# Patient Record
Sex: MALE | Race: WHITE | Employment: OTHER | URBAN - METROPOLITAN AREA
[De-identification: names, ages, dates, MRNs, and addresses within clinical notes are randomized per-mention and may not be internally consistent; named-entity substitution may affect disease eponyms.]

---

## 2017-12-30 PROBLEM — I21.4 NSTEMI (NON-ST ELEVATED MYOCARDIAL INFARCTION) (HCC): Status: ACTIVE | Noted: 2017-12-30

## 2018-01-02 ENCOUNTER — TELEPHONE (OUTPATIENT)
Dept: CARDIOLOGY | Age: 53
End: 2018-01-02

## 2018-01-02 ENCOUNTER — TELEPHONE (OUTPATIENT)
Dept: CARDIOLOGY CLINIC | Age: 53
End: 2018-01-02

## 2018-01-02 RX ORDER — CLOPIDOGREL BISULFATE 75 MG/1
75 TABLET ORAL DAILY
Qty: 30 TABLET | Refills: 11 | Status: SHIPPED | OUTPATIENT
Start: 2018-01-02 | End: 2018-11-23 | Stop reason: SDUPTHER

## 2018-01-03 NOTE — TELEPHONE ENCOUNTER
I called and spoke with patient's wife and she states that Martir Garza picked up his Plavix yesterday and starting taking it right away. I discussed that he should not miss any doses of the medication.

## 2018-01-11 ENCOUNTER — TELEPHONE (OUTPATIENT)
Dept: CARDIOLOGY CLINIC | Age: 53
End: 2018-01-11

## 2018-01-11 NOTE — TELEPHONE ENCOUNTER
Pt states he had a heart attack on 12/30/17 and he is wondering if he is ok to have sex. Please call pt to advise.

## 2018-01-16 ENCOUNTER — OFFICE VISIT (OUTPATIENT)
Dept: CARDIOLOGY CLINIC | Age: 53
End: 2018-01-16

## 2018-01-16 VITALS
HEART RATE: 79 BPM | DIASTOLIC BLOOD PRESSURE: 58 MMHG | OXYGEN SATURATION: 92 % | WEIGHT: 138 LBS | BODY MASS INDEX: 25.4 KG/M2 | HEIGHT: 62 IN | SYSTOLIC BLOOD PRESSURE: 86 MMHG

## 2018-01-16 DIAGNOSIS — I22.2 SUBSEQUENT NON-ST ELEVATION (NSTEMI) MYOCARDIAL INFARCTION WITHIN 4 WEEKS OF INITIAL INFARCTION (HCC): ICD-10-CM

## 2018-01-16 DIAGNOSIS — I25.10 CORONARY ARTERY DISEASE INVOLVING NATIVE CORONARY ARTERY OF NATIVE HEART WITHOUT ANGINA PECTORIS: Primary | ICD-10-CM

## 2018-01-16 DIAGNOSIS — E78.00 PURE HYPERCHOLESTEROLEMIA: ICD-10-CM

## 2018-01-16 PROCEDURE — G8427 DOCREV CUR MEDS BY ELIG CLIN: HCPCS | Performed by: NURSE PRACTITIONER

## 2018-01-16 PROCEDURE — G8419 CALC BMI OUT NRM PARAM NOF/U: HCPCS | Performed by: NURSE PRACTITIONER

## 2018-01-16 PROCEDURE — 99214 OFFICE O/P EST MOD 30 MIN: CPT | Performed by: NURSE PRACTITIONER

## 2018-01-16 PROCEDURE — 4004F PT TOBACCO SCREEN RCVD TLK: CPT | Performed by: NURSE PRACTITIONER

## 2018-01-16 PROCEDURE — 1111F DSCHRG MED/CURRENT MED MERGE: CPT | Performed by: NURSE PRACTITIONER

## 2018-01-16 PROCEDURE — 3017F COLORECTAL CA SCREEN DOC REV: CPT | Performed by: NURSE PRACTITIONER

## 2018-01-16 PROCEDURE — G8484 FLU IMMUNIZE NO ADMIN: HCPCS | Performed by: NURSE PRACTITIONER

## 2018-01-16 PROCEDURE — G8598 ASA/ANTIPLAT THER USED: HCPCS | Performed by: NURSE PRACTITIONER

## 2018-01-16 RX ORDER — NICOTINE 21 MG/24HR
1 PATCH, TRANSDERMAL 24 HOURS TRANSDERMAL EVERY 24 HOURS
Qty: 30 PATCH | Refills: 1 | Status: SHIPPED | OUTPATIENT
Start: 2018-01-16 | End: 2018-03-05 | Stop reason: SDUPTHER

## 2018-01-16 RX ORDER — METOPROLOL SUCCINATE 25 MG/1
12.5 TABLET, EXTENDED RELEASE ORAL DAILY
Qty: 30 TABLET | Refills: 3 | Status: SHIPPED | OUTPATIENT
Start: 2018-01-16 | End: 2018-08-28 | Stop reason: SDUPTHER

## 2018-01-16 NOTE — COMMUNICATION BODY
Assessment:    1. Coronary artery disease involving native coronary artery of native heart without angina pectoris    2. Subsequent non-ST elevation (NSTEMI) myocardial infarction within 4 weeks of initial infarction ([facility] [address]    3. Pure hypercholesterolemia             Plan:   1. Stop the metoprolol (Lopressor) 25 mg twice daily  2. Start instead metoprolol er (Toprol XL) 25 mg, half tablet once daily (hypotension)  3. Avoid ibuprofen, motrin, advil (NSAIDS) since on the \"blood thinners\"  4. Schedule a stress test to look at the other blockage  5. Follow a low saturated fat diet (handout given)  6. Referral to cardiac rehab phase II  7.  Follow up with me in 4-6 weeks, 3 months with Dr. Tyrell Lebron

## 2018-01-16 NOTE — PROGRESS NOTES
cyanosis of the extremities.   · No edema  · Pedal Pulses: 2+ and equal     Abdomen:  · No masses or tenderness  · Liver/Spleen: No Abnormalities Noted  Neurological/Psychiatric:  · Alert and oriented in all spheres  · Moves all extremities well  · Exhibits normal gait balance and coordination  · No abnormalities of mood, affect, memory, mentation, or behavior are noted    Lab Data:  CBC:   Lab Results   Component Value Date    WBC 14.3 12/31/2017    WBC 14.5 12/30/2017    WBC 14.8 08/16/2016    RBC 5.29 12/31/2017    RBC 5.70 12/30/2017    RBC 5.33 08/16/2016    HGB 14.8 12/31/2017    HGB 16.2 12/30/2017    HGB 14.9 08/16/2016    HCT 44.6 12/31/2017    HCT 47.2 12/30/2017    HCT 44.8 08/16/2016    MCV 84.3 12/31/2017    MCV 82.8 12/30/2017    MCV 84.0 08/16/2016    RDW 13.6 12/31/2017    RDW 13.4 12/30/2017    RDW 13.4 08/16/2016     12/31/2017     12/30/2017     08/16/2016     BMP:  Lab Results   Component Value Date     12/31/2017     12/30/2017     08/16/2016    K 3.7 12/31/2017    K 3.3 12/30/2017    K 3.6 08/16/2016     12/31/2017    CL 95 12/30/2017    CL 98 08/16/2016    CO2 22 12/31/2017    CO2 27 12/30/2017    CO2 23 08/16/2016    PHOS 3.8 08/15/2011    BUN 6 12/31/2017    BUN 7 12/30/2017    BUN 7 08/16/2016    CREATININE 0.6 12/31/2017    CREATININE 0.7 12/30/2017    CREATININE 0.6 08/16/2016     BNP:   Lab Results   Component Value Date    PROBNP 1,226 12/30/2017     Troponin: No components found for: TROPONIN  Lipids:   Lab Results   Component Value Date    TRIG 208 12/31/2017    TRIG 446 08/15/2011    HDL 22 12/31/2017    HDL 30 08/15/2011    LDLCALC 80 12/31/2017    LDLDIRECT 99 08/15/2011     Cardiac Imaging:  LEFT HEART CATH  LM: short  LAD: long section of disease in mid LAD from septal to diag 2,  mid LAD plaque rupture with thrombus and 80% stenosis (ADAN-2 flow)  Ramus: vs high OM1, proximal 15%  LCX: small vessel, luminals  RCA: dominant, moderate diffuse diseae in mid with sting of perals configuration, distal RCA of 70%, ostial PLV 90%(short focal, seen in BETH)     LVEDP: 22  LVEF: 30-35% with moderate anterior, apex, and inferior apical hypokinesis     PCI of mid LAD  STENT: Xience 3.0 x 38mm post dilated using 3x15mm NC trek with pressures of 16-22atm, sluggish flow afterwards requiring NTG and nitroprusside    Assessment  1. Successful PCI to mid LAD for active plaque rupture. 80%-->0% using drug stent  2. Integrillin for 12 hrs  3. ASA 81mg poqday for life  4. Ticagrelor 90mg po BID for 1 yr w/o interruption  5. Staged ischemic eval of distal RCA./PLV  6. BB, statin, echo, cardiac rehab       Assessment:    1. Coronary artery disease involving native coronary artery of native heart without angina pectoris    2. Subsequent non-ST elevation (NSTEMI) myocardial infarction within 4 weeks of initial infarction ([facility] [address]    3. Pure hypercholesterolemia          Plan:   1. Stop the metoprolol (Lopressor) 25 mg twice daily  2. Start instead metoprolol er (Toprol XL) 25 mg, half tablet once daily (hypotension)  3. Avoid ibuprofen, motrin, advil (NSAIDS) since on the \"blood thinners\"  4. Schedule a stress test to look at the other blockage  5. Follow a low saturated fat diet (handout given)  6. Referral to cardiac rehab phase II  7. Follow up with me in 4-6 weeks, 3 months with Dr. Nigel Barker      I appreciate the opportunity of cooperating in the care of this individual.    Jeanne Rosenbaum CNP, 1/16/2018, 12:36 PM    QUALITY MEASURES  1. Tobacco Cessation Counseling: Yes  2. Retake of BP if >140/90:   NA  3. Documentation to PCP/referring for new patient:  Sent to PCP at close of office visit  4. CAD patient on anti-platelet: Yes  5. CAD patient on STATIN therapy:  Yes  6.  Patient with CHF and aFib on anticoagulation:  NA

## 2018-01-16 NOTE — PATIENT INSTRUCTIONS
1. Stop the metoprolol (Lopressor) 25 mg twice daily  2. Start instead metoprolol er (Toprol XL) 25 mg, half tablet once daily  3. Avoid ibuprofen, motrin, advil (NSAIDS) since on the \"blood thinners\"  4. Schedule a stress test to look at the other blockage  5. Follow a low saturated fat diet  6. Referral to cardiac rehab phase II  7. Follow up with me in 4-6 weeks, 3 months with Dr. Erich Tracey       Ref.  Range 12/31/2017 04:13   Cholesterol, Total Latest Ref Range: 0 - 199 mg/dL 144   HDL Cholesterol Latest Ref Range: 40 - 60 mg/dL 22 (L)   LDL Calculated Latest Ref Range: <70 mg/dL 80   Triglycerides Latest Ref Range: 0 - 150 mg/dL 208 (H)

## 2018-02-01 ENCOUNTER — HOSPITAL ENCOUNTER (OUTPATIENT)
Dept: NON INVASIVE DIAGNOSTICS | Age: 53
Discharge: HOME | End: 2018-02-01
Attending: NURSE PRACTITIONER | Admitting: NURSE PRACTITIONER

## 2018-02-01 VITALS
DIASTOLIC BLOOD PRESSURE: 70 MMHG | HEART RATE: 77 BPM | WEIGHT: 140 LBS | SYSTOLIC BLOOD PRESSURE: 110 MMHG | TEMPERATURE: 97.5 F | RESPIRATION RATE: 15 BRPM | BODY MASS INDEX: 25.61 KG/M2

## 2018-02-01 DIAGNOSIS — I25.10 ATHEROSCLEROTIC HEART DISEASE OF NATIVE CORONARY ARTERY WITHOUT ANGINA PECTORIS: ICD-10-CM

## 2018-02-01 LAB
LV EF: 65 %
LVEF MODALITY: NORMAL

## 2018-02-01 ASSESSMENT — PAIN - FUNCTIONAL ASSESSMENT: PAIN_FUNCTIONAL_ASSESSMENT: 0-10

## 2018-02-02 ENCOUNTER — TELEPHONE (OUTPATIENT)
Dept: CARDIOLOGY CLINIC | Age: 53
End: 2018-02-02

## 2018-02-02 NOTE — TELEPHONE ENCOUNTER
Spoke with Asiya Buchanan, relayed message per [employer] regarding stress test results.  Pt verbalized understanding and will see [employer] at appt on 2/21

## 2018-02-02 NOTE — TELEPHONE ENCOUNTER
----- Message from Nicanor Alvarez NP sent at 2/2/2018  8:01 AM EST -----  No evidence of ischemia in the territory to the RCA. Heart function is good. No new recommendations. Keep upcoming appointments.    Thanks, [employer]

## 2018-02-05 ENCOUNTER — PATIENT MESSAGE (OUTPATIENT)
Dept: CARDIOLOGY CLINIC | Age: 53
End: 2018-02-05

## 2018-02-06 NOTE — TELEPHONE ENCOUNTER
From: Rachel Cisneros  To: Thu Alvarenga NP  Sent: 2/5/2018 11:47 PM EST  Subject: Test Results Question    will I require to have surgery on my heart ?

## 2018-02-19 PROBLEM — I20.0 UNSTABLE ANGINA (HCC): Status: ACTIVE | Noted: 2018-02-19

## 2018-03-05 RX ORDER — NICOTINE 14MG/24HR
1 PATCH, TRANSDERMAL 24 HOURS TRANSDERMAL EVERY 24 HOURS
Qty: 28 PATCH | Refills: 0 | Status: SHIPPED | OUTPATIENT
Start: 2018-03-05 | End: 2018-03-29 | Stop reason: SDUPTHER

## 2018-03-14 ENCOUNTER — OFFICE VISIT (OUTPATIENT)
Dept: CARDIOLOGY CLINIC | Age: 53
End: 2018-03-14

## 2018-03-14 VITALS
OXYGEN SATURATION: 93 % | SYSTOLIC BLOOD PRESSURE: 102 MMHG | BODY MASS INDEX: 25.95 KG/M2 | HEIGHT: 62 IN | HEART RATE: 78 BPM | WEIGHT: 141 LBS | DIASTOLIC BLOOD PRESSURE: 64 MMHG

## 2018-03-14 DIAGNOSIS — I25.5 ISCHEMIC CARDIOMYOPATHY: ICD-10-CM

## 2018-03-14 DIAGNOSIS — E78.00 PURE HYPERCHOLESTEROLEMIA: ICD-10-CM

## 2018-03-14 DIAGNOSIS — I10 ESSENTIAL HYPERTENSION: ICD-10-CM

## 2018-03-14 DIAGNOSIS — I25.10 CORONARY ARTERY DISEASE INVOLVING NATIVE CORONARY ARTERY OF NATIVE HEART WITHOUT ANGINA PECTORIS: Primary | ICD-10-CM

## 2018-03-14 PROCEDURE — G8598 ASA/ANTIPLAT THER USED: HCPCS | Performed by: NURSE PRACTITIONER

## 2018-03-14 PROCEDURE — 1036F TOBACCO NON-USER: CPT | Performed by: NURSE PRACTITIONER

## 2018-03-14 PROCEDURE — G8484 FLU IMMUNIZE NO ADMIN: HCPCS | Performed by: NURSE PRACTITIONER

## 2018-03-14 PROCEDURE — 1111F DSCHRG MED/CURRENT MED MERGE: CPT | Performed by: NURSE PRACTITIONER

## 2018-03-14 PROCEDURE — 99213 OFFICE O/P EST LOW 20 MIN: CPT | Performed by: NURSE PRACTITIONER

## 2018-03-14 PROCEDURE — 3017F COLORECTAL CA SCREEN DOC REV: CPT | Performed by: NURSE PRACTITIONER

## 2018-03-14 PROCEDURE — G8419 CALC BMI OUT NRM PARAM NOF/U: HCPCS | Performed by: NURSE PRACTITIONER

## 2018-03-14 PROCEDURE — G8427 DOCREV CUR MEDS BY ELIG CLIN: HCPCS | Performed by: NURSE PRACTITIONER

## 2018-03-14 NOTE — PROGRESS NOTES
[address]   Cardiac Evaluation    Primary Care Doctor:  Samm Coley CNP    Chief Complaint   Patient presents with    Follow Up After Procedure     s/p cath by jjp 02/20/2018    Coronary Artery Disease    Results     cxr 02/19/2018    Discuss Labs     cmp 02/20/2018, lip 12/31/2017    Other     groin site healed    Shortness of Breath     always    Fatigue     always tired        History of Present Illness:   I had the pleasure of seeing Merlene Tompkins in follow up for CAD, PCI to LAD, ICM, HTN and HLD. At last visit we changed metoprolol to Toprol XL 12.5 mg once daily and recommended avoidance of [place], baking soda and NSAIDS. A nuclear stress test was negative for ischemia. He was then hospitalized 2/19/18 to 2/21/18 for lightheadedness, diaphoresis and chest pain. A repeat LHC showed patent stent in LAD and 40-50% obstruction in the RCA, no intervention warranted. He was started Imdur which is causing him a headache. No further chest pain, lightheadedness or diaphoresis. He reports some dizziness with standing too long (doing dishes). He was treated for bronchitis with course of antibiotics, now resolved. However, he continues to have dyspnea on exertion with just walking to the bathroom, ongoing since had MI/ PCI. He remains a non-smoker since 12/31/17, no longer using the patches. Merlene Tompkins describes symptoms including dyspnea, fatigue but denies chest pain, palpitations, orthopnea, PND, early saiety, edema, syncope. NYHA:   III  ACC/ AHA Stage:    C    Past Medical History:   has a past medical history of Family history of early CAD; GERD (gastroesophageal reflux disease); High cholesterol; MI (myocardial infarction); and Smoker. Surgical History:   has a past surgical history that includes Cholecystectomy and Coronary angioplasty with stent (12/30/2017). Social History:   reports that he has quit smoking.  His smoking use included

## 2018-03-30 RX ORDER — NICOTINE 14MG/24HR
1 PATCH, TRANSDERMAL 24 HOURS TRANSDERMAL EVERY 24 HOURS
Qty: 28 PATCH | Refills: 0 | Status: SHIPPED | OUTPATIENT
Start: 2018-03-30 | End: 2018-06-20 | Stop reason: ALTCHOICE

## 2018-04-16 RX ORDER — LORATADINE 10 MG
TABLET ORAL
Qty: 30 TABLET | Refills: 3 | Status: SHIPPED | OUTPATIENT
Start: 2018-04-16 | End: 2018-08-01 | Stop reason: SDUPTHER

## 2018-04-16 RX ORDER — ATORVASTATIN CALCIUM 40 MG/1
40 TABLET, FILM COATED ORAL NIGHTLY
Qty: 30 TABLET | Refills: 3 | Status: SHIPPED | OUTPATIENT
Start: 2018-04-16 | End: 2018-08-01 | Stop reason: SDUPTHER

## 2018-04-16 RX ORDER — NITROGLYCERIN 0.4 MG/1
TABLET SUBLINGUAL
Qty: 25 TABLET | Refills: 3 | Status: SHIPPED | OUTPATIENT
Start: 2018-04-16 | End: 2018-08-01 | Stop reason: SDUPTHER

## 2018-04-23 RX ORDER — NICOTINE 14MG/24HR
1 PATCH, TRANSDERMAL 24 HOURS TRANSDERMAL EVERY 24 HOURS
Qty: 28 PATCH | Refills: 0 | OUTPATIENT
Start: 2018-04-23

## 2018-05-08 RX ORDER — NICOTINE 14MG/24HR
1 PATCH, TRANSDERMAL 24 HOURS TRANSDERMAL EVERY 24 HOURS
Qty: 28 PATCH | Refills: 1 | OUTPATIENT
Start: 2018-05-08

## 2018-05-21 ENCOUNTER — PATIENT MESSAGE (OUTPATIENT)
Dept: CARDIOLOGY CLINIC | Age: 53
End: 2018-05-21

## 2018-05-23 ENCOUNTER — PATIENT MESSAGE (OUTPATIENT)
Dept: CARDIOLOGY CLINIC | Age: 53
End: 2018-05-23

## 2018-06-20 ENCOUNTER — OFFICE VISIT (OUTPATIENT)
Dept: CARDIOLOGY CLINIC | Age: 53
End: 2018-06-20

## 2018-06-20 VITALS
OXYGEN SATURATION: 95 % | SYSTOLIC BLOOD PRESSURE: 108 MMHG | HEART RATE: 86 BPM | HEIGHT: 62 IN | WEIGHT: 147 LBS | BODY MASS INDEX: 27.05 KG/M2 | DIASTOLIC BLOOD PRESSURE: 62 MMHG

## 2018-06-20 DIAGNOSIS — I25.5 ISCHEMIC CARDIOMYOPATHY: ICD-10-CM

## 2018-06-20 DIAGNOSIS — I25.10 CORONARY ARTERY DISEASE INVOLVING NATIVE CORONARY ARTERY OF NATIVE HEART WITHOUT ANGINA PECTORIS: Primary | ICD-10-CM

## 2018-06-20 DIAGNOSIS — I10 ESSENTIAL HYPERTENSION: ICD-10-CM

## 2018-06-20 DIAGNOSIS — E78.00 PURE HYPERCHOLESTEROLEMIA: ICD-10-CM

## 2018-06-20 PROCEDURE — G8419 CALC BMI OUT NRM PARAM NOF/U: HCPCS | Performed by: NURSE PRACTITIONER

## 2018-06-20 PROCEDURE — 1036F TOBACCO NON-USER: CPT | Performed by: NURSE PRACTITIONER

## 2018-06-20 PROCEDURE — 3017F COLORECTAL CA SCREEN DOC REV: CPT | Performed by: NURSE PRACTITIONER

## 2018-06-20 PROCEDURE — G8598 ASA/ANTIPLAT THER USED: HCPCS | Performed by: NURSE PRACTITIONER

## 2018-06-20 PROCEDURE — G8427 DOCREV CUR MEDS BY ELIG CLIN: HCPCS | Performed by: NURSE PRACTITIONER

## 2018-06-20 PROCEDURE — 99213 OFFICE O/P EST LOW 20 MIN: CPT | Performed by: NURSE PRACTITIONER

## 2018-06-20 RX ORDER — ISOSORBIDE MONONITRATE 30 MG/1
30 TABLET, EXTENDED RELEASE ORAL DAILY
Qty: 30 TABLET | Refills: 5 | Status: SHIPPED | OUTPATIENT
Start: 2018-06-20 | End: 2018-09-19 | Stop reason: ALTCHOICE

## 2018-06-20 RX ORDER — FOLIC ACID/MULTIVIT,IRON,MINER 0.4MG-18MG
1 TABLET ORAL 2 TIMES DAILY
Qty: 60 CAPSULE | Refills: 5 | Status: SHIPPED | OUTPATIENT
Start: 2018-06-20 | End: 2018-12-03 | Stop reason: SDUPTHER

## 2018-07-16 ENCOUNTER — APPOINTMENT (OUTPATIENT)
Dept: CT IMAGING | Age: 53
DRG: 347 | End: 2018-07-16
Payer: MEDICAID

## 2018-07-16 ENCOUNTER — HOSPITAL ENCOUNTER (INPATIENT)
Age: 53
LOS: 1 days | Discharge: HOME HEALTH | DRG: 347 | End: 2018-07-17
Attending: EMERGENCY MEDICINE | Admitting: INTERNAL MEDICINE
Payer: MEDICAID

## 2018-07-16 ENCOUNTER — APPOINTMENT (OUTPATIENT)
Dept: GENERAL RADIOLOGY | Age: 53
DRG: 347 | End: 2018-07-16
Payer: MEDICAID

## 2018-07-16 DIAGNOSIS — K86.9 PANCREATIC LESION: ICD-10-CM

## 2018-07-16 DIAGNOSIS — S22.070A CLOSED WEDGE COMPRESSION FRACTURE OF TENTH THORACIC VERTEBRA, INITIAL ENCOUNTER: ICD-10-CM

## 2018-07-16 DIAGNOSIS — I95.9 HYPOTENSION, UNSPECIFIED HYPOTENSION TYPE: Primary | ICD-10-CM

## 2018-07-16 DIAGNOSIS — R09.02 HYPOXIA: ICD-10-CM

## 2018-07-16 PROBLEM — I20.0 UNSTABLE ANGINA (HCC): Status: RESOLVED | Noted: 2018-02-19 | Resolved: 2018-07-16

## 2018-07-16 PROBLEM — I21.4 NSTEMI (NON-ST ELEVATED MYOCARDIAL INFARCTION) (HCC): Status: RESOLVED | Noted: 2017-12-30 | Resolved: 2018-07-16

## 2018-07-16 LAB
A/G RATIO: 1.3 (ref 1.1–2.2)
ALBUMIN SERPL-MCNC: 3.9 G/DL (ref 3.4–5)
ALP BLD-CCNC: 118 U/L (ref 40–129)
ALT SERPL-CCNC: 28 U/L (ref 10–40)
ANION GAP SERPL CALCULATED.3IONS-SCNC: 8 MMOL/L (ref 3–16)
AST SERPL-CCNC: 25 U/L (ref 15–37)
BACTERIA: ABNORMAL /HPF
BASOPHILS ABSOLUTE: 0.1 K/UL (ref 0–0.2)
BASOPHILS RELATIVE PERCENT: 0.9 %
BILIRUB SERPL-MCNC: 0.6 MG/DL (ref 0–1)
BILIRUBIN URINE: NEGATIVE
BLOOD, URINE: NEGATIVE
BUN BLDV-MCNC: 9 MG/DL (ref 7–20)
CALCIUM SERPL-MCNC: 9.1 MG/DL (ref 8.3–10.6)
CASTS 2: ABNORMAL /LPF
CASTS: ABNORMAL /LPF
CHLORIDE BLD-SCNC: 104 MMOL/L (ref 99–110)
CLARITY: CLEAR
CO2: 25 MMOL/L (ref 21–32)
COLOR: YELLOW
CREAT SERPL-MCNC: 0.8 MG/DL (ref 0.9–1.3)
EOSINOPHILS ABSOLUTE: 0.1 K/UL (ref 0–0.6)
EOSINOPHILS RELATIVE PERCENT: 0.5 %
GFR AFRICAN AMERICAN: >60
GFR NON-AFRICAN AMERICAN: >60
GLOBULIN: 3.1 G/DL
GLUCOSE BLD-MCNC: 142 MG/DL (ref 70–99)
GLUCOSE URINE: NEGATIVE MG/DL
HCT VFR BLD CALC: 41.4 % (ref 40.5–52.5)
HEMOGLOBIN: 13.9 G/DL (ref 13.5–17.5)
KETONES, URINE: NEGATIVE MG/DL
LACTIC ACID: 1 MMOL/L (ref 0.4–2)
LEUKOCYTE ESTERASE, URINE: NEGATIVE
LYMPHOCYTES ABSOLUTE: 1.7 K/UL (ref 1–5.1)
LYMPHOCYTES RELATIVE PERCENT: 13.2 %
MCH RBC QN AUTO: 27.9 PG (ref 26–34)
MCHC RBC AUTO-ENTMCNC: 33.5 G/DL (ref 31–36)
MCV RBC AUTO: 83.4 FL (ref 80–100)
MICROSCOPIC EXAMINATION: ABNORMAL
MONOCYTES ABSOLUTE: 0.6 K/UL (ref 0–1.3)
MONOCYTES RELATIVE PERCENT: 4.8 %
MUCUS: ABNORMAL /LPF
NEUTROPHILS ABSOLUTE: 10.1 K/UL (ref 1.7–7.7)
NEUTROPHILS RELATIVE PERCENT: 80.6 %
NITRITE, URINE: NEGATIVE
PDW BLD-RTO: 13.4 % (ref 12.4–15.4)
PH UA: 6
PLATELET # BLD: 247 K/UL (ref 135–450)
PMV BLD AUTO: 8.4 FL (ref 5–10.5)
POTASSIUM SERPL-SCNC: 4.1 MMOL/L (ref 3.5–5.1)
PROTEIN UA: NEGATIVE MG/DL
RBC # BLD: 4.97 M/UL (ref 4.2–5.9)
RBC UA: ABNORMAL /HPF (ref 0–2)
SODIUM BLD-SCNC: 137 MMOL/L (ref 136–145)
SPECIFIC GRAVITY UA: 1.02
TOTAL PROTEIN: 7 G/DL (ref 6.4–8.2)
TROPONIN: <0.01 NG/ML
TROPONIN: <0.01 NG/ML
UROBILINOGEN, URINE: 0.2 E.U./DL
WBC # BLD: 12.5 K/UL (ref 4–11)
WBC UA: ABNORMAL /HPF (ref 0–5)

## 2018-07-16 PROCEDURE — 84484 ASSAY OF TROPONIN QUANT: CPT

## 2018-07-16 PROCEDURE — 71275 CT ANGIOGRAPHY CHEST: CPT

## 2018-07-16 PROCEDURE — 2700000000 HC OXYGEN THERAPY PER DAY

## 2018-07-16 PROCEDURE — 6360000004 HC RX CONTRAST MEDICATION: Performed by: PHYSICIAN ASSISTANT

## 2018-07-16 PROCEDURE — 93005 ELECTROCARDIOGRAM TRACING: CPT | Performed by: PHYSICIAN ASSISTANT

## 2018-07-16 PROCEDURE — 6360000002 HC RX W HCPCS: Performed by: INTERNAL MEDICINE

## 2018-07-16 PROCEDURE — 99285 EMERGENCY DEPT VISIT HI MDM: CPT

## 2018-07-16 PROCEDURE — 94761 N-INVAS EAR/PLS OXIMETRY MLT: CPT

## 2018-07-16 PROCEDURE — 2580000003 HC RX 258: Performed by: INTERNAL MEDICINE

## 2018-07-16 PROCEDURE — 6370000000 HC RX 637 (ALT 250 FOR IP): Performed by: INTERNAL MEDICINE

## 2018-07-16 PROCEDURE — 6370000000 HC RX 637 (ALT 250 FOR IP): Performed by: PHYSICIAN ASSISTANT

## 2018-07-16 PROCEDURE — 85025 COMPLETE CBC W/AUTO DIFF WBC: CPT

## 2018-07-16 PROCEDURE — 81001 URINALYSIS AUTO W/SCOPE: CPT

## 2018-07-16 PROCEDURE — 83605 ASSAY OF LACTIC ACID: CPT

## 2018-07-16 PROCEDURE — 36415 COLL VENOUS BLD VENIPUNCTURE: CPT

## 2018-07-16 PROCEDURE — 1200000000 HC SEMI PRIVATE

## 2018-07-16 PROCEDURE — 93010 ELECTROCARDIOGRAM REPORT: CPT | Performed by: INTERNAL MEDICINE

## 2018-07-16 PROCEDURE — 96374 THER/PROPH/DIAG INJ IV PUSH: CPT

## 2018-07-16 PROCEDURE — 72100 X-RAY EXAM L-S SPINE 2/3 VWS: CPT

## 2018-07-16 PROCEDURE — 80053 COMPREHEN METABOLIC PANEL: CPT

## 2018-07-16 PROCEDURE — 74174 CTA ABD&PLVS W/CONTRAST: CPT

## 2018-07-16 PROCEDURE — 96361 HYDRATE IV INFUSION ADD-ON: CPT

## 2018-07-16 PROCEDURE — 2580000003 HC RX 258: Performed by: PHYSICIAN ASSISTANT

## 2018-07-16 RX ORDER — CYCLOBENZAPRINE HCL 10 MG
10 TABLET ORAL ONCE
Status: COMPLETED | OUTPATIENT
Start: 2018-07-16 | End: 2018-07-16

## 2018-07-16 RX ORDER — SODIUM CHLORIDE 0.9 % (FLUSH) 0.9 %
10 SYRINGE (ML) INJECTION PRN
Status: DISCONTINUED | OUTPATIENT
Start: 2018-07-16 | End: 2018-07-17 | Stop reason: HOSPADM

## 2018-07-16 RX ORDER — 0.9 % SODIUM CHLORIDE 0.9 %
1000 INTRAVENOUS SOLUTION INTRAVENOUS ONCE
Status: COMPLETED | OUTPATIENT
Start: 2018-07-16 | End: 2018-07-17

## 2018-07-16 RX ORDER — OXYCODONE HYDROCHLORIDE AND ACETAMINOPHEN 5; 325 MG/1; MG/1
1 TABLET ORAL EVERY 6 HOURS PRN
Status: DISCONTINUED | OUTPATIENT
Start: 2018-07-16 | End: 2018-07-17 | Stop reason: HOSPADM

## 2018-07-16 RX ORDER — SODIUM CHLORIDE 0.9 % (FLUSH) 0.9 %
10 SYRINGE (ML) INJECTION EVERY 12 HOURS SCHEDULED
Status: DISCONTINUED | OUTPATIENT
Start: 2018-07-16 | End: 2018-07-17 | Stop reason: HOSPADM

## 2018-07-16 RX ORDER — CLOPIDOGREL BISULFATE 75 MG/1
75 TABLET ORAL DAILY
Status: DISCONTINUED | OUTPATIENT
Start: 2018-07-17 | End: 2018-07-17 | Stop reason: HOSPADM

## 2018-07-16 RX ORDER — ACETAMINOPHEN 325 MG/1
650 TABLET ORAL EVERY 4 HOURS PRN
Status: DISCONTINUED | OUTPATIENT
Start: 2018-07-16 | End: 2018-07-17 | Stop reason: HOSPADM

## 2018-07-16 RX ORDER — 0.9 % SODIUM CHLORIDE 0.9 %
1000 INTRAVENOUS SOLUTION INTRAVENOUS ONCE
Status: COMPLETED | OUTPATIENT
Start: 2018-07-16 | End: 2018-07-16

## 2018-07-16 RX ORDER — ONDANSETRON 2 MG/ML
4 INJECTION INTRAMUSCULAR; INTRAVENOUS EVERY 6 HOURS PRN
Status: DISCONTINUED | OUTPATIENT
Start: 2018-07-16 | End: 2018-07-17 | Stop reason: HOSPADM

## 2018-07-16 RX ORDER — LIDOCAINE 50 MG/G
1 PATCH TOPICAL ONCE
Status: COMPLETED | OUTPATIENT
Start: 2018-07-16 | End: 2018-07-16

## 2018-07-16 RX ORDER — FOLIC ACID/MULTIVIT,IRON,MINER 0.4MG-18MG
1 TABLET ORAL 2 TIMES DAILY
Status: DISCONTINUED | OUTPATIENT
Start: 2018-07-16 | End: 2018-07-16 | Stop reason: RX

## 2018-07-16 RX ORDER — ATORVASTATIN CALCIUM 40 MG/1
40 TABLET, FILM COATED ORAL NIGHTLY
Status: DISCONTINUED | OUTPATIENT
Start: 2018-07-16 | End: 2018-07-17 | Stop reason: HOSPADM

## 2018-07-16 RX ORDER — OXYCODONE AND ACETAMINOPHEN 10; 325 MG/1; MG/1
1 TABLET ORAL ONCE
Status: COMPLETED | OUTPATIENT
Start: 2018-07-16 | End: 2018-07-16

## 2018-07-16 RX ORDER — ASPIRIN 81 MG/1
81 TABLET, CHEWABLE ORAL DAILY
Status: DISCONTINUED | OUTPATIENT
Start: 2018-07-17 | End: 2018-07-17 | Stop reason: HOSPADM

## 2018-07-16 RX ORDER — OXYCODONE HYDROCHLORIDE AND ACETAMINOPHEN 5; 325 MG/1; MG/1
2 TABLET ORAL EVERY 6 HOURS PRN
Status: DISCONTINUED | OUTPATIENT
Start: 2018-07-16 | End: 2018-07-17 | Stop reason: HOSPADM

## 2018-07-16 RX ORDER — SODIUM CHLORIDE 9 MG/ML
INJECTION, SOLUTION INTRAVENOUS CONTINUOUS
Status: DISCONTINUED | OUTPATIENT
Start: 2018-07-16 | End: 2018-07-17 | Stop reason: HOSPADM

## 2018-07-16 RX ADMIN — CYCLOBENZAPRINE HYDROCHLORIDE 10 MG: 10 TABLET, FILM COATED ORAL at 11:07

## 2018-07-16 RX ADMIN — IOPAMIDOL 100 ML: 755 INJECTION, SOLUTION INTRAVENOUS at 16:29

## 2018-07-16 RX ADMIN — Medication 10 ML: at 22:02

## 2018-07-16 RX ADMIN — OXYCODONE HYDROCHLORIDE AND ACETAMINOPHEN 2 TABLET: 5; 325 TABLET ORAL at 23:37

## 2018-07-16 RX ADMIN — ENOXAPARIN SODIUM 40 MG: 40 INJECTION SUBCUTANEOUS at 21:59

## 2018-07-16 RX ADMIN — SODIUM CHLORIDE: 9 INJECTION, SOLUTION INTRAVENOUS at 21:59

## 2018-07-16 RX ADMIN — SODIUM CHLORIDE 1000 ML: 9 INJECTION, SOLUTION INTRAVENOUS at 12:39

## 2018-07-16 RX ADMIN — ATORVASTATIN CALCIUM 40 MG: 40 TABLET, FILM COATED ORAL at 21:59

## 2018-07-16 RX ADMIN — SODIUM CHLORIDE 1000 ML: 9 INJECTION, SOLUTION INTRAVENOUS at 14:36

## 2018-07-16 RX ADMIN — OXYCODONE HYDROCHLORIDE AND ACETAMINOPHEN 1 TABLET: 10; 325 TABLET ORAL at 11:07

## 2018-07-16 ASSESSMENT — PAIN DESCRIPTION - DESCRIPTORS
DESCRIPTORS: ACHING
DESCRIPTORS: ACHING

## 2018-07-16 ASSESSMENT — PAIN DESCRIPTION - FREQUENCY: FREQUENCY: INTERMITTENT

## 2018-07-16 ASSESSMENT — PAIN SCALES - GENERAL
PAINLEVEL_OUTOF10: 5
PAINLEVEL_OUTOF10: 8
PAINLEVEL_OUTOF10: 5
PAINLEVEL_OUTOF10: 10
PAINLEVEL_OUTOF10: 10

## 2018-07-16 ASSESSMENT — PAIN DESCRIPTION - PAIN TYPE
TYPE: ACUTE PAIN
TYPE: ACUTE PAIN

## 2018-07-16 ASSESSMENT — PAIN DESCRIPTION - LOCATION
LOCATION: BACK
LOCATION: BACK

## 2018-07-16 ASSESSMENT — PAIN DESCRIPTION - ONSET: ONSET: GRADUAL

## 2018-07-16 NOTE — ED NOTES
[id number] EDITH Garcia called for [facility] Hospitalist.      Dylan Liu  07/16/18 1828    NARINDER Hastings NP called back denied transfer    1845 - perfect served Dr. Candace Hawkins. Dylan Liu  07/16/18 Erlinda Cisneros - Dr. Candace Hawkins called back.       Dylan Liu  07/16/18 [id number]

## 2018-07-16 NOTE — ED PROVIDER NOTES
I independently performed a history and physical on [place] [address]. All diagnostic, treatment, and disposition decisions were made by myself in conjunction with the mid-level provider. Briefly the patient's history and exam was the following: For further details of [place] [address] emergency department encounter, please see KADE Zamorano's documentation. HISTORY:  Patient presents to ED with complaints of low back pain that began when he ws lifting a westfall pole ornament out of the ground and felt a pop in his back. Pain is in low back but does not radiate to legs. . No numbness or weakness. No urinary incontinence or retention. Denies abdominal pain. No fever. No vomiting or diarrhea. Hurts more to move. No chest pain or sob. EXAM:  Patient in no distress  Heart RRR, no murmurs. Lungs clear. No rales. No wheezing  Abdomen nontender. No guarding. No flank tenderness  Back with tenderness to low thoracic and lumbar spine. Strength 5/5 in legs bilaterally. Sensation intact. Patellar DTRs 2 plus    ED Triage Vitals [07/16/18 1041]   Enc Vitals Group      BP 98/66      Pulse 74      Resp 16      Temp 98 °F (36.7 °C)      Temp src       SpO2 93 %      Weight 146 lb (66.2 kg)      Height 5' 3\" (1.6 m)      Head Circumference       Peak Flow       Pain Score       Pain Loc       Pain Edu? Excl. in [address]? MEDICAL DECISION MAKING:    EKG as interpreted by myself:  normal sinus rhythm with a rate of 61  Axis is   Normal  QTc is  normal  Intervals and Durations are unremarkable. No specific ST-T wave changes appreciated. Septa Q waves  No evidence of acute ischemia. Compared to prior EKG dated 6/22/18, no significant change    CTA ABDOMEN PELVIS W CONTRAST   Final Result   VASCULAR      1. No evidence of an acute aortic syndrome. 2. Negative for acute pulmonary embolism.    3. 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 Comparison imaging would be helpful if available. Otherwise   consideration could be given to MRCP versus follow-up imaging in 3-6 months. 4. Colonic diverticulosis. XR LUMBAR SPINE (2-3 VIEWS)   Final Result   No acute fracture or subluxation. No significant degenerative changes. Xr Chest Standard (2 Vw)    Result Date: 6/23/2018  EXAMINATION: TWO VIEWS OF THE CHEST 6/23/2018 12:16 am COMPARISON: 02/19/2018 HISTORY: ORDERING PHYSICIAN PROVIDED HISTORY: cp TECHNOLOGIST PROVIDED HISTORY: Technologist Provided Reason for Exam: chest pain; SOB; nausea Acuity: Acute Type of Encounter: Initial Relevant Medical/Surgical History: personal hx of smoker x 45 year 1 1/2 PPD-quit 12/2017; MI; PTCA w/ stent placement FINDINGS: The lungs are clear. Evidence of healed granulomatous disease is again seen. There is no pleural effusion. The cardiomediastinal silhouette is normal. There is no pneumothorax. No acute disease. Xr Lumbar Spine (2-3 Views)    Result Date: 7/16/2018  EXAMINATION: 3 XRAY VIEWS OF THE LUMBAR SPINE 7/16/2018 11:01 am COMPARISON: Lumbar spine radiographs 09/16/2010 HISTORY: ORDERING SYSTEM PROVIDED HISTORY: back pain after trying to pull up a shepherds hook TECHNOLOGIST PROVIDED HISTORY: Reason for exam:->back pain after trying to pull up a shepherds hook Ordering Physician Provided Reason for Exam: back pain after heavy lifting this am Acuity: Acute Type of Exam: Initial FINDINGS: Bones: Five non-rib-bearing lumbar vertebral bodies are present. Vertebral body heights and alignment are maintained. Intervertebral disc heights are maintained. Intact sacroiliac joints and included sacrum. Degenerative changes: No significant degenerative changes. Soft Tissues: Aortoiliac atherosclerotic calcifications. Right upper quadrant cholecystectomy clips. No acute fracture or subluxation. No significant degenerative changes.      Cta Chest W Contrast    Result Date: 7/16/2018  EXAMINATION: CTA OF THE CHEST; CTA OF THE ABDOMEN AND PELVIS WITH CONTRAST 7/16/2018 4:32 pm TECHNIQUE: CTA of the chest was performed after the administration of intravenous contrast.  Multiplanar reformatted images are provided for review. MIP images are provided for review. Dose modulation, iterative reconstruction, and/or weight based adjustment of the mA/kV was utilized to reduce the radiation dose to as low as reasonably achievable.; CTA of the abdomen and pelvis was performed with the administration of intravenous contrast. Multiplanar reformatted images are provided for review. MIP images are provided for review. Dose modulation, iterative reconstruction, and/or weight based adjustment of the mA/kV was utilized to reduce the radiation dose to as low as reasonably achievable. COMPARISON: Chest radiographs 06/23/2018, 02/19/2018 HISTORY: ORDERING SYSTEM PROVIDED HISTORY: low back pain, hypotension TECHNOLOGIST PROVIDED HISTORY: Ordering Physician Provided Reason for Exam: WAS PULLING A SHEPHARDS HOOK IN THE GROUND AND FELT A POP IN HIS BACK Acuity: Acute Type of Exam: Initial FINDINGS: VASCULAR: CHEST: Aorta: Normal caliber thoracic aorta without evidence of aneurysm, dissection, rupture or penetrating atherosclerotic ulcer. Limited evaluation for intramural hematoma on postcontrast imaging. Moderate mixed calcified plaque throughout the thoracic aorta with involvement of the proximal arch vessels. Pulmonary Arteries: Normal caliber main pulmonary artery. No filling defect within the pulmonary arterial tree to the subsegmental level. ABDOMEN: Aorta: Normal caliber abdominal aorta without evidence of aneurysm, dissection, rupture or penetrating atherosclerotic ulcer. Mild atherosclerotic calcifications. Visceral Branch Vessels:  All of the visceral branch vessels opacify with contrast, including the celiac, superior and inferior mesenteric, and bilateral renal arteries as well as an accessory left renal artery. PELVIS: Aortoiliac: Chronic appearing occlusion of the left common iliac artery with reconstitution of the internal and external iliac arteries. Mild to moderate mixed atherosclerotic plaque along the bilateral external iliac and common femoral arteries. The aortoiliac and iliofemoral systems opacify with contrast. No evidence of dissection, aneurysm, or occlusion. NONVASCULAR: CHEST Heart and Pericardium: Normal heart size. Prior LAD stent seen. Mixed calcified plaque along the proximal LAD. Mediastinum: No pneumomediastinum, hematoma, fluid or gas collection. Mild mediastinal and hilar lymphadenopathy. For example 10 mm short axis right tracheobronchial and right hilar lymph nodes. Normal esophagus. Chest Wall: No enlarged lymph nodes. Normal included thyroid. Lungs, Airways and Pleura: No pneumothorax, pleural effusion or focal airspace consolidation. Moderate to advanced centrilobular and paraseptal emphysema with destructive and/or bullous deformity at the right lung apex. Scattered punctate calcified granulomas. Diffuse bronchial wall thickening. Clear central airways. Dependent atelectasis. ABDOMEN Liver: Low-attenuation liver parenchyma compatible with underlying steatosis. No focal lesion appreciated on this single-phase exam. Gallbladder: Surgically absent. Biliary: No intra or extrahepatic bile duct dilatation. Pancreas: 23 x 16 x 18 mm low-attenuation focus along the superior aspect of the pancreatic neck. Pancreas is otherwise unremarkable no bile duct dilatation. Spleen: Normal. Adrenals: Normal. Kidneys: Kidneys are symmetric in size, contour and enhancement. No hydronephrosis or nephrolithiasis. GI Tract: No apparent wall thickening or obstruction. Colonic diverticulosis. Normal appendix. Peritoneum/Retroperitoneum: No enlarged lymph nodes, free air or free fluid.  PELVIS Genitourinary: Probable dependent excreted contrast in the urinary bladder which is otherwise thin-walled and normal-appearing. Normal reproductive organs. Other: No free fluid. No enlarged lymph nodes. MUSCULOSKELETAL Bones and Soft Tissues: Subtle T10 superior endplate deformity with 20-30% vertebral body height loss and no significant retropulsion. No significant canal or foraminal stenosis. Probable Schmorl's node at L4. No other acute osseous or soft tissue abnormality identified. No paraspinal hematoma. VASCULAR 1. No evidence of an acute aortic syndrome. 2. Negative for acute pulmonary embolism. 3. 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 MUSCULOSKELETAL 1. Acute T10 superior endplate deformity with 20-30% vertebral body height loss. No retropulsion, canal or foraminal stenosis. CHEST 1. Moderate to advanced emphysema with smoking-related airway inflammation. No consolidative airspace disease. 2. Partially imaged mixed calcified plaque along the proximal LAD, incompletely characterized on this exam.  Prior LAD territory coronary stenting. 3. Mild mediastinal and right hilar lymphadenopathy, presumably reactive. ABDOMEN/PELVIS 1. No acute abdominopelvic findings. 2. Hepatic steatosis. 3. Indeterminate low-attenuation focus near the pancreatic neck, possibly a pancreatic lesion (such as IPMN) versus necrotic lymph node versus other etiology. Comparison imaging would be helpful if available. Otherwise consideration could be given to MRCP versus follow-up imaging in 3-6 months. 4. Colonic diverticulosis. Cta Abdomen Pelvis W Contrast    Result Date: 7/16/2018  EXAMINATION: CTA OF THE CHEST; CTA OF THE ABDOMEN AND PELVIS WITH CONTRAST 7/16/2018 4:32 pm TECHNIQUE: CTA of the chest was performed after the administration of intravenous contrast.  Multiplanar reformatted images are provided for review. MIP images are provided for review.  Dose modulation, iterative reconstruction, and/or weight based adjustment of the mA/kV was utilized to identified. No paraspinal hematoma. VASCULAR 1. No evidence of an acute aortic syndrome. 2. Negative for acute pulmonary embolism. 3. 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 MUSCULOSKELETAL 1. Acute T10 superior endplate deformity with 20-30% vertebral body height loss. No retropulsion, canal or foraminal stenosis. CHEST 1. Moderate to advanced emphysema with smoking-related airway inflammation. No consolidative airspace disease. 2. Partially imaged mixed calcified plaque along the proximal LAD, incompletely characterized on this exam.  Prior LAD territory coronary stenting. 3. Mild mediastinal and right hilar lymphadenopathy, presumably reactive. ABDOMEN/PELVIS 1. No acute abdominopelvic findings. 2. Hepatic steatosis. 3. Indeterminate low-attenuation focus near the pancreatic neck, possibly a pancreatic lesion (such as IPMN) versus necrotic lymph node versus other etiology. Comparison imaging would be helpful if available. Otherwise consideration could be given to MRCP versus follow-up imaging in 3-6 months. 4. Colonic diverticulosis.        Results for orders placed or performed during the hospital encounter of 07/16/18   CBC Auto Differential   Result Value Ref Range    WBC 12.5 (H) 4.0 - 11.0 K/uL    RBC 4.97 4.20 - 5.90 M/uL    Hemoglobin 13.9 13.5 - 17.5 g/dL    Hematocrit 41.4 40.5 - 52.5 %    MCV 83.4 80.0 - 100.0 fL    MCH 27.9 26.0 - 34.0 pg    MCHC 33.5 31.0 - 36.0 g/dL    RDW 13.4 12.4 - 15.4 %    Platelets 076 964 - 853 K/uL    MPV 8.4 5.0 - 10.5 fL    Neutrophils % 80.6 %    Lymphocytes % 13.2 %    Monocytes % 4.8 %    Eosinophils % 0.5 %    Basophils % 0.9 %    Neutrophils # 10.1 (H) 1.7 - 7.7 K/uL    Lymphocytes # 1.7 1.0 - 5.1 K/uL    Monocytes # 0.6 0.0 - 1.3 K/uL    Eosinophils # 0.1 0.0 - 0.6 K/uL    Basophils # 0.1 0.0 - 0.2 K/uL   Comprehensive Metabolic Panel   Result Value Ref Range    Sodium 137 136 - 145 mmol/L Potassium 4.1 3.5 - 5.1 mmol/L    Chloride 104 99 - 110 mmol/L    CO2 25 21 - 32 mmol/L    Anion Gap 8 3 - 16    Glucose 142 (H) 70 - 99 mg/dL    BUN 9 7 - 20 mg/dL    CREATININE 0.8 (L) 0.9 - 1.3 mg/dL    GFR Non-African American >60 >60    GFR African American >60 >60    Calcium 9.1 8.3 - 10.6 mg/dL    Total Protein 7.0 6.4 - 8.2 g/dL    Alb 3.9 3.4 - 5.0 g/dL    Albumin/Globulin Ratio 1.3 1.1 - 2.2    Total Bilirubin 0.6 0.0 - 1.0 mg/dL    Alkaline Phosphatase 118 40 - 129 U/L    ALT 28 10 - 40 U/L    AST 25 15 - 37 U/L    Globulin 3.1 g/dL   Urinalysis with Microscopic   Result Value Ref Range    Color, UA Yellow Straw/Yellow    Clarity, UA Clear Clear    Glucose, Ur Negative Negative mg/dL    Bilirubin Urine Negative Negative    Ketones, Urine Negative Negative mg/dL    Specific Gravity, UA 1.025 1.005 - 1.030    Blood, Urine Negative Negative    pH, UA 6.0 5.0 - 8.0    Protein, UA Negative Negative mg/dL    Urobilinogen, Urine 0.2 <2.0 E.U./dL    Nitrite, Urine Negative Negative    Leukocyte Esterase, Urine Negative Negative    Microscopic Examination Not Indicated     Casts 1-3 Hyaline (A) /LPF    CASTS 2 0-1 Coarse Gran (A) /LPF    Mucus, UA 3+ (A) /LPF    WBC, UA 0-2 0 - 5 /HPF    RBC, UA 0-2 0 - 2 /HPF    Bacteria, UA 1+ (A) /HPF   Lactic Acid, Plasma   Result Value Ref Range    Lactic Acid 1.0 0.4 - 2.0 mmol/L   Troponin   Result Value Ref Range    Troponin <0.01 <0.01 ng/mL   Troponin   Result Value Ref Range    Troponin <0.01 <0.01 ng/mL   EKG 12 Lead   Result Value Ref Range    Ventricular Rate 61 BPM    Atrial Rate 61 BPM    P-R Interval 138 ms    QRS Duration 76 ms    Q-T Interval 398 ms    QTc Calculation (Bazett) 400 ms    P Axis 48 degrees    R Axis 38 degrees    T Axis 59 degrees    Diagnosis       Sinus rhythm with marked sinus arrhythmiaSeptal infarct , age undeterminedAbnormal ECGNo previous ECGs availableConfirmed by Migel Linder MD, JOHANA ([id number]) on 7/16/2018 1:01:39 PM     Medications   lidocaine

## 2018-07-16 NOTE — ED NOTES
Dr. Michael Puente at bedside updating patient about results and further disposition. Will continue to monitor.       Dagmar Mata RN  07/16/18 2874

## 2018-07-16 NOTE — ED PROVIDER NOTES
well-nourished. HENT:   Head: Normocephalic and atraumatic. Right Ear: External ear normal.   Left Ear: External ear normal.   Nose: Nose normal.   Eyes: Conjunctivae are normal. Right eye exhibits no discharge. Left eye exhibits no discharge. No scleral icterus. Neck: Normal range of motion. Neck supple. Cardiovascular: Normal rate, regular rhythm, normal heart sounds and intact distal pulses. Exam reveals no gallop and no friction rub. No murmur heard. Pulmonary/Chest: Effort normal and breath sounds normal. No respiratory distress. He has no wheezes. He has no rales. Musculoskeletal:        Lumbar back: He exhibits decreased range of motion, tenderness (Bilateral paravertebral muscles), pain and spasm. He exhibits no bony tenderness, no swelling, no edema, no deformity and no laceration. Neurological: He is alert and oriented to person, place, and time. No sensory deficit. Reflex Scores:       Patellar reflexes are 2+ on the right side and 2+ on the left side. Achilles reflexes are 2+ on the right side and 2+ on the left side. Lower extremity strength is 5 out of 5 but is symmetric. Skin: Skin is warm and dry. No rash noted. He is not diaphoretic. No pallor. Psychiatric: He has a normal mood and affect. His behavior is normal. Thought content normal.   Nursing note and vitals reviewed.       DIAGNOSTIC RESULTS   LABS:    Labs Reviewed   CBC WITH AUTO DIFFERENTIAL - Abnormal; Notable for the following:        Result Value    WBC 12.5 (*)     Neutrophils # 10.1 (*)     All other components within normal limits    Narrative:     Performed at:  [facility] [address],  [place], [facility]   Phone [phone]   COMPREHENSIVE METABOLIC PANEL - Abnormal; Notable for the following:     Glucose 142 (*)     CREATININE 0.8 (*)     All other components within normal limits    Narrative:     Performed at:  [facility]  [address],  [place], [facility]   Phone [phone]   URINALYSIS WITH MICROSCOPIC - Abnormal; Notable for the following:     Casts 1-3 Hyaline (*)     CASTS 2 0-1 Coarse Gran (*)     Mucus, UA 3+ (*)     Bacteria, UA 1+ (*)     All other components within normal limits    Narrative:     Performed at:  [facility] [address],  [place], [facility]   Phone [phone]   LACTIC ACID, PLASMA    Narrative:     Performed at:  [facility] [address],  [place], [facility]   Phone [phone]   TROPONIN    Narrative:     Performed at:  [facility] [address],  [place], [facility]   Phone [phone]       All other labs were within normal range or not returned as of this dictation. EKG: All EKG's are interpreted by the Emergency Department Physician who either signs or Co-signs this chart in the absence of a cardiologist.  Please see their note for interpretation of EKG. RADIOLOGY:   Non-plain film images such as CT, Ultrasound and MRI are read by the radiologist. Plain radiographic images are visualized and preliminarily interpreted by the  ED Provider with the below findings:        Interpretation per the Radiologist below, if available at the time of this note:    CTA ABDOMEN PELVIS W CONTRAST   Final Result   VASCULAR      1. No evidence of an acute aortic syndrome. 2. Negative for acute pulmonary embolism. 3. 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 MUSCULOSKELETAL      1. Acute T10 superior endplate deformity with 20-30% vertebral body height   loss. No retropulsion, canal or foraminal stenosis. CHEST      1. Moderate to advanced emphysema with smoking-related airway inflammation. No consolidative airspace disease.    2. Partially imaged mixed calcified plaque along the proximal LAD,   incompletely characterized on this exam.  Prior LAD territory coronary   stenting. 3. Mild mediastinal and right hilar lymphadenopathy, presumably reactive. ABDOMEN/PELVIS      1. No acute abdominopelvic findings. 2. Hepatic steatosis. 3. Indeterminate low-attenuation focus near the pancreatic neck, possibly a   pancreatic lesion (such as IPMN) versus necrotic lymph node versus other   etiology. Comparison imaging would be helpful if available. Otherwise   consideration could be given to MRCP versus follow-up imaging in 3-6 months. 4. Colonic diverticulosis. CTA CHEST W CONTRAST   Final Result   VASCULAR      1. No evidence of an acute aortic syndrome. 2. Negative for acute pulmonary embolism. 3. 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 MUSCULOSKELETAL      1. Acute T10 superior endplate deformity with 20-30% vertebral body height   loss. No retropulsion, canal or foraminal stenosis. CHEST      1. Moderate to advanced emphysema with smoking-related airway inflammation. No consolidative airspace disease. 2. Partially imaged mixed calcified plaque along the proximal LAD,   incompletely characterized on this exam.  Prior LAD territory coronary   stenting. 3. Mild mediastinal and right hilar lymphadenopathy, presumably reactive. ABDOMEN/PELVIS      1. No acute abdominopelvic findings. 2. Hepatic steatosis. 3. Indeterminate low-attenuation focus near the pancreatic neck, possibly a   pancreatic lesion (such as IPMN) versus necrotic lymph node versus other   etiology. Comparison imaging would be helpful if available. Otherwise   consideration could be given to MRCP versus follow-up imaging in 3-6 months. 4. Colonic diverticulosis. XR LUMBAR SPINE (2-3 VIEWS)   Final Result   No acute fracture or subluxation. No significant degenerative changes. No results found.       PROCEDURES   Unless otherwise noted

## 2018-07-16 NOTE — ED NOTES
Pt given water per request at this time, okayed by Jyotsna, [id number].       Wesley Dickens RN  07/16/18 1953

## 2018-07-16 NOTE — ED NOTES
This RN explained to pt about MD Ash wanting an IV on pt. Pt not understand the need for IV.  Dr. Brennan King notified and will speak with pt      Salomón Morris RN  07/16/18 [id number]

## 2018-07-16 NOTE — ED NOTES
Pt O2 dropped to 84% on room air while sleeping. Place pt on 3L of oxygen nasal cannula. Blood pressure still in 80's. Sim BRAUN notified.       Presley Aguilar RN  07/16/18 0622

## 2018-07-17 VITALS
OXYGEN SATURATION: 93 % | SYSTOLIC BLOOD PRESSURE: 129 MMHG | HEART RATE: 70 BPM | WEIGHT: 147.3 LBS | TEMPERATURE: 97.4 F | HEIGHT: 63 IN | BODY MASS INDEX: 26.1 KG/M2 | DIASTOLIC BLOOD PRESSURE: 73 MMHG | RESPIRATION RATE: 18 BRPM

## 2018-07-17 LAB
ANION GAP SERPL CALCULATED.3IONS-SCNC: 8 MMOL/L (ref 3–16)
BASOPHILS ABSOLUTE: 0.1 K/UL (ref 0–0.2)
BASOPHILS RELATIVE PERCENT: 0.9 %
BUN BLDV-MCNC: 10 MG/DL (ref 7–20)
CALCIUM SERPL-MCNC: 8.9 MG/DL (ref 8.3–10.6)
CHLORIDE BLD-SCNC: 110 MMOL/L (ref 99–110)
CO2: 24 MMOL/L (ref 21–32)
CREAT SERPL-MCNC: 0.7 MG/DL (ref 0.9–1.3)
EOSINOPHILS ABSOLUTE: 0.2 K/UL (ref 0–0.6)
EOSINOPHILS RELATIVE PERCENT: 1.9 %
GFR AFRICAN AMERICAN: >60
GFR NON-AFRICAN AMERICAN: >60
GLUCOSE BLD-MCNC: 117 MG/DL (ref 70–99)
HCT VFR BLD CALC: 38.8 % (ref 40.5–52.5)
HEMOGLOBIN: 13.1 G/DL (ref 13.5–17.5)
LYMPHOCYTES ABSOLUTE: 2 K/UL (ref 1–5.1)
LYMPHOCYTES RELATIVE PERCENT: 21.8 %
MCH RBC QN AUTO: 28.1 PG (ref 26–34)
MCHC RBC AUTO-ENTMCNC: 33.9 G/DL (ref 31–36)
MCV RBC AUTO: 82.9 FL (ref 80–100)
MONOCYTES ABSOLUTE: 0.9 K/UL (ref 0–1.3)
MONOCYTES RELATIVE PERCENT: 9.7 %
NEUTROPHILS ABSOLUTE: 6.2 K/UL (ref 1.7–7.7)
NEUTROPHILS RELATIVE PERCENT: 65.7 %
PDW BLD-RTO: 13.6 % (ref 12.4–15.4)
PLATELET # BLD: 202 K/UL (ref 135–450)
PMV BLD AUTO: 8.5 FL (ref 5–10.5)
POTASSIUM REFLEX MAGNESIUM: 3.7 MMOL/L (ref 3.5–5.1)
RBC # BLD: 4.68 M/UL (ref 4.2–5.9)
SODIUM BLD-SCNC: 142 MMOL/L (ref 136–145)
TROPONIN: <0.01 NG/ML
WBC # BLD: 9.4 K/UL (ref 4–11)

## 2018-07-17 PROCEDURE — 6370000000 HC RX 637 (ALT 250 FOR IP): Performed by: INTERNAL MEDICINE

## 2018-07-17 PROCEDURE — 2580000003 HC RX 258: Performed by: INTERNAL MEDICINE

## 2018-07-17 PROCEDURE — 36415 COLL VENOUS BLD VENIPUNCTURE: CPT

## 2018-07-17 PROCEDURE — 84484 ASSAY OF TROPONIN QUANT: CPT

## 2018-07-17 PROCEDURE — 85025 COMPLETE CBC W/AUTO DIFF WBC: CPT

## 2018-07-17 PROCEDURE — 80048 BASIC METABOLIC PNL TOTAL CA: CPT

## 2018-07-17 PROCEDURE — 99220 PR INITIAL OBSERVATION CARE/DAY 70 MINUTES: CPT | Performed by: INTERNAL MEDICINE

## 2018-07-17 RX ADMIN — OXYCODONE HYDROCHLORIDE AND ACETAMINOPHEN 1 TABLET: 5; 325 TABLET ORAL at 02:20

## 2018-07-17 RX ADMIN — ASPIRIN 81 MG 81 MG: 81 TABLET ORAL at 08:08

## 2018-07-17 RX ADMIN — OXYCODONE HYDROCHLORIDE AND ACETAMINOPHEN 1 TABLET: 5; 325 TABLET ORAL at 06:03

## 2018-07-17 RX ADMIN — SODIUM CHLORIDE: 9 INJECTION, SOLUTION INTRAVENOUS at 06:03

## 2018-07-17 RX ADMIN — Medication 10 ML: at 08:08

## 2018-07-17 RX ADMIN — CLOPIDOGREL BISULFATE 75 MG: 75 TABLET ORAL at 08:08

## 2018-07-17 ASSESSMENT — PAIN SCALES - GENERAL
PAINLEVEL_OUTOF10: 3
PAINLEVEL_OUTOF10: 6
PAINLEVEL_OUTOF10: 10

## 2018-07-17 NOTE — ED PROVIDER NOTES
I called the patient in his room this morning. I wanted to see how he was doing. He states that he is in a little bit of pain, but doing better. He states that his blood pressure came back up. I did just want to follow-up and make sure he was aware of his findings on the CT scans. He stated that he was aware of all of the findings and stated he was to follow-up with his family doctor especially about the incidental finding of the pancreas.        Rosemary Cruz PA-C  07/17/18 [id number]

## 2018-07-17 NOTE — H&P
Combined History and Physical and d/c summary          HISTORY OF PRESENT ILLNESS: 39-year-old male with a history of coronary disease status post stents presented to the emergency room with back pain. He was trying to pull something from the ground when he noticed a popping sound in his back and was having back pain which prompted him to come to the emergency room. On evaluation emergency room he was hypotensive. Did not have a fever or elevated white count. CT of the abdomen and chest showed an acute T10 vertebral fracture. Patient has No Known Allergies. Past Medical History:   Diagnosis Date    Family history of early CAD     GERD (gastroesophageal reflux disease)     High cholesterol 12/2017    MI (myocardial infarction) 12/30/2017    ST Elevation MI    NSTEMI (non-ST elevated myocardial infarction) [facility] [address] 12/30/2017    Smoker 12/2017    ST elevation myocardial infarction involving left anterior descending (LAD) coronary artery ([facility])        Past Surgical History:   Procedure Laterality Date    CHOLECYSTECTOMY      CORONARY ANGIOPLASTY WITH STENT PLACEMENT  12/30/2017    BASILIA- 3.0x 38- mLAD       Scheduled Meds:   clopidogrel  75 mg Oral Daily    atorvastatin  40 mg Oral Nightly    aspirin  81 mg Oral Daily    sodium chloride flush  10 mL Intravenous 2 times per day    enoxaparin  40 mg Subcutaneous Nightly       Continuous Infusions:   sodium chloride Stopped (07/17/18 0816)       PRN Meds:  sodium chloride flush, magnesium hydroxide, ondansetron, oxyCODONE-acetaminophen **OR** oxyCODONE-acetaminophen, acetaminophen       reports that he has quit smoking. His smoking use included Cigarettes. He smoked 1.00 pack per day.  He has never used smokeless tobacco.    Family History   Problem Relation Age of Onset    Heart Attack Mother        Social History     Social History    Marital status:      Spouse name: N/A    Number of children: N/A    Years of education: N/A     Social IV fluids and his blood pressures are normal.    3.  Coronary artery disease status post stents. Stable. EKG is unremarkable. Troponins are negative. 4.  Low attenuation focus near the pancreatic neck on CT. Discussed with patient and wife. He will need an outpatient MRCP versus follow-up CT in 3-6 months. DC home.         Chayo Clayton 7/17/2018 10:23 AM

## 2018-07-18 LAB
EKG ATRIAL RATE: 61 BPM
EKG DIAGNOSIS: NORMAL
EKG P AXIS: 48 DEGREES
EKG P-R INTERVAL: 138 MS
EKG Q-T INTERVAL: 398 MS
EKG QRS DURATION: 76 MS
EKG QTC CALCULATION (BAZETT): 400 MS
EKG R AXIS: 38 DEGREES
EKG T AXIS: 59 DEGREES
EKG VENTRICULAR RATE: 61 BPM

## 2018-08-01 RX ORDER — ATORVASTATIN CALCIUM 40 MG/1
40 TABLET, FILM COATED ORAL NIGHTLY
Qty: 90 TABLET | Refills: 3 | Status: SHIPPED | OUTPATIENT
Start: 2018-08-01 | End: 2019-07-10 | Stop reason: SDUPTHER

## 2018-08-01 RX ORDER — LORATADINE 10 MG
TABLET ORAL
Qty: 90 TABLET | Refills: 3 | Status: SHIPPED | OUTPATIENT
Start: 2018-08-01 | End: 2019-07-10 | Stop reason: SDUPTHER

## 2018-08-02 RX ORDER — NITROGLYCERIN 0.4 MG/1
TABLET SUBLINGUAL
Qty: 25 TABLET | Refills: 3 | Status: SHIPPED | OUTPATIENT
Start: 2018-08-02 | End: 2019-01-20 | Stop reason: SDUPTHER

## 2018-08-23 ENCOUNTER — HOSPITAL ENCOUNTER (OUTPATIENT)
Age: 53
Setting detail: OBSERVATION
Discharge: HOME | End: 2018-08-24
Attending: EMERGENCY MEDICINE | Admitting: INTERNAL MEDICINE
Payer: MEDICAID

## 2018-08-23 ENCOUNTER — APPOINTMENT (OUTPATIENT)
Dept: GENERAL RADIOLOGY | Age: 53
End: 2018-08-23
Payer: MEDICAID

## 2018-08-23 DIAGNOSIS — R07.9 CHEST PAIN, UNSPECIFIED TYPE: Primary | ICD-10-CM

## 2018-08-23 DIAGNOSIS — I25.119 CORONARY ARTERY DISEASE INVOLVING NATIVE CORONARY ARTERY OF NATIVE HEART WITH ANGINA PECTORIS (HCC): ICD-10-CM

## 2018-08-23 LAB
A/G RATIO: 1.5 (ref 1.1–2.2)
ALBUMIN SERPL-MCNC: 4.1 G/DL (ref 3.4–5)
ALP BLD-CCNC: 112 U/L (ref 40–129)
ALT SERPL-CCNC: 32 U/L (ref 10–40)
ANION GAP SERPL CALCULATED.3IONS-SCNC: 12 MMOL/L (ref 3–16)
AST SERPL-CCNC: 24 U/L (ref 15–37)
BASOPHILS ABSOLUTE: 0.1 K/UL (ref 0–0.2)
BASOPHILS RELATIVE PERCENT: 1 %
BILIRUB SERPL-MCNC: 0.5 MG/DL (ref 0–1)
BUN BLDV-MCNC: 10 MG/DL (ref 7–20)
CALCIUM SERPL-MCNC: 9.1 MG/DL (ref 8.3–10.6)
CHLORIDE BLD-SCNC: 104 MMOL/L (ref 99–110)
CO2: 23 MMOL/L (ref 21–32)
CREAT SERPL-MCNC: 0.7 MG/DL (ref 0.9–1.3)
EOSINOPHILS ABSOLUTE: 0.1 K/UL (ref 0–0.6)
EOSINOPHILS RELATIVE PERCENT: 1.1 %
GFR AFRICAN AMERICAN: >60
GFR NON-AFRICAN AMERICAN: >60
GLOBULIN: 2.7 G/DL
GLUCOSE BLD-MCNC: 109 MG/DL (ref 70–99)
HCT VFR BLD CALC: 40.4 % (ref 40.5–52.5)
HEMOGLOBIN: 14 G/DL (ref 13.5–17.5)
LYMPHOCYTES ABSOLUTE: 1.8 K/UL (ref 1–5.1)
LYMPHOCYTES RELATIVE PERCENT: 18.1 %
MCH RBC QN AUTO: 28.2 PG (ref 26–34)
MCHC RBC AUTO-ENTMCNC: 34.7 G/DL (ref 31–36)
MCV RBC AUTO: 81.3 FL (ref 80–100)
MONOCYTES ABSOLUTE: 0.8 K/UL (ref 0–1.3)
MONOCYTES RELATIVE PERCENT: 7.4 %
NEUTROPHILS ABSOLUTE: 7.4 K/UL (ref 1.7–7.7)
NEUTROPHILS RELATIVE PERCENT: 72.4 %
PDW BLD-RTO: 13.9 % (ref 12.4–15.4)
PLATELET # BLD: 285 K/UL (ref 135–450)
PMV BLD AUTO: 8.1 FL (ref 5–10.5)
POTASSIUM SERPL-SCNC: 3.9 MMOL/L (ref 3.5–5.1)
RBC # BLD: 4.97 M/UL (ref 4.2–5.9)
SODIUM BLD-SCNC: 139 MMOL/L (ref 136–145)
TOTAL PROTEIN: 6.8 G/DL (ref 6.4–8.2)
TROPONIN: <0.01 NG/ML
TROPONIN: <0.01 NG/ML
WBC # BLD: 10.2 K/UL (ref 4–11)

## 2018-08-23 PROCEDURE — 6370000000 HC RX 637 (ALT 250 FOR IP): Performed by: INTERNAL MEDICINE

## 2018-08-23 PROCEDURE — 85025 COMPLETE CBC W/AUTO DIFF WBC: CPT

## 2018-08-23 PROCEDURE — 71046 X-RAY EXAM CHEST 2 VIEWS: CPT

## 2018-08-23 PROCEDURE — 36415 COLL VENOUS BLD VENIPUNCTURE: CPT

## 2018-08-23 PROCEDURE — 99285 EMERGENCY DEPT VISIT HI MDM: CPT

## 2018-08-23 PROCEDURE — 2580000003 HC RX 258: Performed by: INTERNAL MEDICINE

## 2018-08-23 PROCEDURE — 80053 COMPREHEN METABOLIC PANEL: CPT

## 2018-08-23 PROCEDURE — G0378 HOSPITAL OBSERVATION PER HR: HCPCS

## 2018-08-23 PROCEDURE — 84484 ASSAY OF TROPONIN QUANT: CPT

## 2018-08-23 PROCEDURE — 6360000002 HC RX W HCPCS: Performed by: INTERNAL MEDICINE

## 2018-08-23 PROCEDURE — 93010 ELECTROCARDIOGRAM REPORT: CPT | Performed by: INTERNAL MEDICINE

## 2018-08-23 PROCEDURE — 93005 ELECTROCARDIOGRAM TRACING: CPT | Performed by: EMERGENCY MEDICINE

## 2018-08-23 RX ORDER — ACETAMINOPHEN 325 MG/1
650 TABLET ORAL EVERY 4 HOURS PRN
Status: DISCONTINUED | OUTPATIENT
Start: 2018-08-23 | End: 2018-08-24 | Stop reason: HOSPADM

## 2018-08-23 RX ORDER — ONDANSETRON 2 MG/ML
4 INJECTION INTRAMUSCULAR; INTRAVENOUS EVERY 6 HOURS PRN
Status: DISCONTINUED | OUTPATIENT
Start: 2018-08-23 | End: 2018-08-24 | Stop reason: HOSPADM

## 2018-08-23 RX ORDER — LOPERAMIDE HYDROCHLORIDE 2 MG/1
2 CAPSULE ORAL DAILY PRN
Status: ON HOLD | COMMUNITY
End: 2019-12-31 | Stop reason: HOSPADM

## 2018-08-23 RX ORDER — ISOSORBIDE MONONITRATE 30 MG/1
30 TABLET, EXTENDED RELEASE ORAL DAILY
Status: DISCONTINUED | OUTPATIENT
Start: 2018-08-23 | End: 2018-08-24 | Stop reason: HOSPADM

## 2018-08-23 RX ORDER — NITROGLYCERIN 0.4 MG/1
0.4 TABLET SUBLINGUAL EVERY 5 MIN PRN
Status: DISCONTINUED | OUTPATIENT
Start: 2018-08-23 | End: 2018-08-24 | Stop reason: HOSPADM

## 2018-08-23 RX ORDER — ATORVASTATIN CALCIUM 40 MG/1
40 TABLET, FILM COATED ORAL NIGHTLY
Status: DISCONTINUED | OUTPATIENT
Start: 2018-08-23 | End: 2018-08-24 | Stop reason: HOSPADM

## 2018-08-23 RX ORDER — METOPROLOL SUCCINATE 25 MG/1
12.5 TABLET, EXTENDED RELEASE ORAL DAILY
Status: DISCONTINUED | OUTPATIENT
Start: 2018-08-23 | End: 2018-08-24 | Stop reason: HOSPADM

## 2018-08-23 RX ORDER — CLOPIDOGREL BISULFATE 75 MG/1
75 TABLET ORAL DAILY
Status: DISCONTINUED | OUTPATIENT
Start: 2018-08-23 | End: 2018-08-24 | Stop reason: HOSPADM

## 2018-08-23 RX ORDER — ASPIRIN 81 MG/1
81 TABLET, CHEWABLE ORAL DAILY
Status: DISCONTINUED | OUTPATIENT
Start: 2018-08-24 | End: 2018-08-24 | Stop reason: HOSPADM

## 2018-08-23 RX ORDER — SODIUM CHLORIDE 0.9 % (FLUSH) 0.9 %
10 SYRINGE (ML) INJECTION PRN
Status: DISCONTINUED | OUTPATIENT
Start: 2018-08-23 | End: 2018-08-24 | Stop reason: HOSPADM

## 2018-08-23 RX ORDER — SODIUM CHLORIDE 0.9 % (FLUSH) 0.9 %
10 SYRINGE (ML) INJECTION EVERY 12 HOURS SCHEDULED
Status: DISCONTINUED | OUTPATIENT
Start: 2018-08-23 | End: 2018-08-24 | Stop reason: HOSPADM

## 2018-08-23 RX ORDER — FAMOTIDINE 20 MG/1
20 TABLET, FILM COATED ORAL NIGHTLY
Status: DISCONTINUED | OUTPATIENT
Start: 2018-08-23 | End: 2018-08-24 | Stop reason: HOSPADM

## 2018-08-23 RX ORDER — OMEGA-3-ACID ETHYL ESTERS 1 G/1
1 CAPSULE, LIQUID FILLED ORAL 2 TIMES DAILY
Status: DISCONTINUED | OUTPATIENT
Start: 2018-08-23 | End: 2018-08-24 | Stop reason: HOSPADM

## 2018-08-23 RX ADMIN — Medication 10 ML: at 20:53

## 2018-08-23 RX ADMIN — ATORVASTATIN CALCIUM 40 MG: 40 TABLET, FILM COATED ORAL at 20:52

## 2018-08-23 RX ADMIN — OMEGA-3-ACID ETHYL ESTERS 1 CAPSULE: 1 CAPSULE, LIQUID FILLED ORAL at 20:53

## 2018-08-23 ASSESSMENT — HEART SCORE: ECG: 0

## 2018-08-23 NOTE — ED PROVIDER NOTES
Emergency Physician Note    Chief Complaint  Chest Pain (chest pain started around 1430 says he was talking to a nurse on the phone when the pain started )       History of Present Illness  Yohannes Boyd is a 46 y.o. male who presents to the ED for chest pain. Patient states that he was having a difficult conversation with his son when he started having chest pain. The pain is midsternal, pressure-like, radiates to his left arm. It also has a stabbing pain in the left side of his chest wall. He states the pain resolved nitro. He has noticed for the past 2 days that he'll get the sharpness and the tightness in his chest whenever he has a stressful conversation. On December 30 patient had one stent placement is currently on Plavix. He was chest pain-free when he arrived in the ER but while he was telling me about the issues with his son he started having chest pain again. Denies fever, chills, malaise,  shortness of breath, cough, abdominal pain, nausea, vomiting, diarrhea, headache, sore throat, dysuria, back pain, rash. No palliative/provocative factors. Positives and pertinent negatives as per HPI. All other of the 10 systems were reviewed and are negative. I have reviewed the following from the nursing documentation:      Prior to Admission medications    Medication Sig Start Date End Date Taking? Authorizing Provider   RaNITidine HCl (ZANTAC 75 PO) Take by mouth   Yes Historical Provider, MD   loperamide (IMODIUM) 2 MG capsule Take 2 mg by mouth daily as needed for Diarrhea   Yes Historical Provider, MD   nitroGLYCERIN (NITROSTAT) 0.4 MG SL tablet DISSOLVE 1 TABLET UNDER TONGUE AT ONSET OF CHEST PAIN. IF NO RELIEF AFTER 1 DOSE CALL 911.  (MAX 3) 8/2/18  Yes GISELA Mcgill CNP   [facility] ASPIRIN ADULT LOW DOSE 81 MG chewable tablet TAKE 1 TABLET BY MOUTH EVERY DAY 8/1/18  Yes GISELA Mcgill CNP   atorvastatin (LIPITOR) 40 MG tablet TAKE 1 TABLET BY MOUTH NIGHTLY 8/1/18 Final Result   No acute abnormality detected.               Chest X-Ray    Interpreted by: Emergency Department Physician    View: PA/Lateral chest xray    Findings: Normal heart size, Normal lungs, Normal mediastinum    LABS  Results for orders placed or performed during the hospital encounter of 08/23/18   CBC auto differential   Result Value Ref Range    WBC 10.2 4.0 - 11.0 K/uL    RBC 4.97 4.20 - 5.90 M/uL    Hemoglobin 14.0 13.5 - 17.5 g/dL    Hematocrit 40.4 (L) 40.5 - 52.5 %    MCV 81.3 80.0 - 100.0 fL    MCH 28.2 26.0 - 34.0 pg    MCHC 34.7 31.0 - 36.0 g/dL    RDW 13.9 12.4 - 15.4 %    Platelets 882 414 - 273 K/uL    MPV 8.1 5.0 - 10.5 fL    Neutrophils % 72.4 %    Lymphocytes % 18.1 %    Monocytes % 7.4 %    Eosinophils % 1.1 %    Basophils % 1.0 %    Neutrophils # 7.4 1.7 - 7.7 K/uL    Lymphocytes # 1.8 1.0 - 5.1 K/uL    Monocytes # 0.8 0.0 - 1.3 K/uL    Eosinophils # 0.1 0.0 - 0.6 K/uL    Basophils # 0.1 0.0 - 0.2 K/uL   Comprehensive metabolic panel   Result Value Ref Range    Sodium 139 136 - 145 mmol/L    Potassium 3.9 3.5 - 5.1 mmol/L    Chloride 104 99 - 110 mmol/L    CO2 23 21 - 32 mmol/L    Anion Gap 12 3 - 16    Glucose 109 (H) 70 - 99 mg/dL    BUN 10 7 - 20 mg/dL    CREATININE 0.7 (L) 0.9 - 1.3 mg/dL    GFR Non-African American >60 >60    GFR African American >60 >60    Calcium 9.1 8.3 - 10.6 mg/dL    Total Protein 6.8 6.4 - 8.2 g/dL    Alb 4.1 3.4 - 5.0 g/dL    Albumin/Globulin Ratio 1.5 1.1 - 2.2    Total Bilirubin 0.5 0.0 - 1.0 mg/dL    Alkaline Phosphatase 112 40 - 129 U/L    ALT 32 10 - 40 U/L    AST 24 15 - 37 U/L    Globulin 2.7 g/dL   Troponin   Result Value Ref Range    Troponin <0.01 <0.01 ng/mL   EKG 12 Lead   Result Value Ref Range    Ventricular Rate 62 BPM    Atrial Rate 62 BPM    P-R Interval 140 ms    QRS Duration 82 ms    Q-T Interval 398 ms    QTc Calculation (Bazett) 403 ms    P Axis 40 degrees    R Axis 0 degrees    T Axis 25 degrees    Diagnosis       Normal sinus rhythm with sinus arrhythmiaNonspecific ST abnormalityWhen compared with ECG of 16-JUL-2018 12:00,No significant change was foundConfirmed by LENORA Ferrari MD ([id number]) on 8/23/2018 5:44:01 PM       PROCEDURES      MEDICAL DECISION MAKING    Review of records:  LEFT HEART CATH (12/30/18)  LM: short  LAD: long section of disease in mid LAD from septal to diag 2,  mid LAD plaque rupture with thrombus and 80% stenosis (ADAN-2 flow)  Ramus: vs high OM1, proximal 15%  LCX: small vessel, luminals  RCA: dominant, moderate diffuse diseae in mid with sting of perals configuration, distal RCA of 70%, ostial PLV 90%(short focal, seen in BETH)     LVEDP: 22  LVEF: 30-35% with moderate anterior, apex, and inferior apical hypokinesis     PCI of mid LAD  STENT: Xience 3.0 x 38mm post dilated using 3x15mm NC trek with pressures of 16-22atm, sluggish flow afterwards requiring NTG and nitroprusside     I discussed this patient with Dr. Shay Hartman, cardiology who recommended admission.     Results for orders placed or performed during the hospital encounter of 08/23/18   CBC auto differential   Result Value Ref Range    WBC 10.2 4.0 - 11.0 K/uL    RBC 4.97 4.20 - 5.90 M/uL    Hemoglobin 14.0 13.5 - 17.5 g/dL    Hematocrit 40.4 (L) 40.5 - 52.5 %    MCV 81.3 80.0 - 100.0 fL    MCH 28.2 26.0 - 34.0 pg    MCHC 34.7 31.0 - 36.0 g/dL    RDW 13.9 12.4 - 15.4 %    Platelets 818 308 - 569 K/uL    MPV 8.1 5.0 - 10.5 fL    Neutrophils % 72.4 %    Lymphocytes % 18.1 %    Monocytes % 7.4 %    Eosinophils % 1.1 %    Basophils % 1.0 %    Neutrophils # 7.4 1.7 - 7.7 K/uL    Lymphocytes # 1.8 1.0 - 5.1 K/uL    Monocytes # 0.8 0.0 - 1.3 K/uL    Eosinophils # 0.1 0.0 - 0.6 K/uL    Basophils # 0.1 0.0 - 0.2 K/uL   Comprehensive metabolic panel   Result Value Ref Range    Sodium 139 136 - 145 mmol/L    Potassium 3.9 3.5 - 5.1 mmol/L    Chloride 104 99 - 110 mmol/L    CO2 23 21 - 32 mmol/L    Anion Gap 12 3 - 16    Glucose 109 (H) 70 - 99 mg/dL    BUN 10 7 - 20 mg/dL CREATININE 0.7 (L) 0.9 - 1.3 mg/dL    GFR Non-African American >60 >60    GFR African American >60 >60    Calcium 9.1 8.3 - 10.6 mg/dL    Total Protein 6.8 6.4 - 8.2 g/dL    Alb 4.1 3.4 - 5.0 g/dL    Albumin/Globulin Ratio 1.5 1.1 - 2.2    Total Bilirubin 0.5 0.0 - 1.0 mg/dL    Alkaline Phosphatase 112 40 - 129 U/L    ALT 32 10 - 40 U/L    AST 24 15 - 37 U/L    Globulin 2.7 g/dL   Troponin   Result Value Ref Range    Troponin <0.01 <0.01 ng/mL   Lipid panel   Result Value Ref Range    Cholesterol, Total 116 0 - 199 mg/dL    Triglycerides 415 (H) 0 - 150 mg/dL    HDL 20 (L) 40 - 60 mg/dL    LDL Calculated see below <100 mg/dL    VLDL Cholesterol Calculated see below Not Established mg/dL   Troponin   Result Value Ref Range    Troponin <0.01 <0.01 ng/mL   Troponin   Result Value Ref Range    Troponin <0.01 <0.01 ng/mL   LDL Cholesterol, Direct   Result Value Ref Range    LDL Direct 50 <100 mg/dL   EKG 12 Lead   Result Value Ref Range    Ventricular Rate 62 BPM    Atrial Rate 62 BPM    P-R Interval 140 ms    QRS Duration 82 ms    Q-T Interval 398 ms    QTc Calculation (Bazett) 403 ms    P Axis 40 degrees    R Axis 0 degrees    T Axis 25 degrees    Diagnosis       Normal sinus rhythm with sinus arrhythmiaNonspecific ST abnormalityWhen compared with ECG of 16-JUL-2018 12:00,No significant change was foundConfirmed by LENORA Marte MD ([id number]) on 8/23/2018 5:44:01 PM       I spoke with Dr. Levi Kim. We thoroughly discussed the history, physical exam, laboratory and imaging studies, as well as, emergency department course. Based upon that discussion, we've decided to admit Katheryn Verdugo for further observation and evaluation of Moise Cohen's chest pain.   As I have deemed necessary from their history, physical, and studies, I have considered and evaluated Katheryn Kartik for the following diagnoses:  ACUTE CORONARY SYNDROME, PERICARDIAL TAMPONADE, PNEUMOTHORAX, PULMONARY EMBOLISM, and THORACIC

## 2018-08-23 NOTE — LETTER
[facility] [address] [place] [id number]  Phone: [phone]             August 24, 2018    Patient: Carson Rivero   YOB: 1965   Date of Visit: 8/23/2018       To Whom It May Concern:    Carson Rivero was seen and treated in our facility  beginning 8/23/2018 until 08/24/2018. His wife Alberto Mcgee was present both days.     Sincerely,       Mendel Jarred, RN         Signature:__________________________________

## 2018-08-23 NOTE — LETTER
[address] [address]  Phone: [phone]             August 24, 2018    Patient: Yajaira Garza   YOB: 1965   Date of Visit: 8/23/2018       To Whom It May Concern:    Yajaira Garza was seen and treated in our facility  beginning 8/23/2018 until 8/24/2018. His son, Yajaira Garza, was present with him yesterday, 8/23/2018.  Sincerely,       Margot Baltazar RN         Signature:__________________________________

## 2018-08-23 NOTE — H&P
Hospital Medicine History & Physical      PCP: GISELA Reyes CNP    Date of Admission: 8/23/2018    Date of Service: Pt seen/examined on 8/23/2018 and Admitted to observation with expected LOS less than two midnights due to medical therapy. Chief Complaint:  Chest pain    History Of Present Illness:      46 y.o. male, with PMH of CAD, HTN, HLD and GERD, who [facility]. History obtained from the patient and review of EMR. The patient stated today around 230 pm he was on the phone with a nurse to schedule an appointment when he endorsed 7/10 left sided chest pain. He stated the chest pain radiated down his left arm and was associated with sweating and shortness of breath. The patient stated he took 1 SL nitro and within 5 minutes his chest pain subsided. He stated he had an MI in December, 2017 with stent placement by Dr. Jurgen Nevarez and his symptoms today were very similar to those at that time. He stated he was afraid his was having another \"heart attack\" so he picked his wife up from work and went to the ED for further evaluation. The patient stated he has been under a lot of stress recently d/t some problems he is having with his middle son and his sons khadijah, so he is not sure if today's episode was related to that. The patient denied any other associated symptoms as well as any aggravating and/or alleviating factors. At the time of this assessment, the patient was resting comfortably in bed. He currently denies any chest pain, back pain, abdominal pain, shortness of breath, numbness, tingling, N/V/C/D, fever and/or chills.      Past Medical History:          Diagnosis Date    Family history of early CAD     GERD (gastroesophageal reflux disease)     High cholesterol 12/2017    MI (myocardial infarction) ([facility] [address] 12/30/2017    ST Elevation MI    NSTEMI (non-ST elevated myocardial infarction) ([facility] [address] 12/30/2017    Smoker 12/2017    ST elevation myocardial CXR: I have reviewed the CXR with the following interpretation: No acute cardiopulmonary findings  EKG:  I have reviewed the EKG with the following interpretation: Normal sinus rhythm with sinus arrhythmia, rate 62. Nonspecific ST abnormality. When compared with ECG of 16-JUL-2018 12:00, No significant change was found. Confirmed by Harish Silveira MD, [address] [place] [address] ([id number]) on 8/23/2018 5:44:01 PM     XR CHEST STANDARD (2 VW)   Final Result   No acute abnormality detected. ASSESSMENT:    Active Hospital Problems    Diagnosis Date Noted    Coronary artery disease [I25.10]      Priority: High    Chest pain [R07.9] 08/23/2018     PLAN:    Chest pain in setting of known CAD, s/p MI with BASILIA stent placement to [facility] 12/2017  -atypical  -serial troponin - initial negative  -EKG w/o ischemic changes  -FLP in am  -cardiology consulted - appreciate recommendations in advance  -iv pain medication  -prn nitro  -tele monitoring  -continue imdur  -continue plavix and asa    Essential HTN, controlled  -continue metoprolol    HLD, stable  -continue atorvastatin    GERD, stable  -continue zantac    DVT Prophylaxis: Lovenox  Diet: DIET CARDIAC; Diet NPO, After Midnight  Code Status: Full Code    PT/OT Eval Status: No indication for need at this time. Dispo - days pending clinical improvement and cardiology [address], APRN - CNP    Thank you Ladariusp GISELA Crawford CNP for the opportunity to be involved in this patient's care.  If you have any questions or concerns please feel free to contact me at [phone].  ---------------------------------Anticipated Dr. Robby frank [facility]--------------------------------------------

## 2018-08-24 ENCOUNTER — TELEPHONE (OUTPATIENT)
Dept: CARDIOLOGY CLINIC | Age: 53
End: 2018-08-24

## 2018-08-24 VITALS
HEIGHT: 63 IN | SYSTOLIC BLOOD PRESSURE: 116 MMHG | TEMPERATURE: 97.5 F | HEART RATE: 63 BPM | OXYGEN SATURATION: 94 % | RESPIRATION RATE: 16 BRPM | WEIGHT: 145.06 LBS | BODY MASS INDEX: 25.7 KG/M2 | DIASTOLIC BLOOD PRESSURE: 71 MMHG

## 2018-08-24 LAB
CHOLESTEROL, TOTAL: 116 MG/DL (ref 0–199)
HDLC SERPL-MCNC: 20 MG/DL (ref 40–60)
LDL CHOLESTEROL CALCULATED: ABNORMAL MG/DL
LDL CHOLESTEROL DIRECT: 50 MG/DL
LV EF: 55 %
LVEF MODALITY: NORMAL
TRIGL SERPL-MCNC: 415 MG/DL (ref 0–150)
TROPONIN: <0.01 NG/ML
VLDLC SERPL CALC-MCNC: ABNORMAL MG/DL

## 2018-08-24 PROCEDURE — 93306 TTE W/DOPPLER COMPLETE: CPT

## 2018-08-24 PROCEDURE — 83721 ASSAY OF BLOOD LIPOPROTEIN: CPT

## 2018-08-24 PROCEDURE — 80061 LIPID PANEL: CPT

## 2018-08-24 PROCEDURE — 36415 COLL VENOUS BLD VENIPUNCTURE: CPT

## 2018-08-24 PROCEDURE — 2580000003 HC RX 258: Performed by: INTERNAL MEDICINE

## 2018-08-24 PROCEDURE — G0378 HOSPITAL OBSERVATION PER HR: HCPCS

## 2018-08-24 PROCEDURE — 99214 OFFICE O/P EST MOD 30 MIN: CPT | Performed by: INTERNAL MEDICINE

## 2018-08-24 PROCEDURE — 6370000000 HC RX 637 (ALT 250 FOR IP): Performed by: INTERNAL MEDICINE

## 2018-08-24 PROCEDURE — 84484 ASSAY OF TROPONIN QUANT: CPT

## 2018-08-24 RX ADMIN — ASPIRIN 81 MG 81 MG: 81 TABLET ORAL at 10:40

## 2018-08-24 RX ADMIN — OMEGA-3-ACID ETHYL ESTERS 1 CAPSULE: 1 CAPSULE, LIQUID FILLED ORAL at 10:37

## 2018-08-24 RX ADMIN — Medication 10 ML: at 10:39

## 2018-08-24 RX ADMIN — ISOSORBIDE MONONITRATE 30 MG: 30 TABLET, FILM COATED, EXTENDED RELEASE ORAL at 10:38

## 2018-08-24 RX ADMIN — CLOPIDOGREL BISULFATE 75 MG: 75 TABLET, FILM COATED ORAL at 10:35

## 2018-08-24 NOTE — CONSULTS
[facility]  [phone]      Attending Physician: Amairani Leung MD  Reason for Consultation/Chief Complaint: CP    Subjective   History of Present Illness:  Rosys Tate is a 46 y.o. patient who presented to the hospital with complaints of CP. Started 23-pm while stressing over a message that was sent to his wife. [place] like his MI, took a NTG and got rid of it and got scared. Past Medical History:   has a past medical history of Family history of early CAD; GERD (gastroesophageal reflux disease); High cholesterol; MI (myocardial infarction) [facility]); NSTEMI (non-ST elevated [facility] infarction) [facility] Smoker; and ST elevation myocardial infarction involving left anterior descending (LAD) coronary artery ([facility] Surgical History:   has a past surgical history that includes Cholecystectomy and Coronary angioplasty with stent (12/30/2017). Social History:   reports that he has quit smoking. His smoking use included Cigarettes. He smoked 1.00 pack per day. He has never used smokeless tobacco. He reports that he does not drink alcohol or use drugs. Family History:  family history includes Heart Attack in his mother. Home Medications:  Were reviewed and are listed in nursing record and/or below  Prior to Admission medications    Medication Sig Start Date End Date Taking? Authorizing Provider   RaNITidine HCl (ZANTAC 75 PO) Take by mouth   Yes Historical Provider, MD   loperamide (IMODIUM) 2 MG capsule Take 2 mg by mouth daily as needed for Diarrhea   Yes Historical Provider, MD   nitroGLYCERIN (NITROSTAT) 0.4 MG SL tablet DISSOLVE 1 TABLET UNDER TONGUE AT ONSET OF CHEST PAIN. IF NO RELIEF AFTER 1 DOSE CALL 911.  (MAX 3) 8/2/18  Yes GISELA Ann CNP   CVS ASPIRIN ADULT LOW DOSE 81 MG chewable tablet TAKE 1 TABLET BY MOUTH EVERY DAY 8/1/18  Yes GISELA Ann CNP   atorvastatin (LIPITOR) 40 MG tablet TAKE 1 TABLET BY MOUTH NIGHTLY 8/1/18 Yes Ashley MaryamrodolfoGISELA - CNP   isosorbide mononitrate (IMDUR) 30 MG extended release tablet Take 1 tablet by mouth daily 6/20/18  Yes Ashleyn Favre, APRN - CNP   Omega-3 Fatty Acids (OMEGA-3 FISH OIL) 1200 MG CAPS Take 1 capsule by mouth 2 times daily 6/20/18  Yes Ashleyn Favre, APRN - CNP   metoprolol succinate (TOPROL XL) 25 MG extended release tablet Take 0.5 tablets by mouth daily 1/16/18  Yes Ashleyn Favre, APRN - CNP   clopidogrel (PLAVIX) 75 MG tablet Take 1 tablet by mouth daily 1/2/18  Yes Hanh Calderon MD        CURRENT Medications:    atorvastatin (LIPITOR) tablet 40 mg Nightly   clopidogrel (PLAVIX) tablet 75 mg Daily   isosorbide mononitrate (IMDUR) extended release tablet 30 mg Daily   metoprolol succinate (TOPROL XL) extended release tablet 12.5 mg Daily   omega-3 acid ethyl esters (LOVAZA) capsule 1 capsule BID   famotidine (PEPCID) tablet 20 mg Nightly   sodium chloride flush 0.9 % injection 10 mL 2 times per day   sodium chloride flush 0.9 % injection 10 mL PRN   acetaminophen (TYLENOL) tablet 650 mg Q4H PRN   magnesium hydroxide (MILK OF MAGNESIA) 400 MG/5ML suspension 30 mL Daily PRN   ondansetron (ZOFRAN) injection 4 mg Q6H PRN   nitroGLYCERIN (NITROSTAT) SL tablet 0.4 mg Q5 Min PRN   aspirin chewable tablet 81 mg Daily   enoxaparin (LOVENOX) injection 40 mg Daily       Allergies:  Patient has no known allergies. Review of Systems:   A 14 point review of symptoms completed. Pertinent positives identified in the HPI, all other review of symptoms negative as below.       Objective   PHYSICAL EXAM:    Vitals:    08/24/18 0502   BP: 94/67   Pulse: 80   Resp: 14   Temp: 97.5 °F (36.4 °C)   SpO2: 94%    Weight: 145 lb 1 oz (65.8 kg)         General Appearance:  Alert, cooperative, no distress, appears stated age   Head:  Normocephalic, without obvious abnormality, atraumatic   Eyes:  PERRL, conjunctiva/corneas clear   Nose: Nares normal, no drainage or sinus tenderness   Throat: Lips, mucosa, and tongue normal   Neck: Supple, symmetrical, trachea midline, no adenopathy, thyroid: not enlarged, symmetric, no tenderness/mass/nodules, no carotid bruit or JVD   Lungs:   Clear to auscultation bilaterally, respirations unlabored   Chest Wall:  No deformity or tenderness   Heart:  Regular rate and rhythm, S1, S2 normal, no murmur, rub or gallop   Abdomen:   Soft, non-tender, bowel sounds active all four quadrants,  no masses, no organomegaly   Extremities: Extremities normal, atraumatic, no cyanosis or edema   Pulses: 2+ and symmetric   Skin: Skin color, texture, turgor normal, no rashes or lesions   Pysch: Normal mood and affect   Neurologic: Normal gross motor and sensory exam.         Labs   CBC: Lab Results   Component Value Date    WBC 10.2 2018    RBC 4.97 2018    HGB 14.0 2018    HCT 40.4 2018    MCV 81.3 2018    RDW 13.9 2018     2018     CMP:  Lab Results   Component Value Date     2018    K 3.9 2018    K 3.7 2018     2018    CO2 23 2018    BUN 10 2018    CREATININE 0.7 2018    GFRAA >60 2018    GFRAA >60 08/15/2011    AGRATIO 1.5 2018    LABGLOM >60 2018    GLUCOSE 109 2018    PROT 6.8 2018    PROT 7.4 08/15/2011    CALCIUM 9.1 2018    BILITOT 0.5 2018    ALKPHOS 112 2018    AST 24 2018    ALT 32 2018     PT/INR:  No results found for: PTINR  HgBA1c:  Lab Results   Component Value Date    LABA1C 6.5 (H) 08/15/2011     Lab Results   Component Value Date    TROPONINI <0.01 2018         Cardiac Data     Last EK2018 NSR with no ischemia      Cath: 2018 LEFT HEART CATH  LM: luminals  LAD: patent mid LAD stent  LCX: no angiographic CAD              OM1- high takeoff, luminals  RCA: dominant, luminals, mid with moderate eccentric \"chunk-like\" disease there is a 40-50% stenosis involving bifurcation with

## 2018-08-24 NOTE — PROGRESS NOTES
Per cardiology, patient okay to discharge. He wants him to have his ECHO done here before he leaves, but he does not need to wait for results.  Sent to Dr. Cuba Aleman

## 2018-08-24 NOTE — PROGRESS NOTES
Call placed to cardiology to verify that patient was on list for consults. Was told that patient was on list and that Dr. Chris Gutierrez would see patient later on today.

## 2018-08-24 NOTE — PLAN OF CARE
Problem: Pain:  Intervention: Promote participation in pain management plan  PT verbalized understanding of pain rating scale. PT continues to deny CP, pt/spouse verbalized understanding to alert RN should CP return.

## 2018-08-26 LAB
EKG ATRIAL RATE: 62 BPM
EKG DIAGNOSIS: NORMAL
EKG P AXIS: 40 DEGREES
EKG P-R INTERVAL: 140 MS
EKG Q-T INTERVAL: 398 MS
EKG QRS DURATION: 82 MS
EKG QTC CALCULATION (BAZETT): 403 MS
EKG R AXIS: 0 DEGREES
EKG T AXIS: 25 DEGREES
EKG VENTRICULAR RATE: 62 BPM

## 2018-08-26 NOTE — DISCHARGE SUMMARY
chest pain. He stated the chest pain radiated down his left arm and was associated with sweating and shortness of breath. The patient stated he took 1 SL nitro and within 5 minutes his chest pain subsided. He stated he had an MI in December, 2017 with stent placement by Dr. Oli Osborn and his symptoms today were very similar to those at that time. He stated he was afraid his was having another \"heart attack\" so he picked his wife up from work and went to the ED for further evaluation. The patient stated he has been under a lot of stress recently d/t some problems he is having with his middle son and his sons khadijah, so he is not sure if today's episode was related to that. The patient denied any other associated symptoms as well as any aggravating and/or alleviating factors. At the time of this assessment, the patient was resting comfortably in bed. He currently denies any chest pain, back pain, abdominal pain, shortness of breath, numbness, tingling, N/V/C/D, fever and/or chills. Chest pain in setting of known CAD, s/p MI with BASILIA stent placement to [facility] 12/2017  -atypical  -serial troponin - initial negative  -EKG w/o ischemic changes  -FLP in am  -cardiology consulted; cleared for discharge   -continue plavix and asa     Essential HTN, controlled  -continue metoprolol     HLD, stable  -continue atorvastatin     GERD, stable  -continue zantac          Consults: cardiology    Imaging Studies:  Xr Chest Standard (2 Vw)    Result Date: 8/23/2018  EXAMINATION: TWO VIEWS OF THE CHEST 8/23/2018 2:02 pm COMPARISON: 06/23/2018 HISTORY: ORDERING SYSTEM PROVIDED HISTORY: chest pain TECHNOLOGIST PROVIDED HISTORY: Reason for exam:->chest pain Ordering Physician Provided Reason for Exam: chest pain started around 1430 says he was talking to a nurse on the phone when the pain started FINDINGS: No acute focal infiltrate is identified. Hyperlucency within the upper lungs is found, suggesting obstructive lung disease.   No

## 2018-08-28 RX ORDER — METOPROLOL SUCCINATE 25 MG/1
12.5 TABLET, EXTENDED RELEASE ORAL DAILY
Qty: 90 TABLET | Refills: 3 | Status: SHIPPED | OUTPATIENT
Start: 2018-08-28 | End: 2019-09-01 | Stop reason: SDUPTHER

## 2018-09-19 ENCOUNTER — OFFICE VISIT (OUTPATIENT)
Dept: CARDIOLOGY CLINIC | Age: 53
End: 2018-09-19

## 2018-09-19 VITALS
HEART RATE: 62 BPM | SYSTOLIC BLOOD PRESSURE: 78 MMHG | DIASTOLIC BLOOD PRESSURE: 50 MMHG | OXYGEN SATURATION: 96 % | HEIGHT: 63 IN | WEIGHT: 147 LBS | BODY MASS INDEX: 26.05 KG/M2

## 2018-09-19 DIAGNOSIS — I25.10 CORONARY ARTERY DISEASE INVOLVING NATIVE CORONARY ARTERY OF NATIVE HEART WITHOUT ANGINA PECTORIS: Primary | ICD-10-CM

## 2018-09-19 DIAGNOSIS — I95.2 HYPOTENSION DUE TO DRUGS: ICD-10-CM

## 2018-09-19 DIAGNOSIS — Z72.0 TOBACCO ABUSE: ICD-10-CM

## 2018-09-19 DIAGNOSIS — I73.9 PAD (PERIPHERAL ARTERY DISEASE) (HCC): ICD-10-CM

## 2018-09-19 DIAGNOSIS — I25.5 ISCHEMIC CARDIOMYOPATHY: ICD-10-CM

## 2018-09-19 PROCEDURE — 99214 OFFICE O/P EST MOD 30 MIN: CPT | Performed by: INTERNAL MEDICINE

## 2018-09-19 NOTE — PROGRESS NOTES
[address]   Cardiovascular Evaluation    PATIENT: Russ Pelaez  DATE: 2018  MRN: [id number]  CSN: [id number]  : 1965      Primary Care Doctor: GISELA Alejandro CNP  Reason for evaluation:   6 Month Follow-Up (no cardiac complaints) and Coronary Artery Disease      Subjective:   History of present illness on initial date of evaluation:   Russ Pelaez is a 46 y.o. patient who presents for hospital follow up. He reports he is feeling good. He denies CP, SOB, palpitations, dizziness or syncope. He states he quit smoking back in February after his stent. He denies any bleeding or blood in his stools. He states he changed his diet and is keeping active. Patient Active Problem List   Diagnosis    Coronary artery disease    Tobacco abuse    Hypotension    Fract[facility] of vertebra due to osteoporosis ([facility]    Chest pain    Ischemic cardiomyopathy    [facility]         Past Medical History:   has a past medical history of Family history of early CAD; GERD (gastroesophageal reflux disease); High cholesterol; MI (myocardial infarction) [facility]); NSTEMI (non-ST elevated myocardial infarction) ([facility] Smoker; and ST elevation myocardial infarction involving left anterior descending (LAD) coronary artery ([facility] Surgical History:   has a past surgical history that includes Cholecystectomy and Coronary angioplasty with stent (2017). Social History:   reports that he has quit smoking. His smoking use included Cigarettes. He smoked 1.00 pack per day. He has never used smokeless tobacco. He reports that he does not drink alcohol or use drugs. Family History:  No evidence for sudden cardiac death or premature CAD    Home Medications:  Reviewed and are listed in nursing record.  and/or listed below  Current Outpatient Prescriptions   Medication Sig Dispense Refill    metoprolol succinate (TOPROL XL) 25 MG extended release texture, turgor normal, no rashes or lesions   Pysch: Normal mood and affect   Neurologic: Normal gross motor and sensory exam.         LABS   CBC:      Lab Results   Component Value Date    WBC 10.2 2018    RBC 4.97 2018    HGB 14.0 2018    HCT 40.4 2018    MCV 81.3 2018    RDW 13.9 2018     2018     CMP:  Lab Results   Component Value Date     2018    K 3.9 2018    K 3.7 2018     2018    CO2 23 2018    BUN 10 2018    CREATININE 0.7 2018    GFRAA >60 2018    GFRAA >60 08/15/2011    AGRATIO 1.5 2018    LABGLOM >60 2018    GLUCOSE 109 2018    PROT 6.8 2018    PROT 7.4 08/15/2011    CALCIUM 9.1 2018    BILITOT 0.5 2018    ALKPHOS 112 2018    AST 24 2018    ALT 32 2018     PT/INR:   No results found for: PTINR  Liver:  No components found for: CHLPL  Lab Results   Component Value Date    ALT 32 2018    AST 24 2018    ALKPHOS 112 2018    BILITOT 0.5 2018     Lab Results   Component Value Date    LABA1C 6.5 (H) 08/15/2011     Lipids:         Lab Results   Component Value Date    TRIG 415 (H) 2018    TRIG 208 (H) 2017    TRIG 446 (H) 08/15/2011            Lab Results   Component Value Date    HDL 20 (L) 2018    HDL 22 (L) 2017    HDL 30 (L) 08/15/2011            Lab Results   Component Value Date    LDLCALC see below 2018    [address] 2017            Lab Results   Component Value Date    LABVLDL see below 2018    LABVLDL 42 2017         CARDIAC DATA   EKG: Last EK2018 NSR with no ischemia    ECHO/MUGA: 18   Left ventricular systolic function is normal with ejection fraction   estimated at 55%. No regional wall motion abnormalities. Normal left ventricular diastolic filling pressure. Mild tricuspid regurgitation.    Systolic pulmonary artery pressure (SPAP) is normal estimated at 30 mmHg   (Right atrial pressure of 8 mmHg). STRESS TEST: 18 (lexiscan)  Summary  Small sized, mild intensity anterior and anteroseptal completely reversible  defect consistent with ischemia in the territory of the mid and distal LAD. Hyperdynamic LV systolic function with VF>88% with uniform wall motion. Intermediate risk abnormal study. Resting ECG  Normal sinus rhythm; diffuse T wave inversion  precordial leads and ST depression with T inversion inferior leads c/w possible  ischemia    CARDIAC CATH: 18  Cath: 2018 LEFT HEART CATH  LM: luminals  LAD: patent mid LAD stent  LCX: no angiographic CAD              OM1- high takeoff, luminals  RCA: dominant, luminals, mid with moderate eccentric \"chunk-like\" disease there is a 40-50% stenosis involving bifurcation with PLD. -DA     LVEDP: 20  LVEF: 50% with mild anterior hypokinesis    VASCULAR/OTHER IMAGIN18  VASCULAR       1. No evidence of an acute aortic syndrome. 2. Negative for acute pulmonary embolism. 3. 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 MUSCULOSKELETAL         Assessment and Plan   Luan Light is a 46 y.o. male who presents today for the following problems:      1. Chest pain: resolved, feels great  2. CAD:              - 2017 PCI to mid LAD using drug stent         3. Ischemic cardiomyopathy: 30-->50% on LV gram  4. Tobacco abuse- has quit! 5. PAD:  100% left iliac  6. Hypotension    PLAN  1. Pt doing well despite low asymptomatic BP  2. D/c Imdur  3. Cont ASA, plavix, toprol, lipitor    Patient Active Problem List   Diagnosis    Coronary artery disease    Tobacco abuse    Hypotension    Fracture of vertebra due to osteoporosis ([address]    Chest pain    Ischemic cardiomyopathy    PAD (peripheral artery disease) ([address]         Plan:  1. Stop taking the isosorbide (Imdur)  2. Continue all other medications  3.  Check your BP at a pharmacy to see if your BP is coming back up. If your \"top\" number stays below 100, call our office. 4. Follow up with me in 1 year      It is a pleasure to assist in the care of Kyleigh Rubio. Please call with any questions. All questions and concerns were addressed to the patient/family. Alternatives to my treatment were discussed. The note was completed using EMR. I, Dr. Leanna Balbuena, personally performed the services described in this documentation, and it is both accurate and complete as much as possible. Every effort was made to ensure accuracy; however, inadvertent computerized transcription errors may be present.           Leanna Balbuena MD, [address] [facility] Cardiologist  [facility]  [phone] [facility]  [phone] [facility]  9/19/2018  11:41 AM

## 2018-11-26 RX ORDER — CLOPIDOGREL BISULFATE 75 MG/1
75 TABLET ORAL DAILY
Qty: 30 TABLET | Refills: 11 | Status: SHIPPED | OUTPATIENT
Start: 2018-11-26 | End: 2019-11-10 | Stop reason: SDUPTHER

## 2018-12-04 RX ORDER — ISOSORBIDE MONONITRATE 30 MG/1
TABLET, EXTENDED RELEASE ORAL
Qty: 30 TABLET | Refills: 5 | Status: SHIPPED | OUTPATIENT
Start: 2018-12-04 | End: 2020-02-24 | Stop reason: ALTCHOICE

## 2018-12-04 RX ORDER — GLUCOSAMN/CONDROITN/C/MN/BORON 750-600-30
TABLET ORAL
Qty: 60 CAPSULE | Refills: 5 | Status: SHIPPED | OUTPATIENT
Start: 2018-12-04 | End: 2019-05-22 | Stop reason: SDUPTHER

## 2019-01-03 ENCOUNTER — TELEPHONE (OUTPATIENT)
Dept: CARDIOLOGY CLINIC | Age: 54
End: 2019-01-03

## 2019-01-11 ENCOUNTER — TELEPHONE (OUTPATIENT)
Dept: CARDIOLOGY CLINIC | Age: 54
End: 2019-01-11

## 2019-01-21 RX ORDER — NITROGLYCERIN 0.4 MG/1
TABLET SUBLINGUAL
Qty: 25 TABLET | Refills: 3 | Status: SHIPPED | OUTPATIENT
Start: 2019-01-21 | End: 2019-06-11 | Stop reason: SDUPTHER

## 2019-01-29 ENCOUNTER — TELEPHONE (OUTPATIENT)
Dept: CARDIOLOGY CLINIC | Age: 54
End: 2019-01-29

## 2019-05-29 RX ORDER — GLUCOSAMN/CONDROITN/C/MN/BORON 750-600-30
TABLET ORAL
Qty: 180 CAPSULE | Refills: 3 | Status: ON HOLD | OUTPATIENT
Start: 2019-05-29 | End: 2020-03-05 | Stop reason: HOSPADM

## 2019-05-29 NOTE — TELEPHONE ENCOUNTER
MANJIT Pt  Last office visit 9/19/2018  Plan:  1. Stop taking the isosorbide (Imdur)  2. Continue all other medications  3. Check your BP at a pharmacy to see if your BP is coming back up. If your \"top\" number stays below 100, call our office.    4. Follow up with me in 1 year

## 2019-06-04 RX ORDER — GLUCOSAMN/CONDROITN/C/MN/BORON 750-600-30
TABLET ORAL
Qty: 60 CAPSULE | Refills: 5 | Status: SHIPPED | OUTPATIENT
Start: 2019-06-04 | End: 2019-11-21 | Stop reason: SDUPTHER

## 2019-06-12 RX ORDER — NITROGLYCERIN 0.4 MG/1
TABLET SUBLINGUAL
Qty: 25 TABLET | Refills: 3 | Status: SHIPPED | OUTPATIENT
Start: 2019-06-12 | End: 2020-02-18

## 2019-06-12 NOTE — TELEPHONE ENCOUNTER
JJAKOB 9/19/2018  PLAN  1. Pt doing well despite low asymptomatic BP  2. D/c Imdur  3. Cont ASA, plavix, toprol, lipitor     No upcoming office visit scheduled.

## 2019-07-10 RX ORDER — ATORVASTATIN CALCIUM 40 MG/1
40 TABLET, FILM COATED ORAL NIGHTLY
Qty: 90 TABLET | Refills: 5 | Status: SHIPPED | OUTPATIENT
Start: 2019-07-10 | End: 2020-06-22

## 2019-07-10 RX ORDER — LORATADINE 10 MG
TABLET ORAL
Qty: 90 TABLET | Refills: 1 | Status: SHIPPED | OUTPATIENT
Start: 2019-07-10 | End: 2019-12-20

## 2019-09-03 RX ORDER — METOPROLOL SUCCINATE 25 MG/1
12.5 TABLET, EXTENDED RELEASE ORAL DAILY
Qty: 15 TABLET | Refills: 0 | Status: SHIPPED | OUTPATIENT
Start: 2019-09-03 | End: 2019-09-28 | Stop reason: SDUPTHER

## 2019-09-30 RX ORDER — METOPROLOL SUCCINATE 25 MG/1
TABLET, EXTENDED RELEASE ORAL
Qty: 15 TABLET | Refills: 0 | Status: SHIPPED | OUTPATIENT
Start: 2019-09-30 | End: 2019-10-03 | Stop reason: ALTCHOICE

## 2019-09-30 NOTE — TELEPHONE ENCOUNTER
9/19/2018 JJP     PLAN  1. Pt doing well despite low asymptomatic BP  2. D/c Imdur  3.  Cont ASA, plavix, toprol, lipitor    10/3/2019 upcoming JJP appt
1-2 cups/cans per day

## 2019-10-03 ENCOUNTER — OFFICE VISIT (OUTPATIENT)
Dept: CARDIOLOGY CLINIC | Age: 54
End: 2019-10-03
Payer: MEDICAID

## 2019-10-03 ENCOUNTER — HOSPITAL ENCOUNTER (OUTPATIENT)
Age: 54
Discharge: HOME | End: 2019-10-03
Payer: MEDICAID

## 2019-10-03 VITALS
WEIGHT: 155 LBS | HEIGHT: 63 IN | BODY MASS INDEX: 27.46 KG/M2 | DIASTOLIC BLOOD PRESSURE: 60 MMHG | OXYGEN SATURATION: 96 % | SYSTOLIC BLOOD PRESSURE: 92 MMHG | HEART RATE: 78 BPM

## 2019-10-03 DIAGNOSIS — I25.10 CORONARY ARTERY DISEASE INVOLVING NATIVE CORONARY ARTERY OF NATIVE HEART WITHOUT ANGINA PECTORIS: Primary | ICD-10-CM

## 2019-10-03 DIAGNOSIS — I73.9 PAD (PERIPHERAL ARTERY DISEASE) (HCC): ICD-10-CM

## 2019-10-03 DIAGNOSIS — R07.9 CHEST PAIN, UNSPECIFIED TYPE: ICD-10-CM

## 2019-10-03 DIAGNOSIS — I25.10 CORONARY ARTERY DISEASE INVOLVING NATIVE CORONARY ARTERY OF NATIVE HEART WITHOUT ANGINA PECTORIS: ICD-10-CM

## 2019-10-03 DIAGNOSIS — I25.5 ISCHEMIC CARDIOMYOPATHY: ICD-10-CM

## 2019-10-03 DIAGNOSIS — I95.2 HYPOTENSION DUE TO DRUGS: ICD-10-CM

## 2019-10-03 DIAGNOSIS — E78.2 MIXED HYPERLIPIDEMIA: ICD-10-CM

## 2019-10-03 LAB
CHOLESTEROL, FASTING: 111 MG/DL (ref 0–199)
HDLC SERPL-MCNC: 28 MG/DL (ref 40–60)
LDL CHOLESTEROL CALCULATED: 47 MG/DL
TRIGLYCERIDE, FASTING: 180 MG/DL (ref 0–150)
VLDLC SERPL CALC-MCNC: 36 MG/DL

## 2019-10-03 PROCEDURE — 1036F TOBACCO NON-USER: CPT | Performed by: INTERNAL MEDICINE

## 2019-10-03 PROCEDURE — G8427 DOCREV CUR MEDS BY ELIG CLIN: HCPCS | Performed by: INTERNAL MEDICINE

## 2019-10-03 PROCEDURE — 80061 LIPID PANEL: CPT

## 2019-10-03 PROCEDURE — G8598 ASA/ANTIPLAT THER USED: HCPCS | Performed by: INTERNAL MEDICINE

## 2019-10-03 PROCEDURE — 36415 COLL VENOUS BLD VENIPUNCTURE: CPT

## 2019-10-03 PROCEDURE — 3017F COLORECTAL CA SCREEN DOC REV: CPT | Performed by: INTERNAL MEDICINE

## 2019-10-03 PROCEDURE — 99213 OFFICE O/P EST LOW 20 MIN: CPT | Performed by: INTERNAL MEDICINE

## 2019-10-03 PROCEDURE — G8484 FLU IMMUNIZE NO ADMIN: HCPCS | Performed by: INTERNAL MEDICINE

## 2019-10-03 PROCEDURE — G8419 CALC BMI OUT NRM PARAM NOF/U: HCPCS | Performed by: INTERNAL MEDICINE

## 2019-10-07 ENCOUNTER — TELEPHONE (OUTPATIENT)
Dept: CARDIOLOGY CLINIC | Age: 54
End: 2019-10-07

## 2019-10-14 ENCOUNTER — TELEPHONE (OUTPATIENT)
Dept: CARDIOLOGY CLINIC | Age: 54
End: 2019-10-14

## 2019-10-15 RX ORDER — ATENOLOL 50 MG/1
25 TABLET ORAL DAILY
Qty: 45 TABLET | Refills: 1 | Status: ON HOLD | OUTPATIENT
Start: 2019-10-15 | End: 2020-03-05 | Stop reason: HOSPADM

## 2019-11-12 RX ORDER — CLOPIDOGREL BISULFATE 75 MG/1
75 TABLET ORAL DAILY
Qty: 90 TABLET | Refills: 3 | Status: SHIPPED | OUTPATIENT
Start: 2019-11-12 | End: 2020-10-13

## 2019-11-21 RX ORDER — GLUCOSAMN/CONDROITN/C/MN/BORON 750-600-30
TABLET ORAL
Qty: 180 CAPSULE | Refills: 1 | Status: ON HOLD | OUTPATIENT
Start: 2019-11-21 | End: 2019-12-31 | Stop reason: HOSPADM

## 2019-11-24 ENCOUNTER — APPOINTMENT (OUTPATIENT)
Dept: GENERAL RADIOLOGY | Age: 54
End: 2019-11-24
Payer: MEDICAID

## 2019-11-24 ENCOUNTER — HOSPITAL ENCOUNTER (EMERGENCY)
Age: 54
Discharge: HOME | End: 2019-11-24
Payer: MEDICAID

## 2019-11-24 VITALS
HEIGHT: 63 IN | WEIGHT: 155 LBS | BODY MASS INDEX: 27.46 KG/M2 | TEMPERATURE: 97.2 F | DIASTOLIC BLOOD PRESSURE: 63 MMHG | HEART RATE: 80 BPM | RESPIRATION RATE: 12 BRPM | SYSTOLIC BLOOD PRESSURE: 109 MMHG | OXYGEN SATURATION: 93 %

## 2019-11-24 DIAGNOSIS — S93.401A SPRAIN OF RIGHT ANKLE, UNSPECIFIED LIGAMENT, INITIAL ENCOUNTER: Primary | ICD-10-CM

## 2019-11-24 PROCEDURE — 73610 X-RAY EXAM OF ANKLE: CPT

## 2019-11-24 PROCEDURE — 6370000000 HC RX 637 (ALT 250 FOR IP): Performed by: EMERGENCY MEDICINE

## 2019-11-24 PROCEDURE — 99283 EMERGENCY DEPT VISIT LOW MDM: CPT

## 2019-11-24 RX ORDER — NAPROXEN 500 MG/1
500 TABLET ORAL ONCE
Status: COMPLETED | OUTPATIENT
Start: 2019-11-24 | End: 2019-11-24

## 2019-11-24 RX ORDER — NAPROXEN 500 MG/1
500 TABLET ORAL 2 TIMES DAILY
Qty: 20 TABLET | Refills: 0 | Status: ON HOLD | OUTPATIENT
Start: 2019-11-24 | End: 2019-12-31 | Stop reason: HOSPADM

## 2019-11-24 RX ADMIN — NAPROXEN 500 MG: 500 TABLET ORAL at 19:29

## 2019-11-24 ASSESSMENT — PAIN DESCRIPTION - PAIN TYPE: TYPE: ACUTE PAIN

## 2019-11-24 ASSESSMENT — PAIN DESCRIPTION - LOCATION: LOCATION: ANKLE

## 2019-11-24 ASSESSMENT — PAIN SCALES - GENERAL
PAINLEVEL_OUTOF10: 0
PAINLEVEL_OUTOF10: 9

## 2019-11-24 ASSESSMENT — PAIN DESCRIPTION - ORIENTATION: ORIENTATION: RIGHT

## 2019-12-20 RX ORDER — ASPIRIN 81 MG
TABLET,CHEWABLE ORAL
Qty: 90 TABLET | Refills: 1 | Status: SHIPPED | OUTPATIENT
Start: 2019-12-20 | End: 2020-06-10

## 2019-12-29 ENCOUNTER — APPOINTMENT (OUTPATIENT)
Dept: GENERAL RADIOLOGY | Age: 54
End: 2019-12-29
Payer: MEDICAID

## 2019-12-29 ENCOUNTER — HOSPITAL ENCOUNTER (OUTPATIENT)
Age: 54
Setting detail: OBSERVATION
Discharge: HOME | End: 2019-12-31
Attending: EMERGENCY MEDICINE | Admitting: INTERNAL MEDICINE
Payer: MEDICAID

## 2019-12-29 PROBLEM — K21.9 GERD (GASTROESOPHAGEAL REFLUX DISEASE): Status: ACTIVE | Noted: 2019-12-29

## 2019-12-29 PROBLEM — I25.2 HISTORY OF MI (MYOCARDIAL INFARCTION): Status: ACTIVE | Noted: 2019-12-29

## 2019-12-29 PROBLEM — E78.5 HLD (HYPERLIPIDEMIA): Status: ACTIVE | Noted: 2019-12-29

## 2019-12-29 LAB
A/G RATIO: 1.3 (ref 1.1–2.2)
ALBUMIN SERPL-MCNC: 4.2 G/DL (ref 3.4–5)
ALP BLD-CCNC: 130 U/L (ref 40–129)
ALT SERPL-CCNC: 41 U/L (ref 10–40)
ANION GAP SERPL CALCULATED.3IONS-SCNC: 13 MMOL/L (ref 3–16)
AST SERPL-CCNC: 34 U/L (ref 15–37)
BASOPHILS ABSOLUTE: 0.1 K/UL (ref 0–0.2)
BASOPHILS RELATIVE PERCENT: 1 %
BILIRUB SERPL-MCNC: 0.6 MG/DL (ref 0–1)
BILIRUBIN URINE: NEGATIVE
BLOOD, URINE: NEGATIVE
BUN BLDV-MCNC: 7 MG/DL (ref 7–20)
CALCIUM SERPL-MCNC: 9.1 MG/DL (ref 8.3–10.6)
CHLORIDE BLD-SCNC: 102 MMOL/L (ref 99–110)
CLARITY: CLEAR
CO2: 23 MMOL/L (ref 21–32)
COLOR: YELLOW
CREAT SERPL-MCNC: 0.7 MG/DL (ref 0.9–1.3)
EOSINOPHILS ABSOLUTE: 0.2 K/UL (ref 0–0.6)
EOSINOPHILS RELATIVE PERCENT: 2.2 %
GFR AFRICAN AMERICAN: >60
GFR NON-AFRICAN AMERICAN: >60
GLOBULIN: 3.2 G/DL
GLUCOSE BLD-MCNC: 94 MG/DL (ref 70–99)
GLUCOSE URINE: NEGATIVE MG/DL
HCT VFR BLD CALC: 45 % (ref 40.5–52.5)
HEMOGLOBIN: 15.3 G/DL (ref 13.5–17.5)
KETONES, URINE: NEGATIVE MG/DL
LEUKOCYTE ESTERASE, URINE: NEGATIVE
LIPASE: 29 U/L (ref 13–60)
LYMPHOCYTES ABSOLUTE: 1.9 K/UL (ref 1–5.1)
LYMPHOCYTES RELATIVE PERCENT: 17.8 %
MCH RBC QN AUTO: 28 PG (ref 26–34)
MCHC RBC AUTO-ENTMCNC: 34.1 G/DL (ref 31–36)
MCV RBC AUTO: 82 FL (ref 80–100)
MICROSCOPIC EXAMINATION: NORMAL
MONOCYTES ABSOLUTE: 0.8 K/UL (ref 0–1.3)
MONOCYTES RELATIVE PERCENT: 7.7 %
NEUTROPHILS ABSOLUTE: 7.5 K/UL (ref 1.7–7.7)
NEUTROPHILS RELATIVE PERCENT: 71.3 %
NITRITE, URINE: NEGATIVE
PDW BLD-RTO: 13.5 % (ref 12.4–15.4)
PH UA: 6 (ref 5–8)
PLATELET # BLD: 308 K/UL (ref 135–450)
PMV BLD AUTO: 7.9 FL (ref 5–10.5)
POTASSIUM SERPL-SCNC: 3.7 MMOL/L (ref 3.5–5.1)
PROTEIN UA: NEGATIVE MG/DL
RBC # BLD: 5.49 M/UL (ref 4.2–5.9)
SODIUM BLD-SCNC: 138 MMOL/L (ref 136–145)
SPECIFIC GRAVITY UA: <=1.005 (ref 1–1.03)
TOTAL PROTEIN: 7.4 G/DL (ref 6.4–8.2)
TROPONIN: <0.01 NG/ML
TROPONIN: <0.01 NG/ML
URINE REFLEX TO CULTURE: NORMAL
URINE TYPE: NORMAL
UROBILINOGEN, URINE: 0.2 E.U./DL
WBC # BLD: 10.5 K/UL (ref 4–11)

## 2019-12-29 PROCEDURE — 83690 ASSAY OF LIPASE: CPT

## 2019-12-29 PROCEDURE — 36415 COLL VENOUS BLD VENIPUNCTURE: CPT

## 2019-12-29 PROCEDURE — 93005 ELECTROCARDIOGRAM TRACING: CPT | Performed by: EMERGENCY MEDICINE

## 2019-12-29 PROCEDURE — 99285 EMERGENCY DEPT VISIT HI MDM: CPT

## 2019-12-29 PROCEDURE — 6370000000 HC RX 637 (ALT 250 FOR IP): Performed by: NURSE PRACTITIONER

## 2019-12-29 PROCEDURE — G0378 HOSPITAL OBSERVATION PER HR: HCPCS

## 2019-12-29 PROCEDURE — 84484 ASSAY OF TROPONIN QUANT: CPT

## 2019-12-29 PROCEDURE — 81003 URINALYSIS AUTO W/O SCOPE: CPT

## 2019-12-29 PROCEDURE — 85025 COMPLETE CBC W/AUTO DIFF WBC: CPT

## 2019-12-29 PROCEDURE — 6370000000 HC RX 637 (ALT 250 FOR IP): Performed by: EMERGENCY MEDICINE

## 2019-12-29 PROCEDURE — 71046 X-RAY EXAM CHEST 2 VIEWS: CPT

## 2019-12-29 PROCEDURE — 80053 COMPREHEN METABOLIC PANEL: CPT

## 2019-12-29 RX ORDER — ASPIRIN 81 MG/1
81 TABLET, CHEWABLE ORAL DAILY
Status: DISCONTINUED | OUTPATIENT
Start: 2019-12-30 | End: 2019-12-31 | Stop reason: HOSPADM

## 2019-12-29 RX ORDER — ONDANSETRON 4 MG/1
4 TABLET, ORALLY DISINTEGRATING ORAL ONCE
Status: COMPLETED | OUTPATIENT
Start: 2019-12-29 | End: 2019-12-29

## 2019-12-29 RX ORDER — ATENOLOL 25 MG/1
25 TABLET ORAL DAILY
Status: DISCONTINUED | OUTPATIENT
Start: 2019-12-30 | End: 2019-12-31 | Stop reason: HOSPADM

## 2019-12-29 RX ORDER — ACETAMINOPHEN 325 MG/1
650 TABLET ORAL EVERY 4 HOURS PRN
Status: DISCONTINUED | OUTPATIENT
Start: 2019-12-29 | End: 2019-12-31 | Stop reason: HOSPADM

## 2019-12-29 RX ORDER — ASPIRIN 325 MG
325 TABLET ORAL ONCE
Status: COMPLETED | OUTPATIENT
Start: 2019-12-29 | End: 2019-12-29

## 2019-12-29 RX ORDER — SODIUM CHLORIDE 0.9 % (FLUSH) 0.9 %
10 SYRINGE (ML) INJECTION EVERY 12 HOURS SCHEDULED
Status: DISCONTINUED | OUTPATIENT
Start: 2019-12-29 | End: 2019-12-31 | Stop reason: HOSPADM

## 2019-12-29 RX ORDER — CLOPIDOGREL BISULFATE 75 MG/1
75 TABLET ORAL DAILY
Status: DISCONTINUED | OUTPATIENT
Start: 2019-12-30 | End: 2019-12-31 | Stop reason: HOSPADM

## 2019-12-29 RX ORDER — SODIUM CHLORIDE 0.9 % (FLUSH) 0.9 %
10 SYRINGE (ML) INJECTION PRN
Status: DISCONTINUED | OUTPATIENT
Start: 2019-12-29 | End: 2019-12-31 | Stop reason: HOSPADM

## 2019-12-29 RX ORDER — ATORVASTATIN CALCIUM 40 MG/1
40 TABLET, FILM COATED ORAL NIGHTLY
Status: DISCONTINUED | OUTPATIENT
Start: 2019-12-29 | End: 2019-12-31 | Stop reason: HOSPADM

## 2019-12-29 RX ORDER — ISOSORBIDE MONONITRATE 30 MG/1
30 TABLET, EXTENDED RELEASE ORAL DAILY
Status: DISCONTINUED | OUTPATIENT
Start: 2019-12-30 | End: 2019-12-31 | Stop reason: HOSPADM

## 2019-12-29 RX ORDER — ONDANSETRON 2 MG/ML
4 INJECTION INTRAMUSCULAR; INTRAVENOUS EVERY 6 HOURS PRN
Status: DISCONTINUED | OUTPATIENT
Start: 2019-12-29 | End: 2019-12-31 | Stop reason: HOSPADM

## 2019-12-29 RX ADMIN — ONDANSETRON 4 MG: 4 TABLET, ORALLY DISINTEGRATING ORAL at 19:36

## 2019-12-29 RX ADMIN — ATORVASTATIN CALCIUM 40 MG: 40 TABLET, FILM COATED ORAL at 22:55

## 2019-12-29 RX ADMIN — ASPIRIN 325 MG ORAL TABLET 325 MG: 325 PILL ORAL at 19:36

## 2019-12-29 ASSESSMENT — PAIN SCALES - GENERAL
PAINLEVEL_OUTOF10: 6
PAINLEVEL_OUTOF10: 6
PAINLEVEL_OUTOF10: 0

## 2019-12-29 ASSESSMENT — ENCOUNTER SYMPTOMS
WHEEZING: 0
VOMITING: 0
BACK PAIN: 0
COUGH: 0
RHINORRHEA: 0
DIARRHEA: 0
SHORTNESS OF BREATH: 0
PHOTOPHOBIA: 0
NAUSEA: 1

## 2019-12-30 LAB
ANION GAP SERPL CALCULATED.3IONS-SCNC: 11 MMOL/L (ref 3–16)
BUN BLDV-MCNC: 11 MG/DL (ref 7–20)
CALCIUM SERPL-MCNC: 8.8 MG/DL (ref 8.3–10.6)
CHLORIDE BLD-SCNC: 104 MMOL/L (ref 99–110)
CHOLESTEROL, TOTAL: 109 MG/DL (ref 0–199)
CO2: 23 MMOL/L (ref 21–32)
CREAT SERPL-MCNC: 0.7 MG/DL (ref 0.9–1.3)
GFR AFRICAN AMERICAN: >60
GFR NON-AFRICAN AMERICAN: >60
GLUCOSE BLD-MCNC: 100 MG/DL (ref 70–99)
GLUCOSE BLD-MCNC: 102 MG/DL (ref 70–99)
GLUCOSE BLD-MCNC: 111 MG/DL (ref 70–99)
GLUCOSE BLD-MCNC: 154 MG/DL (ref 70–99)
GLUCOSE BLD-MCNC: 99 MG/DL (ref 70–99)
HCT VFR BLD CALC: 41.8 % (ref 40.5–52.5)
HDLC SERPL-MCNC: 24 MG/DL (ref 40–60)
HEMOGLOBIN: 14.1 G/DL (ref 13.5–17.5)
LDL CHOLESTEROL CALCULATED: 46 MG/DL
LV EF: 60 %
LVEF MODALITY: NORMAL
MCH RBC QN AUTO: 27.9 PG (ref 26–34)
MCHC RBC AUTO-ENTMCNC: 33.8 G/DL (ref 31–36)
MCV RBC AUTO: 82.4 FL (ref 80–100)
PDW BLD-RTO: 13.6 % (ref 12.4–15.4)
PERFORMED ON: ABNORMAL
PERFORMED ON: NORMAL
PLATELET # BLD: 288 K/UL (ref 135–450)
PMV BLD AUTO: 8.2 FL (ref 5–10.5)
POTASSIUM REFLEX MAGNESIUM: 4.1 MMOL/L (ref 3.5–5.1)
RBC # BLD: 5.07 M/UL (ref 4.2–5.9)
SEDIMENTATION RATE, ERYTHROCYTE: 7 MM/HR (ref 0–20)
SODIUM BLD-SCNC: 138 MMOL/L (ref 136–145)
TRIGL SERPL-MCNC: 194 MG/DL (ref 0–150)
TROPONIN: <0.01 NG/ML
VLDLC SERPL CALC-MCNC: 39 MG/DL
WBC # BLD: 7 K/UL (ref 4–11)

## 2019-12-30 PROCEDURE — 93351 STRESS TTE COMPLETE: CPT

## 2019-12-30 PROCEDURE — G0378 HOSPITAL OBSERVATION PER HR: HCPCS

## 2019-12-30 PROCEDURE — 99244 OFF/OP CNSLTJ NEW/EST MOD 40: CPT | Performed by: INTERNAL MEDICINE

## 2019-12-30 PROCEDURE — 85652 RBC SED RATE AUTOMATED: CPT

## 2019-12-30 PROCEDURE — 84484 ASSAY OF TROPONIN QUANT: CPT

## 2019-12-30 PROCEDURE — 80061 LIPID PANEL: CPT

## 2019-12-30 PROCEDURE — 80048 BASIC METABOLIC PNL TOTAL CA: CPT

## 2019-12-30 PROCEDURE — 6360000004 HC RX CONTRAST MEDICATION: Performed by: NURSE PRACTITIONER

## 2019-12-30 PROCEDURE — 6370000000 HC RX 637 (ALT 250 FOR IP): Performed by: NURSE PRACTITIONER

## 2019-12-30 PROCEDURE — 86140 C-REACTIVE PROTEIN: CPT

## 2019-12-30 PROCEDURE — 85027 COMPLETE CBC AUTOMATED: CPT

## 2019-12-30 PROCEDURE — 36415 COLL VENOUS BLD VENIPUNCTURE: CPT

## 2019-12-30 PROCEDURE — 2580000003 HC RX 258: Performed by: NURSE PRACTITIONER

## 2019-12-30 RX ADMIN — CLOPIDOGREL BISULFATE 75 MG: 75 TABLET ORAL at 09:21

## 2019-12-30 RX ADMIN — Medication 10 ML: at 21:18

## 2019-12-30 RX ADMIN — PERFLUTREN 2.2 MG: 6.52 INJECTION, SUSPENSION INTRAVENOUS at 10:51

## 2019-12-30 RX ADMIN — ASPIRIN 81 MG 81 MG: 81 TABLET ORAL at 09:21

## 2019-12-30 RX ADMIN — Medication 10 ML: at 09:21

## 2019-12-30 RX ADMIN — ATORVASTATIN CALCIUM 40 MG: 40 TABLET, FILM COATED ORAL at 21:18

## 2019-12-30 ASSESSMENT — PAIN SCALES - GENERAL
PAINLEVEL_OUTOF10: 0

## 2019-12-30 NOTE — PROGRESS NOTES
Pt oriented to room and plan of care, call light in reach, tele verified with [facility]. Pt and roommate discussed wife staying in room and both are agreeable, spouse at bedside at this time in a recliner chair. Pt aware of NPO after midnight for stress test tomorrow. Consent in chart.

## 2019-12-30 NOTE — H&P
 ST elevation myocardial infarction involving left anterior descending (LAD) coronary artery [facility])        Past Surgical History:          Procedure Laterality Date    CHOLECYSTECTOMY      CORONARY ANGIOPLASTY WITH STENT PLACEMENT  12/30/2017    BASILIA- 3.0x 38- mLAD       Medications Prior to Admission:      Prior to Admission medications    Medication Sig Start Date End Date Taking? Authorizing Provider   ASPIRIN LOW DOSE 81 MG chewable tablet CHEW 1 TABLET BY MOUTH EVERY DAY 12/20/19   Samara Zarco MD   naproxen (NAPROSYN) 500 MG tablet Take 1 tablet by mouth 2 times daily for 20 doses 11/24/19 12/4/19  Maria Luisa Rey, APRN - CNP   Omega-3 Fatty Acids ([facility]S FISH OIL) 1200 MG CPDR TAKE 1 CAPSULE BY MOUTH TWICE A DAY 11/21/19   Samara Zarco MD   clopidogrel (PLAVIX) 75 MG tablet TAKE 1 TABLET BY MOUTH DAILY 11/12/19   Yogesh Urrutia MD   atenolol (TENORMIN) 50 MG tablet Take 0.5 tablets by mouth daily 10/15/19   Yogesh Urrutia MD   METFORMIN HCL PO Take by mouth    Historical Provider, MD   atorvastatin (LIPITOR) 40 MG tablet TAKE 1 TABLET BY MOUTH NIGHTLY 7/10/19   Yogesh Urrutia MD   nitroGLYCERIN (NITROSTAT) 0.4 MG SL tablet DISSOLVE 1 TABLET UNDER TONGUE AT ONSET OF CHEST PAIN. IF NO RELIEF AFTER 1 DOSE CALL 911. (MAX 3) 6/12/19   Samara Zarco MD   Omega-3 Fatty Acids ([facility]S FISH OIL) 1200 MG CPDR TAKE 1 CAPSULE BY MOUTH TWICE A DAY 5/29/19   Yogesh Urrutia MD   isosorbide mononitrate (IMDUR) 30 MG extended release tablet TAKE 1 TABLET BY MOUTH EVERY DAY 12/4/18   Yogesh Urrutia MD   RaNITidine HCl (ZANTAC 75 PO) Take by mouth    Historical Provider, MD   loperamide (IMODIUM) 2 MG capsule Take 2 mg by mouth daily as needed for Diarrhea    Historical Provider, MD       Allergies:  Patient has no known allergies. Social History:      The patient currently lives independently. TOBACCO:   reports that he has quit smoking. His smoking use included cigarettes. He smoked 1.00 pack per day.  He has 12/29/19  1914   TROPONINI <0.01       Urinalysis:      Lab Results   Component Value Date    NITRU Negative 07/16/2018    WBCUA 0-2 07/16/2018    BACTERIA 1+ 07/16/2018    RBCUA 0-2 07/16/2018    BLOODU Negative 07/16/2018    SPECGRAV 1.025 07/16/2018    GLUCOSEU Negative 07/16/2018       Radiology:     CXR: I have reviewed the CXR with the following interpretation: No acute cardiopulmonary findings, hyperinflated lungs with lucent apices compatible with known emphysematous changes  EKG:  I have reviewed the EKG with the following interpretation: NSR rate of 90    XR CHEST STANDARD (2 VW)   Final Result   No evidence of acute process. Emphysema. ASSESSMENT:    Active Hospital Problems    Diagnosis Date Noted    Chest pain [R07.9] 08/23/2018     Priority: High    Coronary artery disease [I25.10]      Priority: High    Tobacco abuse [Z72.0]      Priority: High    History of MI (myocardial infarction) [I25.2] 12/29/2019    HLD (hyperlipidemia) [E78.5] 12/29/2019    GERD (gastroesophageal reflux disease) [K21.9] 12/29/2019         PLAN:    Chest pain in setting of known history of CAD status post LAD stent. Follows with Dr. Jaxon Burton.  -serial troponin - initial negative  -EKG w/o ischemic changes  -FLP in am  -NPO after MN  -stress echo in am  -prn nitro  -tele monitoring  -Continue home ASA, Plavix, atenolol, and Imdur    HyperLipidemia - controlled on home Statin. Continue, w/ f/u and med adjustment w/ PCP    GERD - w/out active signs/sxs of dysphagia/odynophagia. No evidence of active PUD or hx of GI bleed. Controlled on home PPI - continue    DVT Prophylaxis: lovenox  Diet: No diet orders on file  Code Status: Prior    PT/OT Eval Status: not indicated    Dispo - pending cardiac workup       GISELA Tenorio - NP    Thank you Gardenia Jackson for the opportunity to be involved in this patient's care.  If you have any questions or concerns please feel free to contact me at ([phone])

## 2019-12-30 NOTE — CONSULTS
[address] [facility]  [phone]      Attending Physician: Grant Burns MD  Reason for Consultation/Chief Complaint: CP    Subjective   History of Present Illness:  Jessica Rosas is a 53 y.o. patient who presented to the hospital with complaints of feeling sick to stomach on Saturday. Was driving family to work and went out to back porch and got a fish sandwhich. Felt nauseous all day. Ate 1/2 sand which and droe around some more. Continued to feel sick to stomach, laid down and then awoke up with chest pain ( states was different then his previous MI) felt like someone was pushing pen into chest. No radiation, constant pain. Lasted about 2 hrs. + SOB, vomiting. No fainting. Moving around feels better. Past Medical History:   has a past medical history of Diabetes mellitus ([facility] [address] Family history of early CAD, GERD (gastroesophageal reflux disease), High cholesterol, MI (myocardial infarction) ([facility] [address] NSTEMI (non-ST elevated myocardial infarction) ([facility] [address] Smoker, and ST elevation myocardial infarction involving left anterior descending (LAD) coronary artery ([facility] [address] Surgical History:   has a past surgical history that includes Cholecystectomy and Coronary angioplasty with stent (12/30/2017). Social History:   reports that he has quit smoking. His smoking use included cigarettes. He smoked 1.00 pack per day. He has never used smokeless tobacco. He reports that he does not drink alcohol or use drugs. Family History:  family history includes Heart Attack in his mother. Home Medications:  Were reviewed and are listed in nursing record and/or below  Prior to Admission medications    Medication Sig Start Date End Date Taking?  Authorizing Provider   ASPIRIN LOW DOSE 81 MG chewable tablet CHEW 1 TABLET BY MOUTH EVERY DAY 12/20/19  Yes Erica Philip MD   Omega-3 Fatty Acids ([employer] FISH OIL) 1200 MG CPDR TAKE 1 CAPSULE BY MOUTH TWICE A DAY 11/21/19  Yes Erica Philip MD negative as below.       Objective   PHYSICAL EXAM:    Vitals:    12/30/19 0830   BP: (!) 87/55   Pulse:    Resp:    Temp:    SpO2:     Weight: 155 lb (70.3 kg)         General Appearance:  Alert, cooperative, no distress, appears stated age   Head:  Normocephalic, without obvious abnormality, atraumatic   Eyes:  PERRL, conjunctiva/corneas clear   Nose: Nares normal, no drainage or sinus tenderness   Throat: Lips, mucosa, and tongue normal   Neck: Supple, symmetrical, trachea midline, no adenopathy, thyroid: not enlarged, symmetric, no tenderness/mass/nodules, no carotid bruit or JVD   Lungs:   Clear to auscultation bilaterally, respirations unlabored   Chest Wall:  No deformity or tenderness   Heart:  Regular rate and rhythm, S1, S2 normal, no murmur, rub or gallop   Abdomen:   Soft, non-tender, bowel sounds active all four quadrants,  no masses, no organomegaly   Extremities: Extremities normal, atraumatic, no cyanosis or edema   Pulses: 2+ and symmetric   Skin: Skin color, texture, turgor normal, no rashes or lesions   Pysch: Normal mood and affect   Neurologic: Normal gross motor and sensory exam.         Labs   CBC:   Lab Results   Component Value Date    WBC 7.0 12/30/2019    RBC 5.07 12/30/2019    HGB 14.1 12/30/2019    HCT 41.8 12/30/2019    MCV 82.4 12/30/2019    RDW 13.6 12/30/2019     12/30/2019     CMP:  Lab Results   Component Value Date     12/30/2019    K 4.1 12/30/2019     12/30/2019    CO2 23 12/30/2019    BUN 11 12/30/2019    CREATININE 0.7 12/30/2019    GFRAA >60 12/30/2019    GFRAA >60 08/15/2011    AGRATIO 1.3 12/29/2019    LABGLOM >60 12/30/2019    GLUCOSE 102 12/30/2019    PROT 7.4 12/29/2019    PROT 7.4 08/15/2011    CALCIUM 8.8 12/30/2019    BILITOT 0.6 12/29/2019    ALKPHOS 130 12/29/2019    AST 34 12/29/2019    ALT 41 12/29/2019     PT/INR:  No results found for: PTINR  HgBA1c:  Lab Results   Component Value Date    LABA1C 6.5 (H) 08/15/2011     Lab Results   Component Hypotension    Fracture of vertebra due to osteoporosis (HCC)    Chest pain    Ischemic cardiomyopathy    PAD (peripheral artery disease) ([facility] [address]    History of MI (myocardial infarction)    HLD (hyperlipidemia)    GERD (gastroesophageal reflux disease)           Thank you for allowing us to participate in the care of Bean Crespo. Please call me with any questions [phone]. Sharon Howard MD, [address] [facility] Cardiologist  [address]  [phone] [facility]  [phone] [address] [place] [address]  12/30/2019 2:27 PM    I will address the patient's cardiac risk factors and adjusted pharmacologic treatment as needed. In addition, I have reinforced the need for patient directed risk factor modification. All questions and concerns were addressed to the patient/family. Alternatives to my treatment were discussed. The note was completed using EMR. Every effort was made to ensure accuracy; however, inadvertent computerized transcription errors may be present.

## 2019-12-30 NOTE — ED PROVIDER NOTES
Emergency Department Provider Note  Location: [address] [place] [address]  ED  12/29/2019     Patient Identification  Joanne Ndiaye is a 53 y.o. male    Chief Complaint  Chest Pain (started after he woke up this am, nauseated)          HPI  (History provided by patient)  54-year-old male with history of CAD status post LAD stent 2017, former smoker, who presents with left-sided chest pain that started approximately 10 AM this morning. Patient reports he was not doing anything particular when he got an achy pain left side of his chest that radiated down his left arm. No associated diaphoresis lightheadedness however he does endorse nausea. Feels a sensation in his epigastric area as well. Has not taken any oral medications today. Chest pain has since resolved and he has a lingering sensation in his lower chest epigastric area. Denies excessive alcohol use or ibuprofen use. No vomiting no diarrhea no fever no chills no cough sputum production. I have reviewed the following nursing documentation:  Allergies: No Known Allergies    Past medical history:  has a past medical history of Diabetes mellitus ([address] Family history of early CAD, GERD (gastroesophageal reflux disease), High cholesterol (12/2017), MI (myocardial infarction) ([address] (12/30/2017), NSTEMI (non-ST elevated myocardial infarction) ([address] (12/30/2017), Smoker (12/2017), and ST elevation myocardial infarction involving left anterior descending (LAD) coronary artery ([address] Past surgical history:  has a past surgical history that includes Cholecystectomy and Coronary angioplasty with stent (12/30/2017). Home medications:   Prior to Admission medications    Medication Sig Start Date End Date Taking?  Authorizing Provider   ASPIRIN LOW DOSE 81 MG chewable tablet CHEW 1 TABLET BY MOUTH EVERY DAY 12/20/19   Samara Zarco MD   naproxen (NAPROSYN) 500 MG tablet Take 1 tablet by mouth 2 times daily for 20 doses 11/24/19 12/4/19  Lauren Noel JOS BarnettN - CNP   Omega-3 Fatty Acids ([employer] FISH OIL) 1200 MG CPDR TAKE 1 CAPSULE BY MOUTH TWICE A DAY 11/21/19   Andera Birch MD   clopidogrel (PLAVIX) 75 MG tablet TAKE 1 TABLET BY MOUTH DAILY 11/12/19   David Knight MD   atenolol (TENORMIN) 50 MG tablet Take 0.5 tablets by mouth daily 10/15/19   David Knight MD   METFORMIN HCL PO Take by mouth    Historical Provider, MD   atorvastatin (LIPITOR) 40 MG tablet TAKE 1 TABLET BY MOUTH NIGHTLY 7/10/19   David Knight MD   nitroGLYCERIN (NITROSTAT) 0.4 MG SL tablet DISSOLVE 1 TABLET UNDER TONGUE AT ONSET OF CHEST PAIN. IF NO RELIEF AFTER 1 DOSE CALL 911. (MAX 3) 6/12/19   Andrea Birch MD   Omega-3 Fatty Acids ([employer] FISH OIL) 1200 MG CPDR TAKE 1 CAPSULE BY MOUTH TWICE A DAY 5/29/19   David Knight MD   isosorbide mononitrate (IMDUR) 30 MG extended release tablet TAKE 1 TABLET BY MOUTH EVERY DAY 12/4/18   David Knight MD   RaNITidine HCl (ZANTAC 75 PO) Take by mouth    Historical Provider, MD   loperamide (IMODIUM) 2 MG capsule Take 2 mg by mouth daily as needed for Diarrhea    Historical Provider, MD       Social history:  reports that he has quit smoking. His smoking use included cigarettes. He smoked 1.00 pack per day. He has never used smokeless tobacco. He reports that he does not drink alcohol or use drugs. Family history:    Family History   Problem Relation Age of Onset    Heart Attack Mother          ROS  Review of Systems   Constitutional: Negative for chills and fever. HENT: Negative for congestion and rhinorrhea. Eyes: Negative for photophobia and visual disturbance. Respiratory: Negative for cough, shortness of breath and wheezing. Cardiovascular: Positive for chest pain. Negative for palpitations. Gastrointestinal: Positive for nausea. Negative for diarrhea and vomiting. Genitourinary: Negative for dysuria and hematuria. Musculoskeletal: Negative for back pain and neck pain. Skin: Negative for rash and wound. Neurological: Negative for syncope and weakness. Psychiatric/Behavioral: Negative for agitation and confusion. Exam  ED Triage Vitals [12/29/19 1734]   BP Temp Temp Source Pulse Resp SpO2 Height Weight   129/85 97.7 °F (36.5 °C) Oral 100 18 95 % -- 155 lb (70.3 kg)       Physical Exam  Vitals signs and nursing note reviewed. Constitutional:       General: He is not in acute distress. Appearance: He is well-developed. HENT:      Head: Normocephalic and atraumatic. Eyes:      Extraocular Movements: Extraocular movements intact. Pupils: Pupils are equal, round, and reactive to light. Neck:      Musculoskeletal: Normal range of motion and neck supple. Cardiovascular:      Rate and Rhythm: Normal rate and regular rhythm. Heart sounds: No murmur. Comments: Equal radial pulses  Pulmonary:      Effort: Pulmonary effort is normal.      Breath sounds: Normal breath sounds. Abdominal:      General: There is no distension. Palpations: Abdomen is soft. Tenderness: There is no tenderness. Musculoskeletal: Normal range of motion. General: No deformity. Skin:     General: Skin is warm. Findings: No rash. Neurological:      Mental Status: He is alert and oriented to person, place, and time. Motor: No abnormal muscle tone.    Psychiatric:         Behavior: Behavior normal.           ED Course    ED Medication Orders (From admission, onward)    Start Ordered     Status Ordering Provider    12/29/19 1930 12/29/19 1927  ondansetron (ZOFRAN-ODT) disintegrating tablet 4 mg  ONCE      Last MAR action:  Given - by Robb Wallace on 12/29/19 at [address] [place] [address], [address] [facility] Jo ADORNO    12/29/19 1930 12/29/19 1927  aspirin tablet 325 mg  ONCE      Last MAR action:  Given - by Robb Wallace on 12/29/19 at [facility], THELMA L          EKG  Normal sinus rhythm, rate 90, normal axis, normal intervals, no evidence of conduction abnormalities, no diagnostic ischemic changes, QTC 442.      Radiology  Xr Chest Standard (2 Vw)    Result Date: 12/29/2019  EXAMINATION: TWO XRAY VIEWS OF THE CHEST 12/29/2019 5:43 pm COMPARISON: 08/23/2018 HISTORY: ORDERING SYSTEM PROVIDED HISTORY: chest pain TECHNOLOGIST PROVIDED HISTORY: Reason for exam:->chest pain Reason for Exam: cp and sob started this morning Acuity: Acute Type of Exam: Initial FINDINGS: Normal heart size and pulmonary vasculature. No focal consolidations, pleural effusions, or pneumothorax. Hyperinflated lungs with lucent apices compatible with known emphysematous changes. No evidence of acute process. Emphysema.          Labs  Results for orders placed or performed during the hospital encounter of 12/29/19   CBC Auto Differential   Result Value Ref Range    WBC 10.5 4.0 - 11.0 K/uL    RBC 5.49 4.20 - 5.90 M/uL    Hemoglobin 15.3 13.5 - 17.5 g/dL    Hematocrit 45.0 40.5 - 52.5 %    MCV 82.0 80.0 - 100.0 fL    MCH 28.0 26.0 - 34.0 pg    MCHC 34.1 31.0 - 36.0 g/dL    RDW 13.5 12.4 - 15.4 %    Platelets 437 810 - 908 K/uL    MPV 7.9 5.0 - 10.5 fL    Neutrophils % 71.3 %    Lymphocytes % 17.8 %    Monocytes % 7.7 %    Eosinophils % 2.2 %    Basophils % 1.0 %    Neutrophils Absolute 7.5 1.7 - 7.7 K/uL    Lymphocytes Absolute 1.9 1.0 - 5.1 K/uL    Monocytes Absolute 0.8 0.0 - 1.3 K/uL    Eosinophils Absolute 0.2 0.0 - 0.6 K/uL    Basophils Absolute 0.1 0.0 - 0.2 K/uL   Comprehensive Metabolic Panel   Result Value Ref Range    Sodium 138 136 - 145 mmol/L    Potassium 3.7 3.5 - 5.1 mmol/L    Chloride 102 99 - 110 mmol/L    CO2 23 21 - 32 mmol/L    Anion Gap 13 3 - 16    Glucose 94 70 - 99 mg/dL    BUN 7 7 - 20 mg/dL    CREATININE 0.7 (L) 0.9 - 1.3 mg/dL    GFR Non-African American >60 >60    GFR African American >60 >60    Calcium 9.1 8.3 - 10.6 mg/dL    Total Protein 7.4 6.4 - 8.2 g/dL    Alb 4.2 3.4 - 5.0 g/dL    Albumin/Globulin Ratio 1.3 1.1 - 2.2    Total Bilirubin 0.6 0.0 - 1.0 mg/dL    Alkaline Phosphatase 130 (H) 40 - 129 U/L    ALT 41 (H) 10 - 40 U/L    AST 34 15 - 37 U/L    Globulin 3.2 g/dL   Troponin   Result Value Ref Range    Troponin <0.01 <0.01 ng/mL         MDM  79-year-old male who presents with left-sided chest pain that radiates down the left arm, now resolved but still lingering sensation in his lower chest.  His story is concerning for ACS. He is moderate to high risk by heart score and would not feel comfortable discharging this patient home. Patient was given Zofran and 325 aspirin. His EKG does not show a clear ischemic pattern does not meet STEMI criteria. Initial troponin is negative. We will plan to admit for continued monitoring and and likely rule out stress test.  Patient's cardiologist is Dr. Christal Roman:  1. Unstable angina pectoris (HCC)          Disposition:  Admit to telemetry in stable condition. Blood pressure (!) 132/109, pulse 100, temperature 97.7 °F (36.5 °C), temperature source Oral, resp. rate 18, weight 155 lb (70.3 kg), SpO2 93 %. Patient was given scripts for the following medications. I counseled patient how to take these medications. New Prescriptions    No medications on file       Disposition referral (if applicable):  No follow-up provider specified. Total critical care time is 0 minutes, which excludes separately billable procedures and updating family. Time spent is specifically for management of the presenting complaint and symptoms initially, direct bedside care, reevaluation, review of records, and consultation. There was a high probability of clinically significant life-threatening deterioration in the patient's condition, which required my urgent intervention. This chart was generated in part by using Dragon Dictation system and may contain errors related to that system including errors in grammar, punctuation, and spelling, as well as words and phrases that may be inappropriate.  If there are any questions or concerns please feel free to contact the dictating provider for clarification.      MD Arya Aiken MD  12/29/19 1949

## 2019-12-30 NOTE — CARE COORDINATION
Chart reviewed, pt in Observation. Pt lives at home with spouse, has [employer], has PCP. No needs noted prior to admission and no discharge needs noted at this time. Please notify DCP if needs arise.   JENNIFER Olvera-RN

## 2019-12-30 NOTE — PROGRESS NOTES
Pt aware second troponin negative still, remains NPO after midnight, Ham sandwich and apple sauce available floor stock provided for evening meal for both pt and spouse.

## 2019-12-30 NOTE — ED NOTES
Bedside report given to A2,RN. Pt transported to floor in stable condition via wheelchair by RN. Pt transferred with all belongings.       Michelle Macedo RN  12/29/19 5554

## 2019-12-30 NOTE — PROGRESS NOTES
Hospitalist Progress Note      PCP: Suzi Carrington    Date of Admission: 12/29/2019        Subjective:   Patient admitted with chest pain, no new issues    Medications:  Reviewed    Infusion Medications   Scheduled Medications    aspirin  81 mg Oral Daily    atenolol  25 mg Oral Daily    atorvastatin  40 mg Oral Nightly    clopidogrel  75 mg Oral Daily    isosorbide mononitrate  30 mg Oral Daily    sodium chloride flush  10 mL Intravenous 2 times per day    enoxaparin  40 mg Subcutaneous Daily     PRN Meds: sodium chloride flush, magnesium hydroxide, ondansetron, nitroglycerin, acetaminophen      Intake/Output Summary (Last 24 hours) at 12/30/2019 1455  Last data filed at 12/30/2019 0830  Gross per 24 hour   Intake 0 ml   Output 300 ml   Net -300 ml       Physical Exam Performed:    BP (!) 87/55   Pulse 84   Temp 97.7 °F (36.5 °C) (Oral)   Resp 20   Ht 5' 3\" (1.6 m)   Wt 155 lb (70.3 kg)   SpO2 91%   BMI 27.46 kg/m²     General appearance: No apparent distress, appears stated age and cooperative. HEENT: Pupils equal, round, and reactive to light. Conjunctivae/corneas clear. Neck: Supple, with full range of motion. No jugular venous distention. Trachea midline. Respiratory:  Normal respiratory effort. Clear to auscultation, bilaterally without Rales/Wheezes/Rhonchi. Cardiovascular: Regular rate and rhythm with normal S1/S2 without murmurs, rubs or gallops. Abdomen: Soft, non-tender, non-distended with normal bowel sounds. Musculoskeletal: No clubbing, cyanosis or edema bilaterally. Full range of motion without deformity.         Labs:   Recent Labs     12/29/19 1914 12/30/19  0739   WBC 10.5 7.0   HGB 15.3 14.1   HCT 45.0 41.8    288     Recent Labs     12/29/19 1914 12/30/19  0739    138   K 3.7 4.1    104   CO2 23 23   BUN 7 11   CREATININE 0.7* 0.7*   CALCIUM 9.1 8.8     Recent Labs     12/29/19 1914   AST 34   ALT 41*   BILITOT 0.6   ALKPHOS 130*     No results for input(s): INR in the last 72 hours. Recent Labs     12/29/19  1914 12/29/19  2238 12/30/19  0109   TROPONINI <0.01 <0.01 <0.01       Urinalysis:      Lab Results   Component Value Date    NITRU Negative 12/29/2019    WBCUA 0-2 07/16/2018    BACTERIA 1+ 07/16/2018    RBCUA 0-2 07/16/2018    BLOODU Negative 12/29/2019    SPECGRAV <=1.005 12/29/2019    GLUCOSEU Negative 12/29/2019       Radiology:  XR CHEST STANDARD (2 VW)   Final Result   No evidence of acute process. Emphysema.              Assessment/Plan:    Active Hospital Problems    Diagnosis    Coronary artery disease [I25.10]     Priority: High    Tobacco abuse [Z72.0]     Priority: High    History of MI (myocardial infarction) [I25.2]    HLD (hyperlipidemia) [E78.5]    GERD (gastroesophageal reflux disease) [K21.9]    Chest pain [R07.9]     53 y.o. male, with PMH of HTN, HLD, DM 2, CAD status post LAD stent, GERD, and MI x3, who presented to [facility] with chest pain with radiation down his left arm    Stress test abnormal for inferior wall ischemia cardiology consulted    DVT Prophylaxis: Lovenox  Diet: Diet NPO, After Midnight  Code Status: Full Code      Deann Jasso MD

## 2019-12-31 VITALS
HEIGHT: 63 IN | OXYGEN SATURATION: 93 % | DIASTOLIC BLOOD PRESSURE: 57 MMHG | TEMPERATURE: 97.5 F | RESPIRATION RATE: 16 BRPM | SYSTOLIC BLOOD PRESSURE: 91 MMHG | HEART RATE: 69 BPM | WEIGHT: 149.2 LBS | BODY MASS INDEX: 26.44 KG/M2

## 2019-12-31 LAB
C-REACTIVE PROTEIN: 6.3 MG/L (ref 0–5.1)
EKG ATRIAL RATE: 86 BPM
EKG ATRIAL RATE: 89 BPM
EKG DIAGNOSIS: NORMAL
EKG DIAGNOSIS: NORMAL
EKG P AXIS: 52 DEGREES
EKG P AXIS: 56 DEGREES
EKG P-R INTERVAL: 142 MS
EKG P-R INTERVAL: 148 MS
EKG Q-T INTERVAL: 364 MS
EKG Q-T INTERVAL: 370 MS
EKG QRS DURATION: 78 MS
EKG QRS DURATION: 84 MS
EKG QTC CALCULATION (BAZETT): 442 MS
EKG QTC CALCULATION (BAZETT): 442 MS
EKG R AXIS: -15 DEGREES
EKG R AXIS: -16 DEGREES
EKG T AXIS: 16 DEGREES
EKG T AXIS: 18 DEGREES
EKG VENTRICULAR RATE: 86 BPM
EKG VENTRICULAR RATE: 89 BPM
GLUCOSE BLD-MCNC: 112 MG/DL (ref 70–99)
PERFORMED ON: ABNORMAL

## 2019-12-31 PROCEDURE — 6370000000 HC RX 637 (ALT 250 FOR IP): Performed by: NURSE PRACTITIONER

## 2019-12-31 PROCEDURE — 96372 THER/PROPH/DIAG INJ SC/IM: CPT

## 2019-12-31 PROCEDURE — G0378 HOSPITAL OBSERVATION PER HR: HCPCS

## 2019-12-31 PROCEDURE — 93010 ELECTROCARDIOGRAM REPORT: CPT | Performed by: INTERNAL MEDICINE

## 2019-12-31 PROCEDURE — 6360000002 HC RX W HCPCS: Performed by: NURSE PRACTITIONER

## 2019-12-31 PROCEDURE — 2580000003 HC RX 258: Performed by: NURSE PRACTITIONER

## 2019-12-31 RX ADMIN — Medication 10 ML: at 08:39

## 2019-12-31 RX ADMIN — CLOPIDOGREL BISULFATE 75 MG: 75 TABLET ORAL at 08:38

## 2019-12-31 RX ADMIN — ENOXAPARIN SODIUM 40 MG: 40 INJECTION SUBCUTANEOUS at 08:38

## 2019-12-31 RX ADMIN — ATENOLOL 25 MG: 25 TABLET ORAL at 08:38

## 2019-12-31 RX ADMIN — ASPIRIN 81 MG 81 MG: 81 TABLET ORAL at 08:38

## 2019-12-31 RX ADMIN — ISOSORBIDE MONONITRATE 30 MG: 30 TABLET, EXTENDED RELEASE ORAL at 08:38

## 2019-12-31 ASSESSMENT — PAIN SCALES - GENERAL: PAINLEVEL_OUTOF10: 2

## 2020-01-30 ENCOUNTER — TELEPHONE (OUTPATIENT)
Dept: CARDIOLOGY CLINIC | Age: 55
End: 2020-01-30

## 2020-01-30 NOTE — LETTER
[address] Cardiology - [address]  Phone: [phone]  Fax: [phone]    Matthew Oconnell MD        January 31, 2020     Annetta Sloan  [address] [facility], [address] [id number]      To whom it may concern,    Please excuse Annetta Sloan 1965 from jury duty. If you have any questions or concerns, please don't hesitate to call.     Sincerely,        Matthew Oconnell MD

## 2020-01-30 NOTE — TELEPHONE ENCOUNTER
German Monreal seen in hospital when pt was there 12/29-12/31. Please advise what to say if you would be ok with a letter. Pt has upcoming [facility] appt 2/10/2020.    12/30/19  PLAN  1. Pt with atypical CP that is completely differn then his prior angina  2. Reviewed films, PCI to RCA would be higher risk given pt specific anatomy and high PDA/PLV bifurcation.               - Cont medical management for now with ASA, lipitor, plavix, atenolol  3. Will f/u as outpt, if pt has medical refractory CP he was instructed to call office and can consider cath at that time.

## 2020-02-07 NOTE — PROGRESS NOTES
[address] [facility]   Cardiovascular Evaluation    PATIENT: Joanne Ndiaye  DATE: 2/10/2020  MRN: [id number]  CSN: [id number]  : 1965      Primary Care Doctor: Gely Dorantes  Reason for evaluation:   Follow-Up from Hospital (CP); Shortness of Breath; and Fatigue      Subjective:   History of present illness on initial date of evaluation:   Joanne Ndiaye is a 53 y.o. patient who presents for hospital follow up. He reports he is feeling good. He denies CP, SOB, palpitations, dizziness or syncope. He states he quit smoking back in February after his stent. He denies any bleeding or blood in his stools. He states he changed his diet and is keeping active. Today he reports feeling ok since his hospital stay. He denies anymore chest pain. He was admitted to the hospital 19 with c/o chest pain and nausea. Stress/echo 19 abnormal stress ECHO suggestive of inferior ischemia. He states he is now SOB and fatigued and this is new. He denies gasping for air while sleeping. He is taking his medications as prescribed. Patient Active Problem List   Diagnosis    Coronary artery disease    Tobacco abuse    Hypotension    Fracture of vertebra due to osteoporosis ([address]    Chest pain    Ischemic cardiomyopathy    PAD (peripheral artery disease) ([address]    History of MI (myocardial infarction)    HLD (hyperlipidemia)    GERD (gastroesophageal reflux disease)    SOB (shortness of breath)    Other fatigue         Past Medical History:   has a past medical history of Diabetes mellitus ([address] Family history of early CAD, GERD (gastroesophageal reflux disease), High cholesterol, MI (myocardial infarction) ([address] NSTEMI (non-ST elevated myocardial infarction) ([address] Smoker, and ST elevation myocardial infarction involving left anterior descending (LAD) coronary artery ([address]     Surgical History:   has a past surgical history that includes Cholecystectomy and Normocephalic, atraumatic   Eyes:  PERRL, conjunctiva/corneas clear   Nose: Nares normal, no drainage or sinus tenderness   Throat: Lips, mucosa, and tongue normal   Neck: Supple, symmetrical, trachea midline, NL thyroid no carotid bruit or JVD   Lungs:   CTAB, respirations unlabored   Chest Wall:  No tenderness or deformity   Heart:  Regular rhythm and normal rate; S1, S2 are normal;   no murmur noted; no rub or gallop   Abdomen:   Soft, non-tender, +BS x 4, no masses, no organomegaly   Extremities: Extremities normal, atraumatic, no cyanosis or edema   Pulses: 2+ and symmetric   Skin: Skin color, texture, turgor normal, no rashes or lesions   Pysch: Normal mood and affect   Neurologic: Normal gross motor and sensory exam.         LABS   CBC:      Lab Results   Component Value Date    WBC 7.0 12/30/2019    RBC 5.07 12/30/2019    HGB 14.1 12/30/2019    HCT 41.8 12/30/2019    MCV 82.4 12/30/2019    RDW 13.6 12/30/2019     12/30/2019     CMP:  Lab Results   Component Value Date     12/30/2019    K 4.1 12/30/2019     12/30/2019    CO2 23 12/30/2019    BUN 11 12/30/2019    CREATININE 0.7 12/30/2019    GFRAA >60 12/30/2019    GFRAA >60 08/15/2011    AGRATIO 1.3 12/29/2019    LABGLOM >60 12/30/2019    GLUCOSE 102 12/30/2019    PROT 7.4 12/29/2019    PROT 7.4 08/15/2011    CALCIUM 8.8 12/30/2019    BILITOT 0.6 12/29/2019    ALKPHOS 130 12/29/2019    AST 34 12/29/2019    ALT 41 12/29/2019     PT/INR:   No results found for: PTINR  Liver:  No components found for: CHLPL  Lab Results   Component Value Date    ALT 41 (H) 12/29/2019    AST 34 12/29/2019    ALKPHOS 130 (H) 12/29/2019    BILITOT 0.6 12/29/2019     Lab Results   Component Value Date    LABA1C 6.5 (H) 08/15/2011     Lipids:         Lab Results   Component Value Date    TRIG 194 (H) 12/30/2019    TRIG 415 (H) 08/24/2018    TRIG 208 (H) 12/31/2017            Lab Results   Component Value Date    HDL 24 (L) 12/30/2019    HDL 28 (L) 10/03/2019    HDL PLD.-DA     LVEDP: 20  LVEF: 50% with mild anterior hypokinesis    VASCULAR/OTHER IMAGIN18  VASCULAR       1. No evidence of an acute aortic syndrome. 2. Negative for acute pulmonary embolism. 3. 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 MUSCULOSKELETAL         Assessment and Plan   Marlen Landeros is a 53 y.o. male who presents today for the following problems:      1. Chest pain: better but pt states BRYANT and fatigue worse  2. Abnormal stress test: inferior   3. CAD              - 2017 PCI to mid LAD using drug stent         4. Ischemic cardiomyopathy: 30-->recovered 55%  5. PAD:  100% left iliac  6. HLD: LDL well controlled at 46              HDL low at 20 due to tobacco abuse and ? genetics  7. Hx of tobacco abuse: quit 2017       PLAN  1. PT not c/o CP but a lot of SOB and fatigue, ? Anginal equivalent   - LHC (groin) possible CSI  2 Cont medical management with ASA, lipitor, plavix, atenolol          Patient Active Problem List   Diagnosis    Coronary artery disease    Tobacco abuse    Hypotension    Fracture of vertebra due to osteoporosis ([address]    Chest pain    Ischemic cardiomyopathy    PAD (peripheral artery disease) ([address]    History of MI (myocardial infarction)    HLD (hyperlipidemia)    GERD (gastroesophageal reflux disease)    SOB (shortness of breath)    Other fatigue         Plan:  1. Angiogram (left heart cath) to rule out blockage   ~ you will need a    ~ No medication needs held prior to the procedure   2. Follow up after the procedure. It is a pleasure to assist in the care of Marlen Landeros. Please call with any questions.     This note was scribed in the presence of Lalitha Chavira MD by Immanuel Ko RN.      Court Borja MD, personally performed the services described in this documentation as scribed by the above signed scribe in my presence, and it is both accurate and complete to the best of our ability and knowledge. I agree with the details independently gathered by my clinical support staff, while the remaining scribed note accurately describes my personal service to the patient. The above RN is working as a scribe for and in the presence of myself . Working as a scribe, the RN may have prepopulated components of this note with my historical intellectual property under my direct supervision. Any additions to this intellectual property were performed at my direction. Furthermore, the content and accuracy of this note have been reviewed by me to the best of my ability.          Harika Landeros MD, [address] [facility] Cardiologist  [address]  [phone] [facility]  [phone] [address] [place] [address]  2/10/2020  9:12 AM

## 2020-02-10 ENCOUNTER — TELEPHONE (OUTPATIENT)
Dept: CARDIOLOGY CLINIC | Age: 55
End: 2020-02-10

## 2020-02-10 ENCOUNTER — OFFICE VISIT (OUTPATIENT)
Dept: CARDIOLOGY CLINIC | Age: 55
End: 2020-02-10
Payer: MEDICAID

## 2020-02-10 VITALS
BODY MASS INDEX: 27 KG/M2 | HEIGHT: 63 IN | SYSTOLIC BLOOD PRESSURE: 90 MMHG | HEART RATE: 95 BPM | DIASTOLIC BLOOD PRESSURE: 58 MMHG | WEIGHT: 152.4 LBS | OXYGEN SATURATION: 95 %

## 2020-02-10 PROBLEM — R53.83 OTHER FATIGUE: Status: ACTIVE | Noted: 2020-02-10

## 2020-02-10 PROBLEM — R06.02 SOB (SHORTNESS OF BREATH): Status: ACTIVE | Noted: 2020-02-10

## 2020-02-10 PROCEDURE — G8419 CALC BMI OUT NRM PARAM NOF/U: HCPCS | Performed by: INTERNAL MEDICINE

## 2020-02-10 PROCEDURE — 3017F COLORECTAL CA SCREEN DOC REV: CPT | Performed by: INTERNAL MEDICINE

## 2020-02-10 PROCEDURE — 1036F TOBACCO NON-USER: CPT | Performed by: INTERNAL MEDICINE

## 2020-02-10 PROCEDURE — 99215 OFFICE O/P EST HI 40 MIN: CPT | Performed by: INTERNAL MEDICINE

## 2020-02-10 PROCEDURE — G8427 DOCREV CUR MEDS BY ELIG CLIN: HCPCS | Performed by: INTERNAL MEDICINE

## 2020-02-10 PROCEDURE — G8484 FLU IMMUNIZE NO ADMIN: HCPCS | Performed by: INTERNAL MEDICINE

## 2020-02-10 NOTE — LETTER
[address] [address] [facility]   Cardiovascular Evaluation    PATIENT: Troy Pop  DATE: 2/10/2020  MRN: [id number]  CSN: [id number]  : 1965      Primary Care Doctor: Valerio Haro  Reason for evaluation:   Follow-Up from Hospital (CP); Shortness of Breath; and Fatigue      Subjective:   History of present illness on initial date of evaluation:   Troy Pop is a 53 y.o. patient who presents for hospital follow up. He reports he is feeling good. He denies CP, SOB, palpitations, dizziness or syncope. He states he quit smoking back in February after his stent. He denies any bleeding or blood in his stools. He states he changed his diet and is keeping active. Today he reports feeling ok since his hospital stay. He denies anymore chest pain. He was admitted to the hospital 19 with c/o chest pain and nausea. Stress/echo 19 abnormal stress ECHO suggestive of inferior ischemia. He states he is now SOB and fatigued and this is new. He denies gasping for air while sleeping. He is taking his medications as prescribed. Patient Active Problem List   Diagnosis    Coronary artery disease    Tobacco abuse    Hypotension    Fracture of vertebra due to osteoporosis ([address]    Chest pain    Ischemic cardiomyopathy    PAD (peripheral artery disease) ([address]    History of MI (myocardial infarction)    HLD (hyperlipidemia)    GERD (gastroesophageal reflux disease)    SOB (shortness of breath)    Other fatigue         Past Medical History:   has a past medical history of Diabetes mellitus ([address] Family history of early CAD, GERD (gastroesophageal reflux disease), High cholesterol, MI (myocardial infarction) ([address] NSTEMI (non-ST elevated myocardial infarction) ([address] Smoker, and ST elevation myocardial infarction involving left anterior descending (LAD) coronary artery ([address]     Surgical History: General Appearance:  Alert, cooperative, no distress, appears stated age   Head:  Normocephalic, atraumatic   Eyes:  PERRL, conjunctiva/corneas clear   Nose: Nares normal, no drainage or sinus tenderness   Throat: Lips, mucosa, and tongue normal   Neck: Supple, symmetrical, trachea midline, NL thyroid no carotid bruit or JVD   Lungs:   CTAB, respirations unlabored   Chest Wall:  No tenderness or deformity   Heart:  Regular rhythm and normal rate; S1, S2 are normal;   no murmur noted; no rub or gallop   Abdomen:   Soft, non-tender, +BS x 4, no masses, no organomegaly   Extremities: Extremities normal, atraumatic, no cyanosis or edema   Pulses: 2+ and symmetric   Skin: Skin color, texture, turgor normal, no rashes or lesions   Pysch: Normal mood and affect   Neurologic: Normal gross motor and sensory exam.         LABS   CBC:      Lab Results   Component Value Date    WBC 7.0 12/30/2019    RBC 5.07 12/30/2019    HGB 14.1 12/30/2019    HCT 41.8 12/30/2019    MCV 82.4 12/30/2019    RDW 13.6 12/30/2019     12/30/2019     CMP:  Lab Results   Component Value Date     12/30/2019    K 4.1 12/30/2019     12/30/2019    CO2 23 12/30/2019    BUN 11 12/30/2019    CREATININE 0.7 12/30/2019    GFRAA >60 12/30/2019    GFRAA >60 08/15/2011    AGRATIO 1.3 12/29/2019    LABGLOM >60 12/30/2019    GLUCOSE 102 12/30/2019    PROT 7.4 12/29/2019    PROT 7.4 08/15/2011    CALCIUM 8.8 12/30/2019    BILITOT 0.6 12/29/2019    ALKPHOS 130 12/29/2019    AST 34 12/29/2019    ALT 41 12/29/2019     PT/INR:   No results found for: PTINR  Liver:  No components found for: CHLPL  Lab Results   Component Value Date    ALT 41 (H) 12/29/2019    AST 34 12/29/2019    ALKPHOS 130 (H) 12/29/2019    BILITOT 0.6 12/29/2019     Lab Results   Component Value Date    LABA1C 6.5 (H) 08/15/2011     Lipids:         Lab Results   Component Value Date    TRIG 194 (H) 12/30/2019    TRIG 415 (H) 08/24/2018    TRIG 208 (H) 12/31/2017 Lab Results   Component Value Date    HDL 24 (L) 2019    HDL 28 (L) 10/03/2019    HDL 20 (L) 2018            Lab Results   Component Value Date    LDLCALC 46 2019    LDLCALC 47 10/03/2019    [address] see below 2018            Lab Results   Component Value Date    LABVLDL 39 2019    LABVLDL 36 10/03/2019    LABVLDL see below 2018         CARDIAC DATA   EKG: Last EK2018 NSR with no ischemia    STRESS/ECHO 19  Summary   Abnormal stress ECHO suggestive of inferior ischemia. Echo   Images obtained in 59 seconds with aid of Definity contrast.   Baseline resting echocardiogram shows normal global LV systolic function   with an ejection fraction of 60% and uniform myocardial segmental wall   motion. Following stress there was Hypokinesis of the apical inferior and   inferior wall. ECG   The stress ECG showed no ischemia at target heart rate. Duke Score of 5    low Risk ). [address] [place] [address] Street: 18   Left ventricular systolic function is normal with ejection fraction   estimated at 55%. No regional wall motion abnormalities. Normal left ventricular diastolic filling pressure. Mild tricuspid regurgitation. Systolic pulmonary artery pressure (SPAP) is normal estimated at 30 mmHg   (Right atrial pressure of 8 mmHg). STRESS TEST: 18 (lexiscan)  Summary  Small sized, mild intensity anterior and anteroseptal completely reversible  defect consistent with ischemia in the territory of the mid and distal LAD. Hyperdynamic LV systolic function with TV>34% with uniform wall motion. Intermediate risk abnormal study.   Resting ECG  Normal sinus rhythm; diffuse T wave inversion  precordial leads and ST depression with T inversion inferior leads c/w possible  ischemia    CARDIAC CATH: 18  Cath: 2018 LEFT HEART CATH  LM: luminals  LAD: patent mid LAD stent  LCX: no angiographic CAD              OM1- high takeoff, luminals Linda Rosales MD, personally performed the services described in this documentation as scribed by the above signed scribe in my presence, and it is both accurate and complete to the best of our ability and knowledge. I agree with the details independently gathered by my clinical support staff, while the remaining scribed note accurately describes my personal service to the patient. The above RN is working as a scribe for and in the presence of myself . Working as a scribe, the RN may have prepopulated components of this note with my historical intellectual property under my direct supervision. Any additions to this intellectual property were performed at my direction. Furthermore, the content and accuracy of this note have been reviewed by me to the best of my ability.          Harika Landeros MD, [facility] Cardiologist  [address]  [phone] [facility]  [phone] [address] [place] [address]  2/10/2020  9:12 AM

## 2020-02-10 NOTE — PATIENT INSTRUCTIONS
Plan:  1. Angiogram (left heart cath) to rule out blockage   ~ you will need a    ~ No medication needs held prior to the procedure   2. Follow up after the procedure.

## 2020-02-13 ENCOUNTER — TELEPHONE (OUTPATIENT)
Dept: CARDIOLOGY CLINIC | Age: 55
End: 2020-02-13

## 2020-02-13 NOTE — TELEPHONE ENCOUNTER
Spoke with patient. Patient is scheduled with Dr. Ramon Corral for Left Heart Cath on 3/11/20 at [facility], arrival time of 7:30am to the Cath Lab. Please have patient arrive to the main entrance of [facility] at 7:15am and check in with the registration desk. Remind patient to be NPO after midnight (8 hours prior). Do not apply lotions/creams on skin the day of procedure.

## 2020-02-18 RX ORDER — NITROGLYCERIN 0.4 MG/1
TABLET SUBLINGUAL
Qty: 25 TABLET | Refills: 3 | Status: SHIPPED | OUTPATIENT
Start: 2020-02-18 | End: 2020-06-01

## 2020-02-24 ENCOUNTER — TELEPHONE (OUTPATIENT)
Dept: CARDIOLOGY CLINIC | Age: 55
End: 2020-02-24

## 2020-02-24 NOTE — TELEPHONE ENCOUNTER
It would be okay to continue to hold Imdur. I would reconcile the med list and taken off the list if it is still on it. Thank you.

## 2020-02-24 NOTE — TELEPHONE ENCOUNTER
Patient called inquiring if he should be taking Imdur. Patient states he has not taken the medication since 2018.  Patient states the medication is on his med list from his office visit in our office on 2/10

## 2020-03-04 ENCOUNTER — HOSPITAL ENCOUNTER (INPATIENT)
Dept: CARDIAC CATH/INVASIVE PROCEDURES | Age: 55
LOS: 1 days | Discharge: HOME | DRG: 175 | End: 2020-03-05
Attending: INTERNAL MEDICINE | Admitting: INTERNAL MEDICINE
Payer: MEDICAID

## 2020-03-04 PROBLEM — I25.10 CAD IN NATIVE ARTERY: Status: RESOLVED | Noted: 2020-03-04 | Resolved: 2020-03-04

## 2020-03-04 PROBLEM — E11.9 DMII (DIABETES MELLITUS, TYPE 2) (HCC): Status: ACTIVE | Noted: 2020-03-04

## 2020-03-04 PROBLEM — I25.10 CAD IN NATIVE ARTERY: Status: ACTIVE | Noted: 2020-03-04

## 2020-03-04 LAB
A/G RATIO: 1.4 (ref 1.1–2.2)
ALBUMIN SERPL-MCNC: 4.3 G/DL (ref 3.4–5)
ALP BLD-CCNC: 127 U/L (ref 40–129)
ALT SERPL-CCNC: 35 U/L (ref 10–40)
ANION GAP SERPL CALCULATED.3IONS-SCNC: 13 MMOL/L (ref 3–16)
AST SERPL-CCNC: 22 U/L (ref 15–37)
BILIRUB SERPL-MCNC: 0.4 MG/DL (ref 0–1)
BUN BLDV-MCNC: 14 MG/DL (ref 7–20)
CALCIUM SERPL-MCNC: 9 MG/DL (ref 8.3–10.6)
CHLORIDE BLD-SCNC: 104 MMOL/L (ref 99–110)
CHOLESTEROL, TOTAL: 122 MG/DL (ref 0–199)
CO2: 20 MMOL/L (ref 21–32)
CREAT SERPL-MCNC: 0.7 MG/DL (ref 0.9–1.3)
EKG ATRIAL RATE: 64 BPM
EKG ATRIAL RATE: 83 BPM
EKG DIAGNOSIS: NORMAL
EKG DIAGNOSIS: NORMAL
EKG P AXIS: 57 DEGREES
EKG P AXIS: 60 DEGREES
EKG P-R INTERVAL: 152 MS
EKG P-R INTERVAL: 154 MS
EKG Q-T INTERVAL: 360 MS
EKG Q-T INTERVAL: 402 MS
EKG QRS DURATION: 76 MS
EKG QRS DURATION: 96 MS
EKG QTC CALCULATION (BAZETT): 414 MS
EKG QTC CALCULATION (BAZETT): 423 MS
EKG R AXIS: -12 DEGREES
EKG R AXIS: -5 DEGREES
EKG T AXIS: 24 DEGREES
EKG T AXIS: 32 DEGREES
EKG VENTRICULAR RATE: 64 BPM
EKG VENTRICULAR RATE: 83 BPM
GFR AFRICAN AMERICAN: >60
GFR NON-AFRICAN AMERICAN: >60
GLOBULIN: 3 G/DL
GLUCOSE BLD-MCNC: 180 MG/DL (ref 70–99)
GLUCOSE BLD-MCNC: 88 MG/DL (ref 70–99)
HCT VFR BLD CALC: 42.9 % (ref 40.5–52.5)
HDLC SERPL-MCNC: 27 MG/DL (ref 40–60)
HEMOGLOBIN: 14.7 G/DL (ref 13.5–17.5)
INR BLD: 1.02 (ref 0.86–1.14)
LDL CHOLESTEROL CALCULATED: ABNORMAL MG/DL
LDL CHOLESTEROL DIRECT: 63 MG/DL
LEFT VENTRICULAR EJECTION FRACTION MODE: NORMAL
LV EF: 65 %
MCH RBC QN AUTO: 27.7 PG (ref 26–34)
MCHC RBC AUTO-ENTMCNC: 34.2 G/DL (ref 31–36)
MCV RBC AUTO: 81.1 FL (ref 80–100)
PDW BLD-RTO: 13.9 % (ref 12.4–15.4)
PERFORMED ON: NORMAL
PLATELET # BLD: 323 K/UL (ref 135–450)
PMV BLD AUTO: 7.8 FL (ref 5–10.5)
POC ACT LR: 142 SEC
POC ACT LR: 200 SEC
POC ACT LR: 386 SEC
POTASSIUM SERPL-SCNC: 3.6 MMOL/L (ref 3.5–5.1)
PROTHROMBIN TIME: 11.8 SEC (ref 10–13.2)
RBC # BLD: 5.29 M/UL (ref 4.2–5.9)
SODIUM BLD-SCNC: 137 MMOL/L (ref 136–145)
TOTAL PROTEIN: 7.3 G/DL (ref 6.4–8.2)
TRIGL SERPL-MCNC: 329 MG/DL (ref 0–150)
VLDLC SERPL CALC-MCNC: ABNORMAL MG/DL
WBC # BLD: 10.2 K/UL (ref 4–11)

## 2020-03-04 PROCEDURE — 92920 PRQ TRLUML C ANGIOP 1ART&/BR: CPT | Performed by: INTERNAL MEDICINE

## 2020-03-04 PROCEDURE — 2580000003 HC RX 258

## 2020-03-04 PROCEDURE — 99152 MOD SED SAME PHYS/QHP 5/>YRS: CPT | Performed by: INTERNAL MEDICINE

## 2020-03-04 PROCEDURE — 6370000000 HC RX 637 (ALT 250 FOR IP): Performed by: INTERNAL MEDICINE

## 2020-03-04 PROCEDURE — 99152 MOD SED SAME PHYS/QHP 5/>YRS: CPT

## 2020-03-04 PROCEDURE — 2580000003 HC RX 258: Performed by: INTERNAL MEDICINE

## 2020-03-04 PROCEDURE — 80053 COMPREHEN METABOLIC PANEL: CPT

## 2020-03-04 PROCEDURE — 2060000000 HC ICU INTERMEDIATE R&B

## 2020-03-04 PROCEDURE — C1887 CATHETER, GUIDING: HCPCS

## 2020-03-04 PROCEDURE — 93005 ELECTROCARDIOGRAM TRACING: CPT | Performed by: INTERNAL MEDICINE

## 2020-03-04 PROCEDURE — 93458 L HRT ARTERY/VENTRICLE ANGIO: CPT

## 2020-03-04 PROCEDURE — 2500000003 HC RX 250 WO HCPCS

## 2020-03-04 PROCEDURE — 6360000002 HC RX W HCPCS

## 2020-03-04 PROCEDURE — 85347 COAGULATION TIME ACTIVATED: CPT

## 2020-03-04 PROCEDURE — C1894 INTRO/SHEATH, NON-LASER: HCPCS

## 2020-03-04 PROCEDURE — 6360000004 HC RX CONTRAST MEDICATION

## 2020-03-04 PROCEDURE — 99153 MOD SED SAME PHYS/QHP EA: CPT

## 2020-03-04 PROCEDURE — 2709999900 HC NON-CHARGEABLE SUPPLY

## 2020-03-04 PROCEDURE — C1769 GUIDE WIRE: HCPCS

## 2020-03-04 PROCEDURE — 92920 PRQ TRLUML C ANGIOP 1ART&/BR: CPT

## 2020-03-04 PROCEDURE — 93458 L HRT ARTERY/VENTRICLE ANGIO: CPT | Performed by: INTERNAL MEDICINE

## 2020-03-04 PROCEDURE — 80061 LIPID PANEL: CPT

## 2020-03-04 PROCEDURE — C1725 CATH, TRANSLUMIN NON-LASER: HCPCS

## 2020-03-04 PROCEDURE — 4A023N7 MEASUREMENT OF CARDIAC SAMPLING AND PRESSURE, LEFT HEART, PERCUTANEOUS APPROACH: ICD-10-PCS | Performed by: INTERNAL MEDICINE

## 2020-03-04 PROCEDURE — 85610 PROTHROMBIN TIME: CPT

## 2020-03-04 PROCEDURE — 6370000000 HC RX 637 (ALT 250 FOR IP)

## 2020-03-04 PROCEDURE — 6360000002 HC RX W HCPCS: Performed by: INTERNAL MEDICINE

## 2020-03-04 PROCEDURE — 85027 COMPLETE CBC AUTOMATED: CPT

## 2020-03-04 PROCEDURE — 02703ZZ DILATION OF CORONARY ARTERY, ONE ARTERY, PERCUTANEOUS APPROACH: ICD-10-PCS | Performed by: INTERNAL MEDICINE

## 2020-03-04 PROCEDURE — B2111ZZ FLUOROSCOPY OF MULTIPLE CORONARY ARTERIES USING LOW OSMOLAR CONTRAST: ICD-10-PCS | Performed by: INTERNAL MEDICINE

## 2020-03-04 RX ORDER — DEXTROSE MONOHYDRATE 25 G/50ML
12.5 INJECTION, SOLUTION INTRAVENOUS PRN
Status: DISCONTINUED | OUTPATIENT
Start: 2020-03-04 | End: 2020-03-05 | Stop reason: HOSPADM

## 2020-03-04 RX ORDER — POLYETHYLENE GLYCOL 3350 17 G/17G
17 POWDER, FOR SOLUTION ORAL DAILY PRN
Status: DISCONTINUED | OUTPATIENT
Start: 2020-03-04 | End: 2020-03-05 | Stop reason: HOSPADM

## 2020-03-04 RX ORDER — CLOPIDOGREL 300 MG/1
TABLET, FILM COATED ORAL
Status: COMPLETED | OUTPATIENT
Start: 2020-03-04 | End: 2020-03-04

## 2020-03-04 RX ORDER — NITROGLYCERIN 0.4 MG/1
0.4 TABLET SUBLINGUAL EVERY 5 MIN PRN
Status: DISCONTINUED | OUTPATIENT
Start: 2020-03-04 | End: 2020-03-05 | Stop reason: HOSPADM

## 2020-03-04 RX ORDER — ATORVASTATIN CALCIUM 40 MG/1
40 TABLET, FILM COATED ORAL NIGHTLY
Status: DISCONTINUED | OUTPATIENT
Start: 2020-03-04 | End: 2020-03-05 | Stop reason: HOSPADM

## 2020-03-04 RX ORDER — CLOPIDOGREL BISULFATE 75 MG/1
75 TABLET ORAL DAILY
Status: DISCONTINUED | OUTPATIENT
Start: 2020-03-04 | End: 2020-03-05 | Stop reason: HOSPADM

## 2020-03-04 RX ORDER — SODIUM CHLORIDE 0.9 % (FLUSH) 0.9 %
10 SYRINGE (ML) INJECTION PRN
Status: DISCONTINUED | OUTPATIENT
Start: 2020-03-04 | End: 2020-03-05 | Stop reason: HOSPADM

## 2020-03-04 RX ORDER — EPTIFIBATIDE 0.75 MG/ML
INJECTION, SOLUTION INTRAVENOUS CONTINUOUS PRN
Status: COMPLETED | OUTPATIENT
Start: 2020-03-04 | End: 2020-03-04

## 2020-03-04 RX ORDER — PROMETHAZINE HYDROCHLORIDE 25 MG/1
12.5 TABLET ORAL EVERY 6 HOURS PRN
Status: DISCONTINUED | OUTPATIENT
Start: 2020-03-04 | End: 2020-03-05 | Stop reason: HOSPADM

## 2020-03-04 RX ORDER — ACETAMINOPHEN 650 MG/1
650 SUPPOSITORY RECTAL EVERY 6 HOURS PRN
Status: DISCONTINUED | OUTPATIENT
Start: 2020-03-04 | End: 2020-03-05 | Stop reason: HOSPADM

## 2020-03-04 RX ORDER — SODIUM CHLORIDE 9 MG/ML
INJECTION, SOLUTION INTRAVENOUS ONCE
Status: COMPLETED | OUTPATIENT
Start: 2020-03-04 | End: 2020-03-04

## 2020-03-04 RX ORDER — EPTIFIBATIDE 0.75 MG/ML
2 INJECTION, SOLUTION INTRAVENOUS CONTINUOUS
Status: ACTIVE | OUTPATIENT
Start: 2020-03-04 | End: 2020-03-04

## 2020-03-04 RX ORDER — MORPHINE SULFATE 2 MG/ML
1 INJECTION, SOLUTION INTRAMUSCULAR; INTRAVENOUS ONCE
Status: COMPLETED | OUTPATIENT
Start: 2020-03-04 | End: 2020-03-04

## 2020-03-04 RX ORDER — ACETAMINOPHEN 325 MG/1
650 TABLET ORAL EVERY 6 HOURS PRN
Status: DISCONTINUED | OUTPATIENT
Start: 2020-03-04 | End: 2020-03-05 | Stop reason: HOSPADM

## 2020-03-04 RX ORDER — MIDAZOLAM HYDROCHLORIDE 1 MG/ML
INJECTION INTRAMUSCULAR; INTRAVENOUS
Status: COMPLETED | OUTPATIENT
Start: 2020-03-04 | End: 2020-03-04

## 2020-03-04 RX ORDER — FENTANYL CITRATE 50 UG/ML
INJECTION, SOLUTION INTRAMUSCULAR; INTRAVENOUS
Status: COMPLETED | OUTPATIENT
Start: 2020-03-04 | End: 2020-03-04

## 2020-03-04 RX ORDER — HEPARIN SODIUM 1000 [USP'U]/ML
INJECTION, SOLUTION INTRAVENOUS; SUBCUTANEOUS
Status: COMPLETED | OUTPATIENT
Start: 2020-03-04 | End: 2020-03-04

## 2020-03-04 RX ORDER — DEXTROSE MONOHYDRATE 50 MG/ML
100 INJECTION, SOLUTION INTRAVENOUS PRN
Status: DISCONTINUED | OUTPATIENT
Start: 2020-03-04 | End: 2020-03-05 | Stop reason: HOSPADM

## 2020-03-04 RX ORDER — ASPIRIN 81 MG/1
81 TABLET, CHEWABLE ORAL DAILY
Status: DISCONTINUED | OUTPATIENT
Start: 2020-03-04 | End: 2020-03-05 | Stop reason: HOSPADM

## 2020-03-04 RX ORDER — SODIUM CHLORIDE 0.9 % (FLUSH) 0.9 %
10 SYRINGE (ML) INJECTION EVERY 12 HOURS SCHEDULED
Status: DISCONTINUED | OUTPATIENT
Start: 2020-03-04 | End: 2020-03-05 | Stop reason: HOSPADM

## 2020-03-04 RX ORDER — NITROGLYCERIN 0.3 MG/1
TABLET SUBLINGUAL
Status: COMPLETED | OUTPATIENT
Start: 2020-03-04 | End: 2020-03-04

## 2020-03-04 RX ORDER — RANITIDINE 150 MG/1
75 TABLET ORAL NIGHTLY
Status: DISCONTINUED | OUTPATIENT
Start: 2020-03-04 | End: 2020-03-04

## 2020-03-04 RX ORDER — NICOTINE POLACRILEX 4 MG
15 LOZENGE BUCCAL PRN
Status: DISCONTINUED | OUTPATIENT
Start: 2020-03-04 | End: 2020-03-05 | Stop reason: HOSPADM

## 2020-03-04 RX ORDER — FAMOTIDINE 20 MG/1
20 TABLET, FILM COATED ORAL NIGHTLY
Status: DISCONTINUED | OUTPATIENT
Start: 2020-03-04 | End: 2020-03-05 | Stop reason: HOSPADM

## 2020-03-04 RX ORDER — POTASSIUM CHLORIDE 20 MEQ/1
40 TABLET, EXTENDED RELEASE ORAL PRN
Status: DISCONTINUED | OUTPATIENT
Start: 2020-03-04 | End: 2020-03-05 | Stop reason: HOSPADM

## 2020-03-04 RX ORDER — POTASSIUM CHLORIDE 7.45 MG/ML
10 INJECTION INTRAVENOUS PRN
Status: DISCONTINUED | OUTPATIENT
Start: 2020-03-04 | End: 2020-03-05 | Stop reason: HOSPADM

## 2020-03-04 RX ORDER — ONDANSETRON 2 MG/ML
4 INJECTION INTRAMUSCULAR; INTRAVENOUS EVERY 6 HOURS PRN
Status: DISCONTINUED | OUTPATIENT
Start: 2020-03-04 | End: 2020-03-05 | Stop reason: HOSPADM

## 2020-03-04 RX ADMIN — HEPARIN SODIUM 3500 UNITS: 1000 INJECTION, SOLUTION INTRAVENOUS; SUBCUTANEOUS at 09:22

## 2020-03-04 RX ADMIN — MIDAZOLAM HYDROCHLORIDE 2 MG: 1 INJECTION INTRAMUSCULAR; INTRAVENOUS at 09:03

## 2020-03-04 RX ADMIN — MIDAZOLAM HYDROCHLORIDE 1 MG: 1 INJECTION INTRAMUSCULAR; INTRAVENOUS at 10:16

## 2020-03-04 RX ADMIN — MORPHINE SULFATE 1 MG: 2 INJECTION, SOLUTION INTRAMUSCULAR; INTRAVENOUS at 17:44

## 2020-03-04 RX ADMIN — ATORVASTATIN CALCIUM 40 MG: 40 TABLET, FILM COATED ORAL at 20:36

## 2020-03-04 RX ADMIN — NITROGLYCERIN 0.4 MG: 0.3 TABLET SUBLINGUAL at 09:49

## 2020-03-04 RX ADMIN — FENTANYL CITRATE 50 MCG: 50 INJECTION, SOLUTION INTRAMUSCULAR; INTRAVENOUS at 09:47

## 2020-03-04 RX ADMIN — SODIUM CHLORIDE: 9 INJECTION, SOLUTION INTRAVENOUS at 15:04

## 2020-03-04 RX ADMIN — EPTIFIBATIDE 2 MCG/KG/MIN: 0.75 INJECTION, SOLUTION INTRAVENOUS at 09:57

## 2020-03-04 RX ADMIN — ACETAMINOPHEN 650 MG: 325 TABLET ORAL at 21:20

## 2020-03-04 RX ADMIN — MIDAZOLAM HYDROCHLORIDE 1 MG: 1 INJECTION INTRAMUSCULAR; INTRAVENOUS at 09:22

## 2020-03-04 RX ADMIN — CLOPIDOGREL 300 MG: 300 TABLET, FILM COATED ORAL at 09:57

## 2020-03-04 RX ADMIN — FENTANYL CITRATE 25 MCG: 50 INJECTION, SOLUTION INTRAMUSCULAR; INTRAVENOUS at 09:02

## 2020-03-04 RX ADMIN — FENTANYL CITRATE 25 MCG: 50 INJECTION, SOLUTION INTRAMUSCULAR; INTRAVENOUS at 10:17

## 2020-03-04 RX ADMIN — FAMOTIDINE 20 MG: 20 TABLET, FILM COATED ORAL at 20:36

## 2020-03-04 RX ADMIN — EPTIFIBATIDE 2 MCG/KG/MIN: 0.75 INJECTION, SOLUTION INTRAVENOUS at 09:47

## 2020-03-04 RX ADMIN — MIDAZOLAM HYDROCHLORIDE 1 MG: 1 INJECTION INTRAMUSCULAR; INTRAVENOUS at 09:47

## 2020-03-04 RX ADMIN — HEPARIN SODIUM 1500 UNITS: 1000 INJECTION, SOLUTION INTRAVENOUS; SUBCUTANEOUS at 09:47

## 2020-03-04 ASSESSMENT — PAIN DESCRIPTION - PAIN TYPE: TYPE: ACUTE PAIN;SURGICAL PAIN

## 2020-03-04 ASSESSMENT — PAIN SCALES - GENERAL
PAINLEVEL_OUTOF10: 3
PAINLEVEL_OUTOF10: 9
PAINLEVEL_OUTOF10: 7
PAINLEVEL_OUTOF10: 9

## 2020-03-04 NOTE — H&P
Hospital Medicine History & Physical      PCP: Angela Meade    Date of Admission: 3/4/2020    Date of Service: Pt seen/examined on above date and Admitted to Inpatient with expected LOS greater than two midnights due to medical therapy. Chief Complaint:  S/p LHC        History Of Present Illness:  53 y.o. male with history of DMII, hyperlipidemia, and STEMI who had abnormal stress test in 12/2/2019 and follows with Dr. Alma Johnson. He has been having increasing fatigue intermittent chest pain at home. Underwent left heart cath today with successful POBA to PLV and PDA using kissing balloon inflations done with PCI not performed due to mid RCA anatomy. Dr. lAma Johnson requested patient admitted under hospitalist service for overnight monitoring with possible discharge tomorrow. Patient denied any chest pain however reported pain at cath site at right groin and right wrist where a radial access for LHC was performed initially was but was not successful. Patient denied any fever, chills, shortness of breath or any other symptoms. Past Medical History:          Diagnosis Date    Diabetes mellitus ([address]     Family history of early CAD     GERD (gastroesophageal reflux disease)     High cholesterol 12/2017    MI (myocardial infarction) ([address] 12/30/2017    ST Elevation MI    NSTEMI (non-ST elevated myocardial infarction) ([address] 12/30/2017    Smoker 12/2017    ST elevation myocardial infarction involving left anterior descending (LAD) coronary artery (HCC)        Past Surgical History:          Procedure Laterality Date    CHOLECYSTECTOMY      CORONARY ANGIOPLASTY WITH STENT PLACEMENT  12/30/2017    BASILIA- 3.0x 38- mLAD       Medications Prior to Admission:      Prior to Admission medications    Medication Sig Start Date End Date Taking?  Authorizing Provider   ASPIRIN LOW DOSE 81 MG chewable tablet CHEW 1 TABLET BY MOUTH EVERY DAY 12/20/19  Yes Lee Alex MD   clopidogrel (PLAVIX) 75 MG tablet TAKE 1 TABLET BY MOUTH DAILY 11/12/19  Yes Meghan Suarez MD   atenolol (TENORMIN) 50 MG tablet Take 0.5 tablets by mouth daily 10/15/19  Yes Meghan Suarez MD   METFORMIN HCL PO Take by mouth   Yes Historical Provider, MD   atorvastatin (LIPITOR) 40 MG tablet TAKE 1 TABLET BY MOUTH NIGHTLY 7/10/19  Yes Meghan Suarez MD   Omega-3 Fatty Acids ([employer] FISH OIL) 1200 MG CPDR TAKE 1 CAPSULE BY MOUTH TWICE A DAY  Patient taking differently: daily  5/29/19  Yes Meghan Suarez MD   RaNITidine HCl (Z[address]AC 75 PO) Take by mouth   Yes Historical Provider, MD   nitroGLYCERIN (NITROSTAT) 0.4 MG SL tablet DISSOLVE 1 TABLET UNDER TONGUE AT ONSET OF CHEST PAIN. IF NO RELIEF AFTER 1 DOSE CALL 911. (MAX 3) 2/18/20   Meghan Suarez MD       Allergies:  Patient has no known allergies. Social History:      The patient currently lives with wife    TOBACCO:   reports that he has quit smoking. His smoking use included cigarettes. He smoked 1.00 pack per day. He has never used smokeless tobacco.  ETOH:   reports no history of alcohol use. E-Cigarettes Vaping or Juuling     Questions Responses    E-Cigarette Use Never User    Start Date     Does device contain nicotine? Quit Date     E-Cigarette Type             Family History:      Positive as follows:        Problem Relation Age of Onset    Heart Attack Mother        REVIEW OF SYSTEMS:   Pertinent positives as noted in the HPI. All other systems reviewed and negative. PHYSICAL EXAM PERFORMED:    /72   Pulse 82   Temp 97.4 °F (36.3 °C) (Oral)   Resp 20   Ht 5' 3\" (1.6 m)   Wt 154 lb 8 oz (70.1 kg)   SpO2 95%   BMI 27.37 kg/m²     General appearance:  No apparent distress, appears stated age and cooperative. HEENT:  Normal cephalic, atraumatic without obvious deformity. Conjunctivae/corneas clear. Neck: Supple, with full range of motion. No jugular venous distention. Trachea midline. Respiratory:  Normal respiratory effort.  Clear to auscultation, bilaterally without takeoff                PDA: ostial 80%                PLV: prox 30%     LVEDP: 7  LVEF: 65%+ hyperdynamic     POBA of PLV/PDA  2.75mm x 15 to ostial PDA then 2.5x15mm and 2.5 x 15mm kissing balloon inflations with PDA/PLV. <20% residual        Assessment  1. Successful POBA to PLV and PDA using lissing balloon inflations, PCI not performed due to mid RCA anatomy                - if restenosis, will need long stent from prox RCA into PDA  2. Cont ASA and plavix  3. Pt intollerant to BB and will d/c   4. COnt statin      ASSESSMENT AND PLAN     Active Hospital Problems    Diagnosis Date Noted    Coronary artery disease [I25.10]      Priority: High    DMII (diabetes mellitus, type 2) ([facility] [E11.9] 03/04/2020    HLD (hyperlipidemia) [E78.5] 12/29/2019    Chest pain [R07.9] 08/23/2018         CAD : s/p LHC today with  findings as stated above. No bleeding or hematoma at cath sites. Mgt per cardio. Continue aspirin, statin, Plavix. No BB due to intolerance. NTG and morphine PRN       Diabetes mellitus type 2: HBA1c 6.5 in 8/15/2011. Hold metformin. Start SSI. Check HBA1c . Hyperlipidemia: continue statin     GERD : continue pepcid. DVT Prophylaxis: Lovenox   diet: DIET CARDIAC; No Caffeine  Code Status: Full Code    PT/OT Eval Status: To be determined by cardio    Dispo - per cardiology            Lety Monteiro MD    Thank you Bran Perry for the opportunity to be involved in this patient's care. If you have any questions or concerns please feel free to contact me at [phone].

## 2020-03-04 NOTE — PROGRESS NOTES
Patient admitted to room 425 from cath lab. Report received from Robi Vanegas, [facility]. Patient oriented to room, call light, bed rails, phone, lights and bathroom. Patient instructed about the schedule of the day including: vital sign frequency, lab draws, possible tests, frequency of MD and staff rounds, including RN/MD rounding together at bedside, daily weights, and I &O's. Patient instructed about prescribed diet, how to use 8MENU, and television. No bed alarm in place, pt alert and oriented, calls out appropriately. Telemetry box in place, patient aware of placement and reason. Bed locked, in lowest position, side rails up 2/4, call light within reach. Will continue to monitor.

## 2020-03-04 NOTE — BRIEF OP NOTE
Brief Postoperative Note  ______________________________________________________________    Patient: Troy Pop  YOB: 1965  MRN: [id number]    Brief Postoperative Note  Pre-operative Diagnosis: + stress test  Post-operative Diagnosis: Same  Procedure: Moderate sedation  Radial access with US  Rt groin access  LHC  LVG  Cors  Kissing Poba to PLV/PDA  Anesthesia: Moderate Sedation  Surgeons/Assistants: Coral Resendez MD, [facility]  Estimated Blood Loss: less than 50   Complications: None  Specimens: Was Not Obtained    Findings:     LEFT HEART CATH  LM: luminals  LAD: mid stent with trace restenosis <10%, luminals otherwise  Ramus: luminals  LCX:luminals  RCA: dominant, mid moderate diffuse disease up to 40%, + small aneurysm   High PLV/PDA takeoff   PDA: ostial 80%   PLV: prox 30%    LVEDP: 7  LVEF: 65%+ hyperdynamic    POBA of PLV/PDA  2.75mm x 15 to ostial PDA then 2.5x15mm and 2.5 x 15mm kissing balloon inflations with PDA/PLV. <20% residual      Assessment  1. Successful POBA to PLV and PDA using lissing balloon inflations, PCI not performed due to mid RCA anatomy   - if restenosis, will need long stent from prox RCA into PDA  2. Cont ASA and plavix  3.  Pt intollerant to BB and will d/c   4. COnt statin              Sparkle Mena MD  Date: 3/4/2020  Time: 10:29 AM

## 2020-03-04 NOTE — H&P
Medications:  Current Facility-Administered Medications   Medication Dose Route Frequency Provider Last Rate Last Dose    0.9 % sodium chloride infusion   Intravenous Once Mabel Rosa MD               Pre-Sedation:    Pre-Sedation Documentation and Exam:  I have assessed the patient and agree with the H&P present on the chart. Prior History of Anesthesia Complications:   none    Modified Mallampati:  II (soft palate, uvula, fauces visible)    ASA Classification:  Class 3 - A patient with severe systemic disease that limits activity but is not incapacitating      Mary Scale: Activity:  2 - Able to move 4 extremities voluntarily on command  Respiration:  2 - Able to breathe deeply and cough freely  Circulation:  2 - BP+/- 20mmHg of normal  Consciousness:  2 - Fully awake  Oxygen Saturation (color):  2 - Able to maintain oxygen saturation >92% on room air    Sedation/Anesthesia Plan:  Guard the patient's safety and welfare. Minimize physical discomfort and pain. Minimize negative psychological responses to treatment by providing sedation and analgesia and maximize the potential amnesia. Patient to meet pre-procedure discharge plan.     Medication Planned:  midazolam intravenously and fentanyl intravenously    Patient is an appropriate candidate for plan of sedation: yes      Electronically signed by Annabelle Simon MD on 3/4/2020 at 7:40 AM

## 2020-03-05 VITALS
RESPIRATION RATE: 16 BRPM | BODY MASS INDEX: 27.38 KG/M2 | WEIGHT: 154.5 LBS | TEMPERATURE: 97.9 F | SYSTOLIC BLOOD PRESSURE: 118 MMHG | DIASTOLIC BLOOD PRESSURE: 74 MMHG | HEART RATE: 84 BPM | HEIGHT: 63 IN | OXYGEN SATURATION: 97 %

## 2020-03-05 LAB
ANION GAP SERPL CALCULATED.3IONS-SCNC: 11 MMOL/L (ref 3–16)
BUN BLDV-MCNC: 11 MG/DL (ref 7–20)
CALCIUM SERPL-MCNC: 8.9 MG/DL (ref 8.3–10.6)
CHLORIDE BLD-SCNC: 105 MMOL/L (ref 99–110)
CO2: 22 MMOL/L (ref 21–32)
CREAT SERPL-MCNC: 0.7 MG/DL (ref 0.9–1.3)
GFR AFRICAN AMERICAN: >60
GFR NON-AFRICAN AMERICAN: >60
GLUCOSE BLD-MCNC: 126 MG/DL (ref 70–99)
GLUCOSE BLD-MCNC: 152 MG/DL (ref 70–99)
HCT VFR BLD CALC: 39.1 % (ref 40.5–52.5)
HEMOGLOBIN: 13.2 G/DL (ref 13.5–17.5)
MCH RBC QN AUTO: 27.6 PG (ref 26–34)
MCHC RBC AUTO-ENTMCNC: 33.8 G/DL (ref 31–36)
MCV RBC AUTO: 81.4 FL (ref 80–100)
PDW BLD-RTO: 14.1 % (ref 12.4–15.4)
PERFORMED ON: ABNORMAL
PLATELET # BLD: 270 K/UL (ref 135–450)
PMV BLD AUTO: 7.8 FL (ref 5–10.5)
POC ACT LR: >400 SEC
POTASSIUM REFLEX MAGNESIUM: 4.1 MMOL/L (ref 3.5–5.1)
RBC # BLD: 4.81 M/UL (ref 4.2–5.9)
SODIUM BLD-SCNC: 138 MMOL/L (ref 136–145)
WBC # BLD: 7.6 K/UL (ref 4–11)

## 2020-03-05 PROCEDURE — 36415 COLL VENOUS BLD VENIPUNCTURE: CPT

## 2020-03-05 PROCEDURE — 85027 COMPLETE CBC AUTOMATED: CPT

## 2020-03-05 PROCEDURE — 2580000003 HC RX 258: Performed by: INTERNAL MEDICINE

## 2020-03-05 PROCEDURE — 80048 BASIC METABOLIC PNL TOTAL CA: CPT

## 2020-03-05 PROCEDURE — 6370000000 HC RX 637 (ALT 250 FOR IP): Performed by: INTERNAL MEDICINE

## 2020-03-05 PROCEDURE — 99233 SBSQ HOSP IP/OBS HIGH 50: CPT | Performed by: NURSE PRACTITIONER

## 2020-03-05 PROCEDURE — 6360000002 HC RX W HCPCS: Performed by: INTERNAL MEDICINE

## 2020-03-05 RX ORDER — ICOSAPENT ETHYL 1000 MG/1
2 CAPSULE ORAL 2 TIMES DAILY
Qty: 120 CAPSULE | Refills: 3 | Status: SHIPPED | OUTPATIENT
Start: 2020-03-05 | End: 2020-07-29

## 2020-03-05 RX ADMIN — CLOPIDOGREL BISULFATE 75 MG: 75 TABLET ORAL at 08:38

## 2020-03-05 RX ADMIN — ASPIRIN 81 MG 81 MG: 81 TABLET ORAL at 08:38

## 2020-03-05 RX ADMIN — ENOXAPARIN SODIUM 40 MG: 40 INJECTION SUBCUTANEOUS at 08:38

## 2020-03-05 RX ADMIN — Medication 10 ML: at 08:39

## 2020-03-05 ASSESSMENT — PAIN SCALES - GENERAL: PAINLEVEL_OUTOF10: 0

## 2020-03-05 NOTE — PROGRESS NOTES
Bedside report received from [facility]. Patient resting comfortably in bed, family at bedside. No signs of discomfort or distress. Bed is in lowest position, wheels locked, 2/2 side rails up. Bedside table and call light within reach. White board updated. Will continue to monitor patient. Mela Nolasco RN    9:23 PM  Shift assessment complete. (See findings in flowsheet). Med pass complete. (See MAR). VSS. Patient c/o low back pain, PRN tylenol given. Patient resting in bed comfortably. Bed in lowest position, brakes locked. Nonskid footwear in place. 5 P's addressed, no needs at this time. Pt calls out appropriately. Will continue to monitor. Mela Nolasco RN    11:38 PM  VSS. No needs at this time. Cath sites look good, unchanged from previous assessment. Mela Nolasco RN    5:43 AM  VSS. No needs at this time. Patient ambulated to bathroom without difficulty. Cath sites look good, unchanged from previous assessments.  Mela Nolasco RN

## 2020-03-05 NOTE — DISCHARGE SUMMARY
Hospital Medicine Discharge Summary    Patient ID: Bean Crespo      Patient's PCP: Elvis Ruiz    Admit Date: 3/4/2020     Discharge Date: 3/5/2020      Admitting Physician: Renée Royal MD     Discharge Physician: Renée Royal MD     Discharge Diagnoses: Active Hospital Problems    Diagnosis    Coronary artery disease [I25.10]     Priority: High    Essential hypertension [I10]    DMII (diabetes mellitus, type 2) (HCC) [E11.9]    HLD (hyperlipidemia) [E78.5]    Chest pain [R07.9]       The patient was seen and examined on day of discharge and this discharge summary is in conjunction with any daily progress note from day of discharge. Hospital Course:       53 y.o. male with history of DMII, hyperlipidemia, and STEMI who had abnormal stress test in 12/2/2019 and follows with Dr. Tatiana Gordon. He had been having increasing fatigue,  intermittent chest . Underwent left heart cath on 3/4/2020 with successful POBA to PLV and PDA using kissing balloon inflations done with PCI not performed due to mid RCA anatomy. Dr Tatiana Gordon requested patient admitted under hospitalist service for overnight monitoring. He was monitored overnight with no acute issues and was discharged the next day. Continue aspirin, statin, aspirin Plavix. No BB due to intolerance. Metformin was initially held for cath as he got IV contrast. He  was told to resume it on 3/7/2020. Discharged home in stable condition with outpatient follow-up with cardiology. Physical Exam Performed:     /74   Pulse 84   Temp 97.9 °F (36.6 °C) (Oral)   Resp 16   Ht 5' 3\" (1.6 m)   Wt 154 lb 8 oz (70.1 kg)   SpO2 97%   BMI 27.37 kg/m²         Labs:  For convenience and continuity at follow-up the following most recent labs are provided:      CBC:    Lab Results   Component Value Date    WBC 7.6 03/05/2020    HGB 13.2 03/05/2020    HCT 39.1 03/05/2020     03/05/2020       Renal:    Lab Results   Component Details   nitroGLYCERIN (NITROSTAT) 0.4 MG SL tablet DISSOLVE 1 TABLET UNDER TONGUE AT ONSET OF CHEST PAIN. IF NO RELIEF AFTER 1 DOSE CALL 911. (MAX 3), Disp-25 tablet, R-3Normal      ASPIRIN LOW DOSE 81 MG chewable tablet CHEW 1 TABLET BY MOUTH EVERY DAY, Disp-90 tablet, R-1Normal      clopidogrel (PLAVIX) 75 MG tablet TAKE 1 TABLET BY MOUTH DAILY, Disp-90 tablet, R-3Normal      METFORMIN HCL PO Take by mouthHistorical Med      atorvastatin (LIPITOR) 40 MG tablet TAKE 1 TABLET BY MOUTH NIGHTLY, Disp-90 tablet, R-5Normal      RaNITidine HCl (ZANTAC 75 PO) Take by mouthHistorical Med             Time Spent on discharge is more than 30 minutes in the examination, evaluation, counseling and review of medications and discharge plan. Signed:    Pablo Mitchell MD   3/5/2020      Thank you Adrianna Jordan for the opportunity to be involved in this patient's care. If you have any questions or concerns please feel free to contact me at [phone].

## 2020-03-05 NOTE — PROGRESS NOTES
[employer]   Daily Progress Note    Admit Date:  3/4/2020  HPI:   Admitted following outpatient scheduled left heart catheterization. He had been admitted in December 2019 with chest pain. No evidence of ACS but had abnormal stress MPI. He continued to have increased fatigue and shortness of breath so recommended cardiac catheterization. This revealed mid RCA, PLV and PDA disease. Performed balloon angioplasty to PLV and PDA (simultaneous kissing balloon technique). No intervention to mid RCA. Subjective:  Mr. Lawyer Rezaters up in halls. Reports feeling much better, able to breath easier and has more energy. Denies any chest pain. Reviewed coronary artery disease and procedure yesterday. Discussed cholesterol panel and options for improvement. Objective:   /74   Pulse 84   Temp 97.9 °F (36.6 °C) (Oral)   Resp 16   Ht 5' 3\" (1.6 m)   Wt 154 lb 8 oz (70.1 kg)   SpO2 97%   BMI 27.37 kg/m²       Intake/Output Summary (Last 24 hours) at 3/5/2020 0852  Last data filed at 3/5/2020 0844  Gross per 24 hour   Intake 480 ml   Output 1425 ml   Net -945 ml     Wt Readings from Last 3 Encounters:   03/04/20 154 lb 8 oz (70.1 kg)   02/10/20 152 lb 6.4 oz (69.1 kg)   12/31/19 149 lb 3.2 oz (67.7 kg)         ASSESSMENT:   1. CAD with stable angina (dyspnea, fatigue) resolved - on ASA, statin, Plavix (unable to tolerate low dose atenolol)  2. HLD - LDL 63, HDL 27, Trig 329 despite fish oil and lipitor  3. HTN - stable  4. PAD  5. DM        PLAN:  1. Continue ASA and Plavix  2. Continue Lipitor 40 mg, add Vascepa  3. Stop OTC fish oil - has been taking for ~ 2 years without improvement  4. Okay for discharge from cardiac perspective. Will review cardiac medications on discharge medication reconciliation form. Will arrange follow up appointment with Ortiz Kyle CNP in 2 weeks.       GISELA Harman - CNP, 3/5/2020, 8:52 AM  [facility]   [phone]       Telemetry:    NYHA: III    Physical Exam:  General:  Awake, alert, NAD  Skin:  Warm and dry  Neck:  JVP 8 cm  Chest:  Fine right basilar rales  Cardiovascular:  RRR, normal S1S2, no m/g/r  Abdomen:  Soft, nontender, +bowel sounds  Extremities:  No BLE edema  right radial site without ooze or hematoma, mild ecchymosis, dressing C,D,I, 2+ pulse  right femoral site without ooze, bruise or hematoma, dressing C,D,I, 2+ pulse       Medications:    aspirin  81 mg Oral Daily    atorvastatin  40 mg Oral Nightly    clopidogrel  75 mg Oral Daily    famotidine  20 mg Oral Nightly    sodium chloride flush  10 mL Intravenous 2 times per day    enoxaparin  40 mg Subcutaneous Daily    insulin lispro  0-6 Units Subcutaneous TID WC    insulin lispro  0-3 Units Subcutaneous Nightly      dextrose         Lab Data: Lab results independently reviewed by myself 3/5/20   CBC:   Recent Labs     03/04/20  0719 03/05/20  0520   WBC 10.2 7.6   HGB 14.7 13.2*    270     BMP:    Recent Labs     03/04/20  0719 03/05/20  0520    138   K 3.6 4.1   CO2 20* 22   BUN 14 11   CREATININE 0.7* 0.7*     INR:    Recent Labs     03/04/20 0719   INR 1.02     BNP:  No results for input(s): PROBNP in the last 72 hours. Cardiac Enzymes: No results for input(s): TROPONINI in the last 72 hours.   Lipids:   Lab Results   Component Value Date    TRIG 329 03/04/2020    TRIG 194 12/30/2019    HDL 27 03/04/2020    HDL 24 12/30/2019    HDL 30 08/15/2011    LDLCALC see below 03/04/2020    LDLCALC 46 12/30/2019    LDLDIRECT 63 03/04/2020    LDLDIRECT 50 08/24/2018       Cardiac Imaging:    LEFT HEART CATH 3/4/20:  LM: luminals  LAD: mid stent with trace restenosis <10%, luminals otherwise  Ramus: luminals  LCX:luminals  RCA: dominant, mid moderate diffuse disease up to 40%, + small aneurysm                High PLV/PDA takeoff                PDA: ostial 80%                PLV: prox 30%   LVEDP: 7  LVEF: 65%+ hyperdynamic   POBA of PLV/PDA  2.75mm x 15 to ostial PDA then 2.5x15mm and 2.5 x 15mm kissing balloon inflations with PDA/PLV. <20% residual   Assessment  1. Successful POBA to PLV and PDA using lissing balloon inflations, PCI not performed due to mid RCA anatomy                - if restenosis, will need long stent from prox RCA into PDA  2. Cont ASA and plavix  3. Pt intollerant to BB and will d/c   4. COnt statin     STRESS/ECHO 12/29/19  Summary   Abnormal stress ECHO suggestive of inferior ischemia. Echo   Images obtained in 59 seconds with aid of Definity contrast.   Baseline resting echocardiogram shows normal global LV systolic function   with an ejection fraction of 60% and uniform myocardial segmental wall   motion. Following stress there was Hypokinesis of the apical inferior and   inferior wall. ECG   The stress ECG showed no ischemia at target heart rate. Duke Score of 5    low Risk ). ECHO: 8/24/18   Left ventricular systolic function is normal with ejection fraction   estimated at 55%. No regional wall motion abnormalities. Normal left ventricular diastolic filling pressure. Mild tricuspid regurgitation. Systolic pulmonary artery pressure (SPAP) is normal estimated at 30 mmHg   (Right atrial pressure of 8 mmHg). LEFT HEART CATH 2/20/18:   LM: luminals  LAD: patent mid LAD stent  LCX: no angiographic CAD              OM1- high takeoff, luminals  RCA: dominant, luminals, mid with moderate eccentric \"chunk-like\" disease there is a 40-50% stenosis involving bifurcation with PLD. PDA     LVEDP: 20  LVEF: 50% with mild anterior hypokinesis   Assessment  1. Patent mid LAD stent  2. Nonobstructive CAD in RCA              - add nitrates for anti-anginals              - f/u in office    MPI 2/1/18:    Small sized, mild intensity anterior and anteroseptal completely reversible    defect consistent with ischemia in the territory of the mid and distal LAD.    Hyperdynamic LV systolic function with LE>62% with uniform wall motion.  Intermediate risk abnormal study. LEFT HEART CATH 12/30/17:   LM: short  LAD: long section of disease in mid LAD from septal to diag 2,  mid LAD plaque rupture with thrombus and 80% stenosis (ADAN-2 flow)  Ramus: vs high OM1, proximal 15%  LCX: small vessel, luminals  RCA: dominant, moderate diffuse diseae in mid with sting of perals configuration, distal RCA of 70%, ostial PLV 90%(short focal, seen in [facility])     LVEDP: 22  LVEF: 30-35% with moderate anterior, apex, and inferior apical hypokinesis     PCI of mid LAD  STENT: Xience 3.0 x 38mm post dilated using 3x15mm NC trek with pressures of 16-22atm, sluggish flow afterwards requiring NTG and nitroprusside   Assessment  1. Successful PCI to mid LAD for active plaque rupture. 80%-->0% using drug stent  2. Integrillin for 12 hrs  3. ASA 81mg poqday for life  4. Ticagrelor 90mg po BID for 1 yr w/o interruption  5.  Staged ischemic eval of distal RCA./PLV  6. BB, statin, echo, cardiac rehab

## 2020-03-05 NOTE — PROGRESS NOTES
Pt. Sitting up in bed awake. Lungs clear, diminished. Denies  Pain. SR on the monitor. Right groin site no hematoma. Right radial site  Small  Swollen  Area, no drainage. No distress noted.

## 2020-03-06 ENCOUNTER — TELEPHONE (OUTPATIENT)
Dept: CARDIOLOGY CLINIC | Age: 55
End: 2020-03-06

## 2020-03-09 NOTE — TELEPHONE ENCOUNTER
[facility] does not have samples. We do have samples and the $9 cards. Patient can  here if he wishes.

## 2020-03-13 ENCOUNTER — TELEPHONE (OUTPATIENT)
Dept: CARDIOLOGY CLINIC | Age: 55
End: 2020-03-13

## 2020-03-13 ENCOUNTER — OFFICE VISIT (OUTPATIENT)
Dept: CARDIOLOGY CLINIC | Age: 55
End: 2020-03-13
Payer: MEDICAID

## 2020-03-13 VITALS
OXYGEN SATURATION: 98 % | HEART RATE: 86 BPM | WEIGHT: 151 LBS | DIASTOLIC BLOOD PRESSURE: 68 MMHG | BODY MASS INDEX: 26.75 KG/M2 | SYSTOLIC BLOOD PRESSURE: 90 MMHG | HEIGHT: 63 IN

## 2020-03-13 PROCEDURE — G8427 DOCREV CUR MEDS BY ELIG CLIN: HCPCS | Performed by: NURSE PRACTITIONER

## 2020-03-13 PROCEDURE — G8419 CALC BMI OUT NRM PARAM NOF/U: HCPCS | Performed by: NURSE PRACTITIONER

## 2020-03-13 PROCEDURE — G8484 FLU IMMUNIZE NO ADMIN: HCPCS | Performed by: NURSE PRACTITIONER

## 2020-03-13 PROCEDURE — 3017F COLORECTAL CA SCREEN DOC REV: CPT | Performed by: NURSE PRACTITIONER

## 2020-03-13 PROCEDURE — 1111F DSCHRG MED/CURRENT MED MERGE: CPT | Performed by: NURSE PRACTITIONER

## 2020-03-13 PROCEDURE — 1036F TOBACCO NON-USER: CPT | Performed by: NURSE PRACTITIONER

## 2020-03-13 PROCEDURE — 99214 OFFICE O/P EST MOD 30 MIN: CPT | Performed by: NURSE PRACTITIONER

## 2020-03-13 ASSESSMENT — ENCOUNTER SYMPTOMS
COUGH: 0
CHEST TIGHTNESS: 0
SHORTNESS OF BREATH: 1

## 2020-03-13 NOTE — LETTER
inflations with PDA/PLV. <20% residual  Assessment  1. Successful POBA to PLV and PDA using lissing balloon inflations, PCI not performed due to mid RCA anatomy                - if restenosis, will need long stent from prox RCA into PDA  2. Cont ASA and plavix  3. Pt intollerant to BB and will d/c   4. COnt statin     Coronary angiogram 2/20/2018:  Brief Postoperative Note  Pre-operative Diagnosis: Unstable angina  Post-operative Diagnosis: Same  Procedure:   [facility]  LVG  Cors  Anesthesia: Moderate Sedation  Surgeons/Assistants: Grace Huntley MD, Fior Chowdary  Estimated Blood Loss: less than 50   Complications: None  Specimens: Was Not Obtained     Findings:   LEFT HEART CATH  LM: luminals  LAD: patent mid LAD stent  LCX: no angiographic CAD              OM1- high takeoff, luminals  RCA: dominant, luminals, mid with moderate eccentric \"chunk-like\" disease there is a 40-50% stenosis involving bifurcation with PLD. PDA  LVEDP: 20  LVEF: 50% with mild anterior hypokinesis  Assessment  1. Patent mid LAD stent  2.  Nonobstructive CAD in RCA              - add nitrates for anti-anginals              - f/u in office        Coronary angiogram 12/30/2017 [facility]):           Brief Postoperative Note  Pre-operative Diagnosis: NSTEMI  Post-operative Diagnosis: Same  Procedure:   [facility]  LVG  Bilateral cors  POBa and PCI to mid LAD  Anesthesia: Moderate Sedation  Surgeons/Assistants: Grace Huntley MD, Fior Chodwary  Estimated Blood Loss: less than 50   Complications: None  Specimens: Was Not Obtained     Findings:      LEFT HEART CATH  LM: short  LAD: long section of disease in mid LAD from septal to diag 2,  mid LAD plaque rupture with thrombus and 80% stenosis (ADAN-2 flow)  Ramus: vs high OM1, proximal 15%  LCX: small vessel, luminals  RCA: dominant, moderate diffuse diseae in mid with sting of perals configuration, distal RCA of 70%, ostial PLV 90%(short focal, seen in [facility])     LVEDP: 22 TRIG 329 03/04/2020     LIVER PROFILE:No results for input(s): AST, ALT, ALB in the last 72 hours. Reviewed all labs and imaging today    Assessment:   1. CAD:denies chest pain- unable to tolerate BB    -s/p Balloon angioplasty to PLV and PDA (simultaneous kidding balloon technique. No intervention to mid RCA. -s/p PCI to mid LAD with BASILIA x1 12/30/2017  2. HLD: LDL 63, HLD 27, Trig 329 despite fish oil and Lipitor switched to Vascepa in hospital- has not started it due to insurance   3. HTN:on the soft side, asymptomatic   4. PAD: 100% left iliac  5. DM  6. Hx of tobacco abuse    Plan:   1. Cardiac rehab  2. Will work on [place] for [employer]- gave printed coupon to take to pharmacy  3. Diet and exercise as discussed- handout given  4. Recheck Fasting lipid in 3 months after starting Vascepa   5. No other changes in medication  6. Follow up with Dr. David Early in 3 months    Cande Mahan [address]  [phone]      QUALITY MEASURES  1. Tobacco Cessation Counseling: NA  2. Retake of BP if >140/90:   NA  3. Documentation to PCP/referring for new patient:  Sent to PCP at close of office visit  4. CAD patient on anti-platelet: Yes  5. CAD patient on STATIN therapy:  Yes  6.  Patient with CHF and aFib on anticoagulation:  NA      Sincerely,      GISELA Celeste - CNP

## 2020-03-13 NOTE — PROGRESS NOTES
luminals  RCA: dominant, luminals, mid with moderate eccentric \"chunk-like\" disease there is a 40-50% stenosis involving bifurcation with PLD. PDA     LVEDP: 20  LVEF: 50% with mild anterior hypokinesis        Assessment  1. Patent mid LAD stent  2. Nonobstructive CAD in RCA              - add nitrates for anti-anginals              - f/u in office        Patient: Talisha Campa  YOB: 1965  MRN: [id number]     Brief Postoperative Note  Pre-operative Diagnosis: + stress test  Post-operative Diagnosis: Same  Procedure: Moderate sedation  Radial access with US  Rt groin access  [facility]  Cors  Kissing Poba to PLV/PDA  Anesthesia: Moderate Sedation  Surgeons/Assistants: Nicolasa Chase MD, [facility]  Estimated Blood Loss: less than 50   Complications: None  Specimens: Was Not Obtained     Findings:      LEFT HEART CATH  LM: luminals  LAD: mid stent with trace restenosis <10%, luminals otherwise  Ramus: luminals  LCX:luminals  RCA: dominant, mid moderate diffuse disease up to 40%, + small aneurysm                High PLV/PDA takeoff                PDA: ostial 80%                PLV: prox 30%  LVEDP: 7  LVEF: 65%+ hyperdynamic  POBA of PLV/PDA  2.75mm x 15 to ostial PDA then 2.5x15mm and 2.5 x 15mm kissing balloon inflations with PDA/PLV. <20% residual  Assessment  1. Successful POBA to PLV and PDA using lissing balloon inflations, PCI not performed due to mid RCA anatomy                - if restenosis, will need long stent from prox RCA into PDA  2. Cont ASA and plavix  3.  Pt intollerant to BB and will d/c   4. COnt statin     Coronary angiogram 2/20/2018:  Brief Postoperative Note  Pre-operative Diagnosis: Unstable angina  Post-operative Diagnosis: Same  Procedure:   [facility]  Cors  Anesthesia: Moderate Sedation  Surgeons/Assistants: Nicolasa Chase MD, [facility]  Estimated Blood Loss: less than 50   Complications: None  Specimens: Was Not Obtained     Findings:   LEFT HEART CATH  LM: luminals  LAD:

## 2020-03-18 ENCOUNTER — TELEPHONE (OUTPATIENT)
Dept: CARDIOLOGY CLINIC | Age: 55
End: 2020-03-18

## 2020-04-07 ENCOUNTER — TELEPHONE (OUTPATIENT)
Dept: CARDIOLOGY CLINIC | Age: 55
End: 2020-04-07

## 2020-05-04 ENCOUNTER — APPOINTMENT (OUTPATIENT)
Dept: GENERAL RADIOLOGY | Age: 55
End: 2020-05-04
Payer: MEDICAID

## 2020-05-04 ENCOUNTER — HOSPITAL ENCOUNTER (EMERGENCY)
Age: 55
Discharge: HOME | End: 2020-05-04
Attending: EMERGENCY MEDICINE
Payer: MEDICAID

## 2020-05-04 VITALS
WEIGHT: 150 LBS | RESPIRATION RATE: 18 BRPM | BODY MASS INDEX: 27.6 KG/M2 | TEMPERATURE: 98 F | HEART RATE: 103 BPM | SYSTOLIC BLOOD PRESSURE: 135 MMHG | DIASTOLIC BLOOD PRESSURE: 98 MMHG | HEIGHT: 62 IN | OXYGEN SATURATION: 93 %

## 2020-05-04 PROCEDURE — 97161 PT EVAL LOW COMPLEX 20 MIN: CPT

## 2020-05-04 PROCEDURE — 99283 EMERGENCY DEPT VISIT LOW MDM: CPT

## 2020-05-04 PROCEDURE — 6370000000 HC RX 637 (ALT 250 FOR IP): Performed by: EMERGENCY MEDICINE

## 2020-05-04 PROCEDURE — 97110 THERAPEUTIC EXERCISES: CPT

## 2020-05-04 PROCEDURE — 72100 X-RAY EXAM L-S SPINE 2/3 VWS: CPT

## 2020-05-04 RX ORDER — IBUPROFEN 600 MG/1
600 TABLET ORAL ONCE
Status: COMPLETED | OUTPATIENT
Start: 2020-05-04 | End: 2020-05-04

## 2020-05-04 RX ORDER — IBUPROFEN 600 MG/1
600 TABLET ORAL EVERY 6 HOURS PRN
Qty: 28 TABLET | Refills: 0 | Status: SHIPPED | OUTPATIENT
Start: 2020-05-04 | End: 2021-01-01

## 2020-05-04 RX ORDER — LIDOCAINE 4 G/G
1 PATCH TOPICAL ONCE
Status: DISCONTINUED | OUTPATIENT
Start: 2020-05-04 | End: 2020-05-04 | Stop reason: HOSPADM

## 2020-05-04 RX ORDER — HYDROCODONE BITARTRATE AND ACETAMINOPHEN 5; 325 MG/1; MG/1
1 TABLET ORAL EVERY 6 HOURS PRN
Qty: 12 TABLET | Refills: 0 | Status: SHIPPED | OUTPATIENT
Start: 2020-05-04 | End: 2020-05-07

## 2020-05-04 RX ORDER — CYCLOBENZAPRINE HCL 10 MG
10 TABLET ORAL 3 TIMES DAILY PRN
Qty: 15 TABLET | Refills: 0 | Status: SHIPPED | OUTPATIENT
Start: 2020-05-04 | End: 2020-05-09

## 2020-05-04 RX ADMIN — IBUPROFEN 600 MG: 600 TABLET, FILM COATED ORAL at 15:57

## 2020-05-04 ASSESSMENT — PAIN SCALES - GENERAL
PAINLEVEL_OUTOF10: 10
PAINLEVEL_OUTOF10: 10

## 2020-05-04 ASSESSMENT — PAIN DESCRIPTION - LOCATION: LOCATION: BACK

## 2020-05-04 NOTE — ED PROVIDER NOTES
1 tablet by mouth every 6 hours as needed for Pain 28 tablet 0    HYDROcodone-acetaminophen (NORCO) 5-325 MG per tablet Take 1 tablet by mouth every 6 hours as needed for Pain for up to 3 days. CHRISTIAN: [id number] 12 tablet 0    Icosapent Ethyl (VASCEPA) 1 g CAPS capsule Take 2 capsules by mouth 2 times daily 120 capsule 3    nitroGLYCERIN (NITROSTAT) 0.4 MG SL tablet DISSOLVE 1 TABLET UNDER TONGUE AT ONSET OF CHEST PAIN. IF NO RELIEF AFTER 1 DOSE CALL 911. (MAX 3) 25 tablet 3    ASPIRIN LOW DOSE 81 MG chewable tablet CHEW 1 TABLET BY MOUTH EVERY DAY 90 tablet 1    clopidogrel (PLAVIX) 75 MG tablet TAKE 1 TABLET BY MOUTH DAILY 90 tablet 3    metFORMIN (GLUCOPHAGE) 500 MG tablet Take 500 mg by mouth daily       atorvastatin (LIPITOR) 40 MG tablet TAKE 1 TABLET BY MOUTH NIGHTLY 90 tablet 5     No Known Allergies    Nursing Notes Reviewed    Physical Exam:  ED Triage Vitals [05/04/20 1505]   Enc Vitals Group      /82      Pulse 103      Resp 18      Temp 98 °F (36.7 °C)      Temp Source Oral      SpO2 94 %      Weight 150 lb (68 kg)      Height 5' 2\" (1.575 m)      Head Circumference       Peak Flow       Pain Score       Pain Loc       Pain Edu? Excl. in [address]? My pulse ox interpretation is - normal    General appearance:  No acute distress. Skin:  Warm. Dry. No Rash   Eye:  Extraocular movements intact. Ears, nose, mouth and throat:  Oral mucosa moist   Neck:  Trachea midline. Extremity:  No swelling. Normal ROM     Heart:  Regular  Perfusion:  intact  Respiratory:   Respirations nonlabored. Abdominal:  Normal bowel sounds. Soft. Nontender. Non distended. Back:  No CVA tenderness to palpation, slight midline tenderness throughout the lumbar spine without step-off or deformity,, mild lumbosacral paraspinal muscle tenderness to palpation, no spasm             Neurological:  Alert and oriented times 3.  2+ patellar tendon and Achilles tendon reflexes.   Normal sensation in the L5 and S1 (e.g., saddle anesthesia, urinary or bowel incontinence or retention, changing or worsening pain) that necessitate immediate return. Clinical Impression:  1. Strain of lumbar region, initial encounter      Disposition referral (if applicable):  Melissa Dotson  [address] [place]  [phone]    Schedule an appointment as soon as possible for a visit in 1 week      [facility]  [address] [facility] [place] [address]  Go to   If symptoms worsen    [facility] Physician Referral Line  Call [phone] to get an appointment with a primary care provider. Call   Call [phone] for scheduling or appointments. Disposition medications (if applicable):  Discharge Medication List as of 5/4/2020  4:59 PM      START taking these medications    Details   cyclobenzaprine (FLEXERIL) 10 MG tablet Take 1 tablet by mouth 3 times daily as needed for Muscle spasms, Disp-15 tablet, R-0Print      ibuprofen (ADVIL;MOTRIN) 600 MG tablet Take 1 tablet by mouth every 6 hours as needed for Pain, Disp-28 tablet, R-0Print      HYDROcodone-acetaminophen (NORCO) 5-325 MG per tablet Take 1 tablet by mouth every 6 hours as needed for Pain for up to 3 days. CHRISTIAN: [id number], Disp-12 tablet, R-0Print             Comment: Please note this report has been produced using speech recognition software and may contain errors related to that system including errors in grammar, punctuation, and spelling, as well as words and phrases that may be inappropriate. If there are any questions or concerns please feel free to contact the dictating provider for clarification.        Renée Washington MD  05/05/20 5194

## 2020-05-04 NOTE — PROGRESS NOTES
Outpatient Physical Therapy Phone: [phone], Fax: [phone]   ED Physical Therapy Phone: [phone]    LOWER EXTREMITY PHYSICAL THERAPY EVALUATION  EMERGENCY DEPARTMENT     Received referral for Physical Therapy Evaluation in the Emergency Department. Pt educated to role of PT in the ED, and pt agreeable to proceed with evaluation. Evaluation Date: 5/4/2020    Patient Name: Nereyda Barrett   YOB: 1965    Medical Diagnosis:  Back pain  Treatment Diagnosis:  Lumbar strain  Onset Date:  05/04/2020    Referral Date: 5/4/2020   Referring Provider: Maribel Gan MD  Restrictions/Precautions:    none    SUBJECTIVE FINDINGS    History of Present Illness:  Pt presents with c/o bilateral lumbar spine pain after bending over to pull a cord on his lawnmower. Patient felt a popping pain along with a severe throbbing with sharp component low back pain. Is primarily in the middle that radiates towards both sides as well as now into the hip area. There is no numbness tingling or burning associated with it. No saddle anesthesia. He denies any bowel or bladder incontinence. Movement makes the pain significantly worse. He really has not found anything that makes it better. He did take Tylenol prior to coming into the emergency department. He had a similar injury about a year ago that got better on its own. He denies any IV drug use or immunosuppression. Injury happened earlier today. Patient had to crawl inside the house to ask for help and unable to get to standing without assistance. Patient took help of his two sons to get dressed today and to walk to car to get to ED. Red Flags:  none    Pain       Patient describes pain to be aching in the back   Patient reports 9/10 pain at present and  10/10 pain at its worst.  Worsened by any movement of the trunk, L side lying, walking, standing  Improved by staying in supine in [facility] elevated position with hook lying.   Pt. reports pain increased muscle tone   []   Yes   [x]   No   []   NT  Location:   Ligament tenderness/provocation:   [x]   Yes   []   No   []   NT  Location:        Gait/Steps/Balance    []   WFL [x]    Dysfunction Noted. Comment: decreased step height, decreased john and increased LUC    [x]  All balance WFL unless otherwise noted below:  Static/ Dynamic Sitting:  Static/Dynamic Standing:    Quick Tests/Functional Myotome Tests:     Heel Walk (L4): [x]   Able to perform WNL       []   Unable to perform   [] NT     Toe Walk (S1):  [x]   Able to perform WNL     []   Unable to perform     [] NT     Lumbar Range of Motion/Strength Testing      [] All WNL except as marked below  All motions are painful with 50% decrease in motion  ROM (*denotes pain) AROM PROM COMMENTS   Flexion 50% decrease     Extension 50% decrease     Sidebending Left 50% decrease     Sidebending Right 50% decrease     Rotation Left  50% decrease  []   Seated  []   Standing       Rotation Right 50% decrease   []   Seated  []   Standing         Pelvic Sacral Assessment:  (C) = Catherineka's Criteria: 3/4 positive tests or (+) Fortins sign with 2 additional (+) tests  (L) = Laureanolett's Criteria: 2/4 positive tests  Standing:  Anna's Sign: negative  Iliac crests: even   PSIS: even   ASIS: even  Flexion test (C): negative  Sacral Sulci test (L): negative    Sitting:   Iliac crests (C): even   PSIS (C): even  Flexion test: negative    Supine: Patient unable to tolerate the testing position.   Long sit test (C): NT  Thigh thrust (L): NT  SI distraction (L): NT    Sidelying:   SI compression (L): negative    Prone: Unable to tolerate prone position  Prone knee bend test (C): NT  Sacral thrusts (L):   Base NT  R Base NT  L Base NT  R ERIC NT  L ERIC NT  Sacral prominence: NT    Other Special Tests Lumbar Spine and Hip  Special Test Findings   Seated:    Slump Test/Dural Tension [x]Neg   []Pos R   []Pos L   []NT   Hip IR PROM - seated [x]Neg   []Pos R   []Pos L   []NT

## 2020-05-29 ENCOUNTER — HOSPITAL ENCOUNTER (OUTPATIENT)
Dept: MRI IMAGING | Age: 55
Discharge: HOME | End: 2020-05-29
Payer: MEDICAID

## 2020-05-29 PROCEDURE — 72146 MRI CHEST SPINE W/O DYE: CPT

## 2020-06-01 RX ORDER — NITROGLYCERIN 0.4 MG/1
TABLET SUBLINGUAL
Qty: 25 TABLET | Refills: 3 | Status: SHIPPED | OUTPATIENT
Start: 2020-06-01 | End: 2020-09-29

## 2020-06-10 ENCOUNTER — OFFICE VISIT (OUTPATIENT)
Dept: ORTHOPEDIC SURGERY | Age: 55
End: 2020-06-10
Payer: MEDICAID

## 2020-06-10 VITALS — BODY MASS INDEX: 27.59 KG/M2 | WEIGHT: 149.91 LBS | HEIGHT: 62 IN

## 2020-06-10 PROCEDURE — G8419 CALC BMI OUT NRM PARAM NOF/U: HCPCS | Performed by: PHYSICIAN ASSISTANT

## 2020-06-10 PROCEDURE — 99243 OFF/OP CNSLTJ NEW/EST LOW 30: CPT | Performed by: PHYSICIAN ASSISTANT

## 2020-06-10 PROCEDURE — G8428 CUR MEDS NOT DOCUMENT: HCPCS | Performed by: PHYSICIAN ASSISTANT

## 2020-06-10 RX ORDER — ASPIRIN 81 MG
TABLET,CHEWABLE ORAL
Qty: 90 TABLET | Refills: 3 | Status: SHIPPED | OUTPATIENT
Start: 2020-06-10 | End: 2021-01-01 | Stop reason: SDUPTHER

## 2020-06-10 RX ORDER — ISOSORBIDE MONONITRATE 30 MG/1
TABLET, EXTENDED RELEASE ORAL
COMMUNITY
Start: 2018-12-04

## 2020-06-10 RX ORDER — NICOTINE POLACRILEX 4 MG
100 LOZENGE BUCCAL
COMMUNITY
Start: 2020-03-04

## 2020-06-10 RX ORDER — ALBUTEROL SULFATE 90 UG/1
AEROSOL, METERED RESPIRATORY (INHALATION)
COMMUNITY
Start: 2019-06-04

## 2020-06-10 RX ORDER — NAPROXEN 500 MG/1
500 TABLET ORAL 2 TIMES DAILY
COMMUNITY
Start: 2020-05-29

## 2020-06-10 RX ORDER — FAMOTIDINE 20 MG/1
20 TABLET, FILM COATED ORAL 2 TIMES DAILY PRN
COMMUNITY
Start: 2020-03-04

## 2020-06-10 NOTE — PROGRESS NOTES
History of present illness:   Mr. Roseline Carlos  is a pleasant 63 y.o. male with a PMH of CAD, GERD, and diabetes kindly referred by Dr. Denzel Mariee for consultation regarding his T12 compression fracture. He states his pain began about 1 month ago after using a riding mower. His pain has improved some since then. He rates his back pain 6/10 and leg pain 0/10. He states the pain initially was sharp and stabbing, but is now described as a mild ache and intermittent. He denies lower extremity pain, numbness or weakness. He denies saddle numbness or bowel or bladder dysfunction. He states he can sit and stand as long as he likes with mild pain. The pain occasionally disrupts his sleep. He has a quick drawl brace. He takes Naproxen. Past medical history:  His past medical history has been reviewed and is significant for CAD, GERD, and diabetes. His past surgical history has been reviewed and is non-contributory to his present illness. His medications and allergies were reviewed. His social history has been reviewed and he is a former smoker. His family history has been reviewed is significant for CAD. Review of symptoms:  Patient's review of symptoms was reviewed and is significant for back pain and negative for recent weight loss, fatigue, chills, visual disturbances, blood in stool or urine, recent infection, chest pain, or shortness of breath. All other ROS were negative. Physical examination:  Mr. Roseline Carlos most recent vitals:  Vitals  Height: 5' 2.01\" (157.5 cm)  Weight: 149 lb 14.6 oz (68 kg)  Body mass index is 27.41 kg/m². General exam:  He is well-developed and well-nourished, is in obvious pain and alert and oriented to person, place, and time. He demonstrates appropriate mood and affect. His skin is warm and dry. His gait is normal and he walks heel to toe without limp or instability. Back:  He stands with slight lumbar flexion.  His lumbar flexion,

## 2020-06-18 NOTE — PROGRESS NOTES
AGRATIO 1.4 2020    LABGLOM >60 2020    GLUCOSE 152 2020    PROT 7.3 2020    PROT 7.4 08/15/2011    CALCIUM 8.9 2020    BILITOT 0.4 2020    ALKPHOS 127 2020    AST 22 2020    ALT 35 2020     PT/INR:   No results found for: PTINR  Liver:  No components found for: CHLPL  Lab Results   Component Value Date    ALT 35 2020    AST 22 2020    ALKPHOS 127 2020    BILITOT 0.4 2020     Lab Results   Component Value Date    LABA1C 6.5 (H) 08/15/2011     Lipids:         Lab Results   Component Value Date    TRIG 329 (H) 2020    TRIG 194 (H) 2019    TRIG 415 (H) 2018            Lab Results   Component Value Date    HDL 27 (L) 2020    HDL 24 (L) 2019    HDL 28 (L) 10/03/2019            Lab Results   Component Value Date    LDLCALC see below 2020    LDLCALC 46 2019    LDLCALC 47 10/03/2019            Lab Results   Component Value Date    LABVLDL see below 2020    LABVLDL 39 2019    LABVLDL 36 10/03/2019         CARDIAC DATA   EKG: Last EK2018 NSR with no ischemia    STRESS/ECHO 19  Summary   Abnormal stress ECHO suggestive of inferior ischemia. Echo   Images obtained in 59 seconds with aid of Definity contrast.   Baseline resting echocardiogram shows normal global LV systolic function   with an ejection fraction of 60% and uniform myocardial segmental wall   motion. Following stress there was Hypokinesis of the apical inferior and   inferior wall. ECG   The stress ECG showed no ischemia at target heart rate. Duke Score of 5    low Risk ). [address] [place] [address] Street: 18   Left ventricular systolic function is normal with ejection fraction   estimated at 55%. No regional wall motion abnormalities. Normal left ventricular diastolic filling pressure. Mild tricuspid regurgitation.    Systolic pulmonary artery pressure (SPAP) is normal estimated at 30 mmHg   (Right atrial pressure of 8 today for the following problems:      1. Chest pain: BRYANT and fatigue all better following PCI  2. CAD              - 12/30/2017 PCI to mid LAD using drug stent          - POBA of PLV/PDA  3. Ischemic cardiomyopathy: 30-->recovered 55%  4. PAD:  100% left iliac  5. HLD: LDL well controlled at 63              HDL low at 20 due to tobacco abuse and ? genetics  6. Hx of tobacco abuse: quit 2017       MD PLAN  1. Pt doing much better from cardiac standpoint   - call with change in symptoms  2. No med changes          Patient Active Problem List   Diagnosis    Coronary artery disease    Tobacco abuse    Hypotension    [facility]racture of vertebra due to osteoporosis [facility]    Chest pain    Ischemic cardiomyopathy    PAD (peripheral artery disease) ([facility]    History of MI (myocardial infarction)    HLD (hyperlipidemia)    GERD (gastroesophageal reflux disease)    SOB (shortness of breath)    Other fatigue    DMII (diabetes mellitus, type 2) (HCC)    Essential hypertension         Plan:  1. Continue all medications   2. No cardiac testing warranted at this time  3. Follow up in 1 year          It is a pleasure to assist in the care of Mariana Payne. Please call with any questions. This note was scribed in the presence of Yulissa Esqueda MD by Aquilino Otoole RN.      Rosario Rosas MD, personally performed the services described in this documentation as scribed by the above signed scribe in my presence, and it is both accurate and complete to the best of our ability and knowledge. I agree with the details independently gathered by my clinical support staff, while the remaining scribed note accurately describes my personal service to the patient. The above RN is working as a scribe for and in the presence of myself . Working as a scribe, the RN may have prepopulated components of this note with my historical intellectual property under my direct supervision.   Any additions to this intellectual property were performed at my direction. Furthermore, the content and accuracy of this note have been reviewed by me to the best of my ability.          Rola Benavidez MD, [facility] Cardiologist  [address]  [phone] [facility]  [phone] [address] [place] [address]  6/22/2020  12:57 PM

## 2020-06-22 ENCOUNTER — OFFICE VISIT (OUTPATIENT)
Dept: CARDIOLOGY CLINIC | Age: 55
End: 2020-06-22
Payer: MEDICAID

## 2020-06-22 VITALS
HEART RATE: 103 BPM | OXYGEN SATURATION: 98 % | SYSTOLIC BLOOD PRESSURE: 126 MMHG | WEIGHT: 154 LBS | BODY MASS INDEX: 28.34 KG/M2 | HEIGHT: 62 IN | DIASTOLIC BLOOD PRESSURE: 84 MMHG

## 2020-06-22 PROCEDURE — G8427 DOCREV CUR MEDS BY ELIG CLIN: HCPCS | Performed by: INTERNAL MEDICINE

## 2020-06-22 PROCEDURE — G8419 CALC BMI OUT NRM PARAM NOF/U: HCPCS | Performed by: INTERNAL MEDICINE

## 2020-06-22 PROCEDURE — 3017F COLORECTAL CA SCREEN DOC REV: CPT | Performed by: INTERNAL MEDICINE

## 2020-06-22 PROCEDURE — 1036F TOBACCO NON-USER: CPT | Performed by: INTERNAL MEDICINE

## 2020-06-22 PROCEDURE — 99213 OFFICE O/P EST LOW 20 MIN: CPT | Performed by: INTERNAL MEDICINE

## 2020-06-22 RX ORDER — ATORVASTATIN CALCIUM 40 MG/1
40 TABLET, FILM COATED ORAL NIGHTLY
Qty: 90 TABLET | Refills: 3
Start: 2020-06-22 | End: 2020-07-24

## 2020-06-22 RX ORDER — ATORVASTATIN CALCIUM 80 MG/1
80 TABLET, FILM COATED ORAL NIGHTLY
Qty: 90 TABLET | Refills: 3 | Status: CANCELLED | OUTPATIENT
Start: 2020-06-22

## 2020-06-22 RX ORDER — ATORVASTATIN CALCIUM 80 MG/1
80 TABLET, FILM COATED ORAL NIGHTLY
Qty: 90 TABLET | Refills: 3
Start: 2020-06-22 | End: 2020-06-22

## 2020-06-22 NOTE — PATIENT INSTRUCTIONS
Plan:  1. Increase the atorvastatin (Lipitor) to 80 mg nightly   Continue all other medications   2. No cardiac testing warranted at this time  3.  Follow up in 1 year

## 2020-07-08 ENCOUNTER — OFFICE VISIT (OUTPATIENT)
Dept: ORTHOPEDIC SURGERY | Age: 55
End: 2020-07-08
Payer: MEDICAID

## 2020-07-08 VITALS — BODY MASS INDEX: 28.34 KG/M2 | WEIGHT: 154 LBS | HEIGHT: 62 IN

## 2020-07-08 PROCEDURE — 99213 OFFICE O/P EST LOW 20 MIN: CPT | Performed by: PHYSICIAN ASSISTANT

## 2020-07-08 PROCEDURE — G8419 CALC BMI OUT NRM PARAM NOF/U: HCPCS | Performed by: PHYSICIAN ASSISTANT

## 2020-07-08 PROCEDURE — 3017F COLORECTAL CA SCREEN DOC REV: CPT | Performed by: PHYSICIAN ASSISTANT

## 2020-07-08 PROCEDURE — G8427 DOCREV CUR MEDS BY ELIG CLIN: HCPCS | Performed by: PHYSICIAN ASSISTANT

## 2020-07-08 PROCEDURE — 1036F TOBACCO NON-USER: CPT | Performed by: PHYSICIAN ASSISTANT

## 2020-07-08 NOTE — PROGRESS NOTES
History of present illness:   Mr. Edgar Holloway  is a pleasant 63 y.o. male with a PMH of CAD, GERD, and diabetes who returns today regarding his T12 compression fracture. He states his pain began about 2 months ago after using a riding mower. His pain has improved some since then. He rates his back pain 3-5/10 and leg pain 0/10. He states the pain initially was sharp and stabbing, but is now described as a mild ache and intermittent. He denies lower extremity pain, numbness or weakness. He denies saddle numbness or bowel or bladder dysfunction. He states he can sit and stand as long as he likes with mild pain. The pain occasionally disrupts his sleep. He has been wearing his kellen orthosis. He takes Naproxen. Physical examination:  Mr. Edgar Holloway most recent vitals: Body mass index is 28.17 kg/m². General exam:  He is well-developed and well-nourished, is in obvious pain and alert and oriented to person, place, and time. He demonstrates appropriate mood and affect. His skin is warm and dry. His gait is normal and he walks heel to toe without limp or instability. Back:  He stands with slight lumbar flexion. His lumbar flexion, extension and lateral bending are moderately reduced with pain. He has mild tenderness over his lumbar spine without obvious muscle spasm. The skin over his lumbar spine is normal without a surgical scar. Lower extremities:  He has 5/5 motor strength of bilateral lower extremities. He has a negative straight leg raise, bilaterally. Deep tendon reflexes at knees and achilles are 2+. Sensation is intact to light touch L3 to S1 bilaterally. He has no clonus. Hip range of motion painless. Imaging:  I reviewed MRI images of his thoracic spine from 5/29/20. They note an acute T12 burst fracture with about 50% vertebral body height loss and 4mm retropulsion of fracture fragment into spinal canal, with mild spinal canal stenosis.  I reviewed AP and lateral xray images of his thoracolumbar spine from the office today and from his previous office visit. They show stable alignment of his thoracic spine and T12 fracture. Assessment:  2 months s/p T12 compression fracture    Plan:  I recommend he continue with activity modification and his kellen orthosis for another 4 weeks. He will return in 4 weeks for repeat xrays of his his thoracolumbar spine. He was advised to avoid twisting, bending or lifting greater than 10 lbs over the next 4 weeks. I also advised he not go bowling until his fracture is completely healed. He will likely be able to wean out of his brace after next visit and return PRN.      Scott Jain PA-C

## 2020-07-24 RX ORDER — ATORVASTATIN CALCIUM 40 MG/1
TABLET, FILM COATED ORAL
Qty: 90 TABLET | Refills: 3 | Status: SHIPPED | OUTPATIENT
Start: 2020-07-24

## 2020-07-29 RX ORDER — ICOSAPENT ETHYL 1000 MG/1
CAPSULE ORAL
Qty: 120 CAPSULE | Refills: 3 | Status: SHIPPED | OUTPATIENT
Start: 2020-07-29

## 2020-08-05 ENCOUNTER — OFFICE VISIT (OUTPATIENT)
Dept: ORTHOPEDIC SURGERY | Age: 55
End: 2020-08-05
Payer: MEDICAID

## 2020-08-05 VITALS — WEIGHT: 154 LBS | BODY MASS INDEX: 28.34 KG/M2 | HEIGHT: 62 IN

## 2020-08-05 PROCEDURE — G8427 DOCREV CUR MEDS BY ELIG CLIN: HCPCS | Performed by: PHYSICIAN ASSISTANT

## 2020-08-05 PROCEDURE — 3017F COLORECTAL CA SCREEN DOC REV: CPT | Performed by: PHYSICIAN ASSISTANT

## 2020-08-05 PROCEDURE — G8419 CALC BMI OUT NRM PARAM NOF/U: HCPCS | Performed by: PHYSICIAN ASSISTANT

## 2020-08-05 PROCEDURE — 1036F TOBACCO NON-USER: CPT | Performed by: PHYSICIAN ASSISTANT

## 2020-08-05 PROCEDURE — 99213 OFFICE O/P EST LOW 20 MIN: CPT | Performed by: PHYSICIAN ASSISTANT

## 2020-08-05 NOTE — PROGRESS NOTES
History of present illness:   Mr. Bryon Garcia  is a pleasant 63 y.o. male with a PMH of CAD, GERD, and diabetes who returns today regarding his T12 compression fracture. He states his pain began about 3 months ago after using a riding mower. His pain has substantially improved some since then. He rates his back pain 0-2/10 and leg pain 0/10. He states the pain initially was sharp and stabbing, but is now described as a mild ache and intermittent. He denies lower extremity pain, numbness or weakness. He denies saddle numbness or bowel or bladder dysfunction. He states he can sit and stand as long as he likes with mild pain. The pain occasionally disrupts his sleep. He has been wearing his kellen orthosis intermittently with activity over the past 3 weeks. He takes Naproxen. Physical examination:  Mr. Bryon Garcia most recent vitals:  Vitals  Height: 5' 2\" (157.5 cm)  Weight: 154 lb (69.9 kg)  Body mass index is 28.17 kg/m². General exam:  He is well-developed and well-nourished, is in obvious pain and alert and oriented to person, place, and time. He demonstrates appropriate mood and affect. His skin is warm and dry. His gait is normal and he walks heel to toe without limp or instability. Back:  He stands with slight lumbar flexion. His lumbar flexion, extension and lateral bending are moderately reduced with pain. He has mild tenderness over his lumbar spine without obvious muscle spasm. The skin over his lumbar spine is normal without a surgical scar. Lower extremities:  He has 5/5 motor strength of bilateral lower extremities. He has a negative straight leg raise, bilaterally. Deep tendon reflexes at knees and achilles are 2+. Sensation is intact to light touch L3 to S1 bilaterally. He has no clonus. Hip range of motion painless. Imaging:  I reviewed MRI images of his thoracic spine from 5/29/20.  They note an acute T12 burst fracture with about 50% vertebral body height loss and 4mm retropulsion of fracture fragment into spinal canal, with mild spinal canal stenosis. I reviewed AP and lateral xray images of his thoracolumbar spine from the office today and from his previous office visits. They show stable alignment of his thoracic spine and T12 fracture. Assessment:  3 months s/p T12 compression fracture    Plan:  He will will gradually wean out of his kellen orthosis over the next two weeks. He was advised to avoid twisting, bending or lifting greater than 10 lbs over the next 4 weeks, then slowly increase his activity as tolerated. He may return PRN. He will return for repeat xrays in 4 weeks if he is still having back pain.     Radha Reynolds PA-C

## 2020-09-29 RX ORDER — NITROGLYCERIN 0.4 MG/1
TABLET SUBLINGUAL
Qty: 25 TABLET | Refills: 3 | Status: SHIPPED | OUTPATIENT
Start: 2020-09-29 | End: 2021-01-19

## 2020-10-13 RX ORDER — CLOPIDOGREL BISULFATE 75 MG/1
TABLET ORAL
Qty: 90 TABLET | Refills: 3 | Status: SHIPPED | OUTPATIENT
Start: 2020-10-13 | End: 2021-01-01

## 2020-10-21 ENCOUNTER — TELEPHONE (OUTPATIENT)
Dept: CARDIOLOGY CLINIC | Age: 55
End: 2020-10-21

## 2020-10-21 NOTE — TELEPHONE ENCOUNTER
Spoke with patient. Even with savings card it was still over $200. Informed patient we would start the PA process and let him know once we are informed.

## 2020-11-03 ENCOUNTER — TELEPHONE (OUTPATIENT)
Dept: CARDIOLOGY CLINIC | Age: 55
End: 2020-11-03

## 2020-11-03 NOTE — TELEPHONE ENCOUNTER
Cover my meds sts the questionnaire has  and they wanted to reach out to see if JJP wanted to pursue PA and offer their assistance.

## 2020-11-09 ENCOUNTER — TELEPHONE (OUTPATIENT)
Dept: CARDIOLOGY CLINIC | Age: 55
End: 2020-11-09

## 2021-01-01 ENCOUNTER — HOSPITAL ENCOUNTER (OUTPATIENT)
Age: 56
Discharge: HOME | End: 2021-05-14
Payer: MEDICAID

## 2021-01-01 ENCOUNTER — OFFICE VISIT (OUTPATIENT)
Dept: CARDIOLOGY CLINIC | Age: 56
End: 2021-01-01
Payer: MEDICAID

## 2021-01-01 ENCOUNTER — APPOINTMENT (OUTPATIENT)
Dept: GENERAL RADIOLOGY | Age: 56
End: 2021-01-01
Payer: MEDICAID

## 2021-01-01 ENCOUNTER — HOSPITAL ENCOUNTER (EMERGENCY)
Age: 56
Discharge: HOME | End: 2021-06-30
Attending: EMERGENCY MEDICINE
Payer: MEDICAID

## 2021-01-01 ENCOUNTER — HOSPITAL ENCOUNTER (OUTPATIENT)
Dept: GENERAL RADIOLOGY | Age: 56
Discharge: HOME | End: 2021-05-14
Payer: MEDICAID

## 2021-01-01 VITALS
SYSTOLIC BLOOD PRESSURE: 124 MMHG | RESPIRATION RATE: 21 BRPM | TEMPERATURE: 97.5 F | DIASTOLIC BLOOD PRESSURE: 75 MMHG | HEART RATE: 75 BPM | OXYGEN SATURATION: 94 %

## 2021-01-01 VITALS
DIASTOLIC BLOOD PRESSURE: 80 MMHG | SYSTOLIC BLOOD PRESSURE: 122 MMHG | HEART RATE: 82 BPM | OXYGEN SATURATION: 96 % | HEIGHT: 62 IN | WEIGHT: 143 LBS | BODY MASS INDEX: 26.31 KG/M2

## 2021-01-01 DIAGNOSIS — I25.10 CORONARY ARTERY DISEASE INVOLVING NATIVE CORONARY ARTERY OF NATIVE HEART WITHOUT ANGINA PECTORIS: Primary | ICD-10-CM

## 2021-01-01 DIAGNOSIS — I73.9 PAD (PERIPHERAL ARTERY DISEASE) (HCC): ICD-10-CM

## 2021-01-01 DIAGNOSIS — I10 ESSENTIAL HYPERTENSION: ICD-10-CM

## 2021-01-01 DIAGNOSIS — M25.511 ACUTE PAIN OF RIGHT SHOULDER: ICD-10-CM

## 2021-01-01 DIAGNOSIS — I25.5 ISCHEMIC CARDIOMYOPATHY: ICD-10-CM

## 2021-01-01 DIAGNOSIS — S29.011A MUSCLE STRAIN OF CHEST WALL, INITIAL ENCOUNTER: Primary | ICD-10-CM

## 2021-01-01 DIAGNOSIS — E78.2 MIXED HYPERLIPIDEMIA: ICD-10-CM

## 2021-01-01 LAB
A/G RATIO: 1.4 (ref 1.1–2.2)
ALBUMIN SERPL-MCNC: 4.3 G/DL (ref 3.4–5)
ALP BLD-CCNC: 143 U/L (ref 40–129)
ALT SERPL-CCNC: 28 U/L (ref 10–40)
ANION GAP SERPL CALCULATED.3IONS-SCNC: 11 MMOL/L (ref 3–16)
AST SERPL-CCNC: 26 U/L (ref 15–37)
BASOPHILS ABSOLUTE: 0.1 K/UL (ref 0–0.2)
BASOPHILS RELATIVE PERCENT: 1.1 %
BILIRUB SERPL-MCNC: 0.5 MG/DL (ref 0–1)
BUN BLDV-MCNC: 10 MG/DL (ref 7–20)
CALCIUM SERPL-MCNC: 9.4 MG/DL (ref 8.3–10.6)
CHLORIDE BLD-SCNC: 104 MMOL/L (ref 99–110)
CO2: 23 MMOL/L (ref 21–32)
CREAT SERPL-MCNC: 0.7 MG/DL (ref 0.9–1.3)
EKG ATRIAL RATE: 60 BPM
EKG ATRIAL RATE: 86 BPM
EKG DIAGNOSIS: NORMAL
EKG DIAGNOSIS: NORMAL
EKG P AXIS: 35 DEGREES
EKG P AXIS: 62 DEGREES
EKG P-R INTERVAL: 140 MS
EKG P-R INTERVAL: 140 MS
EKG Q-T INTERVAL: 354 MS
EKG Q-T INTERVAL: 396 MS
EKG QRS DURATION: 82 MS
EKG QRS DURATION: 86 MS
EKG QTC CALCULATION (BAZETT): 396 MS
EKG QTC CALCULATION (BAZETT): 423 MS
EKG R AXIS: -23 DEGREES
EKG R AXIS: -37 DEGREES
EKG T AXIS: 10 DEGREES
EKG T AXIS: 36 DEGREES
EKG VENTRICULAR RATE: 60 BPM
EKG VENTRICULAR RATE: 86 BPM
EOSINOPHILS ABSOLUTE: 0.1 K/UL (ref 0–0.6)
EOSINOPHILS RELATIVE PERCENT: 1.1 %
GFR AFRICAN AMERICAN: >60
GFR NON-AFRICAN AMERICAN: >60
GLOBULIN: 3.1 G/DL
GLUCOSE BLD-MCNC: 102 MG/DL (ref 70–99)
HCT VFR BLD CALC: 44.8 % (ref 40.5–52.5)
HEMOGLOBIN: 15.1 G/DL (ref 13.5–17.5)
LYMPHOCYTES ABSOLUTE: 2.2 K/UL (ref 1–5.1)
LYMPHOCYTES RELATIVE PERCENT: 18.1 %
MCH RBC QN AUTO: 27.9 PG (ref 26–34)
MCHC RBC AUTO-ENTMCNC: 33.8 G/DL (ref 31–36)
MCV RBC AUTO: 82.5 FL (ref 80–100)
MONOCYTES ABSOLUTE: 1.1 K/UL (ref 0–1.3)
MONOCYTES RELATIVE PERCENT: 9.2 %
NEUTROPHILS ABSOLUTE: 8.6 K/UL (ref 1.7–7.7)
NEUTROPHILS RELATIVE PERCENT: 70.5 %
PDW BLD-RTO: 13.6 % (ref 12.4–15.4)
PLATELET # BLD: 277 K/UL (ref 135–450)
PMV BLD AUTO: 8.4 FL (ref 5–10.5)
POTASSIUM REFLEX MAGNESIUM: 3.9 MMOL/L (ref 3.5–5.1)
RBC # BLD: 5.43 M/UL (ref 4.2–5.9)
SODIUM BLD-SCNC: 138 MMOL/L (ref 136–145)
SPECIMEN STATUS: NORMAL
TOTAL PROTEIN: 7.4 G/DL (ref 6.4–8.2)
TROPONIN: <0.01 NG/ML
TROPONIN: <0.01 NG/ML
WBC # BLD: 12.1 K/UL (ref 4–11)

## 2021-01-01 PROCEDURE — 80053 COMPREHEN METABOLIC PANEL: CPT

## 2021-01-01 PROCEDURE — 3017F COLORECTAL CA SCREEN DOC REV: CPT | Performed by: INTERNAL MEDICINE

## 2021-01-01 PROCEDURE — 93010 ELECTROCARDIOGRAM REPORT: CPT | Performed by: INTERNAL MEDICINE

## 2021-01-01 PROCEDURE — G8427 DOCREV CUR MEDS BY ELIG CLIN: HCPCS | Performed by: INTERNAL MEDICINE

## 2021-01-01 PROCEDURE — 96374 THER/PROPH/DIAG INJ IV PUSH: CPT

## 2021-01-01 PROCEDURE — G8419 CALC BMI OUT NRM PARAM NOF/U: HCPCS | Performed by: INTERNAL MEDICINE

## 2021-01-01 PROCEDURE — 84484 ASSAY OF TROPONIN QUANT: CPT

## 2021-01-01 PROCEDURE — 6360000002 HC RX W HCPCS: Performed by: EMERGENCY MEDICINE

## 2021-01-01 PROCEDURE — 85025 COMPLETE CBC W/AUTO DIFF WBC: CPT

## 2021-01-01 PROCEDURE — 93005 ELECTROCARDIOGRAM TRACING: CPT | Performed by: EMERGENCY MEDICINE

## 2021-01-01 PROCEDURE — 1036F TOBACCO NON-USER: CPT | Performed by: INTERNAL MEDICINE

## 2021-01-01 PROCEDURE — 99214 OFFICE O/P EST MOD 30 MIN: CPT | Performed by: INTERNAL MEDICINE

## 2021-01-01 PROCEDURE — 73030 X-RAY EXAM OF SHOULDER: CPT

## 2021-01-01 PROCEDURE — 71045 X-RAY EXAM CHEST 1 VIEW: CPT

## 2021-01-01 PROCEDURE — 99282 EMERGENCY DEPT VISIT SF MDM: CPT

## 2021-01-01 RX ORDER — CLOPIDOGREL BISULFATE 75 MG/1
TABLET ORAL
Qty: 90 TABLET | Refills: 3 | Status: SHIPPED | OUTPATIENT
Start: 2021-01-01

## 2021-01-01 RX ORDER — IBUPROFEN 600 MG/1
600 TABLET ORAL 3 TIMES DAILY PRN
Qty: 30 TABLET | Refills: 0 | Status: SHIPPED | OUTPATIENT
Start: 2021-01-01

## 2021-01-01 RX ORDER — KETOROLAC TROMETHAMINE 30 MG/ML
15 INJECTION, SOLUTION INTRAMUSCULAR; INTRAVENOUS ONCE
Status: COMPLETED | OUTPATIENT
Start: 2021-01-01 | End: 2021-01-01

## 2021-01-01 RX ORDER — ASPIRIN 81 MG/1
TABLET, CHEWABLE ORAL
Qty: 90 TABLET | Refills: 3 | Status: SHIPPED | OUTPATIENT
Start: 2021-01-01

## 2021-01-01 RX ORDER — CYCLOBENZAPRINE HCL 5 MG
5 TABLET ORAL 2 TIMES DAILY PRN
Qty: 10 TABLET | Refills: 0 | Status: SHIPPED | OUTPATIENT
Start: 2021-01-01 | End: 2021-01-01

## 2021-01-01 RX ORDER — NITROGLYCERIN 0.4 MG/1
TABLET SUBLINGUAL
Qty: 25 TABLET | Refills: 3 | Status: SHIPPED | OUTPATIENT
Start: 2021-01-01

## 2021-01-01 RX ADMIN — KETOROLAC TROMETHAMINE 15 MG: 30 INJECTION, SOLUTION INTRAMUSCULAR; INTRAVENOUS at 20:51

## 2021-01-01 ASSESSMENT — PAIN SCALES - GENERAL
PAINLEVEL_OUTOF10: 8
PAINLEVEL_OUTOF10: 8

## 2021-01-19 RX ORDER — NITROGLYCERIN 0.4 MG/1
TABLET SUBLINGUAL
Qty: 25 TABLET | Refills: 3 | Status: SHIPPED | OUTPATIENT
Start: 2021-01-19 | End: 2021-01-01

## 2021-06-30 NOTE — ED NOTES
Pt arrives via private transport after c/o bilateral rib pain after moving lumbar. Pt states he was then in the pool and had an increased amount of pain after sneezing. Pt denies dizziness, SOB or mid sternal CP.        Velma Ram RN  06/30/21 3083

## 2021-07-01 NOTE — ED PROVIDER NOTES
[facility] Emergency Department      CHIEF COMPLAINT  Muscle Pain (chest, states he was moving wood and his chest is sore.)      HISTORY OF PRESENT ILLNESS  Chris Lee is a 61 y.o. male with a history of diabetes, GERD and coronary disease with 3 stents in place presents with rib pain. He states earlier today he was dragging sheets of plywood from the back of his house to his front porch. He did not have chest pain while doing that but after he was done he sneezed and had sharp pain in his rib cage on the left and right. He states he felt like he pulled a muscle so he went to lay in his swimming pool. While in the swimming pool he was lying in innertube. As long as he is perfectly still he does not feel the pain but then he tried to get out of the innertube and again had the sharp pain in his rib cage bilaterally. He denies shortness of breath or nausea. He states when he had the stents placed in the past it was all with MIs and that pain was very different than the pain he is feeling tonight. He states this pain feels like it is on the surface and like it is a pulled muscle. He denies fever. He is followed by Dr. Shayna West. No other complaints, modifying factors or associated symptoms. I have reviewed the following from the nursing documentation.     Past Medical History:   Diagnosis Date    Diabetes mellitus ([address]     Family history of early CAD     GERD (gastroesophageal reflux disease)     High cholesterol 12/2017    MI (myocardial infarction) ([address] 12/30/2017    ST Elevation MI    NSTEMI (non-ST elevated myocardial infarction) ([address] 12/30/2017    Smoker 12/2017    ST elevation myocardial infarction involving left anterior descending (LAD) coronary artery (HCC)      Past Surgical History:   Procedure Laterality Date    CHOLECYSTECTOMY      CORONARY ANGIOPLASTY WITH STENT PLACEMENT  12/30/2017    BASILIA- 3.0x 45- mLAD     Family History   Problem Relation Age of Onset  Heart Attack Mother      Social History     Socioeconomic History    Marital status:      Spouse name: Not on file    Number of children: Not on file    Years of education: Not on file    Highest education level: Not on file   Occupational History    Not on file   Tobacco Use    Smoking status: Former Smoker     Packs/day: 1.00     Types: Cigarettes    Smokeless tobacco: Never Used    Tobacco comment: quit 12/30/17   Vaping Use    Vaping Use: Never used   Substance and Sexual Activity    Alcohol use: No    Drug use: No    Sexual activity: Yes     Partners: Female   Other Topics Concern    Not on file   Social History Narrative    Not on file     Social Determinants of Health     Financial Resource Strain:     Difficulty of Paying Living Expenses:    Food Insecurity:     Worried About Running Out of Food in the Last Year:     [address] in the Last Year:    Transportation Needs:     Lack of Transportation (Medical):  Lack of Transportation (Non-Medical):    Physical Activity:     Days of Exercise per Week:     Minutes of Exercise per Session:    Stress:     Feeling of Stress :    Social Connections:     Frequency of Communication with Friends and Family:     Frequency of Social Gatherings with Friends and Family:     Attends [employer]oration[employer] Services:     Active Member of Clubs or Organizations:     Attends Club or Organization Meetings:     Marital Status:    Intimate Partner Violence:     Fear of Current or Ex-Partner:     Emotionally Abused:     Physically Abused:     Sexually Abused:      No current facility-administered medications for this encounter.      Current Outpatient Medications   Medication Sig Dispense Refill    ibuprofen (ADVIL;MOTRIN) 600 MG tablet Take 1 tablet by mouth 3 times daily as needed for Pain 30 tablet 0    cyclobenzaprine (FLEXERIL) 5 MG tablet Take 1 tablet by mouth 2 times daily as needed for Muscle spasms 10 tablet 0    nitroGLYCERIN (NITROSTAT) 0.4 MG SL tablet DISSOLVE 1 TABLET UNDER TONGUE AT ONSET OF CHEST PAIN. IF NO RELIEF AFTER 1 DOSE CALL 911. (MAX 3) 25 tablet 3    clopidogrel (PLAVIX) 75 MG tablet TAKE 1 TABLET BY MOUTH EVERY DAY 90 tablet 3    VASCEPA 1 g CAPS capsule TAKE 2 CAPSULES BY MOUTH TWICE A  capsule 3    atorvastatin (LIPITOR) 40 MG tablet TAKE 1 TABLET BY MOUTH EVERY DAY AT NIGHT 90 tablet 3    ASPIRIN LOW DOSE 81 MG chewable tablet CHEW 1 TABLET BY MOUTH EVERY DAY 90 tablet 3    naproxen (NAPROSYN) 500 MG tablet Take 500 mg by mouth 2 times daily      isosorbide mononitrate (IMDUR) 30 MG extended release tablet Take by mouth      famotidine (PEPCID) 20 MG tablet Take 20 mg by mouth 2 times daily as needed      albuterol sulfate  (90 Base) MCG/ACT inhaler INHALE 2 PUFFS BY MOUTH EVERY 6 HOURS AS NEEDED      glucose (GLUTOSE) 40 % GEL Infuse 100 mLs intravenously      metFORMIN (GLUCOPHAGE) 500 MG tablet Take 500 mg by mouth daily        No Known Allergies    REVIEW OF SYSTEMS  10 systems reviewed, pertinent positives per HPI otherwise noted to be negative. PHYSICAL EXAM  /74   Pulse 75   Temp 97.5 °F (36.4 °C) (Oral)   Resp 18   SpO2 94%   GENERAL APPEARANCE: Awake and alert. Cooperative. No acute distress. HEAD: Normocephalic. Atraumatic. EYES: PERRL. EOM's grossly intact. ENT: Mucous membranes are moist.   NECK: Supple, trachea midline. HEART: RRR. Reproducible rib pain with palpation of the bilateral lower rib cage. LUNGS: Respirations unlabored. CTAB. Good air exchange. No wheezes, rales, or rhonchi. Speaking comfortably in full sentences. ABDOMEN: Soft. Non-distended. Non-tender. No guarding or rebound. EXTREMITIES: No peripheral edema. MAEE. No acute deformities. Equal pulses in all 4 extremities. SKIN: Warm, dry and intact. No acute rashes. NEUROLOGICAL: Alert and oriented X 3. CN II-XII grossly intact. Strength 5/5, sensation intact.    PSYCHIATRIC: Normal mood and affect. LABS  I have reviewed all labs for this visit.    Results for orders placed or performed during the hospital encounter of 06/30/21   Troponin   Result Value Ref Range    Troponin <0.01 <0.01 ng/mL   Comprehensive Metabolic Panel w/ Reflex to MG   Result Value Ref Range    Sodium 138 136 - 145 mmol/L    Potassium reflex Magnesium 3.9 3.5 - 5.1 mmol/L    Chloride 104 99 - 110 mmol/L    CO2 23 21 - 32 mmol/L    Anion Gap 11 3 - 16    Glucose 102 (H) 70 - 99 mg/dL    BUN 10 7 - 20 mg/dL    CREATININE 0.7 (L) 0.9 - 1.3 mg/dL    GFR Non-African American >60 >60    GFR African American >60 >60    Calcium 9.4 8.3 - 10.6 mg/dL    Total Protein 7.4 6.4 - 8.2 g/dL    Albumin 4.3 3.4 - 5.0 g/dL    Albumin/Globulin Ratio 1.4 1.1 - 2.2    Total Bilirubin 0.5 0.0 - 1.0 mg/dL    Alkaline Phosphatase 143 (H) 40 - 129 U/L    ALT 28 10 - 40 U/L    AST 26 15 - 37 U/L    Globulin 3.1 g/dL   CBC Auto Differential   Result Value Ref Range    WBC 12.1 (H) 4.0 - 11.0 K/uL    RBC 5.43 4.20 - 5.90 M/uL    Hemoglobin 15.1 13.5 - 17.5 g/dL    Hematocrit 44.8 40.5 - 52.5 %    MCV 82.5 80.0 - 100.0 fL    MCH 27.9 26.0 - 34.0 pg    MCHC 33.8 31.0 - 36.0 g/dL    RDW 13.6 12.4 - 15.4 %    Platelets 175 293 - 053 K/uL    MPV 8.4 5.0 - 10.5 fL    Neutrophils % 70.5 %    Lymphocytes % 18.1 %    Monocytes % 9.2 %    Eosinophils % 1.1 %    Basophils % 1.1 %    Neutrophils Absolute 8.6 (H) 1.7 - 7.7 K/uL    Lymphocytes Absolute 2.2 1.0 - 5.1 K/uL    Monocytes Absolute 1.1 0.0 - 1.3 K/uL    Eosinophils Absolute 0.1 0.0 - 0.6 K/uL    Basophils Absolute 0.1 0.0 - 0.2 K/uL   Sample possible blood bank testing   Result Value Ref Range    Specimen Status JUAN    Troponin   Result Value Ref Range    Troponin <0.01 <0.01 ng/mL   EKG 12 Lead   Result Value Ref Range    Ventricular Rate 60 BPM    Atrial Rate 60 BPM    P-R Interval 140 ms    QRS Duration 82 ms    Q-T Interval 396 ms    QTc Calculation (Bazett) 396 ms    P Axis 35 degrees    R Axis -23 degrees    T Axis 10 degrees    Diagnosis       Normal sinus rhythmNormal ECGWhen compared with ECG of 04-MAR-2020 12:05,No significant change was found       EKG 1855  The Ekg interpreted by myself  normal sinus rhythm with a rate of 86  Axis is   Left axis deviation  QTc is  normal  Intervals and Durations are unremarkable. No specific ST-T wave changes appreciated. No evidence of acute ischemia. No significant change from prior EKG dated 3/4/20    EKG Interpretation 2237    Interpreted by emergency department physician    Rhythm: normal sinus   Rate: normal  Axis: normal  Ectopy: none  Conduction: normal  ST Segments: normal  T Waves: no acute change and normal  Q Waves: none    Clinical Impression: no acute changes    Toribio Trammell MD      Cardiac Monitoring: The cardiac monitor revealed normal sinus rhythm as interpreted by me. The cardiac monitor was ordered secondary to the patient's complaint of chest pain and to monitor the patient for dysrhythmia. RADIOLOGY  X-RAYS:  I have reviewed radiologic plain film image(s). ALL OTHER NON-PLAIN FILM IMAGES SUCH AS CT, ULTRASOUND AND MRI HAVE BEEN READ BY THE RADIOLOGIST. XR CHEST PORTABLE   Final Result   No radiographic evidence of acute pulmonary disease. Rechecks: Physical assessment performed. Toradol resolved the patient's pain here completely. He is resting comfortably. ED COURSE/MDM  Patient seen and evaluated. Here the patient is afebrile with normal vitals signs. Old records reviewed. Labs and imaging reviewed and results discussed with patient. EKG shows no ischemic changes, normal sinus rhythm. Troponin is negative, lab work is reassuring, chest x-ray is normal.  3-hour repeat troponin and EKG remained normal and unchanged. Patient's presentation is quite consistent with muscle strain in his chest.  As long as he is perfectly still he does not feel the pain.   Is when he sneezes, coughs, bends or twists that he feels the pain and it is a sharp pain that hits him in his rib cage. This is quite consistent with muscle strain. He states this does not feel anything like the chest pain he had with his MI in the past.  I counseled the patient at length. Heart score is 3, only receiving points really for his known history of coronary disease. I will prescribe Motrin and Flexeril for him to take at home. Strict return precautions have been given. I have low suspicion for PE or aortic dissection. No shortness of breath or hypoxia here. No tachycardia. No radiation of pain to his back or neurologic symptoms in his arms or legs. He also has equal pulses in all 4 extremities. Patient was reassessed as noted above . Plan of care discussed with patient. Patient in agreement with plan. Strict return precautions have been given. Patient was given scripts for the following medications. I counseled patient how to take these medications. New Prescriptions    CYCLOBENZAPRINE (FLEXERIL) 5 MG TABLET    Take 1 tablet by mouth 2 times daily as needed for Muscle spasms    IBUPROFEN (ADVIL;MOTRIN) 600 MG TABLET    Take 1 tablet by mouth 3 times daily as needed for Pain       CLINICAL IMPRESSION  1. Muscle strain of chest wall, initial encounter        Blood pressure 114/74, pulse 75, temperature 97.5 °F (36.4 °C), temperature source Oral, resp. rate 18, SpO2 94 %. DISPOSITION  Harrell Sicard was discharged to home in stable condition.     (Please note this note was completed with a voice recognition program.  Efforts were made to edit the dictations but occasionally words are mis-transcribed.)       Heri Duvall MD  06/30/21 2918

## 2021-07-01 NOTE — ED NOTES
Pt ok to d/c to home. Pt given d/c instructions. Pt verbalized understating including Rx and follow up care. Pt ambulated to lobby for ride home.  0 s/s of distress at time of d/c.          Thaddeus Spatz, RN  06/30/21 1172

## 2021-07-23 NOTE — TELEPHONE ENCOUNTER
Medication Refill    Medication needing refilled: aspirin    Dosage of the medication: 81 mg    How are you taking this medication (QD, BID, TID, QID, PRN):once daily    30 or 90 day supply called in: 90    When will you run out of your medication: 2 days    Which Pharmacy are we sending the medication to?:    [id number]   [address] [place] [address], [address] [place] [id number]

## 2021-08-05 NOTE — PROGRESS NOTES
[address] [facility]   Cardiovascular Evaluation    PATIENT: Huy Patel  DATE: 2021  MRN: [id number]  CSN: [id number]  : 1965      Primary Care Doctor: Rashaad De Souza  Reason for evaluation:   1 Year Follow Up, Coronary Artery Disease, Cardiomyopathy, Other (PAD), and Hypertension      Subjective:   History of present illness on initial date of evaluation:   Huy Patel is a 61 y.o. patient who presents for hospital follow up. He reports he is feeling good. He denies CP, SOB, palpitations, dizziness or syncope. He states he quit smoking back in February after his stent. He denies any bleeding or blood in his stools. He states he changed his diet and is keeping active. He was admitted to the hospital 19 with c/o chest pain and nausea. Stress/echo 19 abnormal stress ECHO suggestive of inferior ischemia. Last OV 2/10/20 he was SOB and fatigued and that was new. He denied gasping for air while sleeping. He is taking his medications as prescribed. At his office visit 2020, he presented for follow up s/p [facility] 3/4/20 with a successful POBA to PLV and PDA using lissing balloon inflation. He presented in a back brace and reports he fractured a vertebra on 20. He was trying to get a  \"unstuck\" and heard his back pop. Cardiovascular wise he states he is feeling much better. He stated the SOB is better, but is still there \"somewhat\". He denied any chest pain. He stated he occasionally feels his heart \"flutter\", but it doesn't last and is infrequent. Today, 2021, patient reports he is doing very well. He reports he no longer experiences SOB, dizziness, or fatigue. He recently got a swimming pool and has been staying active swimming on hot days. He reports he has not gotten the COVID vaccine, but is considering it. He reports he is taking medications as prescribed and tolerates them well.  Denies recent issues with bleeding or bruising. Patient denies current edema, chest pain, sob, palpitations, dizziness or syncope. Patient Active Problem List   Diagnosis    Coronary artery disease    Tobacco abuse    Hypotension    Fracture of vertebra due to osteoporosis ([facility]    Chest pain    Ischemic cardiomyopathy    PAD (peripheral artery disease) ([facility]    History of MI (myocardial infarction)    HLD (hyperlipidemia)    GERD (gastroesophageal reflux disease)    SOB (shortness of breath)    Other fatigue    DMII (diabetes mellitus, type 2) (HCC)    Essential hypertension         Past Medical History:   has a past medical history of Diabetes mellitus ([facility] Family history of early CAD, GERD (gastroesophageal reflux disease), High cholesterol, MI (myocardial infarction) ([facility] NSTEMI (non-ST elevated myocardial infarction) ([facility] Smoker, and ST elevation myocardial infarction involving left anterior descending (LAD) coronary artery ([facility] Surgical History:   has a past surgical history that includes Cholecystectomy and Coronary angioplasty with stent (12/30/2017). Social History:   reports that he has quit smoking. His smoking use included cigarettes. He smoked 1.00 pack per day. He has never used smokeless tobacco. He reports that he does not drink alcohol and does not use drugs. Family History:  No evidence for sudden cardiac death or premature CAD    Home Medications:  Reviewed and are listed in nursing record. and/or listed below  Current Outpatient Medications   Medication Sig Dispense Refill    clopidogrel (PLAVIX) 75 MG tablet TAKE 1 TABLET BY MOUTH EVERY DAY 90 tablet 3    aspirin (ASPIRIN LOW DOSE) 81 MG chewable tablet CHEW 1 TABLET BY MOUTH EVERY DAY 90 tablet 3    ibuprofen (ADVIL;MOTRIN) 600 MG tablet Take 1 tablet by mouth 3 times daily as needed for Pain 30 tablet 0    nitroGLYCERIN (NITROSTAT) 0.4 MG SL tablet DISSOLVE 1 TABLET UNDER TONGUE AT ONSET OF CHEST PAIN.  IF NO RELIEF AFTER 1 DOSE CALL 911. (MAX 3) 25 tablet 3    atorvastatin (LIPITOR) 40 MG tablet TAKE 1 TABLET BY MOUTH EVERY DAY AT NIGHT 90 tablet 3    isosorbide mononitrate (IMDUR) 30 MG extended release tablet Take by mouth      famotidine (PEPCID) 20 MG tablet Take 20 mg by mouth 2 times daily as needed      albuterol sulfate  (90 Base) MCG/ACT inhaler INHALE 2 PUFFS BY MOUTH EVERY 6 HOURS AS NEEDED      glucose (GLUTOSE) 40 % GEL Infuse 100 mLs intravenously      metFORMIN (GLUCOPHAGE) 500 MG tablet Take 500 mg by mouth daily       VASCEPA 1 g CAPS capsule TAKE 2 CAPSULES BY MOUTH TWICE A DAY (Patient not taking: Reported on 8/5/2021) 120 capsule 3    naproxen (NAPROSYN) 500 MG tablet Take 500 mg by mouth 2 times daily (Patient not taking: Reported on 8/5/2021)       No current facility-administered medications for this visit. Allergies:  Patient has no known allergies. Review of Systems:   A 14 point review of symptoms completed. Pertinent positives identified in the HPI, all other review of symptoms negative as below.     Objective:   PHYSICAL EXAM:    Vitals:    08/05/21 0956   BP: 122/80   Pulse: 82   SpO2: 96%    Weight: 143 lb (64.9 kg)     Wt Readings from Last 3 Encounters:   08/05/21 143 lb (64.9 kg)   08/05/20 154 lb (69.9 kg)   07/08/20 154 lb (69.9 kg)         General Appearance:  Alert, cooperative, no distress, appears stated age   Head:  Normocephalic, atraumatic   Eyes:  PERRL, conjunctiva/corneas clear   Nose: Nares normal, no drainage or sinus tenderness   Throat: Lips, mucosa, and tongue normal   Neck: Supple, symmetrical, trachea midline, NL thyroid no carotid bruit or JVD   Lungs:   CTAB, respirations unlabored   Chest Wall:  No tenderness or deformity   Heart:  Regular rhythm and normal rate; S1, S2 are normal;   no murmur noted; no rub or gallop   Abdomen:   Soft, non-tender, +BS x 4, no masses, no organomegaly   Extremities: Extremities normal, atraumatic, no cyanosis or edema   Pulses: function   with an ejection fraction of 60% and uniform myocardial segmental wall   motion. Following stress there was Hypokinesis of the apical inferior and   inferior wall. ECG   The stress ECG showed no ischemia at target heart rate. Duke Score of 5    low Risk ). [address] [place] [address] Street: 8/24/18   Left ventricular systolic function is normal with ejection fraction   estimated at 55%. No regional wall motion abnormalities. Normal left ventricular diastolic filling pressure. Mild tricuspid regurgitation. Systolic pulmonary artery pressure (SPAP) is normal estimated at 30 mmHg   (Right atrial pressure of 8 mmHg). STRESS TEST: 2/1/18 (lexiscan)  Summary  Small sized, mild intensity anterior and anteroseptal completely reversible  defect consistent with ischemia in the territory of the mid and distal LAD. Hyperdynamic LV systolic function with RO>74% with uniform wall motion. Intermediate risk abnormal study. Resting ECG  Normal sinus rhythm; diffuse T wave inversion  precordial leads and ST depression with T inversion inferior leads c/w possible  ischemia    CARDIAC CATH: 3/4/2020  LEFT HEART CATH  LM: luminals  LAD: mid stent with trace restenosis <10%, luminals otherwise  Ramus: luminals  LCX:luminals  RCA: dominant, mid moderate diffuse disease up to 40%, + small aneurysm                High PLV/PDA takeoff                PDA: ostial 80%                PLV: prox 30%     LVEDP: 7  LVEF: 65%+ hyperdynamic     POBA of PLV/PDA  2.75mm x 15 to ostial PDA then 2.5x15mm and 2.5 x 15mm kissing balloon inflations with PDA/PLV. <20% residual  Assessment  1. Successful POBA to PLV and PDA using lissing balloon inflations, PCI not performed due to mid RCA anatomy                - if restenosis, will need long stent from prox RCA into PDA  2. Cont ASA and plavix  3.  Pt intollerant to BB and will d/c   4. COnt statin       CARDIAC CATH: 2/20/18  Cath: 2/20/2018 LEFT HEART CATH  LM: luminals  LAD: patent mid LAD stent  LCX: no angiographic CAD              OM1- high takeoff, luminals  RCA: dominant, luminals, mid with moderate eccentric \"chunk-like\" disease there is a 40-50% stenosis involving bifurcation with PLD. -DA     LVEDP: 20  LVEF: 50% with mild anterior hypokinesis    VASCULAR/OTHER IMAGIN18  VASCULAR       1. No evidence of an acute aortic syndrome. 2. Negative for acute pulmonary embolism. 3. 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 MUSCULOSKELETAL         Assessment and Plan   Nathan Holloway is a 61 y.o. male who presents today for the following problems:      1. CAD              - 2017 PCI to mid LAD using drug stent          - POBA of PLV/PDA  2. Ischemic cardiomyopathy: 30-->recovered 55%  3. PAD:  100% left iliac  4. HLD: LDL well controlled at 63              HDL low at 20 due to tobacco abuse and ? genetics  5. Hx of tobacco abuse: quit 2017       MD PLAN  1. Patient continues to do well. He states he feels amazing with much better energy and has been very happy  2. No signs of bleeding continue aspirin and Plavix. 3.  Refills given as needed okay to follow-up with nurse practitioner in 1 year         Patient Active Problem List   Diagnosis    Coronary artery disease    Tobacco abuse    Hypotension    Fracture of vertebra due to osteoporosis ([address]    Chest pain    Ischemic cardiomyopathy    PAD (peripheral artery disease) ([address]    History of MI (myocardial infarction)    HLD (hyperlipidemia)    GERD (gastroesophageal reflux disease)    SOB (shortness of breath)    Other fatigue    DMII (diabetes mellitus, type 2) ([address]    Essential hypertension     Plan:  1. Strongly recommend COVID vaccine. 2. Continue medications as prescribed. 3. Follow up in 1 year with NP. It is a pleasure to assist in the care of Nathan Holloway. Please call with any questions.     This note has been scribed in the presence of Kiya Kay MD, by Gaurang Ibrahim RN.      Justyna Morales MD, personally performed the services described in this documentation as scribed by the above signed scribe in my presence, and it is both accurate and complete to the best of our ability and knowledge. I agree with the details independently gathered by my clinical support staff, while the remaining scribed note accurately describes my personal service to the patient. The above RN is working as a scribe for and in the presence of myself . Working as a scribe, the RN may have prepopulated components of this note with my historical intellectual property under my direct supervision. Any additions to this intellectual property were performed at my direction. Furthermore, the content and accuracy of this note have been reviewed by me to the best of my ability.        Kiya Kay MD, [facility] Cardiologist  [address]  [phone] [facility]  [phone] [address] [place] [address]  8/5/2021  10:20 AM

## 2021-08-05 NOTE — PATIENT INSTRUCTIONS
Plan:  1. Strongly recommend COVID vaccine. 2. Continue medications as prescribed.    3. Follow up in 1 year with NP.

## 2021-10-16 NOTE — DISCHARGE SUMMARY
gallops. Abdomen: Soft, non-tender, non-distended with normal bowel sounds. Musculoskeletal:  No clubbing, cyanosis or edema bilaterally. Labs: For convenience and continuity at follow-up the following most recent labs are provided:      CBC:    Lab Results   Component Value Date    WBC 7.0 12/30/2019    HGB 14.1 12/30/2019    HCT 41.8 12/30/2019     12/30/2019       Renal:    Lab Results   Component Value Date     12/30/2019    K 4.1 12/30/2019     12/30/2019    CO2 23 12/30/2019    BUN 11 12/30/2019    CREATININE 0.7 12/30/2019    CALCIUM 8.8 12/30/2019    PHOS 3.8 08/15/2011         Significant Diagnostic Studies    Radiology:   XR CHEST STANDARD (2 VW)   Final Result   No evidence of acute process. Emphysema. Consults:     IP CONSULT TO HOSPITALIST  IP CONSULT TO CARDIOLOGY    Disposition:  Home     Condition at Discharge: Stable    Discharge Instructions/Follow-up: PCP and cardiology    Code Status:  Full Code     Activity: activity as tolerated    Diet: cardiac diet      Discharge Medications:   Please see AVS      Time Spent on discharge is more than 30 minutes in the examination, evaluation, counseling and review of medications and discharge plan. Signed:    Vashti Curling, MD   12/31/2019      Thank you Suzi Carrington for the opportunity to be involved in this patient's care. If you have any questions or concerns please feel free to contact me at [phone]. Patient limited by fatigue  [x] Patient limited by pain, anxiety   [] Patient limited by medical complications:    [] Adverse reaction to Tx:   [] Significant change in status    Safety:       []  bed alarm set    [x]  chair alarm set    []  Pt refused alarms                []  Telesitter activated      [x]  Gait belt used during tx session      []other:         Number of Minutes/Billable Intervention  Gait Training 20   Therapeutic Exercise 15   Neuro Re-Ed    Therapeutic Activity 25   Wheelchair Propulsion    Group    Other:    TOTAL 60         Social History  Social/Functional History  Lives With: Alone (has local family support)  Type of Home: House  Home Layout: Two level, Able to Live on Main level with bedroom/bathroom, Performs ADL's on one level  Home Access: Ramped entrance (bilat rails)  Bathroom Shower/Tub: Walk-in shower  Bathroom Toilet: Handicap height  Bathroom Equipment: Built-in shower seat, Hand-held shower, Grab bars in shower, Grab bars around toilet  Home Equipment: Rolling walker, [address], [employer], [employer], Reacher, Grab bars, Lift chair, Sock aid, Long-handled shoehorn  Receives Help From: Family, Neighbor, Friend(s)  ADL Assistance: Needs assistance (niece supervised pt during ADL (bathing and dressing), otherwise Mod Ind with toileting activity)  Homemaking Responsibilities: Yes  Meal Prep Responsibility: Primary  Laundry Responsibility: Secondary  Cleaning Responsibility: Primary (does not run the sweeper)  Bill Paying/Finance Responsibility: Primary  Shopping Responsibility: Secondary (does grocery list)  [address] [facility] [address]: No  Health Care Management: Primary  Ambulation Assistance: Independent (Mod Ind using RW (household and limited community distances))  Active : No  Mode of Transportation: Family, Car  Occupation: Retired  Type of occupation: worked in bank  Additional Comments: has a regular, flat bed (tall height); usually sleeps in lift chair; had 2 falls in the past year with injuries requiring surgery    Objective                                                                                    Goals:  (Update in navigator)  Short term goals  Time Frame for Short term goals: 12-14 tx days:  Short term goal 1: Pt will complete rolling L/R and sit->sup using leg  to assist RLE and using bed features with Min A following posterior hip precautions on RLE. Short term goal 2: Pt will complete sup->sit using bed features and leg  to assist RLE with SBA-Sup following posterior hip precautions on RLE. Short term goal 3: Pt will complete OOB transfers using RW with Min A following posterior hip precautions. Short term goal 4: Pt will ambulate 50 ft with turns over level surface and 10 ft of unevem surface using RW with CGA. Short term goal 5: Pt will ascend/descend 2\" curb step using RW with Min A. Short term goal 6: Pt will complete object retrieval from the floor in standing with 1UE support on RW and using reacher with CGA. Short term goal 7: pt will complete 150 ft of w/c propulsion using manual w/c at mod ind:   :        Plan of Care                                                                              Times per week: 5 days per week for a minimum of 60 minutes/day plus group as appropriate for 60 minutes.   Treatment to include Current Treatment Recommendations: Strengthening, Gait Training, Patient/Caregiver Education & Training, ROM, Equipment Evaluation, Education, & procurement, Balance Training, Pain Management, Modalities, Functional Mobility Training, Endurance Training, Home Exercise Program, Positioning, Transfer Training, Wheelchair Mobility Training, Safety Education & Training    Electronically signed by   Leandra Wynn, PTA #[id number]  10/16/2021, 10:03 AM

## 2022-01-01 ENCOUNTER — APPOINTMENT (OUTPATIENT)
Dept: GENERAL RADIOLOGY | Age: 57
DRG: 130 | End: 2022-01-01
Payer: MEDICAID

## 2022-01-01 ENCOUNTER — APPOINTMENT (OUTPATIENT)
Dept: CT IMAGING | Age: 57
DRG: 130 | End: 2022-01-01
Payer: MEDICAID

## 2022-01-01 ENCOUNTER — APPOINTMENT (OUTPATIENT)
Dept: INTERVENTIONAL RADIOLOGY/VASCULAR | Age: 57
DRG: 130 | End: 2022-01-01
Payer: MEDICAID

## 2022-01-01 ENCOUNTER — HOSPITAL ENCOUNTER (INPATIENT)
Age: 57
LOS: 24 days | DRG: 130 | End: 2022-02-02
Attending: EMERGENCY MEDICINE | Admitting: INTERNAL MEDICINE
Payer: MEDICAID

## 2022-01-01 ENCOUNTER — APPOINTMENT (OUTPATIENT)
Dept: VASCULAR LAB | Age: 57
DRG: 130 | End: 2022-01-01
Payer: MEDICAID

## 2022-01-01 VITALS
OXYGEN SATURATION: 68 % | SYSTOLIC BLOOD PRESSURE: 64 MMHG | HEIGHT: 62 IN | RESPIRATION RATE: 36 BRPM | WEIGHT: 161.38 LBS | DIASTOLIC BLOOD PRESSURE: 42 MMHG | TEMPERATURE: 98.6 F | BODY MASS INDEX: 29.7 KG/M2 | HEART RATE: 111 BPM

## 2022-01-01 DIAGNOSIS — U07.1 ACUTE HYPOXEMIC RESPIRATORY FAILURE DUE TO COVID-19 (HCC): Primary | ICD-10-CM

## 2022-01-01 DIAGNOSIS — J96.01 ACUTE HYPOXEMIC RESPIRATORY FAILURE DUE TO COVID-19 (HCC): Primary | ICD-10-CM

## 2022-01-01 LAB
(1,3)-BETA-D-GLUCAN (FUNGITELL) INTERPRETATION: POSITIVE
(1,3)-BETA-D-GLUCAN (FUNGITELL): 144 PG/ML
A/G RATIO: 0.8 (ref 1.1–2.2)
A/G RATIO: 0.9 (ref 1.1–2.2)
A/G RATIO: 1 (ref 1.1–2.2)
A/G RATIO: 1 (ref 1.1–2.2)
A/G RATIO: 1.1 (ref 1.1–2.2)
A/G RATIO: 1.2 (ref 1.1–2.2)
A/G RATIO: 1.3 (ref 1.1–2.2)
A/G RATIO: 1.4 (ref 1.1–2.2)
A/G RATIO: 1.6 (ref 1.1–2.2)
ABO/RH: NORMAL
ABO/RH: NORMAL
ALBUMIN SERPL-MCNC: 1.8 G/DL (ref 3.4–5)
ALBUMIN SERPL-MCNC: 2 G/DL (ref 3.4–5)
ALBUMIN SERPL-MCNC: 2.3 G/DL (ref 3.4–5)
ALBUMIN SERPL-MCNC: 2.4 G/DL (ref 3.4–5)
ALBUMIN SERPL-MCNC: 2.5 G/DL (ref 3.4–5)
ALBUMIN SERPL-MCNC: 2.6 G/DL (ref 3.4–5)
ALBUMIN SERPL-MCNC: 2.7 G/DL (ref 3.4–5)
ALBUMIN SERPL-MCNC: 2.8 G/DL (ref 3.4–5)
ALBUMIN SERPL-MCNC: 2.9 G/DL (ref 3.4–5)
ALBUMIN SERPL-MCNC: 2.9 G/DL (ref 3.4–5)
ALBUMIN SERPL-MCNC: 3 G/DL (ref 3.4–5)
ALBUMIN SERPL-MCNC: 3.1 G/DL (ref 3.4–5)
ALBUMIN SERPL-MCNC: 3.4 G/DL (ref 3.4–5)
ALP BLD-CCNC: 102 U/L (ref 40–129)
ALP BLD-CCNC: 104 U/L (ref 40–129)
ALP BLD-CCNC: 111 U/L (ref 40–129)
ALP BLD-CCNC: 115 U/L (ref 40–129)
ALP BLD-CCNC: 116 U/L (ref 40–129)
ALP BLD-CCNC: 117 U/L (ref 40–129)
ALP BLD-CCNC: 125 U/L (ref 40–129)
ALP BLD-CCNC: 127 U/L (ref 40–129)
ALP BLD-CCNC: 149 U/L (ref 40–129)
ALP BLD-CCNC: 149 U/L (ref 40–129)
ALP BLD-CCNC: 156 U/L (ref 40–129)
ALP BLD-CCNC: 166 U/L (ref 40–129)
ALP BLD-CCNC: 168 U/L (ref 40–129)
ALP BLD-CCNC: 175 U/L (ref 40–129)
ALP BLD-CCNC: 181 U/L (ref 40–129)
ALP BLD-CCNC: 183 U/L (ref 40–129)
ALP BLD-CCNC: 186 U/L (ref 40–129)
ALP BLD-CCNC: 186 U/L (ref 40–129)
ALP BLD-CCNC: 212 U/L (ref 40–129)
ALP BLD-CCNC: 80 U/L (ref 40–129)
ALP BLD-CCNC: 82 U/L (ref 40–129)
ALP BLD-CCNC: 87 U/L (ref 40–129)
ALP BLD-CCNC: 93 U/L (ref 40–129)
ALP BLD-CCNC: 97 U/L (ref 40–129)
ALT SERPL-CCNC: 39 U/L (ref 10–40)
ALT SERPL-CCNC: 47 U/L (ref 10–40)
ALT SERPL-CCNC: 47 U/L (ref 10–40)
ALT SERPL-CCNC: 49 U/L (ref 10–40)
ALT SERPL-CCNC: 50 U/L (ref 10–40)
ALT SERPL-CCNC: 56 U/L (ref 10–40)
ALT SERPL-CCNC: 59 U/L (ref 10–40)
ALT SERPL-CCNC: 59 U/L (ref 10–40)
ALT SERPL-CCNC: 62 U/L (ref 10–40)
ALT SERPL-CCNC: 62 U/L (ref 10–40)
ALT SERPL-CCNC: 63 U/L (ref 10–40)
ALT SERPL-CCNC: 64 U/L (ref 10–40)
ALT SERPL-CCNC: 64 U/L (ref 10–40)
ALT SERPL-CCNC: 66 U/L (ref 10–40)
ALT SERPL-CCNC: 67 U/L (ref 10–40)
ALT SERPL-CCNC: 67 U/L (ref 10–40)
ALT SERPL-CCNC: 69 U/L (ref 10–40)
ALT SERPL-CCNC: 75 U/L (ref 10–40)
ALT SERPL-CCNC: <5 U/L (ref 10–40)
ANION GAP SERPL CALCULATED.3IONS-SCNC: 10 MMOL/L (ref 3–16)
ANION GAP SERPL CALCULATED.3IONS-SCNC: 11 MMOL/L (ref 3–16)
ANION GAP SERPL CALCULATED.3IONS-SCNC: 11 MMOL/L (ref 3–16)
ANION GAP SERPL CALCULATED.3IONS-SCNC: 13 MMOL/L (ref 3–16)
ANION GAP SERPL CALCULATED.3IONS-SCNC: 14 MMOL/L (ref 3–16)
ANION GAP SERPL CALCULATED.3IONS-SCNC: 2 MMOL/L (ref 3–16)
ANION GAP SERPL CALCULATED.3IONS-SCNC: 3 MMOL/L (ref 3–16)
ANION GAP SERPL CALCULATED.3IONS-SCNC: 4 MMOL/L (ref 3–16)
ANION GAP SERPL CALCULATED.3IONS-SCNC: 5 MMOL/L (ref 3–16)
ANION GAP SERPL CALCULATED.3IONS-SCNC: 5 MMOL/L (ref 3–16)
ANION GAP SERPL CALCULATED.3IONS-SCNC: 6 MMOL/L (ref 3–16)
ANION GAP SERPL CALCULATED.3IONS-SCNC: 7 MMOL/L (ref 3–16)
ANION GAP SERPL CALCULATED.3IONS-SCNC: 8 MMOL/L (ref 3–16)
ANION GAP SERPL CALCULATED.3IONS-SCNC: 9 MMOL/L (ref 3–16)
ANION GAP SERPL CALCULATED.3IONS-SCNC: 9 MMOL/L (ref 3–16)
ANISOCYTOSIS: ABNORMAL
ANTI-XA UNFRAC HEPARIN: 0.07 IU/ML (ref 0.3–0.7)
ANTI-XA UNFRAC HEPARIN: 0.29 IU/ML (ref 0.3–0.7)
ANTI-XA UNFRAC HEPARIN: 0.3 IU/ML (ref 0.3–0.7)
ANTI-XA UNFRAC HEPARIN: 0.4 IU/ML (ref 0.3–0.7)
ANTI-XA UNFRAC HEPARIN: 0.44 IU/ML (ref 0.3–0.7)
ANTI-XA UNFRAC HEPARIN: 0.72 IU/ML (ref 0.3–0.7)
ANTI-XA UNFRAC HEPARIN: 0.9 IU/ML (ref 0.3–0.7)
ANTI-XA UNFRAC HEPARIN: 1.16 IU/ML (ref 0.3–0.7)
ANTI-XA UNFRAC HEPARIN: 1.43 IU/ML (ref 0.3–0.7)
ANTI-XA UNFRAC HEPARIN: 1.8 IU/ML (ref 0.3–0.7)
ANTIBODY SCREEN: NORMAL
ANTIBODY SCREEN: NORMAL
AST SERPL-CCNC: 151 U/L (ref 15–37)
AST SERPL-CCNC: 30 U/L (ref 15–37)
AST SERPL-CCNC: 54 U/L (ref 15–37)
AST SERPL-CCNC: 58 U/L (ref 15–37)
AST SERPL-CCNC: 59 U/L (ref 15–37)
AST SERPL-CCNC: 62 U/L (ref 15–37)
AST SERPL-CCNC: 64 U/L (ref 15–37)
AST SERPL-CCNC: 66 U/L (ref 15–37)
AST SERPL-CCNC: 73 U/L (ref 15–37)
AST SERPL-CCNC: 74 U/L (ref 15–37)
AST SERPL-CCNC: 85 U/L (ref 15–37)
AST SERPL-CCNC: 86 U/L (ref 15–37)
AST SERPL-CCNC: 86 U/L (ref 15–37)
AST SERPL-CCNC: 87 U/L (ref 15–37)
AST SERPL-CCNC: 89 U/L (ref 15–37)
AST SERPL-CCNC: 91 U/L (ref 15–37)
AST SERPL-CCNC: 93 U/L (ref 15–37)
AST SERPL-CCNC: 93 U/L (ref 15–37)
AST SERPL-CCNC: 94 U/L (ref 15–37)
AST SERPL-CCNC: 98 U/L (ref 15–37)
AST SERPL-CCNC: 98 U/L (ref 15–37)
AST SERPL-CCNC: 99 U/L (ref 15–37)
AST SERPL-CCNC: <5 U/L (ref 15–37)
AST SERPL-CCNC: <5 U/L (ref 15–37)
ATYPICAL LYMPHOCYTE RELATIVE PERCENT: 1 % (ref 0–6)
ATYPICAL LYMPHOCYTE RELATIVE PERCENT: 5 % (ref 0–6)
BANDED NEUTROPHILS RELATIVE PERCENT: 1 % (ref 0–7)
BANDED NEUTROPHILS RELATIVE PERCENT: 10 % (ref 0–7)
BANDED NEUTROPHILS RELATIVE PERCENT: 10 % (ref 0–7)
BANDED NEUTROPHILS RELATIVE PERCENT: 16 % (ref 0–7)
BANDED NEUTROPHILS RELATIVE PERCENT: 3 % (ref 0–7)
BANDED NEUTROPHILS RELATIVE PERCENT: 3 % (ref 0–7)
BANDED NEUTROPHILS RELATIVE PERCENT: 4 % (ref 0–7)
BANDED NEUTROPHILS RELATIVE PERCENT: 4 % (ref 0–7)
BANDED NEUTROPHILS RELATIVE PERCENT: 6 % (ref 0–7)
BANDED NEUTROPHILS RELATIVE PERCENT: 7 % (ref 0–7)
BASE EXCESS ARTERIAL: -1.3 MMOL/L (ref -3–3)
BASE EXCESS ARTERIAL: -1.5 MMOL/L (ref -3–3)
BASE EXCESS ARTERIAL: -2.6 MMOL/L (ref -3–3)
BASE EXCESS ARTERIAL: 0.5 MMOL/L (ref -3–3)
BASE EXCESS ARTERIAL: 1.4 MMOL/L (ref -3–3)
BASE EXCESS ARTERIAL: 2 MMOL/L (ref -3–3)
BASE EXCESS ARTERIAL: 2 MMOL/L (ref -3–3)
BASE EXCESS ARTERIAL: 2.3 MMOL/L (ref -3–3)
BASE EXCESS ARTERIAL: 2.4 MMOL/L (ref -3–3)
BASE EXCESS ARTERIAL: 2.9 MMOL/L (ref -3–3)
BASE EXCESS ARTERIAL: 4.2 MMOL/L (ref -3–3)
BASE EXCESS ARTERIAL: 4.2 MMOL/L (ref -3–3)
BASE EXCESS ARTERIAL: 4.7 MMOL/L (ref -3–3)
BASE EXCESS ARTERIAL: 6 MMOL/L (ref -3–3)
BASE EXCESS ARTERIAL: 6.2 MMOL/L (ref -3–3)
BASE EXCESS ARTERIAL: 6.7 MMOL/L (ref -3–3)
BASE EXCESS ARTERIAL: 6.8 MMOL/L (ref -3–3)
BASE EXCESS ARTERIAL: 7.2 MMOL/L (ref -3–3)
BASE EXCESS ARTERIAL: 8 MMOL/L (ref -3–3)
BASE EXCESS ARTERIAL: 8.6 MMOL/L (ref -3–3)
BASE EXCESS ARTERIAL: 9.4 MMOL/L (ref -3–3)
BASE EXCESS ARTERIAL: 9.5 MMOL/L (ref -3–3)
BASE EXCESS VENOUS: -3.8 MMOL/L (ref -3–3)
BASOPHILIC STIPPLING: ABNORMAL
BASOPHILS ABSOLUTE: 0 K/UL (ref 0–0.2)
BASOPHILS ABSOLUTE: 0.1 K/UL (ref 0–0.2)
BASOPHILS ABSOLUTE: 0.2 K/UL (ref 0–0.2)
BASOPHILS RELATIVE PERCENT: 0 %
BASOPHILS RELATIVE PERCENT: 0.1 %
BASOPHILS RELATIVE PERCENT: 0.2 %
BASOPHILS RELATIVE PERCENT: 0.4 %
BASOPHILS RELATIVE PERCENT: 0.4 %
BASOPHILS RELATIVE PERCENT: 0.5 %
BASOPHILS RELATIVE PERCENT: 0.5 %
BASOPHILS RELATIVE PERCENT: 0.6 %
BILIRUB SERPL-MCNC: 1.1 MG/DL (ref 0–1)
BILIRUB SERPL-MCNC: 1.4 MG/DL (ref 0–1)
BILIRUB SERPL-MCNC: 1.5 MG/DL (ref 0–1)
BILIRUB SERPL-MCNC: 1.6 MG/DL (ref 0–1)
BILIRUB SERPL-MCNC: 1.7 MG/DL (ref 0–1)
BILIRUB SERPL-MCNC: 1.8 MG/DL (ref 0–1)
BILIRUB SERPL-MCNC: 2 MG/DL (ref 0–1)
BILIRUB SERPL-MCNC: 2.1 MG/DL (ref 0–1)
BILIRUB SERPL-MCNC: 2.1 MG/DL (ref 0–1)
BILIRUB SERPL-MCNC: 2.2 MG/DL (ref 0–1)
BILIRUB SERPL-MCNC: 2.3 MG/DL (ref 0–1)
BILIRUB SERPL-MCNC: 2.4 MG/DL (ref 0–1)
BILIRUB SERPL-MCNC: 2.7 MG/DL (ref 0–1)
BILIRUB SERPL-MCNC: 3.1 MG/DL (ref 0–1)
BILIRUBIN URINE: NEGATIVE
BLOOD BANK DISPENSE STATUS: NORMAL
BLOOD BANK PRODUCT CODE: NORMAL
BLOOD CULTURE, ROUTINE: NORMAL
BLOOD, URINE: ABNORMAL
BPU ID: NORMAL
BUN BLDV-MCNC: 15 MG/DL (ref 7–20)
BUN BLDV-MCNC: 16 MG/DL (ref 7–20)
BUN BLDV-MCNC: 16 MG/DL (ref 7–20)
BUN BLDV-MCNC: 18 MG/DL (ref 7–20)
BUN BLDV-MCNC: 19 MG/DL (ref 7–20)
BUN BLDV-MCNC: 20 MG/DL (ref 7–20)
BUN BLDV-MCNC: 21 MG/DL (ref 7–20)
BUN BLDV-MCNC: 22 MG/DL (ref 7–20)
BUN BLDV-MCNC: 22 MG/DL (ref 7–20)
BUN BLDV-MCNC: 23 MG/DL (ref 7–20)
BUN BLDV-MCNC: 31 MG/DL (ref 7–20)
BUN BLDV-MCNC: 34 MG/DL (ref 7–20)
BUN BLDV-MCNC: 38 MG/DL (ref 7–20)
BUN BLDV-MCNC: 38 MG/DL (ref 7–20)
BUN BLDV-MCNC: 41 MG/DL (ref 7–20)
BUN BLDV-MCNC: 42 MG/DL (ref 7–20)
BUN BLDV-MCNC: 47 MG/DL (ref 7–20)
BUN BLDV-MCNC: 50 MG/DL (ref 7–20)
BUN BLDV-MCNC: 53 MG/DL (ref 7–20)
BUN BLDV-MCNC: 53 MG/DL (ref 7–20)
BUN BLDV-MCNC: 55 MG/DL (ref 7–20)
C-REACTIVE PROTEIN: 11.9 MG/L (ref 0–5.1)
C-REACTIVE PROTEIN: 27.6 MG/L (ref 0–5.1)
C-REACTIVE PROTEIN: 55.2 MG/L (ref 0–5.1)
CALCIUM SERPL-MCNC: 7.5 MG/DL (ref 8.3–10.6)
CALCIUM SERPL-MCNC: 7.7 MG/DL (ref 8.3–10.6)
CALCIUM SERPL-MCNC: 7.8 MG/DL (ref 8.3–10.6)
CALCIUM SERPL-MCNC: 7.8 MG/DL (ref 8.3–10.6)
CALCIUM SERPL-MCNC: 7.9 MG/DL (ref 8.3–10.6)
CALCIUM SERPL-MCNC: 8 MG/DL (ref 8.3–10.6)
CALCIUM SERPL-MCNC: 8.1 MG/DL (ref 8.3–10.6)
CALCIUM SERPL-MCNC: 8.2 MG/DL (ref 8.3–10.6)
CALCIUM SERPL-MCNC: 8.2 MG/DL (ref 8.3–10.6)
CALCIUM SERPL-MCNC: 8.3 MG/DL (ref 8.3–10.6)
CALCIUM SERPL-MCNC: 8.5 MG/DL (ref 8.3–10.6)
CALCIUM SERPL-MCNC: 8.7 MG/DL (ref 8.3–10.6)
CALCIUM SERPL-MCNC: 8.7 MG/DL (ref 8.3–10.6)
CARBOXYHEMOGLOBIN ARTERIAL: 0.2 % (ref 0–1.5)
CARBOXYHEMOGLOBIN ARTERIAL: 0.3 % (ref 0–1.5)
CARBOXYHEMOGLOBIN ARTERIAL: 0.5 % (ref 0–1.5)
CARBOXYHEMOGLOBIN ARTERIAL: 0.5 % (ref 0–1.5)
CARBOXYHEMOGLOBIN ARTERIAL: 0.6 % (ref 0–1.5)
CARBOXYHEMOGLOBIN ARTERIAL: 0.7 % (ref 0–1.5)
CARBOXYHEMOGLOBIN ARTERIAL: 1 % (ref 0–1.5)
CARBOXYHEMOGLOBIN ARTERIAL: 1.2 % (ref 0–1.5)
CARBOXYHEMOGLOBIN ARTERIAL: 1.5 % (ref 0–1.5)
CARBOXYHEMOGLOBIN ARTERIAL: 1.5 % (ref 0–1.5)
CARBOXYHEMOGLOBIN ARTERIAL: 2.3 % (ref 0–1.5)
CARBOXYHEMOGLOBIN ARTERIAL: 2.5 % (ref 0–1.5)
CARBOXYHEMOGLOBIN ARTERIAL: 2.9 % (ref 0–1.5)
CARBOXYHEMOGLOBIN ARTERIAL: 3.3 % (ref 0–1.5)
CARBOXYHEMOGLOBIN: 1.2 % (ref 0–1.5)
CHLORIDE BLD-SCNC: 100 MMOL/L (ref 99–110)
CHLORIDE BLD-SCNC: 101 MMOL/L (ref 99–110)
CHLORIDE BLD-SCNC: 102 MMOL/L (ref 99–110)
CHLORIDE BLD-SCNC: 103 MMOL/L (ref 99–110)
CHLORIDE BLD-SCNC: 103 MMOL/L (ref 99–110)
CHLORIDE BLD-SCNC: 104 MMOL/L (ref 99–110)
CHLORIDE BLD-SCNC: 105 MMOL/L (ref 99–110)
CHLORIDE BLD-SCNC: 90 MMOL/L (ref 99–110)
CHLORIDE BLD-SCNC: 94 MMOL/L (ref 99–110)
CHLORIDE BLD-SCNC: 95 MMOL/L (ref 99–110)
CHLORIDE BLD-SCNC: 96 MMOL/L (ref 99–110)
CHLORIDE BLD-SCNC: 96 MMOL/L (ref 99–110)
CHLORIDE BLD-SCNC: 97 MMOL/L (ref 99–110)
CHLORIDE BLD-SCNC: 98 MMOL/L (ref 99–110)
CHLORIDE BLD-SCNC: 98 MMOL/L (ref 99–110)
CHLORIDE BLD-SCNC: 99 MMOL/L (ref 99–110)
CLARITY: CLEAR
CO2: 24 MMOL/L (ref 21–32)
CO2: 25 MMOL/L (ref 21–32)
CO2: 27 MMOL/L (ref 21–32)
CO2: 28 MMOL/L (ref 21–32)
CO2: 29 MMOL/L (ref 21–32)
CO2: 30 MMOL/L (ref 21–32)
CO2: 31 MMOL/L (ref 21–32)
CO2: 31 MMOL/L (ref 21–32)
CO2: 32 MMOL/L (ref 21–32)
CO2: 33 MMOL/L (ref 21–32)
CO2: 33 MMOL/L (ref 21–32)
CO2: 34 MMOL/L (ref 21–32)
CO2: 35 MMOL/L (ref 21–32)
CO2: 35 MMOL/L (ref 21–32)
CO2: 36 MMOL/L (ref 21–32)
CO2: 36 MMOL/L (ref 21–32)
COLOR: YELLOW
CREAT SERPL-MCNC: 0.6 MG/DL (ref 0.9–1.3)
CREAT SERPL-MCNC: 0.7 MG/DL (ref 0.9–1.3)
CREAT SERPL-MCNC: 0.8 MG/DL (ref 0.9–1.3)
CREAT SERPL-MCNC: 0.8 MG/DL (ref 0.9–1.3)
CREAT SERPL-MCNC: <0.5 MG/DL (ref 0.9–1.3)
CULTURE, BLOOD 2: NORMAL
CULTURE, RESPIRATORY: ABNORMAL
DESCRIPTION BLOOD BANK: NORMAL
EKG ATRIAL RATE: 105 BPM
EKG DIAGNOSIS: NORMAL
EKG P AXIS: 46 DEGREES
EKG P-R INTERVAL: 122 MS
EKG Q-T INTERVAL: 346 MS
EKG QRS DURATION: 78 MS
EKG QTC CALCULATION (BAZETT): 457 MS
EKG R AXIS: -31 DEGREES
EKG T AXIS: 61 DEGREES
EKG VENTRICULAR RATE: 105 BPM
EOSINOPHILS ABSOLUTE: 0 K/UL (ref 0–0.6)
EOSINOPHILS RELATIVE PERCENT: 0 %
EOSINOPHILS RELATIVE PERCENT: 0.1 %
GFR AFRICAN AMERICAN: >60
GFR NON-AFRICAN AMERICAN: >60
GLUCOSE BLD-MCNC: 100 MG/DL (ref 70–99)
GLUCOSE BLD-MCNC: 105 MG/DL (ref 70–99)
GLUCOSE BLD-MCNC: 107 MG/DL (ref 70–99)
GLUCOSE BLD-MCNC: 109 MG/DL (ref 70–99)
GLUCOSE BLD-MCNC: 112 MG/DL (ref 70–99)
GLUCOSE BLD-MCNC: 112 MG/DL (ref 70–99)
GLUCOSE BLD-MCNC: 113 MG/DL (ref 70–99)
GLUCOSE BLD-MCNC: 116 MG/DL (ref 70–99)
GLUCOSE BLD-MCNC: 117 MG/DL (ref 70–99)
GLUCOSE BLD-MCNC: 118 MG/DL (ref 70–99)
GLUCOSE BLD-MCNC: 118 MG/DL (ref 70–99)
GLUCOSE BLD-MCNC: 119 MG/DL (ref 70–99)
GLUCOSE BLD-MCNC: 120 MG/DL (ref 70–99)
GLUCOSE BLD-MCNC: 121 MG/DL (ref 70–99)
GLUCOSE BLD-MCNC: 121 MG/DL (ref 70–99)
GLUCOSE BLD-MCNC: 122 MG/DL (ref 70–99)
GLUCOSE BLD-MCNC: 123 MG/DL (ref 70–99)
GLUCOSE BLD-MCNC: 124 MG/DL (ref 70–99)
GLUCOSE BLD-MCNC: 125 MG/DL (ref 70–99)
GLUCOSE BLD-MCNC: 126 MG/DL (ref 70–99)
GLUCOSE BLD-MCNC: 126 MG/DL (ref 70–99)
GLUCOSE BLD-MCNC: 127 MG/DL (ref 70–99)
GLUCOSE BLD-MCNC: 128 MG/DL (ref 70–99)
GLUCOSE BLD-MCNC: 130 MG/DL (ref 70–99)
GLUCOSE BLD-MCNC: 130 MG/DL (ref 70–99)
GLUCOSE BLD-MCNC: 131 MG/DL (ref 70–99)
GLUCOSE BLD-MCNC: 132 MG/DL (ref 70–99)
GLUCOSE BLD-MCNC: 132 MG/DL (ref 70–99)
GLUCOSE BLD-MCNC: 133 MG/DL (ref 70–99)
GLUCOSE BLD-MCNC: 133 MG/DL (ref 70–99)
GLUCOSE BLD-MCNC: 135 MG/DL (ref 70–99)
GLUCOSE BLD-MCNC: 135 MG/DL (ref 70–99)
GLUCOSE BLD-MCNC: 136 MG/DL (ref 70–99)
GLUCOSE BLD-MCNC: 136 MG/DL (ref 70–99)
GLUCOSE BLD-MCNC: 137 MG/DL (ref 70–99)
GLUCOSE BLD-MCNC: 139 MG/DL (ref 70–99)
GLUCOSE BLD-MCNC: 140 MG/DL (ref 70–99)
GLUCOSE BLD-MCNC: 143 MG/DL (ref 70–99)
GLUCOSE BLD-MCNC: 143 MG/DL (ref 70–99)
GLUCOSE BLD-MCNC: 144 MG/DL (ref 70–99)
GLUCOSE BLD-MCNC: 144 MG/DL (ref 70–99)
GLUCOSE BLD-MCNC: 145 MG/DL (ref 70–99)
GLUCOSE BLD-MCNC: 147 MG/DL (ref 70–99)
GLUCOSE BLD-MCNC: 148 MG/DL (ref 70–99)
GLUCOSE BLD-MCNC: 149 MG/DL (ref 70–99)
GLUCOSE BLD-MCNC: 150 MG/DL (ref 70–99)
GLUCOSE BLD-MCNC: 150 MG/DL (ref 70–99)
GLUCOSE BLD-MCNC: 154 MG/DL (ref 70–99)
GLUCOSE BLD-MCNC: 155 MG/DL (ref 70–99)
GLUCOSE BLD-MCNC: 157 MG/DL (ref 70–99)
GLUCOSE BLD-MCNC: 157 MG/DL (ref 70–99)
GLUCOSE BLD-MCNC: 160 MG/DL (ref 70–99)
GLUCOSE BLD-MCNC: 162 MG/DL (ref 70–99)
GLUCOSE BLD-MCNC: 163 MG/DL (ref 70–99)
GLUCOSE BLD-MCNC: 164 MG/DL (ref 70–99)
GLUCOSE BLD-MCNC: 164 MG/DL (ref 70–99)
GLUCOSE BLD-MCNC: 166 MG/DL (ref 70–99)
GLUCOSE BLD-MCNC: 167 MG/DL (ref 70–99)
GLUCOSE BLD-MCNC: 168 MG/DL (ref 70–99)
GLUCOSE BLD-MCNC: 169 MG/DL (ref 70–99)
GLUCOSE BLD-MCNC: 169 MG/DL (ref 70–99)
GLUCOSE BLD-MCNC: 173 MG/DL (ref 70–99)
GLUCOSE BLD-MCNC: 174 MG/DL (ref 70–99)
GLUCOSE BLD-MCNC: 175 MG/DL (ref 70–99)
GLUCOSE BLD-MCNC: 176 MG/DL (ref 70–99)
GLUCOSE BLD-MCNC: 181 MG/DL (ref 70–99)
GLUCOSE BLD-MCNC: 181 MG/DL (ref 70–99)
GLUCOSE BLD-MCNC: 186 MG/DL (ref 70–99)
GLUCOSE BLD-MCNC: 187 MG/DL (ref 70–99)
GLUCOSE BLD-MCNC: 187 MG/DL (ref 70–99)
GLUCOSE BLD-MCNC: 189 MG/DL (ref 70–99)
GLUCOSE BLD-MCNC: 189 MG/DL (ref 70–99)
GLUCOSE BLD-MCNC: 190 MG/DL (ref 70–99)
GLUCOSE BLD-MCNC: 194 MG/DL (ref 70–99)
GLUCOSE BLD-MCNC: 194 MG/DL (ref 70–99)
GLUCOSE BLD-MCNC: 196 MG/DL (ref 70–99)
GLUCOSE BLD-MCNC: 201 MG/DL (ref 70–99)
GLUCOSE BLD-MCNC: 202 MG/DL (ref 70–99)
GLUCOSE BLD-MCNC: 204 MG/DL (ref 70–99)
GLUCOSE BLD-MCNC: 208 MG/DL (ref 70–99)
GLUCOSE BLD-MCNC: 208 MG/DL (ref 70–99)
GLUCOSE BLD-MCNC: 209 MG/DL (ref 70–99)
GLUCOSE BLD-MCNC: 209 MG/DL (ref 70–99)
GLUCOSE BLD-MCNC: 211 MG/DL (ref 70–99)
GLUCOSE BLD-MCNC: 211 MG/DL (ref 70–99)
GLUCOSE BLD-MCNC: 212 MG/DL (ref 70–99)
GLUCOSE BLD-MCNC: 214 MG/DL (ref 70–99)
GLUCOSE BLD-MCNC: 214 MG/DL (ref 70–99)
GLUCOSE BLD-MCNC: 215 MG/DL (ref 70–99)
GLUCOSE BLD-MCNC: 215 MG/DL (ref 70–99)
GLUCOSE BLD-MCNC: 217 MG/DL (ref 70–99)
GLUCOSE BLD-MCNC: 217 MG/DL (ref 70–99)
GLUCOSE BLD-MCNC: 219 MG/DL (ref 70–99)
GLUCOSE BLD-MCNC: 219 MG/DL (ref 70–99)
GLUCOSE BLD-MCNC: 222 MG/DL (ref 70–99)
GLUCOSE BLD-MCNC: 224 MG/DL (ref 70–99)
GLUCOSE BLD-MCNC: 226 MG/DL (ref 70–99)
GLUCOSE BLD-MCNC: 229 MG/DL (ref 70–99)
GLUCOSE BLD-MCNC: 230 MG/DL (ref 70–99)
GLUCOSE BLD-MCNC: 231 MG/DL (ref 70–99)
GLUCOSE BLD-MCNC: 232 MG/DL (ref 70–99)
GLUCOSE BLD-MCNC: 232 MG/DL (ref 70–99)
GLUCOSE BLD-MCNC: 234 MG/DL (ref 70–99)
GLUCOSE BLD-MCNC: 235 MG/DL (ref 70–99)
GLUCOSE BLD-MCNC: 236 MG/DL (ref 70–99)
GLUCOSE BLD-MCNC: 237 MG/DL (ref 70–99)
GLUCOSE BLD-MCNC: 238 MG/DL (ref 70–99)
GLUCOSE BLD-MCNC: 239 MG/DL (ref 70–99)
GLUCOSE BLD-MCNC: 241 MG/DL (ref 70–99)
GLUCOSE BLD-MCNC: 241 MG/DL (ref 70–99)
GLUCOSE BLD-MCNC: 244 MG/DL (ref 70–99)
GLUCOSE BLD-MCNC: 245 MG/DL (ref 70–99)
GLUCOSE BLD-MCNC: 249 MG/DL (ref 70–99)
GLUCOSE BLD-MCNC: 252 MG/DL (ref 70–99)
GLUCOSE BLD-MCNC: 257 MG/DL (ref 70–99)
GLUCOSE BLD-MCNC: 259 MG/DL (ref 70–99)
GLUCOSE BLD-MCNC: 265 MG/DL (ref 70–99)
GLUCOSE BLD-MCNC: 266 MG/DL (ref 70–99)
GLUCOSE BLD-MCNC: 269 MG/DL (ref 70–99)
GLUCOSE BLD-MCNC: 270 MG/DL (ref 70–99)
GLUCOSE BLD-MCNC: 270 MG/DL (ref 70–99)
GLUCOSE BLD-MCNC: 272 MG/DL (ref 70–99)
GLUCOSE BLD-MCNC: 276 MG/DL (ref 70–99)
GLUCOSE BLD-MCNC: 278 MG/DL (ref 70–99)
GLUCOSE BLD-MCNC: 280 MG/DL (ref 70–99)
GLUCOSE BLD-MCNC: 284 MG/DL (ref 70–99)
GLUCOSE BLD-MCNC: 287 MG/DL (ref 70–99)
GLUCOSE BLD-MCNC: 292 MG/DL (ref 70–99)
GLUCOSE BLD-MCNC: 292 MG/DL (ref 70–99)
GLUCOSE BLD-MCNC: 310 MG/DL (ref 70–99)
GLUCOSE BLD-MCNC: 327 MG/DL (ref 70–99)
GLUCOSE BLD-MCNC: 346 MG/DL (ref 70–99)
GLUCOSE BLD-MCNC: 354 MG/DL (ref 70–99)
GLUCOSE BLD-MCNC: 86 MG/DL (ref 70–99)
GLUCOSE BLD-MCNC: 90 MG/DL (ref 70–99)
GLUCOSE BLD-MCNC: 96 MG/DL (ref 70–99)
GLUCOSE URINE: NEGATIVE MG/DL
GRAM STAIN RESULT: ABNORMAL
HCO3 ARTERIAL: 22.9 MMOL/L (ref 21–29)
HCO3 ARTERIAL: 26 MMOL/L (ref 21–29)
HCO3 ARTERIAL: 26.6 MMOL/L (ref 21–29)
HCO3 ARTERIAL: 27.3 MMOL/L (ref 21–29)
HCO3 ARTERIAL: 28 MMOL/L (ref 21–29)
HCO3 ARTERIAL: 28.2 MMOL/L (ref 21–29)
HCO3 ARTERIAL: 28.6 MMOL/L (ref 21–29)
HCO3 ARTERIAL: 28.7 MMOL/L (ref 21–29)
HCO3 ARTERIAL: 29 MMOL/L (ref 21–29)
HCO3 ARTERIAL: 29.2 MMOL/L (ref 21–29)
HCO3 ARTERIAL: 29.6 MMOL/L (ref 21–29)
HCO3 ARTERIAL: 30.8 MMOL/L (ref 21–29)
HCO3 ARTERIAL: 31.1 MMOL/L (ref 21–29)
HCO3 ARTERIAL: 31.9 MMOL/L (ref 21–29)
HCO3 ARTERIAL: 32.6 MMOL/L (ref 21–29)
HCO3 ARTERIAL: 32.9 MMOL/L (ref 21–29)
HCO3 ARTERIAL: 33.7 MMOL/L (ref 21–29)
HCO3 ARTERIAL: 35.1 MMOL/L (ref 21–29)
HCO3 ARTERIAL: 35.2 MMOL/L (ref 21–29)
HCO3 ARTERIAL: 35.8 MMOL/L (ref 21–29)
HCO3 ARTERIAL: 36 MMOL/L (ref 21–29)
HCO3 ARTERIAL: 36.2 MMOL/L (ref 21–29)
HCO3 VENOUS: 20 MMOL/L (ref 23–29)
HCT VFR BLD CALC: 21.1 % (ref 40.5–52.5)
HCT VFR BLD CALC: 21.3 % (ref 40.5–52.5)
HCT VFR BLD CALC: 21.9 % (ref 40.5–52.5)
HCT VFR BLD CALC: 22.3 % (ref 40.5–52.5)
HCT VFR BLD CALC: 22.7 % (ref 40.5–52.5)
HCT VFR BLD CALC: 22.7 % (ref 40.5–52.5)
HCT VFR BLD CALC: 22.8 % (ref 40.5–52.5)
HCT VFR BLD CALC: 23.2 % (ref 40.5–52.5)
HCT VFR BLD CALC: 23.3 % (ref 40.5–52.5)
HCT VFR BLD CALC: 23.4 % (ref 40.5–52.5)
HCT VFR BLD CALC: 23.5 % (ref 40.5–52.5)
HCT VFR BLD CALC: 23.5 % (ref 40.5–52.5)
HCT VFR BLD CALC: 23.6 % (ref 40.5–52.5)
HCT VFR BLD CALC: 23.8 % (ref 40.5–52.5)
HCT VFR BLD CALC: 24 % (ref 40.5–52.5)
HCT VFR BLD CALC: 24.3 % (ref 40.5–52.5)
HCT VFR BLD CALC: 24.4 % (ref 40.5–52.5)
HCT VFR BLD CALC: 24.5 % (ref 40.5–52.5)
HCT VFR BLD CALC: 24.5 % (ref 40.5–52.5)
HCT VFR BLD CALC: 24.8 % (ref 40.5–52.5)
HCT VFR BLD CALC: 25 % (ref 40.5–52.5)
HCT VFR BLD CALC: 25.4 % (ref 40.5–52.5)
HCT VFR BLD CALC: 25.7 % (ref 40.5–52.5)
HCT VFR BLD CALC: 25.9 % (ref 40.5–52.5)
HCT VFR BLD CALC: 26.4 % (ref 40.5–52.5)
HCT VFR BLD CALC: 27.1 % (ref 40.5–52.5)
HCT VFR BLD CALC: 28.4 % (ref 40.5–52.5)
HCT VFR BLD CALC: 30.2 % (ref 40.5–52.5)
HCT VFR BLD CALC: 35.3 % (ref 40.5–52.5)
HCT VFR BLD CALC: 36.1 % (ref 40.5–52.5)
HCT VFR BLD CALC: 40.9 % (ref 40.5–52.5)
HCT VFR BLD CALC: 42.4 % (ref 40.5–52.5)
HCT VFR BLD CALC: 43.7 % (ref 40.5–52.5)
HCT VFR BLD CALC: 43.7 % (ref 40.5–52.5)
HCT VFR BLD CALC: 45.4 % (ref 40.5–52.5)
HCT VFR BLD CALC: 45.8 % (ref 40.5–52.5)
HCT VFR BLD CALC: 49.8 % (ref 40.5–52.5)
HEMATOLOGY PATH CONSULT: NO
HEMATOLOGY PATH CONSULT: NO
HEMATOLOGY PATH CONSULT: NORMAL
HEMATOLOGY PATH CONSULT: YES
HEMOGLOBIN, ART, EXTENDED: 10.3 G/DL (ref 13.5–17.5)
HEMOGLOBIN, ART, EXTENDED: 10.9 G/DL (ref 13.5–17.5)
HEMOGLOBIN, ART, EXTENDED: 12.7 G/DL (ref 13.5–17.5)
HEMOGLOBIN, ART, EXTENDED: 13.6 G/DL (ref 13.5–17.5)
HEMOGLOBIN, ART, EXTENDED: 14.3 G/DL (ref 13.5–17.5)
HEMOGLOBIN, ART, EXTENDED: 14.3 G/DL (ref 13.5–17.5)
HEMOGLOBIN, ART, EXTENDED: 15.4 G/DL (ref 13.5–17.5)
HEMOGLOBIN, ART, EXTENDED: 15.6 G/DL (ref 13.5–17.5)
HEMOGLOBIN, ART, EXTENDED: 7.5 G/DL (ref 13.5–17.5)
HEMOGLOBIN, ART, EXTENDED: 7.7 G/DL (ref 13.5–17.5)
HEMOGLOBIN, ART, EXTENDED: 7.9 G/DL (ref 13.5–17.5)
HEMOGLOBIN, ART, EXTENDED: 8 G/DL (ref 13.5–17.5)
HEMOGLOBIN, ART, EXTENDED: 8.4 G/DL (ref 13.5–17.5)
HEMOGLOBIN, ART, EXTENDED: 8.6 G/DL (ref 13.5–17.5)
HEMOGLOBIN, ART, EXTENDED: 8.6 G/DL (ref 13.5–17.5)
HEMOGLOBIN, ART, EXTENDED: 8.7 G/DL (ref 13.5–17.5)
HEMOGLOBIN, ART, EXTENDED: 9.1 G/DL (ref 13.5–17.5)
HEMOGLOBIN, ART, EXTENDED: 9.2 G/DL (ref 13.5–17.5)
HEMOGLOBIN, ART, EXTENDED: 9.3 G/DL (ref 13.5–17.5)
HEMOGLOBIN, ART, EXTENDED: 9.3 G/DL (ref 13.5–17.5)
HEMOGLOBIN: 10.4 G/DL (ref 13.5–17.5)
HEMOGLOBIN: 12.1 G/DL (ref 13.5–17.5)
HEMOGLOBIN: 12.8 G/DL (ref 13.5–17.5)
HEMOGLOBIN: 13.5 G/DL (ref 13.5–17.5)
HEMOGLOBIN: 13.8 G/DL (ref 13.5–17.5)
HEMOGLOBIN: 14.5 G/DL (ref 13.5–17.5)
HEMOGLOBIN: 14.5 G/DL (ref 13.5–17.5)
HEMOGLOBIN: 14.7 G/DL (ref 13.5–17.5)
HEMOGLOBIN: 14.8 G/DL (ref 13.5–17.5)
HEMOGLOBIN: 16.5 G/DL (ref 13.5–17.5)
HEMOGLOBIN: 6.7 G/DL (ref 13.5–17.5)
HEMOGLOBIN: 6.8 G/DL (ref 13.5–17.5)
HEMOGLOBIN: 6.8 G/DL (ref 13.5–17.5)
HEMOGLOBIN: 7 G/DL (ref 13.5–17.5)
HEMOGLOBIN: 7 G/DL (ref 13.5–17.5)
HEMOGLOBIN: 7.1 G/DL (ref 13.5–17.5)
HEMOGLOBIN: 7.1 G/DL (ref 13.5–17.5)
HEMOGLOBIN: 7.3 G/DL (ref 13.5–17.5)
HEMOGLOBIN: 7.4 G/DL (ref 13.5–17.5)
HEMOGLOBIN: 7.5 G/DL (ref 13.5–17.5)
HEMOGLOBIN: 7.6 G/DL (ref 13.5–17.5)
HEMOGLOBIN: 7.7 G/DL (ref 13.5–17.5)
HEMOGLOBIN: 7.8 G/DL (ref 13.5–17.5)
HEMOGLOBIN: 7.8 G/DL (ref 13.5–17.5)
HEMOGLOBIN: 7.9 G/DL (ref 13.5–17.5)
HEMOGLOBIN: 8.1 G/DL (ref 13.5–17.5)
HEMOGLOBIN: 8.2 G/DL (ref 13.5–17.5)
HEMOGLOBIN: 8.3 G/DL (ref 13.5–17.5)
HEMOGLOBIN: 8.3 G/DL (ref 13.5–17.5)
HEMOGLOBIN: 9 G/DL (ref 13.5–17.5)
HEMOGLOBIN: 9.1 G/DL (ref 13.5–17.5)
HEPARIN INDUCED PLATELET ANTIBODY: NEGATIVE
HYALINE CASTS: ABNORMAL /LPF (ref 0–2)
HYPOCHROMIA: ABNORMAL
INFLUENZA A: NOT DETECTED
INFLUENZA B: NOT DETECTED
INR BLD: 1.09 (ref 0.88–1.12)
INR BLD: 1.14 (ref 0.88–1.12)
INR BLD: 1.42 (ref 0.88–1.12)
KETONES, URINE: NEGATIVE MG/DL
LACTIC ACID, SEPSIS: 2.7 MMOL/L (ref 0.4–1.9)
LACTIC ACID, SEPSIS: 3.1 MMOL/L (ref 0.4–1.9)
LEUKOCYTE ESTERASE, URINE: NEGATIVE
LYMPHOCYTES ABSOLUTE: 0.2 K/UL (ref 1–5.1)
LYMPHOCYTES ABSOLUTE: 0.3 K/UL (ref 1–5.1)
LYMPHOCYTES ABSOLUTE: 0.4 K/UL (ref 1–5.1)
LYMPHOCYTES ABSOLUTE: 0.5 K/UL (ref 1–5.1)
LYMPHOCYTES ABSOLUTE: 0.6 K/UL (ref 1–5.1)
LYMPHOCYTES ABSOLUTE: 0.6 K/UL (ref 1–5.1)
LYMPHOCYTES ABSOLUTE: 0.7 K/UL (ref 1–5.1)
LYMPHOCYTES ABSOLUTE: 0.9 K/UL (ref 1–5.1)
LYMPHOCYTES ABSOLUTE: 1 K/UL (ref 1–5.1)
LYMPHOCYTES ABSOLUTE: 1 K/UL (ref 1–5.1)
LYMPHOCYTES ABSOLUTE: 1.3 K/UL (ref 1–5.1)
LYMPHOCYTES ABSOLUTE: 1.7 K/UL (ref 1–5.1)
LYMPHOCYTES ABSOLUTE: 2.2 K/UL (ref 1–5.1)
LYMPHOCYTES ABSOLUTE: 3.4 K/UL (ref 1–5.1)
LYMPHOCYTES ABSOLUTE: 4.3 K/UL (ref 1–5.1)
LYMPHOCYTES RELATIVE PERCENT: 0 %
LYMPHOCYTES RELATIVE PERCENT: 0.6 %
LYMPHOCYTES RELATIVE PERCENT: 0.7 %
LYMPHOCYTES RELATIVE PERCENT: 1 %
LYMPHOCYTES RELATIVE PERCENT: 1.1 %
LYMPHOCYTES RELATIVE PERCENT: 1.4 %
LYMPHOCYTES RELATIVE PERCENT: 14 %
LYMPHOCYTES RELATIVE PERCENT: 2 %
LYMPHOCYTES RELATIVE PERCENT: 2.1 %
LYMPHOCYTES RELATIVE PERCENT: 2.8 %
LYMPHOCYTES RELATIVE PERCENT: 2.9 %
LYMPHOCYTES RELATIVE PERCENT: 3 %
LYMPHOCYTES RELATIVE PERCENT: 3 %
LYMPHOCYTES RELATIVE PERCENT: 3.6 %
LYMPHOCYTES RELATIVE PERCENT: 3.8 %
LYMPHOCYTES RELATIVE PERCENT: 5 %
LYMPHOCYTES RELATIVE PERCENT: 8 %
MAGNESIUM: 2.1 MG/DL (ref 1.8–2.4)
MCH RBC QN AUTO: 26.9 PG (ref 26–34)
MCH RBC QN AUTO: 26.9 PG (ref 26–34)
MCH RBC QN AUTO: 27 PG (ref 26–34)
MCH RBC QN AUTO: 27 PG (ref 26–34)
MCH RBC QN AUTO: 27.1 PG (ref 26–34)
MCH RBC QN AUTO: 27.2 PG (ref 26–34)
MCH RBC QN AUTO: 27.2 PG (ref 26–34)
MCH RBC QN AUTO: 27.3 PG (ref 26–34)
MCH RBC QN AUTO: 27.4 PG (ref 26–34)
MCH RBC QN AUTO: 28.1 PG (ref 26–34)
MCH RBC QN AUTO: 28.2 PG (ref 26–34)
MCH RBC QN AUTO: 28.5 PG (ref 26–34)
MCH RBC QN AUTO: 28.7 PG (ref 26–34)
MCH RBC QN AUTO: 28.7 PG (ref 26–34)
MCH RBC QN AUTO: 28.9 PG (ref 26–34)
MCH RBC QN AUTO: 28.9 PG (ref 26–34)
MCH RBC QN AUTO: 29.1 PG (ref 26–34)
MCH RBC QN AUTO: 29.2 PG (ref 26–34)
MCH RBC QN AUTO: 29.3 PG (ref 26–34)
MCH RBC QN AUTO: 29.3 PG (ref 26–34)
MCH RBC QN AUTO: 29.4 PG (ref 26–34)
MCH RBC QN AUTO: 29.8 PG (ref 26–34)
MCH RBC QN AUTO: 30.2 PG (ref 26–34)
MCH RBC QN AUTO: 31.2 PG (ref 26–34)
MCHC RBC AUTO-ENTMCNC: 30.6 G/DL (ref 31–36)
MCHC RBC AUTO-ENTMCNC: 30.9 G/DL (ref 31–36)
MCHC RBC AUTO-ENTMCNC: 30.9 G/DL (ref 31–36)
MCHC RBC AUTO-ENTMCNC: 31 G/DL (ref 31–36)
MCHC RBC AUTO-ENTMCNC: 31.4 G/DL (ref 31–36)
MCHC RBC AUTO-ENTMCNC: 31.4 G/DL (ref 31–36)
MCHC RBC AUTO-ENTMCNC: 31.8 G/DL (ref 31–36)
MCHC RBC AUTO-ENTMCNC: 31.9 G/DL (ref 31–36)
MCHC RBC AUTO-ENTMCNC: 32.2 G/DL (ref 31–36)
MCHC RBC AUTO-ENTMCNC: 32.2 G/DL (ref 31–36)
MCHC RBC AUTO-ENTMCNC: 32.3 G/DL (ref 31–36)
MCHC RBC AUTO-ENTMCNC: 32.3 G/DL (ref 31–36)
MCHC RBC AUTO-ENTMCNC: 32.5 G/DL (ref 31–36)
MCHC RBC AUTO-ENTMCNC: 32.5 G/DL (ref 31–36)
MCHC RBC AUTO-ENTMCNC: 32.7 G/DL (ref 31–36)
MCHC RBC AUTO-ENTMCNC: 32.7 G/DL (ref 31–36)
MCHC RBC AUTO-ENTMCNC: 32.9 G/DL (ref 31–36)
MCHC RBC AUTO-ENTMCNC: 33 G/DL (ref 31–36)
MCHC RBC AUTO-ENTMCNC: 33.1 G/DL (ref 31–36)
MCHC RBC AUTO-ENTMCNC: 33.2 G/DL (ref 31–36)
MCHC RBC AUTO-ENTMCNC: 33.2 G/DL (ref 31–36)
MCHC RBC AUTO-ENTMCNC: 34.2 G/DL (ref 31–36)
MCHC RBC AUTO-ENTMCNC: 34.5 G/DL (ref 31–36)
MCHC RBC AUTO-ENTMCNC: 35.5 G/DL (ref 31–36)
MCV RBC AUTO: 81.2 FL (ref 80–100)
MCV RBC AUTO: 81.9 FL (ref 80–100)
MCV RBC AUTO: 82.2 FL (ref 80–100)
MCV RBC AUTO: 82.3 FL (ref 80–100)
MCV RBC AUTO: 82.6 FL (ref 80–100)
MCV RBC AUTO: 82.6 FL (ref 80–100)
MCV RBC AUTO: 82.7 FL (ref 80–100)
MCV RBC AUTO: 82.7 FL (ref 80–100)
MCV RBC AUTO: 83.2 FL (ref 80–100)
MCV RBC AUTO: 83.5 FL (ref 80–100)
MCV RBC AUTO: 84 FL (ref 80–100)
MCV RBC AUTO: 84.3 FL (ref 80–100)
MCV RBC AUTO: 87.4 FL (ref 80–100)
MCV RBC AUTO: 87.6 FL (ref 80–100)
MCV RBC AUTO: 88.6 FL (ref 80–100)
MCV RBC AUTO: 89.7 FL (ref 80–100)
MCV RBC AUTO: 92.6 FL (ref 80–100)
MCV RBC AUTO: 93.4 FL (ref 80–100)
MCV RBC AUTO: 93.6 FL (ref 80–100)
MCV RBC AUTO: 93.9 FL (ref 80–100)
MCV RBC AUTO: 94.9 FL (ref 80–100)
MCV RBC AUTO: 94.9 FL (ref 80–100)
MCV RBC AUTO: 95.9 FL (ref 80–100)
MCV RBC AUTO: 97.2 FL (ref 80–100)
METAMYELOCYTES RELATIVE PERCENT: 1 %
METAMYELOCYTES RELATIVE PERCENT: 2 %
METAMYELOCYTES RELATIVE PERCENT: 2 %
METAMYELOCYTES RELATIVE PERCENT: 3 %
METHEMOGLOBIN ARTERIAL: 0 %
METHEMOGLOBIN ARTERIAL: 0.1 %
METHEMOGLOBIN ARTERIAL: 0.2 %
METHEMOGLOBIN ARTERIAL: 0.2 %
METHEMOGLOBIN ARTERIAL: 0.3 %
METHEMOGLOBIN VENOUS: 0 %
MICROCYTES: ABNORMAL
MICROSCOPIC EXAMINATION: YES
MONOCYTES ABSOLUTE: 0.2 K/UL (ref 0–1.3)
MONOCYTES ABSOLUTE: 0.3 K/UL (ref 0–1.3)
MONOCYTES ABSOLUTE: 0.4 K/UL (ref 0–1.3)
MONOCYTES ABSOLUTE: 0.5 K/UL (ref 0–1.3)
MONOCYTES ABSOLUTE: 0.7 K/UL (ref 0–1.3)
MONOCYTES ABSOLUTE: 0.8 K/UL (ref 0–1.3)
MONOCYTES ABSOLUTE: 0.9 K/UL (ref 0–1.3)
MONOCYTES ABSOLUTE: 0.9 K/UL (ref 0–1.3)
MONOCYTES ABSOLUTE: 1 K/UL (ref 0–1.3)
MONOCYTES ABSOLUTE: 1 K/UL (ref 0–1.3)
MONOCYTES ABSOLUTE: 1.1 K/UL (ref 0–1.3)
MONOCYTES ABSOLUTE: 1.2 K/UL (ref 0–1.3)
MONOCYTES ABSOLUTE: 1.2 K/UL (ref 0–1.3)
MONOCYTES ABSOLUTE: 1.3 K/UL (ref 0–1.3)
MONOCYTES ABSOLUTE: 1.3 K/UL (ref 0–1.3)
MONOCYTES ABSOLUTE: 1.9 K/UL (ref 0–1.3)
MONOCYTES ABSOLUTE: 3.2 K/UL (ref 0–1.3)
MONOCYTES RELATIVE PERCENT: 1 %
MONOCYTES RELATIVE PERCENT: 10 %
MONOCYTES RELATIVE PERCENT: 10.1 %
MONOCYTES RELATIVE PERCENT: 2 %
MONOCYTES RELATIVE PERCENT: 2 %
MONOCYTES RELATIVE PERCENT: 2.6 %
MONOCYTES RELATIVE PERCENT: 3 %
MONOCYTES RELATIVE PERCENT: 3.1 %
MONOCYTES RELATIVE PERCENT: 3.2 %
MONOCYTES RELATIVE PERCENT: 3.6 %
MONOCYTES RELATIVE PERCENT: 4 %
MONOCYTES RELATIVE PERCENT: 6.7 %
MONOCYTES RELATIVE PERCENT: 6.8 %
MONOCYTES RELATIVE PERCENT: 7 %
MONOCYTES RELATIVE PERCENT: 7.3 %
MONOCYTES RELATIVE PERCENT: 7.9 %
MONOCYTES RELATIVE PERCENT: 8.5 %
MONONUCLEAR UNIDENTIFIED CELLS: 1 %
MUCUS: ABNORMAL /LPF
MYELOCYTE PERCENT: 1 %
NEUTROPHILS ABSOLUTE: 12.1 K/UL (ref 1.7–7.7)
NEUTROPHILS ABSOLUTE: 13.5 K/UL (ref 1.7–7.7)
NEUTROPHILS ABSOLUTE: 13.6 K/UL (ref 1.7–7.7)
NEUTROPHILS ABSOLUTE: 14.3 K/UL (ref 1.7–7.7)
NEUTROPHILS ABSOLUTE: 15 K/UL (ref 1.7–7.7)
NEUTROPHILS ABSOLUTE: 15.4 K/UL (ref 1.7–7.7)
NEUTROPHILS ABSOLUTE: 15.5 K/UL (ref 1.7–7.7)
NEUTROPHILS ABSOLUTE: 16.1 K/UL (ref 1.7–7.7)
NEUTROPHILS ABSOLUTE: 18.2 K/UL (ref 1.7–7.7)
NEUTROPHILS ABSOLUTE: 20.9 K/UL (ref 1.7–7.7)
NEUTROPHILS ABSOLUTE: 22.5 K/UL (ref 1.7–7.7)
NEUTROPHILS ABSOLUTE: 24.3 K/UL (ref 1.7–7.7)
NEUTROPHILS ABSOLUTE: 24.6 K/UL (ref 1.7–7.7)
NEUTROPHILS ABSOLUTE: 24.7 K/UL (ref 1.7–7.7)
NEUTROPHILS ABSOLUTE: 25 K/UL (ref 1.7–7.7)
NEUTROPHILS ABSOLUTE: 25.6 K/UL (ref 1.7–7.7)
NEUTROPHILS ABSOLUTE: 27.2 K/UL (ref 1.7–7.7)
NEUTROPHILS ABSOLUTE: 28.4 K/UL (ref 1.7–7.7)
NEUTROPHILS ABSOLUTE: 29.8 K/UL (ref 1.7–7.7)
NEUTROPHILS ABSOLUTE: 30.5 K/UL (ref 1.7–7.7)
NEUTROPHILS ABSOLUTE: 31.4 K/UL (ref 1.7–7.7)
NEUTROPHILS ABSOLUTE: 35.3 K/UL (ref 1.7–7.7)
NEUTROPHILS ABSOLUTE: 37.4 K/UL (ref 1.7–7.7)
NEUTROPHILS RELATIVE PERCENT: 76 %
NEUTROPHILS RELATIVE PERCENT: 81 %
NEUTROPHILS RELATIVE PERCENT: 82 %
NEUTROPHILS RELATIVE PERCENT: 82 %
NEUTROPHILS RELATIVE PERCENT: 84 %
NEUTROPHILS RELATIVE PERCENT: 84.5 %
NEUTROPHILS RELATIVE PERCENT: 85 %
NEUTROPHILS RELATIVE PERCENT: 86 %
NEUTROPHILS RELATIVE PERCENT: 87 %
NEUTROPHILS RELATIVE PERCENT: 88.2 %
NEUTROPHILS RELATIVE PERCENT: 88.5 %
NEUTROPHILS RELATIVE PERCENT: 89.1 %
NEUTROPHILS RELATIVE PERCENT: 89.8 %
NEUTROPHILS RELATIVE PERCENT: 90 %
NEUTROPHILS RELATIVE PERCENT: 90 %
NEUTROPHILS RELATIVE PERCENT: 91.1 %
NEUTROPHILS RELATIVE PERCENT: 92 %
NEUTROPHILS RELATIVE PERCENT: 93 %
NEUTROPHILS RELATIVE PERCENT: 94.8 %
NEUTROPHILS RELATIVE PERCENT: 95.2 %
NEUTROPHILS RELATIVE PERCENT: 95.3 %
NEUTROPHILS RELATIVE PERCENT: 96.6 %
NEUTROPHILS RELATIVE PERCENT: 97 %
NITRITE, URINE: NEGATIVE
NUCLEATED RED BLOOD CELLS: 1 /100 WBC
NUCLEATED RED BLOOD CELLS: 2 /100 WBC
NUCLEATED RED BLOOD CELLS: 3 /100 WBC
NUCLEATED RED BLOOD CELLS: 4 /100 WBC
NUCLEATED RED BLOOD CELLS: 5 /100 WBC
NUCLEATED RED BLOOD CELLS: 5 /100 WBC
O2 CONTENT ARTERIAL: 19 ML/DL
O2 CONTENT, VEN: 15 VOL %
O2 SAT, ARTERIAL: 61.4 %
O2 SAT, ARTERIAL: 83.2 %
O2 SAT, ARTERIAL: 87.3 %
O2 SAT, ARTERIAL: 87.4 %
O2 SAT, ARTERIAL: 87.5 %
O2 SAT, ARTERIAL: 89.2 %
O2 SAT, ARTERIAL: 89.4 %
O2 SAT, ARTERIAL: 89.6 %
O2 SAT, ARTERIAL: 90.5 %
O2 SAT, ARTERIAL: 91 %
O2 SAT, ARTERIAL: 91.5 %
O2 SAT, ARTERIAL: 92.7 %
O2 SAT, ARTERIAL: 93.2 %
O2 SAT, ARTERIAL: 94.2 %
O2 SAT, ARTERIAL: 94.2 %
O2 SAT, ARTERIAL: 94.6 %
O2 SAT, ARTERIAL: 95.7 %
O2 SAT, ARTERIAL: 96.1 %
O2 SAT, ARTERIAL: 96.1 %
O2 SAT, ARTERIAL: 97.5 %
O2 SAT, ARTERIAL: 98.2 %
O2 SAT, ARTERIAL: 98.9 %
O2 SAT, VEN: 74 %
O2 THERAPY: ABNORMAL
ORGANISM: ABNORMAL
ORGANISM: ABNORMAL
PCO2 ARTERIAL: 37.8 MMHG (ref 35–45)
PCO2 ARTERIAL: 43.9 MMHG (ref 35–45)
PCO2 ARTERIAL: 48.8 MMHG (ref 35–45)
PCO2 ARTERIAL: 50.5 MMHG (ref 35–45)
PCO2 ARTERIAL: 51.6 MMHG (ref 35–45)
PCO2 ARTERIAL: 52.7 MMHG (ref 35–45)
PCO2 ARTERIAL: 53.2 MMHG (ref 35–45)
PCO2 ARTERIAL: 54.8 MMHG (ref 35–45)
PCO2 ARTERIAL: 55.6 MMHG (ref 35–45)
PCO2 ARTERIAL: 57.2 MMHG (ref 35–45)
PCO2 ARTERIAL: 57.6 MMHG (ref 35–45)
PCO2 ARTERIAL: 57.7 MMHG (ref 35–45)
PCO2 ARTERIAL: 58.6 MMHG (ref 35–45)
PCO2 ARTERIAL: 59.7 MMHG (ref 35–45)
PCO2 ARTERIAL: 61 MMHG (ref 35–45)
PCO2 ARTERIAL: 62.5 MMHG (ref 35–45)
PCO2 ARTERIAL: 62.8 MMHG (ref 35–45)
PCO2 ARTERIAL: 64.6 MMHG (ref 35–45)
PCO2 ARTERIAL: 66.4 MMHG (ref 35–45)
PCO2 ARTERIAL: 69.7 MMHG (ref 35–45)
PCO2 ARTERIAL: 72.3 MMHG (ref 35–45)
PCO2 ARTERIAL: 75.9 MMHG (ref 35–45)
PCO2, VEN: 33.4 MMHG (ref 40–50)
PDW BLD-RTO: 15.1 % (ref 12.4–15.4)
PDW BLD-RTO: 15.2 % (ref 12.4–15.4)
PDW BLD-RTO: 15.3 % (ref 12.4–15.4)
PDW BLD-RTO: 15.4 % (ref 12.4–15.4)
PDW BLD-RTO: 15.5 % (ref 12.4–15.4)
PDW BLD-RTO: 15.7 % (ref 12.4–15.4)
PDW BLD-RTO: 15.9 % (ref 12.4–15.4)
PDW BLD-RTO: 16 % (ref 12.4–15.4)
PDW BLD-RTO: 16.6 % (ref 12.4–15.4)
PDW BLD-RTO: 16.7 % (ref 12.4–15.4)
PDW BLD-RTO: 20.1 % (ref 12.4–15.4)
PDW BLD-RTO: 20.4 % (ref 12.4–15.4)
PDW BLD-RTO: 21.2 % (ref 12.4–15.4)
PDW BLD-RTO: 22.2 % (ref 12.4–15.4)
PDW BLD-RTO: 23.3 % (ref 12.4–15.4)
PDW BLD-RTO: 23.8 % (ref 12.4–15.4)
PDW BLD-RTO: 24.8 % (ref 12.4–15.4)
PERFORMED ON: ABNORMAL
PERFORMED ON: NORMAL
PERFORMED ON: NORMAL
PH ARTERIAL: 7.25 (ref 7.35–7.45)
PH ARTERIAL: 7.27 (ref 7.35–7.45)
PH ARTERIAL: 7.28 (ref 7.35–7.45)
PH ARTERIAL: 7.28 (ref 7.35–7.45)
PH ARTERIAL: 7.3 (ref 7.35–7.45)
PH ARTERIAL: 7.3 (ref 7.35–7.45)
PH ARTERIAL: 7.32 (ref 7.35–7.45)
PH ARTERIAL: 7.32 (ref 7.35–7.45)
PH ARTERIAL: 7.34 (ref 7.35–7.45)
PH ARTERIAL: 7.37 (ref 7.35–7.45)
PH ARTERIAL: 7.38 (ref 7.35–7.45)
PH ARTERIAL: 7.4 (ref 7.35–7.45)
PH ARTERIAL: 7.4 (ref 7.35–7.45)
PH ARTERIAL: 7.41 (ref 7.35–7.45)
PH ARTERIAL: 7.41 (ref 7.35–7.45)
PH ARTERIAL: 7.42 (ref 7.35–7.45)
PH UA: 6.5 (ref 5–8)
PH VENOUS: 7.4 (ref 7.35–7.45)
PHOSPHORUS: 4.1 MG/DL (ref 2.5–4.9)
PHOSPHORUS: 4.3 MG/DL (ref 2.5–4.9)
PLATELET # BLD: 111 K/UL (ref 135–450)
PLATELET # BLD: 112 K/UL (ref 135–450)
PLATELET # BLD: 174 K/UL (ref 135–450)
PLATELET # BLD: 187 K/UL (ref 135–450)
PLATELET # BLD: 205 K/UL (ref 135–450)
PLATELET # BLD: 257 K/UL (ref 135–450)
PLATELET # BLD: 278 K/UL (ref 135–450)
PLATELET # BLD: 289 K/UL (ref 135–450)
PLATELET # BLD: 318 K/UL (ref 135–450)
PLATELET # BLD: 325 K/UL (ref 135–450)
PLATELET # BLD: 373 K/UL (ref 135–450)
PLATELET # BLD: 394 K/UL (ref 135–450)
PLATELET # BLD: 397 K/UL (ref 135–450)
PLATELET # BLD: 405 K/UL (ref 135–450)
PLATELET # BLD: 60 K/UL (ref 135–450)
PLATELET # BLD: 67 K/UL (ref 135–450)
PLATELET # BLD: 67 K/UL (ref 135–450)
PLATELET # BLD: 68 K/UL (ref 135–450)
PLATELET # BLD: 69 K/UL (ref 135–450)
PLATELET # BLD: 70 K/UL (ref 135–450)
PLATELET # BLD: 73 K/UL (ref 135–450)
PLATELET # BLD: 73 K/UL (ref 135–450)
PLATELET # BLD: 75 K/UL (ref 135–450)
PLATELET # BLD: 85 K/UL (ref 135–450)
PLATELET SLIDE REVIEW: ABNORMAL
PLATELET SLIDE REVIEW: ADEQUATE
PMV BLD AUTO: 10.1 FL (ref 5–10.5)
PMV BLD AUTO: 10.3 FL (ref 5–10.5)
PMV BLD AUTO: 10.4 FL (ref 5–10.5)
PMV BLD AUTO: 10.5 FL (ref 5–10.5)
PMV BLD AUTO: 11.1 FL (ref 5–10.5)
PMV BLD AUTO: 11.2 FL (ref 5–10.5)
PMV BLD AUTO: 7.5 FL (ref 5–10.5)
PMV BLD AUTO: 8 FL (ref 5–10.5)
PMV BLD AUTO: 8.2 FL (ref 5–10.5)
PMV BLD AUTO: 8.2 FL (ref 5–10.5)
PMV BLD AUTO: 8.5 FL (ref 5–10.5)
PMV BLD AUTO: 8.5 FL (ref 5–10.5)
PMV BLD AUTO: 9.2 FL (ref 5–10.5)
PMV BLD AUTO: 9.6 FL (ref 5–10.5)
PMV BLD AUTO: 9.7 FL (ref 5–10.5)
PMV BLD AUTO: 9.8 FL (ref 5–10.5)
PMV BLD AUTO: 9.8 FL (ref 5–10.5)
PMV BLD AUTO: 9.9 FL (ref 5–10.5)
PO2 ARTERIAL: 111.6 MMHG (ref 75–108)
PO2 ARTERIAL: 118.7 MMHG (ref 75–108)
PO2 ARTERIAL: 170.3 MMHG (ref 75–108)
PO2 ARTERIAL: 37.2 MMHG (ref 75–108)
PO2 ARTERIAL: 52.1 MMHG (ref 75–108)
PO2 ARTERIAL: 52.6 MMHG (ref 75–108)
PO2 ARTERIAL: 53.4 MMHG (ref 75–108)
PO2 ARTERIAL: 60.9 MMHG (ref 75–108)
PO2 ARTERIAL: 61 MMHG (ref 75–108)
PO2 ARTERIAL: 61.9 MMHG (ref 75–108)
PO2 ARTERIAL: 62.6 MMHG (ref 75–108)
PO2 ARTERIAL: 64.6 MMHG (ref 75–108)
PO2 ARTERIAL: 66.7 MMHG (ref 75–108)
PO2 ARTERIAL: 67.5 MMHG (ref 75–108)
PO2 ARTERIAL: 67.8 MMHG (ref 75–108)
PO2 ARTERIAL: 70.4 MMHG (ref 75–108)
PO2 ARTERIAL: 71.8 MMHG (ref 75–108)
PO2 ARTERIAL: 72 MMHG (ref 75–108)
PO2 ARTERIAL: 75.7 MMHG (ref 75–108)
PO2 ARTERIAL: 82.8 MMHG (ref 75–108)
PO2 ARTERIAL: 84.3 MMHG (ref 75–108)
PO2 ARTERIAL: 86.9 MMHG (ref 75–108)
PO2, VEN: 39.2 MMHG (ref 25–40)
POIKILOCYTES: ABNORMAL
POLYCHROMASIA: ABNORMAL
POTASSIUM REFLEX MAGNESIUM: 3.5 MMOL/L (ref 3.5–5.1)
POTASSIUM REFLEX MAGNESIUM: 3.6 MMOL/L (ref 3.5–5.1)
POTASSIUM REFLEX MAGNESIUM: 3.8 MMOL/L (ref 3.5–5.1)
POTASSIUM REFLEX MAGNESIUM: 3.8 MMOL/L (ref 3.5–5.1)
POTASSIUM REFLEX MAGNESIUM: 3.9 MMOL/L (ref 3.5–5.1)
POTASSIUM REFLEX MAGNESIUM: 4.2 MMOL/L (ref 3.5–5.1)
POTASSIUM REFLEX MAGNESIUM: 4.3 MMOL/L (ref 3.5–5.1)
POTASSIUM REFLEX MAGNESIUM: 4.4 MMOL/L (ref 3.5–5.1)
POTASSIUM REFLEX MAGNESIUM: 4.5 MMOL/L (ref 3.5–5.1)
POTASSIUM REFLEX MAGNESIUM: 4.5 MMOL/L (ref 3.5–5.1)
POTASSIUM REFLEX MAGNESIUM: 4.6 MMOL/L (ref 3.5–5.1)
POTASSIUM REFLEX MAGNESIUM: 4.7 MMOL/L (ref 3.5–5.1)
POTASSIUM REFLEX MAGNESIUM: 4.7 MMOL/L (ref 3.5–5.1)
POTASSIUM REFLEX MAGNESIUM: 4.8 MMOL/L (ref 3.5–5.1)
POTASSIUM REFLEX MAGNESIUM: 5 MMOL/L (ref 3.5–5.1)
POTASSIUM REFLEX MAGNESIUM: 5.1 MMOL/L (ref 3.5–5.1)
POTASSIUM REFLEX MAGNESIUM: 5.2 MMOL/L (ref 3.5–5.1)
POTASSIUM REFLEX MAGNESIUM: 5.2 MMOL/L (ref 3.5–5.1)
POTASSIUM REFLEX MAGNESIUM: 5.3 MMOL/L (ref 3.5–5.1)
POTASSIUM REFLEX MAGNESIUM: 5.4 MMOL/L (ref 3.5–5.1)
POTASSIUM REFLEX MAGNESIUM: 5.4 MMOL/L (ref 3.5–5.1)
POTASSIUM REFLEX MAGNESIUM: 5.5 MMOL/L (ref 3.5–5.1)
POTASSIUM REFLEX MAGNESIUM: 5.6 MMOL/L (ref 3.5–5.1)
POTASSIUM REFLEX MAGNESIUM: 5.6 MMOL/L (ref 3.5–5.1)
POTASSIUM SERPL-SCNC: 5 MMOL/L (ref 3.5–5.1)
POTASSIUM SERPL-SCNC: 5.1 MMOL/L (ref 3.5–5.1)
POTASSIUM SERPL-SCNC: 5.2 MMOL/L (ref 3.5–5.1)
POTASSIUM SERPL-SCNC: 5.4 MMOL/L (ref 3.5–5.1)
POTASSIUM SERPL-SCNC: 5.5 MMOL/L (ref 3.5–5.1)
POTASSIUM SERPL-SCNC: 5.6 MMOL/L (ref 3.5–5.1)
PRO-BNP: 105 PG/ML (ref 0–124)
PROCALCITONIN: 0.09 NG/ML (ref 0–0.15)
PROCALCITONIN: 0.12 NG/ML (ref 0–0.15)
PROTEIN UA: NEGATIVE MG/DL
PROTHROMBIN TIME: 12.4 SEC (ref 9.9–12.7)
PROTHROMBIN TIME: 13 SEC (ref 9.9–12.7)
PROTHROMBIN TIME: 16.3 SEC (ref 9.9–12.7)
RBC # BLD: 2.3 M/UL (ref 4.2–5.9)
RBC # BLD: 2.39 M/UL (ref 4.2–5.9)
RBC # BLD: 2.4 M/UL (ref 4.2–5.9)
RBC # BLD: 2.41 M/UL (ref 4.2–5.9)
RBC # BLD: 2.56 M/UL (ref 4.2–5.9)
RBC # BLD: 2.57 M/UL (ref 4.2–5.9)
RBC # BLD: 2.61 M/UL (ref 4.2–5.9)
RBC # BLD: 2.7 M/UL (ref 4.2–5.9)
RBC # BLD: 2.73 M/UL (ref 4.2–5.9)
RBC # BLD: 2.75 M/UL (ref 4.2–5.9)
RBC # BLD: 2.82 M/UL (ref 4.2–5.9)
RBC # BLD: 2.93 M/UL (ref 4.2–5.9)
RBC # BLD: 3.02 M/UL (ref 4.2–5.9)
RBC # BLD: 3.07 M/UL (ref 4.2–5.9)
RBC # BLD: 3.64 M/UL (ref 4.2–5.9)
RBC # BLD: 4.29 M/UL (ref 4.2–5.9)
RBC # BLD: 4.39 M/UL (ref 4.2–5.9)
RBC # BLD: 4.95 M/UL (ref 4.2–5.9)
RBC # BLD: 5.13 M/UL (ref 4.2–5.9)
RBC # BLD: 5.28 M/UL (ref 4.2–5.9)
RBC # BLD: 5.38 M/UL (ref 4.2–5.9)
RBC # BLD: 5.44 M/UL (ref 4.2–5.9)
RBC # BLD: 5.45 M/UL (ref 4.2–5.9)
RBC # BLD: 6.07 M/UL (ref 4.2–5.9)
RBC UA: ABNORMAL /HPF (ref 0–4)
SARS-COV-2 RNA, RT PCR: DETECTED
SLIDE REVIEW: ABNORMAL
SMUDGE CELLS: PRESENT
SODIUM BLD-SCNC: 129 MMOL/L (ref 136–145)
SODIUM BLD-SCNC: 130 MMOL/L (ref 136–145)
SODIUM BLD-SCNC: 132 MMOL/L (ref 136–145)
SODIUM BLD-SCNC: 134 MMOL/L (ref 136–145)
SODIUM BLD-SCNC: 136 MMOL/L (ref 136–145)
SODIUM BLD-SCNC: 137 MMOL/L (ref 136–145)
SODIUM BLD-SCNC: 138 MMOL/L (ref 136–145)
SODIUM BLD-SCNC: 139 MMOL/L (ref 136–145)
SODIUM BLD-SCNC: 139 MMOL/L (ref 136–145)
SODIUM BLD-SCNC: 140 MMOL/L (ref 136–145)
SODIUM BLD-SCNC: 140 MMOL/L (ref 136–145)
SODIUM BLD-SCNC: 142 MMOL/L (ref 136–145)
SPECIFIC GRAVITY UA: 1.01 (ref 1–1.03)
STOMATOCYTES: ABNORMAL
TCO2 ARTERIAL: 24.1 MMOL/L
TCO2 ARTERIAL: 27.9 MMOL/L
TCO2 ARTERIAL: 28.4 MMOL/L
TCO2 ARTERIAL: 28.7 MMOL/L
TCO2 ARTERIAL: 29.8 MMOL/L
TCO2 ARTERIAL: 29.9 MMOL/L
TCO2 ARTERIAL: 30.3 MMOL/L
TCO2 ARTERIAL: 30.4 MMOL/L
TCO2 ARTERIAL: 30.8 MMOL/L
TCO2 ARTERIAL: 30.9 MMOL/L
TCO2 ARTERIAL: 31.1 MMOL/L
TCO2 ARTERIAL: 32.6 MMOL/L
TCO2 ARTERIAL: 32.8 MMOL/L
TCO2 ARTERIAL: 33.4 MMOL/L
TCO2 ARTERIAL: 34.2 MMOL/L
TCO2 ARTERIAL: 34.7 MMOL/L
TCO2 ARTERIAL: 35.8 MMOL/L
TCO2 ARTERIAL: 37.2 MMOL/L
TCO2 ARTERIAL: 37.4 MMOL/L
TCO2 ARTERIAL: 37.7 MMOL/L
TCO2 ARTERIAL: 38.2 MMOL/L
TCO2 ARTERIAL: 38.2 MMOL/L
TCO2 CALC VENOUS: 21 MMOL/L
TOTAL PROTEIN: 4 G/DL (ref 6.4–8.2)
TOTAL PROTEIN: 4.1 G/DL (ref 6.4–8.2)
TOTAL PROTEIN: 4.2 G/DL (ref 6.4–8.2)
TOTAL PROTEIN: 4.2 G/DL (ref 6.4–8.2)
TOTAL PROTEIN: 4.3 G/DL (ref 6.4–8.2)
TOTAL PROTEIN: 4.4 G/DL (ref 6.4–8.2)
TOTAL PROTEIN: 4.5 G/DL (ref 6.4–8.2)
TOTAL PROTEIN: 4.5 G/DL (ref 6.4–8.2)
TOTAL PROTEIN: 4.6 G/DL (ref 6.4–8.2)
TOTAL PROTEIN: 4.7 G/DL (ref 6.4–8.2)
TOTAL PROTEIN: 5.2 G/DL (ref 6.4–8.2)
TOTAL PROTEIN: 5.6 G/DL (ref 6.4–8.2)
TOTAL PROTEIN: 5.7 G/DL (ref 6.4–8.2)
TOTAL PROTEIN: 5.8 G/DL (ref 6.4–8.2)
TOTAL PROTEIN: 6.9 G/DL (ref 6.4–8.2)
TOXIC GRANULATION: PRESENT
TRIGL SERPL-MCNC: 135 MG/DL (ref 0–150)
TRIGL SERPL-MCNC: 1410 MG/DL (ref 0–150)
TRIGL SERPL-MCNC: 144 MG/DL (ref 0–150)
TRIGL SERPL-MCNC: 153 MG/DL (ref 0–150)
TRIGL SERPL-MCNC: 160 MG/DL (ref 0–150)
TRIGL SERPL-MCNC: 1626 MG/DL (ref 0–150)
TRIGL SERPL-MCNC: 170 MG/DL (ref 0–150)
TRIGL SERPL-MCNC: 171 MG/DL (ref 0–150)
TRIGL SERPL-MCNC: 172 MG/DL (ref 0–150)
TRIGL SERPL-MCNC: 182 MG/DL (ref 0–150)
TRIGL SERPL-MCNC: 186 MG/DL (ref 0–150)
TRIGL SERPL-MCNC: 190 MG/DL (ref 0–150)
TRIGL SERPL-MCNC: 244 MG/DL (ref 0–150)
TRIGL SERPL-MCNC: 254 MG/DL (ref 0–150)
TRIGL SERPL-MCNC: 424 MG/DL (ref 0–150)
TRIGL SERPL-MCNC: 84 MG/DL (ref 0–150)
TROPONIN: <0.01 NG/ML
URINE REFLEX TO CULTURE: ABNORMAL
URINE TYPE: ABNORMAL
UROBILINOGEN, URINE: 4 E.U./DL
VANCOMYCIN TROUGH: 10.8 UG/ML (ref 10–20)
WBC # BLD: 13.7 K/UL (ref 4–11)
WBC # BLD: 15.1 K/UL (ref 4–11)
WBC # BLD: 15.3 K/UL (ref 4–11)
WBC # BLD: 15.5 K/UL (ref 4–11)
WBC # BLD: 16.3 K/UL (ref 4–11)
WBC # BLD: 16.3 K/UL (ref 4–11)
WBC # BLD: 16.7 K/UL (ref 4–11)
WBC # BLD: 19 K/UL (ref 4–11)
WBC # BLD: 19.9 K/UL (ref 4–11)
WBC # BLD: 23.6 K/UL (ref 4–11)
WBC # BLD: 24.6 K/UL (ref 4–11)
WBC # BLD: 25.9 K/UL (ref 4–11)
WBC # BLD: 25.9 K/UL (ref 4–11)
WBC # BLD: 26.6 K/UL (ref 4–11)
WBC # BLD: 26.6 K/UL (ref 4–11)
WBC # BLD: 27.6 K/UL (ref 4–11)
WBC # BLD: 28.9 K/UL (ref 4–11)
WBC # BLD: 31.4 K/UL (ref 4–11)
WBC # BLD: 31.7 K/UL (ref 4–11)
WBC # BLD: 31.9 K/UL (ref 4–11)
WBC # BLD: 33.8 K/UL (ref 4–11)
WBC # BLD: 37.2 K/UL (ref 4–11)
WBC # BLD: 42.5 K/UL (ref 4–11)
WBC # BLD: 8.2 K/UL (ref 4–11)
WBC UA: ABNORMAL /HPF (ref 0–5)

## 2022-01-01 PROCEDURE — 85025 COMPLETE CBC W/AUTO DIFF WBC: CPT

## 2022-01-01 PROCEDURE — 82803 BLOOD GASES ANY COMBINATION: CPT

## 2022-01-01 PROCEDURE — 6360000002 HC RX W HCPCS: Performed by: INTERNAL MEDICINE

## 2022-01-01 PROCEDURE — 86140 C-REACTIVE PROTEIN: CPT

## 2022-01-01 PROCEDURE — XW043E5 INTRODUCTION OF REMDESIVIR ANTI-INFECTIVE INTO CENTRAL VEIN, PERCUTANEOUS APPROACH, NEW TECHNOLOGY GROUP 5: ICD-10-PCS | Performed by: INTERNAL MEDICINE

## 2022-01-01 PROCEDURE — 94660 CPAP INITIATION&MGMT: CPT

## 2022-01-01 PROCEDURE — 6370000000 HC RX 637 (ALT 250 FOR IP): Performed by: INTERNAL MEDICINE

## 2022-01-01 PROCEDURE — 80053 COMPREHEN METABOLIC PANEL: CPT

## 2022-01-01 PROCEDURE — 2580000003 HC RX 258: Performed by: INTERNAL MEDICINE

## 2022-01-01 PROCEDURE — 99233 SBSQ HOSP IP/OBS HIGH 50: CPT | Performed by: INTERNAL MEDICINE

## 2022-01-01 PROCEDURE — 2500000003 HC RX 250 WO HCPCS: Performed by: INTERNAL MEDICINE

## 2022-01-01 PROCEDURE — 87077 CULTURE AEROBIC IDENTIFY: CPT

## 2022-01-01 PROCEDURE — 2700000000 HC OXYGEN THERAPY PER DAY

## 2022-01-01 PROCEDURE — 94640 AIRWAY INHALATION TREATMENT: CPT

## 2022-01-01 PROCEDURE — C9113 INJ PANTOPRAZOLE SODIUM, VIA: HCPCS | Performed by: INTERNAL MEDICINE

## 2022-01-01 PROCEDURE — 2000000000 HC ICU R&B

## 2022-01-01 PROCEDURE — 85520 HEPARIN ASSAY: CPT

## 2022-01-01 PROCEDURE — 36592 COLLECT BLOOD FROM PICC: CPT

## 2022-01-01 PROCEDURE — 94003 VENT MGMT INPAT SUBQ DAY: CPT

## 2022-01-01 PROCEDURE — 94761 N-INVAS EAR/PLS OXIMETRY MLT: CPT

## 2022-01-01 PROCEDURE — 71045 X-RAY EXAM CHEST 1 VIEW: CPT

## 2022-01-01 PROCEDURE — 36415 COLL VENOUS BLD VENIPUNCTURE: CPT

## 2022-01-01 PROCEDURE — 87040 BLOOD CULTURE FOR BACTERIA: CPT

## 2022-01-01 PROCEDURE — 85014 HEMATOCRIT: CPT

## 2022-01-01 PROCEDURE — 83605 ASSAY OF LACTIC ACID: CPT

## 2022-01-01 PROCEDURE — 8E0ZXY6 ISOLATION: ICD-10-PCS | Performed by: INTERNAL MEDICINE

## 2022-01-01 PROCEDURE — 87205 SMEAR GRAM STAIN: CPT

## 2022-01-01 PROCEDURE — 84478 ASSAY OF TRIGLYCERIDES: CPT

## 2022-01-01 PROCEDURE — 86850 RBC ANTIBODY SCREEN: CPT

## 2022-01-01 PROCEDURE — 96375 TX/PRO/DX INJ NEW DRUG ADDON: CPT

## 2022-01-01 PROCEDURE — 96367 TX/PROPH/DG ADDL SEQ IV INF: CPT

## 2022-01-01 PROCEDURE — 36573 INSJ PICC RS&I 5 YR+: CPT

## 2022-01-01 PROCEDURE — 99291 CRITICAL CARE FIRST HOUR: CPT | Performed by: INTERNAL MEDICINE

## 2022-01-01 PROCEDURE — 94002 VENT MGMT INPAT INIT DAY: CPT

## 2022-01-01 PROCEDURE — 86901 BLOOD TYPING SEROLOGIC RH(D): CPT

## 2022-01-01 PROCEDURE — 2060000000 HC ICU INTERMEDIATE R&B

## 2022-01-01 PROCEDURE — 36430 TRANSFUSION BLD/BLD COMPNT: CPT

## 2022-01-01 PROCEDURE — 83880 ASSAY OF NATRIURETIC PEPTIDE: CPT

## 2022-01-01 PROCEDURE — 85610 PROTHROMBIN TIME: CPT

## 2022-01-01 PROCEDURE — 84132 ASSAY OF SERUM POTASSIUM: CPT

## 2022-01-01 PROCEDURE — 99285 EMERGENCY DEPT VISIT HI MDM: CPT

## 2022-01-01 PROCEDURE — 96365 THER/PROPH/DIAG IV INF INIT: CPT

## 2022-01-01 PROCEDURE — 86022 PLATELET ANTIBODIES: CPT

## 2022-01-01 PROCEDURE — 93010 ELECTROCARDIOGRAM REPORT: CPT | Performed by: INTERNAL MEDICINE

## 2022-01-01 PROCEDURE — 99223 1ST HOSP IP/OBS HIGH 75: CPT | Performed by: INTERNAL MEDICINE

## 2022-01-01 PROCEDURE — 87636 SARSCOV2 & INF A&B AMP PRB: CPT

## 2022-01-01 PROCEDURE — 2580000003 HC RX 258: Performed by: PHYSICIAN ASSISTANT

## 2022-01-01 PROCEDURE — 6370000000 HC RX 637 (ALT 250 FOR IP): Performed by: HOSPITALIST

## 2022-01-01 PROCEDURE — 81001 URINALYSIS AUTO W/SCOPE: CPT

## 2022-01-01 PROCEDURE — 86900 BLOOD TYPING SEROLOGIC ABO: CPT

## 2022-01-01 PROCEDURE — 84484 ASSAY OF TROPONIN QUANT: CPT

## 2022-01-01 PROCEDURE — 85018 HEMOGLOBIN: CPT

## 2022-01-01 PROCEDURE — 6360000002 HC RX W HCPCS: Performed by: PHYSICIAN ASSISTANT

## 2022-01-01 PROCEDURE — 80202 ASSAY OF VANCOMYCIN: CPT

## 2022-01-01 PROCEDURE — 86923 COMPATIBILITY TEST ELECTRIC: CPT

## 2022-01-01 PROCEDURE — 0BH18EZ INSERTION OF ENDOTRACHEAL AIRWAY INTO TRACHEA, VIA NATURAL OR ARTIFICIAL OPENING ENDOSCOPIC: ICD-10-PCS | Performed by: INTERNAL MEDICINE

## 2022-01-01 PROCEDURE — 6360000002 HC RX W HCPCS

## 2022-01-01 PROCEDURE — P9016 RBC LEUKOCYTES REDUCED: HCPCS

## 2022-01-01 PROCEDURE — 5A1955Z RESPIRATORY VENTILATION, GREATER THAN 96 CONSECUTIVE HOURS: ICD-10-PCS | Performed by: INTERNAL MEDICINE

## 2022-01-01 PROCEDURE — 93970 EXTREMITY STUDY: CPT

## 2022-01-01 PROCEDURE — 87070 CULTURE OTHR SPECIMN AEROBIC: CPT

## 2022-01-01 PROCEDURE — 31500 INSERT EMERGENCY AIRWAY: CPT | Performed by: INTERNAL MEDICINE

## 2022-01-01 PROCEDURE — 71260 CT THORAX DX C+: CPT

## 2022-01-01 PROCEDURE — 83735 ASSAY OF MAGNESIUM: CPT

## 2022-01-01 PROCEDURE — 84145 PROCALCITONIN (PCT): CPT

## 2022-01-01 PROCEDURE — 99255 IP/OBS CONSLTJ NEW/EST HI 80: CPT | Performed by: INTERNAL MEDICINE

## 2022-01-01 PROCEDURE — 02HV33Z INSERTION OF INFUSION DEVICE INTO SUPERIOR VENA CAVA, PERCUTANEOUS APPROACH: ICD-10-PCS | Performed by: RADIOLOGY

## 2022-01-01 PROCEDURE — 6360000004 HC RX CONTRAST MEDICATION: Performed by: PHYSICIAN ASSISTANT

## 2022-01-01 PROCEDURE — XW043H5 INTRODUCTION OF TOCILIZUMAB INTO CENTRAL VEIN, PERCUTANEOUS APPROACH, NEW TECHNOLOGY GROUP 5: ICD-10-PCS | Performed by: INTERNAL MEDICINE

## 2022-01-01 PROCEDURE — 2500000003 HC RX 250 WO HCPCS

## 2022-01-01 PROCEDURE — 85027 COMPLETE CBC AUTOMATED: CPT

## 2022-01-01 PROCEDURE — 96366 THER/PROPH/DIAG IV INF ADDON: CPT

## 2022-01-01 PROCEDURE — C1751 CATH, INF, PER/CENT/MIDLINE: HCPCS

## 2022-01-01 PROCEDURE — 93005 ELECTROCARDIOGRAM TRACING: CPT | Performed by: PHYSICIAN ASSISTANT

## 2022-01-01 PROCEDURE — 36600 WITHDRAWAL OF ARTERIAL BLOOD: CPT

## 2022-01-01 PROCEDURE — 87449 NOS EACH ORGANISM AG IA: CPT

## 2022-01-01 PROCEDURE — 87186 SC STD MICRODIL/AGAR DIL: CPT

## 2022-01-01 RX ORDER — FENTANYL CITRATE-0.9 % NACL/PF 10 MCG/ML
12.5-3 PLASTIC BAG, INJECTION (ML) INTRAVENOUS CONTINUOUS
Status: DISCONTINUED | OUTPATIENT
Start: 2022-01-01 | End: 2022-01-01 | Stop reason: HOSPADM

## 2022-01-01 RX ORDER — SODIUM CHLORIDE, SODIUM LACTATE, POTASSIUM CHLORIDE, CALCIUM CHLORIDE 600; 310; 30; 20 MG/100ML; MG/100ML; MG/100ML; MG/100ML
INJECTION, SOLUTION INTRAVENOUS CONTINUOUS
Status: DISCONTINUED | OUTPATIENT
Start: 2022-01-01 | End: 2022-01-01

## 2022-01-01 RX ORDER — HEPARIN SODIUM 1000 [USP'U]/ML
5000 INJECTION, SOLUTION INTRAVENOUS; SUBCUTANEOUS PRN
Status: DISCONTINUED | OUTPATIENT
Start: 2022-01-01 | End: 2022-01-01

## 2022-01-01 RX ORDER — HEPARIN SODIUM 10000 [USP'U]/100ML
1100 INJECTION, SOLUTION INTRAVENOUS CONTINUOUS
Status: DISCONTINUED | OUTPATIENT
Start: 2022-01-01 | End: 2022-01-01 | Stop reason: DRUGHIGH

## 2022-01-01 RX ORDER — NICOTINE POLACRILEX 4 MG
15 LOZENGE BUCCAL PRN
Status: DISCONTINUED | OUTPATIENT
Start: 2022-01-01 | End: 2022-01-01 | Stop reason: HOSPADM

## 2022-01-01 RX ORDER — MAGNESIUM SULFATE 1 G/100ML
1000 INJECTION INTRAVENOUS ONCE
Status: DISCONTINUED | OUTPATIENT
Start: 2022-01-01 | End: 2022-01-01

## 2022-01-01 RX ORDER — ALBUTEROL SULFATE 90 UG/1
2 AEROSOL, METERED RESPIRATORY (INHALATION) EVERY 4 HOURS PRN
Status: DISCONTINUED | OUTPATIENT
Start: 2022-01-01 | End: 2022-01-01

## 2022-01-01 RX ORDER — CASTOR OIL AND BALSAM, PERU 788; 87 MG/G; MG/G
OINTMENT TOPICAL 2 TIMES DAILY
Status: DISCONTINUED | OUTPATIENT
Start: 2022-01-01 | End: 2022-01-01 | Stop reason: HOSPADM

## 2022-01-01 RX ORDER — FONDAPARINUX SODIUM 2.5 MG/.5ML
2.5 INJECTION SUBCUTANEOUS DAILY
Status: DISCONTINUED | OUTPATIENT
Start: 2022-01-01 | End: 2022-01-01

## 2022-01-01 RX ORDER — PROPOFOL 10 MG/ML
5-50 INJECTION, EMULSION INTRAVENOUS
Status: DISCONTINUED | OUTPATIENT
Start: 2022-01-01 | End: 2022-01-01

## 2022-01-01 RX ORDER — DIAZEPAM 10 MG/1
10 TABLET ORAL EVERY 8 HOURS
Status: DISCONTINUED | OUTPATIENT
Start: 2022-01-01 | End: 2022-01-01

## 2022-01-01 RX ORDER — LEVOFLOXACIN 5 MG/ML
750 INJECTION, SOLUTION INTRAVENOUS EVERY 24 HOURS
Status: DISCONTINUED | OUTPATIENT
Start: 2022-01-01 | End: 2022-01-01 | Stop reason: HOSPADM

## 2022-01-01 RX ORDER — 0.9 % SODIUM CHLORIDE 0.9 %
1000 INTRAVENOUS SOLUTION INTRAVENOUS ONCE
Status: COMPLETED | OUTPATIENT
Start: 2022-01-01 | End: 2022-01-01

## 2022-01-01 RX ORDER — SODIUM CHLORIDE 9 MG/ML
25 INJECTION, SOLUTION INTRAVENOUS PRN
Status: DISCONTINUED | OUTPATIENT
Start: 2022-01-01 | End: 2022-01-01 | Stop reason: SDUPTHER

## 2022-01-01 RX ORDER — ISOSORBIDE MONONITRATE 30 MG/1
30 TABLET, EXTENDED RELEASE ORAL DAILY
Status: DISCONTINUED | OUTPATIENT
Start: 2022-01-01 | End: 2022-01-01

## 2022-01-01 RX ORDER — SODIUM CHLORIDE 9 MG/ML
INJECTION, SOLUTION INTRAVENOUS PRN
Status: DISCONTINUED | OUTPATIENT
Start: 2022-01-01 | End: 2022-01-01

## 2022-01-01 RX ORDER — ALBUTEROL SULFATE 90 UG/1
2 AEROSOL, METERED RESPIRATORY (INHALATION) 2 TIMES DAILY
Status: DISCONTINUED | OUTPATIENT
Start: 2022-01-01 | End: 2022-01-01

## 2022-01-01 RX ORDER — SODIUM CHLORIDE, SODIUM LACTATE, POTASSIUM CHLORIDE, CALCIUM CHLORIDE 600; 310; 30; 20 MG/100ML; MG/100ML; MG/100ML; MG/100ML
INJECTION, SOLUTION INTRAVENOUS ONCE
Status: COMPLETED | OUTPATIENT
Start: 2022-01-01 | End: 2022-01-01

## 2022-01-01 RX ORDER — SODIUM CHLORIDE 0.9 % (FLUSH) 0.9 %
5-40 SYRINGE (ML) INJECTION EVERY 12 HOURS SCHEDULED
Status: DISCONTINUED | OUTPATIENT
Start: 2022-01-01 | End: 2022-01-01

## 2022-01-01 RX ORDER — CISATRACURIUM BESYLATE 2 MG/ML
0.15 INJECTION, SOLUTION INTRAVENOUS ONCE
Status: COMPLETED | OUTPATIENT
Start: 2022-01-01 | End: 2022-01-01

## 2022-01-01 RX ORDER — FAMOTIDINE 20 MG/1
20 TABLET, FILM COATED ORAL 2 TIMES DAILY
Status: DISCONTINUED | OUTPATIENT
Start: 2022-01-01 | End: 2022-01-01

## 2022-01-01 RX ORDER — HEPARIN SODIUM 1000 [USP'U]/ML
5000 INJECTION, SOLUTION INTRAVENOUS; SUBCUTANEOUS ONCE
Status: COMPLETED | OUTPATIENT
Start: 2022-01-01 | End: 2022-01-01

## 2022-01-01 RX ORDER — SENNA PLUS 8.6 MG/1
2 TABLET ORAL 2 TIMES DAILY
Status: DISCONTINUED | OUTPATIENT
Start: 2022-01-01 | End: 2022-01-01 | Stop reason: HOSPADM

## 2022-01-01 RX ORDER — IPRATROPIUM BROMIDE AND ALBUTEROL SULFATE 2.5; .5 MG/3ML; MG/3ML
1 SOLUTION RESPIRATORY (INHALATION) EVERY 4 HOURS
Status: DISCONTINUED | OUTPATIENT
Start: 2022-01-01 | End: 2022-01-01 | Stop reason: HOSPADM

## 2022-01-01 RX ORDER — NITROGLYCERIN 0.4 MG/1
0.4 TABLET SUBLINGUAL EVERY 5 MIN PRN
Status: DISCONTINUED | OUTPATIENT
Start: 2022-01-01 | End: 2022-01-01 | Stop reason: HOSPADM

## 2022-01-01 RX ORDER — CISATRACURIUM BESYLATE 2 MG/ML
0.15 INJECTION, SOLUTION INTRAVENOUS ONCE
Status: DISCONTINUED | OUTPATIENT
Start: 2022-01-01 | End: 2022-01-01

## 2022-01-01 RX ORDER — HEPARIN SODIUM 10000 [USP'U]/100ML
920 INJECTION, SOLUTION INTRAVENOUS CONTINUOUS
Status: DISCONTINUED | OUTPATIENT
Start: 2022-01-01 | End: 2022-01-01

## 2022-01-01 RX ORDER — LANOLIN ALCOHOL/MO/W.PET/CERES
3 CREAM (GRAM) TOPICAL NIGHTLY PRN
Status: DISCONTINUED | OUTPATIENT
Start: 2022-01-01 | End: 2022-01-01 | Stop reason: HOSPADM

## 2022-01-01 RX ORDER — PANTOPRAZOLE SODIUM 40 MG/10ML
40 INJECTION, POWDER, LYOPHILIZED, FOR SOLUTION INTRAVENOUS 2 TIMES DAILY
Status: DISCONTINUED | OUTPATIENT
Start: 2022-01-01 | End: 2022-01-01 | Stop reason: HOSPADM

## 2022-01-01 RX ORDER — INSULIN GLARGINE 100 [IU]/ML
10 INJECTION, SOLUTION SUBCUTANEOUS EVERY MORNING
Status: DISCONTINUED | OUTPATIENT
Start: 2022-01-01 | End: 2022-01-01

## 2022-01-01 RX ORDER — PROPOFOL 10 MG/ML
5-80 INJECTION, EMULSION INTRAVENOUS
Status: DISCONTINUED | OUTPATIENT
Start: 2022-01-01 | End: 2022-01-01

## 2022-01-01 RX ORDER — SODIUM CHLORIDE 0.9 % (FLUSH) 0.9 %
5-40 SYRINGE (ML) INJECTION PRN
Status: DISCONTINUED | OUTPATIENT
Start: 2022-01-01 | End: 2022-01-01

## 2022-01-01 RX ORDER — SODIUM POLYSTYRENE SULFONATE 15 G/60ML
30 SUSPENSION ORAL; RECTAL ONCE
Status: DISCONTINUED | OUTPATIENT
Start: 2022-01-01 | End: 2022-01-01

## 2022-01-01 RX ORDER — ACETAMINOPHEN 650 MG/1
650 SUPPOSITORY RECTAL EVERY 6 HOURS PRN
Status: DISCONTINUED | OUTPATIENT
Start: 2022-01-01 | End: 2022-01-01 | Stop reason: HOSPADM

## 2022-01-01 RX ORDER — LEVOFLOXACIN 5 MG/ML
750 INJECTION, SOLUTION INTRAVENOUS ONCE
Status: COMPLETED | OUTPATIENT
Start: 2022-01-01 | End: 2022-01-01

## 2022-01-01 RX ORDER — HEPARIN SODIUM 10000 [USP'U]/100ML
620 INJECTION, SOLUTION INTRAVENOUS CONTINUOUS
Status: DISCONTINUED | OUTPATIENT
Start: 2022-01-01 | End: 2022-01-01

## 2022-01-01 RX ORDER — METOCLOPRAMIDE HYDROCHLORIDE 5 MG/ML
10 INJECTION INTRAMUSCULAR; INTRAVENOUS EVERY 6 HOURS
Status: DISCONTINUED | OUTPATIENT
Start: 2022-01-01 | End: 2022-01-01

## 2022-01-01 RX ORDER — SODIUM CHLORIDE 0.9 % (FLUSH) 0.9 %
5-40 SYRINGE (ML) INJECTION PRN
Status: DISCONTINUED | OUTPATIENT
Start: 2022-01-01 | End: 2022-01-01 | Stop reason: HOSPADM

## 2022-01-01 RX ORDER — MIDAZOLAM HYDROCHLORIDE 1 MG/ML
2 INJECTION INTRAMUSCULAR; INTRAVENOUS
Status: DISCONTINUED | OUTPATIENT
Start: 2022-01-01 | End: 2022-01-01 | Stop reason: HOSPADM

## 2022-01-01 RX ORDER — OMEGA-3-ACID ETHYL ESTERS 1 G/1
2 CAPSULE, LIQUID FILLED ORAL 2 TIMES DAILY
Status: DISCONTINUED | OUTPATIENT
Start: 2022-01-01 | End: 2022-01-01 | Stop reason: HOSPADM

## 2022-01-01 RX ORDER — CISATRACURIUM BESYLATE 2 MG/ML
0.15 INJECTION, SOLUTION INTRAVENOUS ONCE
Status: DISCONTINUED | OUTPATIENT
Start: 2022-01-01 | End: 2022-01-01 | Stop reason: SDUPTHER

## 2022-01-01 RX ORDER — LIDOCAINE HYDROCHLORIDE 10 MG/ML
5 INJECTION, SOLUTION INFILTRATION; PERINEURAL ONCE
Status: COMPLETED | OUTPATIENT
Start: 2022-01-01 | End: 2022-01-01

## 2022-01-01 RX ORDER — INSULIN GLARGINE 100 [IU]/ML
20 INJECTION, SOLUTION SUBCUTANEOUS EVERY MORNING
Status: DISCONTINUED | OUTPATIENT
Start: 2022-01-01 | End: 2022-01-01

## 2022-01-01 RX ORDER — SENNA PLUS 8.6 MG/1
2 TABLET ORAL 2 TIMES DAILY
Status: DISCONTINUED | OUTPATIENT
Start: 2022-01-01 | End: 2022-01-01

## 2022-01-01 RX ORDER — DEXAMETHASONE SODIUM PHOSPHATE 4 MG/ML
4 INJECTION, SOLUTION INTRA-ARTICULAR; INTRALESIONAL; INTRAMUSCULAR; INTRAVENOUS; SOFT TISSUE DAILY
Status: DISCONTINUED | OUTPATIENT
Start: 2022-01-01 | End: 2022-01-01 | Stop reason: HOSPADM

## 2022-01-01 RX ORDER — SENNA PLUS 8.6 MG/1
2 TABLET ORAL 2 TIMES DAILY PRN
Status: DISCONTINUED | OUTPATIENT
Start: 2022-01-01 | End: 2022-01-01

## 2022-01-01 RX ORDER — POTASSIUM CHLORIDE 20 MEQ/1
40 TABLET, EXTENDED RELEASE ORAL ONCE
Status: COMPLETED | OUTPATIENT
Start: 2022-01-01 | End: 2022-01-01

## 2022-01-01 RX ORDER — SODIUM CHLORIDE 9 MG/ML
25 INJECTION, SOLUTION INTRAVENOUS PRN
Status: DISCONTINUED | OUTPATIENT
Start: 2022-01-01 | End: 2022-01-01 | Stop reason: HOSPADM

## 2022-01-01 RX ORDER — INSULIN GLARGINE 100 [IU]/ML
5 INJECTION, SOLUTION SUBCUTANEOUS ONCE
Status: COMPLETED | OUTPATIENT
Start: 2022-01-01 | End: 2022-01-01

## 2022-01-01 RX ORDER — DEXAMETHASONE SODIUM PHOSPHATE 10 MG/ML
10 INJECTION, SOLUTION INTRAMUSCULAR; INTRAVENOUS EVERY 24 HOURS
Status: DISCONTINUED | OUTPATIENT
Start: 2022-01-01 | End: 2022-01-01

## 2022-01-01 RX ORDER — POLYETHYLENE GLYCOL 3350 17 G/17G
17 POWDER, FOR SOLUTION ORAL DAILY PRN
Status: DISCONTINUED | OUTPATIENT
Start: 2022-01-01 | End: 2022-01-01 | Stop reason: HOSPADM

## 2022-01-01 RX ORDER — DEXAMETHASONE SODIUM PHOSPHATE 10 MG/ML
6 INJECTION, SOLUTION INTRAMUSCULAR; INTRAVENOUS EVERY 6 HOURS
Status: DISCONTINUED | OUTPATIENT
Start: 2022-01-01 | End: 2022-01-01

## 2022-01-01 RX ORDER — INSULIN GLARGINE 100 [IU]/ML
25 INJECTION, SOLUTION SUBCUTANEOUS EVERY MORNING
Status: DISCONTINUED | OUTPATIENT
Start: 2022-01-01 | End: 2022-01-01 | Stop reason: HOSPADM

## 2022-01-01 RX ORDER — ONDANSETRON 4 MG/1
4 TABLET, ORALLY DISINTEGRATING ORAL EVERY 8 HOURS PRN
Status: DISCONTINUED | OUTPATIENT
Start: 2022-01-01 | End: 2022-01-01 | Stop reason: HOSPADM

## 2022-01-01 RX ORDER — SODIUM CHLORIDE 0.9 % (FLUSH) 0.9 %
5-40 SYRINGE (ML) INJECTION EVERY 12 HOURS SCHEDULED
Status: DISCONTINUED | OUTPATIENT
Start: 2022-01-01 | End: 2022-01-01 | Stop reason: HOSPADM

## 2022-01-01 RX ORDER — POLYETHYLENE GLYCOL 3350 17 G/17G
17 POWDER, FOR SOLUTION ORAL DAILY PRN
Status: DISCONTINUED | OUTPATIENT
Start: 2022-01-01 | End: 2022-01-01 | Stop reason: SDUPTHER

## 2022-01-01 RX ORDER — POTASSIUM CHLORIDE 7.45 MG/ML
10 INJECTION INTRAVENOUS PRN
Status: DISCONTINUED | OUTPATIENT
Start: 2022-01-01 | End: 2022-01-01 | Stop reason: HOSPADM

## 2022-01-01 RX ORDER — ONDANSETRON 2 MG/ML
4 INJECTION INTRAMUSCULAR; INTRAVENOUS EVERY 6 HOURS PRN
Status: DISCONTINUED | OUTPATIENT
Start: 2022-01-01 | End: 2022-01-01 | Stop reason: HOSPADM

## 2022-01-01 RX ORDER — DEXAMETHASONE SODIUM PHOSPHATE 10 MG/ML
10 INJECTION, SOLUTION INTRAMUSCULAR; INTRAVENOUS DAILY
Status: DISCONTINUED | OUTPATIENT
Start: 2022-01-01 | End: 2022-01-01

## 2022-01-01 RX ORDER — CLOPIDOGREL BISULFATE 75 MG/1
75 TABLET ORAL DAILY
Status: DISCONTINUED | OUTPATIENT
Start: 2022-01-01 | End: 2022-01-01 | Stop reason: HOSPADM

## 2022-01-01 RX ORDER — FUROSEMIDE 10 MG/ML
INJECTION INTRAMUSCULAR; INTRAVENOUS
Status: COMPLETED
Start: 2022-01-01 | End: 2022-01-01

## 2022-01-01 RX ORDER — 0.9 % SODIUM CHLORIDE 0.9 %
500 INTRAVENOUS SOLUTION INTRAVENOUS ONCE
Status: COMPLETED | OUTPATIENT
Start: 2022-01-01 | End: 2022-01-01

## 2022-01-01 RX ORDER — SODIUM CHLORIDE 9 MG/ML
INJECTION, SOLUTION INTRAVENOUS PRN
Status: DISCONTINUED | OUTPATIENT
Start: 2022-01-01 | End: 2022-01-01 | Stop reason: HOSPADM

## 2022-01-01 RX ORDER — VITAMIN B COMPLEX
2000 TABLET ORAL DAILY
Status: DISCONTINUED | OUTPATIENT
Start: 2022-01-01 | End: 2022-01-01 | Stop reason: HOSPADM

## 2022-01-01 RX ORDER — ALBUTEROL SULFATE 2.5 MG/3ML
2.5 SOLUTION RESPIRATORY (INHALATION)
Status: DISCONTINUED | OUTPATIENT
Start: 2022-01-01 | End: 2022-01-01 | Stop reason: HOSPADM

## 2022-01-01 RX ORDER — IPRATROPIUM BROMIDE AND ALBUTEROL SULFATE 2.5; .5 MG/3ML; MG/3ML
1 SOLUTION RESPIRATORY (INHALATION) ONCE
Status: DISCONTINUED | OUTPATIENT
Start: 2022-01-01 | End: 2022-01-01

## 2022-01-01 RX ORDER — DEXTROSE MONOHYDRATE 50 MG/ML
100 INJECTION, SOLUTION INTRAVENOUS PRN
Status: DISCONTINUED | OUTPATIENT
Start: 2022-01-01 | End: 2022-01-01 | Stop reason: HOSPADM

## 2022-01-01 RX ORDER — 0.9 % SODIUM CHLORIDE 0.9 %
30 INTRAVENOUS SOLUTION INTRAVENOUS ONCE
Status: DISCONTINUED | OUTPATIENT
Start: 2022-01-01 | End: 2022-01-01

## 2022-01-01 RX ORDER — DEXTROSE MONOHYDRATE 50 MG/ML
100 INJECTION, SOLUTION INTRAVENOUS PRN
Status: DISCONTINUED | OUTPATIENT
Start: 2022-01-01 | End: 2022-01-01 | Stop reason: ALTCHOICE

## 2022-01-01 RX ORDER — MAGNESIUM SULFATE IN WATER 40 MG/ML
2000 INJECTION, SOLUTION INTRAVENOUS PRN
Status: DISCONTINUED | OUTPATIENT
Start: 2022-01-01 | End: 2022-01-01 | Stop reason: HOSPADM

## 2022-01-01 RX ORDER — FENTANYL CITRATE 50 UG/ML
50 INJECTION, SOLUTION INTRAMUSCULAR; INTRAVENOUS
Status: DISCONTINUED | OUTPATIENT
Start: 2022-01-01 | End: 2022-01-01 | Stop reason: HOSPADM

## 2022-01-01 RX ORDER — DEXAMETHASONE SODIUM PHOSPHATE 10 MG/ML
6 INJECTION, SOLUTION INTRAMUSCULAR; INTRAVENOUS DAILY
Status: DISCONTINUED | OUTPATIENT
Start: 2022-01-01 | End: 2022-01-01

## 2022-01-01 RX ORDER — FUROSEMIDE 10 MG/ML
40 INJECTION INTRAMUSCULAR; INTRAVENOUS ONCE
Status: COMPLETED | OUTPATIENT
Start: 2022-01-01 | End: 2022-01-01

## 2022-01-01 RX ORDER — ATORVASTATIN CALCIUM 40 MG/1
40 TABLET, FILM COATED ORAL NIGHTLY
Status: DISCONTINUED | OUTPATIENT
Start: 2022-01-01 | End: 2022-01-01

## 2022-01-01 RX ORDER — HEPARIN SODIUM 1000 [USP'U]/ML
2500 INJECTION, SOLUTION INTRAVENOUS; SUBCUTANEOUS PRN
Status: DISCONTINUED | OUTPATIENT
Start: 2022-01-01 | End: 2022-01-01

## 2022-01-01 RX ORDER — SODIUM CHLORIDE 9 MG/ML
INJECTION, SOLUTION INTRAVENOUS ONCE
Status: COMPLETED | OUTPATIENT
Start: 2022-01-01 | End: 2022-01-01

## 2022-01-01 RX ORDER — POLYETHYLENE GLYCOL 3350 17 G/17G
17 POWDER, FOR SOLUTION ORAL DAILY
Status: DISCONTINUED | OUTPATIENT
Start: 2022-01-01 | End: 2022-01-01

## 2022-01-01 RX ORDER — ASPIRIN 81 MG/1
81 TABLET, CHEWABLE ORAL DAILY
Status: DISCONTINUED | OUTPATIENT
Start: 2022-01-01 | End: 2022-01-01 | Stop reason: HOSPADM

## 2022-01-01 RX ORDER — DEXTROSE MONOHYDRATE 25 G/50ML
12.5 INJECTION, SOLUTION INTRAVENOUS PRN
Status: DISCONTINUED | OUTPATIENT
Start: 2022-01-01 | End: 2022-01-01 | Stop reason: ALTCHOICE

## 2022-01-01 RX ORDER — ACETAMINOPHEN 325 MG/1
650 TABLET ORAL EVERY 6 HOURS PRN
Status: DISCONTINUED | OUTPATIENT
Start: 2022-01-01 | End: 2022-01-01 | Stop reason: HOSPADM

## 2022-01-01 RX ORDER — DIAZEPAM 10 MG/1
20 TABLET ORAL EVERY 8 HOURS
Status: DISCONTINUED | OUTPATIENT
Start: 2022-01-01 | End: 2022-01-01 | Stop reason: HOSPADM

## 2022-01-01 RX ORDER — SODIUM POLYSTYRENE SULFONATE 15 G/60ML
30 SUSPENSION ORAL; RECTAL ONCE
Status: COMPLETED | OUTPATIENT
Start: 2022-01-01 | End: 2022-01-01

## 2022-01-01 RX ADMIN — INSULIN LISPRO 3 UNITS: 100 INJECTION, SOLUTION INTRAVENOUS; SUBCUTANEOUS at 00:22

## 2022-01-01 RX ADMIN — ATORVASTATIN CALCIUM 40 MG: 40 TABLET, FILM COATED ORAL at 20:40

## 2022-01-01 RX ADMIN — ASPIRIN 81 MG: 81 TABLET, CHEWABLE ORAL at 10:50

## 2022-01-01 RX ADMIN — INSULIN LISPRO 7 UNITS: 100 INJECTION, SOLUTION INTRAVENOUS; SUBCUTANEOUS at 22:28

## 2022-01-01 RX ADMIN — DEXTROSE MONOHYDRATE 300 MG: 50 INJECTION, SOLUTION INTRAVENOUS at 09:27

## 2022-01-01 RX ADMIN — MIDAZOLAM HYDROCHLORIDE 2 MG: 2 INJECTION, SOLUTION INTRAMUSCULAR; INTRAVENOUS at 05:02

## 2022-01-01 RX ADMIN — SODIUM CHLORIDE, PRESERVATIVE FREE 10 ML: 5 INJECTION INTRAVENOUS at 20:41

## 2022-01-01 RX ADMIN — OMEGA-3-ACID ETHYL ESTERS 2 G: 1 CAPSULE, LIQUID FILLED ORAL at 08:26

## 2022-01-01 RX ADMIN — IPRATROPIUM BROMIDE AND ALBUTEROL SULFATE 1 AMPULE: 2.5; .5 SOLUTION RESPIRATORY (INHALATION) at 22:57

## 2022-01-01 RX ADMIN — Medication 10 MG/HR: at 17:46

## 2022-01-01 RX ADMIN — ASPIRIN 81 MG: 81 TABLET, CHEWABLE ORAL at 07:56

## 2022-01-01 RX ADMIN — DOCUSATE SODIUM 100 MG: 50 LIQUID ORAL at 19:59

## 2022-01-01 RX ADMIN — ENOXAPARIN SODIUM 30 MG: 100 INJECTION SUBCUTANEOUS at 08:33

## 2022-01-01 RX ADMIN — HEPARIN SODIUM 440 UNITS/HR: 10000 INJECTION INTRAVENOUS; SUBCUTANEOUS at 04:46

## 2022-01-01 RX ADMIN — DOCUSATE SODIUM 100 MG: 50 LIQUID ORAL at 09:45

## 2022-01-01 RX ADMIN — INSULIN GLARGINE 25 UNITS: 100 INJECTION, SOLUTION SUBCUTANEOUS at 09:49

## 2022-01-01 RX ADMIN — FUROSEMIDE 40 MG: 10 INJECTION INTRAMUSCULAR; INTRAVENOUS at 05:21

## 2022-01-01 RX ADMIN — INSULIN LISPRO 5 UNITS: 100 INJECTION, SOLUTION INTRAVENOUS; SUBCUTANEOUS at 20:16

## 2022-01-01 RX ADMIN — ENOXAPARIN SODIUM 30 MG: 100 INJECTION SUBCUTANEOUS at 07:19

## 2022-01-01 RX ADMIN — IPRATROPIUM BROMIDE AND ALBUTEROL SULFATE 1 AMPULE: 2.5; .5 SOLUTION RESPIRATORY (INHALATION) at 22:35

## 2022-01-01 RX ADMIN — ATORVASTATIN CALCIUM 40 MG: 40 TABLET, FILM COATED ORAL at 20:52

## 2022-01-01 RX ADMIN — FAMOTIDINE 20 MG: 20 TABLET ORAL at 20:41

## 2022-01-01 RX ADMIN — Medication 10 MG/HR: at 15:21

## 2022-01-01 RX ADMIN — IPRATROPIUM BROMIDE AND ALBUTEROL SULFATE 1 AMPULE: 2.5; .5 SOLUTION RESPIRATORY (INHALATION) at 11:32

## 2022-01-01 RX ADMIN — PROPOFOL 65 MCG/KG/MIN: 10 INJECTION, EMULSION INTRAVENOUS at 08:29

## 2022-01-01 RX ADMIN — INSULIN GLARGINE 25 UNITS: 100 INJECTION, SOLUTION SUBCUTANEOUS at 09:10

## 2022-01-01 RX ADMIN — IPRATROPIUM BROMIDE AND ALBUTEROL SULFATE 1 AMPULE: 2.5; .5 SOLUTION RESPIRATORY (INHALATION) at 12:13

## 2022-01-01 RX ADMIN — CLOPIDOGREL BISULFATE 75 MG: 75 TABLET ORAL at 07:31

## 2022-01-01 RX ADMIN — DOCUSATE SODIUM 100 MG: 50 LIQUID ORAL at 20:57

## 2022-01-01 RX ADMIN — FAMOTIDINE 20 MG: 20 TABLET ORAL at 07:56

## 2022-01-01 RX ADMIN — SODIUM ZIRCONIUM CYCLOSILICATE 10 G: 10 POWDER, FOR SUSPENSION ORAL at 09:20

## 2022-01-01 RX ADMIN — PROPOFOL 65 MCG/KG/MIN: 10 INJECTION, EMULSION INTRAVENOUS at 13:16

## 2022-01-01 RX ADMIN — Medication 200 MCG/HR: at 09:10

## 2022-01-01 RX ADMIN — IPRATROPIUM BROMIDE AND ALBUTEROL SULFATE 1 AMPULE: 2.5; .5 SOLUTION RESPIRATORY (INHALATION) at 19:03

## 2022-01-01 RX ADMIN — SODIUM CHLORIDE, PRESERVATIVE FREE 10 ML: 5 INJECTION INTRAVENOUS at 20:08

## 2022-01-01 RX ADMIN — PANTOPRAZOLE SODIUM 40 MG: 40 INJECTION, POWDER, FOR SOLUTION INTRAVENOUS at 09:50

## 2022-01-01 RX ADMIN — ENOXAPARIN SODIUM 30 MG: 100 INJECTION SUBCUTANEOUS at 08:29

## 2022-01-01 RX ADMIN — DEXAMETHASONE SODIUM PHOSPHATE 6 MG: 10 INJECTION INTRAMUSCULAR; INTRAVENOUS at 08:17

## 2022-01-01 RX ADMIN — Medication: at 09:51

## 2022-01-01 RX ADMIN — INSULIN LISPRO 9 UNITS: 100 INJECTION, SOLUTION INTRAVENOUS; SUBCUTANEOUS at 04:59

## 2022-01-01 RX ADMIN — Medication 175 MCG/HR: at 07:56

## 2022-01-01 RX ADMIN — Medication 175 MCG/HR: at 20:01

## 2022-01-01 RX ADMIN — FUROSEMIDE 40 MG: 10 INJECTION, SOLUTION INTRAMUSCULAR; INTRAVENOUS at 05:21

## 2022-01-01 RX ADMIN — OMEGA-3-ACID ETHYL ESTERS 2 G: 1 CAPSULE, LIQUID FILLED ORAL at 20:02

## 2022-01-01 RX ADMIN — IPRATROPIUM BROMIDE AND ALBUTEROL SULFATE 1 AMPULE: 2.5; .5 SOLUTION RESPIRATORY (INHALATION) at 02:54

## 2022-01-01 RX ADMIN — Medication 10 MG/HR: at 08:32

## 2022-01-01 RX ADMIN — IPRATROPIUM BROMIDE AND ALBUTEROL SULFATE 1 AMPULE: 2.5; .5 SOLUTION RESPIRATORY (INHALATION) at 19:05

## 2022-01-01 RX ADMIN — INSULIN GLARGINE 20 UNITS: 100 INJECTION, SOLUTION SUBCUTANEOUS at 08:37

## 2022-01-01 RX ADMIN — Medication 200 MCG/HR: at 14:37

## 2022-01-01 RX ADMIN — DOCUSATE SODIUM 100 MG: 50 LIQUID ORAL at 07:41

## 2022-01-01 RX ADMIN — PROPOFOL 65 MCG/KG/MIN: 10 INJECTION, EMULSION INTRAVENOUS at 14:21

## 2022-01-01 RX ADMIN — Medication 200 MCG/HR: at 21:05

## 2022-01-01 RX ADMIN — INSULIN LISPRO 6 UNITS: 100 INJECTION, SOLUTION INTRAVENOUS; SUBCUTANEOUS at 09:21

## 2022-01-01 RX ADMIN — Medication 300 MCG/HR: at 11:27

## 2022-01-01 RX ADMIN — CHOLECALCIFEROL (VITAMIN D3) 25 MCG (1,000 UNIT) TABLET 2000 UNITS: at 08:52

## 2022-01-01 RX ADMIN — IPRATROPIUM BROMIDE AND ALBUTEROL SULFATE 1 AMPULE: 2.5; .5 SOLUTION RESPIRATORY (INHALATION) at 16:16

## 2022-01-01 RX ADMIN — Medication 200 MCG/HR: at 05:51

## 2022-01-01 RX ADMIN — INSULIN LISPRO 3 UNITS: 100 INJECTION, SOLUTION INTRAVENOUS; SUBCUTANEOUS at 20:09

## 2022-01-01 RX ADMIN — IPRATROPIUM BROMIDE AND ALBUTEROL SULFATE 1 AMPULE: 2.5; .5 SOLUTION RESPIRATORY (INHALATION) at 03:18

## 2022-01-01 RX ADMIN — ENOXAPARIN SODIUM 30 MG: 100 INJECTION SUBCUTANEOUS at 07:31

## 2022-01-01 RX ADMIN — PROPOFOL 45 MCG/KG/MIN: 10 INJECTION, EMULSION INTRAVENOUS at 16:00

## 2022-01-01 RX ADMIN — SENNOSIDES 17.2 MG: 8.6 TABLET, COATED ORAL at 19:59

## 2022-01-01 RX ADMIN — DIAZEPAM 10 MG: 10 TABLET ORAL at 21:03

## 2022-01-01 RX ADMIN — IPRATROPIUM BROMIDE AND ALBUTEROL SULFATE 1 AMPULE: 2.5; .5 SOLUTION RESPIRATORY (INHALATION) at 22:48

## 2022-01-01 RX ADMIN — INSULIN LISPRO 6 UNITS: 100 INJECTION, SOLUTION INTRAVENOUS; SUBCUTANEOUS at 08:31

## 2022-01-01 RX ADMIN — Medication 8 MG/HR: at 13:47

## 2022-01-01 RX ADMIN — Medication 200 MCG/HR: at 07:00

## 2022-01-01 RX ADMIN — PROPOFOL 60 MCG/KG/MIN: 10 INJECTION, EMULSION INTRAVENOUS at 12:28

## 2022-01-01 RX ADMIN — ASPIRIN 81 MG: 81 TABLET, CHEWABLE ORAL at 08:24

## 2022-01-01 RX ADMIN — Medication 200 MCG/HR: at 00:40

## 2022-01-01 RX ADMIN — PANTOPRAZOLE SODIUM 40 MG: 40 INJECTION, POWDER, FOR SOLUTION INTRAVENOUS at 19:51

## 2022-01-01 RX ADMIN — DEXTROSE MONOHYDRATE 300 MG: 50 INJECTION, SOLUTION INTRAVENOUS at 20:26

## 2022-01-01 RX ADMIN — ENOXAPARIN SODIUM 30 MG: 100 INJECTION SUBCUTANEOUS at 19:36

## 2022-01-01 RX ADMIN — INSULIN LISPRO 3 UNITS: 100 INJECTION, SOLUTION INTRAVENOUS; SUBCUTANEOUS at 19:58

## 2022-01-01 RX ADMIN — Medication 200 MCG/HR: at 18:45

## 2022-01-01 RX ADMIN — CHOLECALCIFEROL (VITAMIN D3) 25 MCG (1,000 UNIT) TABLET 2000 UNITS: at 09:19

## 2022-01-01 RX ADMIN — INSULIN LISPRO 9 UNITS: 100 INJECTION, SOLUTION INTRAVENOUS; SUBCUTANEOUS at 00:36

## 2022-01-01 RX ADMIN — IPRATROPIUM BROMIDE AND ALBUTEROL SULFATE 1 AMPULE: 2.5; .5 SOLUTION RESPIRATORY (INHALATION) at 23:13

## 2022-01-01 RX ADMIN — INSULIN LISPRO 3 UNITS: 100 INJECTION, SOLUTION INTRAVENOUS; SUBCUTANEOUS at 23:53

## 2022-01-01 RX ADMIN — Medication 1100 UNITS/HR: at 15:15

## 2022-01-01 RX ADMIN — SODIUM CHLORIDE, PRESERVATIVE FREE 10 ML: 5 INJECTION INTRAVENOUS at 20:53

## 2022-01-01 RX ADMIN — ENOXAPARIN SODIUM 30 MG: 100 INJECTION SUBCUTANEOUS at 23:25

## 2022-01-01 RX ADMIN — PROPOFOL 65 MCG/KG/MIN: 10 INJECTION, EMULSION INTRAVENOUS at 16:13

## 2022-01-01 RX ADMIN — SODIUM CHLORIDE, PRESERVATIVE FREE 10 ML: 5 INJECTION INTRAVENOUS at 08:03

## 2022-01-01 RX ADMIN — INSULIN LISPRO 6 UNITS: 100 INJECTION, SOLUTION INTRAVENOUS; SUBCUTANEOUS at 16:43

## 2022-01-01 RX ADMIN — IPRATROPIUM BROMIDE AND ALBUTEROL SULFATE 1 AMPULE: 2.5; .5 SOLUTION RESPIRATORY (INHALATION) at 18:48

## 2022-01-01 RX ADMIN — OMEGA-3-ACID ETHYL ESTERS 2 G: 1 CAPSULE, LIQUID FILLED ORAL at 20:41

## 2022-01-01 RX ADMIN — ASPIRIN 81 MG: 81 TABLET, CHEWABLE ORAL at 08:29

## 2022-01-01 RX ADMIN — Medication 200 MCG/HR: at 17:02

## 2022-01-01 RX ADMIN — OMEGA-3-ACID ETHYL ESTERS 2 G: 1 CAPSULE, LIQUID FILLED ORAL at 11:27

## 2022-01-01 RX ADMIN — INSULIN LISPRO 3 UNITS: 100 INJECTION, SOLUTION INTRAVENOUS; SUBCUTANEOUS at 23:45

## 2022-01-01 RX ADMIN — INSULIN LISPRO 3 UNITS: 100 INJECTION, SOLUTION INTRAVENOUS; SUBCUTANEOUS at 13:54

## 2022-01-01 RX ADMIN — DIAZEPAM 20 MG: 10 TABLET ORAL at 11:26

## 2022-01-01 RX ADMIN — INSULIN LISPRO 3 UNITS: 100 INJECTION, SOLUTION INTRAVENOUS; SUBCUTANEOUS at 00:34

## 2022-01-01 RX ADMIN — CHOLECALCIFEROL (VITAMIN D3) 25 MCG (1,000 UNIT) TABLET 2000 UNITS: at 08:41

## 2022-01-01 RX ADMIN — LIDOCAINE HYDROCHLORIDE 5 ML: 10 INJECTION, SOLUTION INFILTRATION; PERINEURAL at 13:20

## 2022-01-01 RX ADMIN — Medication 8 MG/HR: at 19:24

## 2022-01-01 RX ADMIN — Medication 300 MCG/HR: at 05:36

## 2022-01-01 RX ADMIN — PROPOFOL 65 MCG/KG/MIN: 10 INJECTION, EMULSION INTRAVENOUS at 17:28

## 2022-01-01 RX ADMIN — CEFEPIME 2000 MG: 2 INJECTION, POWDER, FOR SOLUTION INTRAVENOUS at 21:07

## 2022-01-01 RX ADMIN — SENNOSIDES 17.2 MG: 8.6 TABLET, COATED ORAL at 07:56

## 2022-01-01 RX ADMIN — PANTOPRAZOLE SODIUM 40 MG: 40 INJECTION, POWDER, FOR SOLUTION INTRAVENOUS at 10:10

## 2022-01-01 RX ADMIN — SODIUM ZIRCONIUM CYCLOSILICATE 10 G: 10 POWDER, FOR SUSPENSION ORAL at 13:11

## 2022-01-01 RX ADMIN — INSULIN LISPRO 6 UNITS: 100 INJECTION, SOLUTION INTRAVENOUS; SUBCUTANEOUS at 20:40

## 2022-01-01 RX ADMIN — VANCOMYCIN HYDROCHLORIDE 1250 MG: 10 INJECTION, POWDER, LYOPHILIZED, FOR SOLUTION INTRAVENOUS at 14:54

## 2022-01-01 RX ADMIN — IPRATROPIUM BROMIDE AND ALBUTEROL SULFATE 1 AMPULE: 2.5; .5 SOLUTION RESPIRATORY (INHALATION) at 14:12

## 2022-01-01 RX ADMIN — CHOLECALCIFEROL (VITAMIN D3) 25 MCG (1,000 UNIT) TABLET 2000 UNITS: at 08:24

## 2022-01-01 RX ADMIN — Medication 8 MG/HR: at 03:50

## 2022-01-01 RX ADMIN — Medication 200 MCG/HR: at 15:25

## 2022-01-01 RX ADMIN — LEVOFLOXACIN 750 MG: 5 INJECTION, SOLUTION INTRAVENOUS at 12:48

## 2022-01-01 RX ADMIN — LEVOFLOXACIN 750 MG: 5 INJECTION, SOLUTION INTRAVENOUS at 13:01

## 2022-01-01 RX ADMIN — SODIUM CHLORIDE, PRESERVATIVE FREE 10 ML: 5 INJECTION INTRAVENOUS at 10:51

## 2022-01-01 RX ADMIN — PANTOPRAZOLE SODIUM 40 MG: 40 INJECTION, POWDER, FOR SOLUTION INTRAVENOUS at 20:08

## 2022-01-01 RX ADMIN — VANCOMYCIN HYDROCHLORIDE 1250 MG: 10 INJECTION, POWDER, LYOPHILIZED, FOR SOLUTION INTRAVENOUS at 02:19

## 2022-01-01 RX ADMIN — Medication 200 MCG/HR: at 23:15

## 2022-01-01 RX ADMIN — Medication: at 21:09

## 2022-01-01 RX ADMIN — NOREPINEPHRINE BITARTRATE 6 MCG/MIN: 1 INJECTION, SOLUTION, CONCENTRATE INTRAVENOUS at 01:53

## 2022-01-01 RX ADMIN — INSULIN GLARGINE 20 UNITS: 100 INJECTION, SOLUTION SUBCUTANEOUS at 08:17

## 2022-01-01 RX ADMIN — MIDAZOLAM HYDROCHLORIDE 2 MG: 2 INJECTION, SOLUTION INTRAMUSCULAR; INTRAVENOUS at 16:42

## 2022-01-01 RX ADMIN — IPRATROPIUM BROMIDE AND ALBUTEROL SULFATE 1 AMPULE: 2.5; .5 SOLUTION RESPIRATORY (INHALATION) at 03:36

## 2022-01-01 RX ADMIN — DIAZEPAM 20 MG: 10 TABLET ORAL at 19:51

## 2022-01-01 RX ADMIN — Medication 10 MG/HR: at 09:33

## 2022-01-01 RX ADMIN — SODIUM CHLORIDE 3 MCG/KG/MIN: 9 INJECTION, SOLUTION INTRAVENOUS at 14:01

## 2022-01-01 RX ADMIN — DIAZEPAM 20 MG: 10 TABLET ORAL at 19:49

## 2022-01-01 RX ADMIN — I.V. FAT EMULSION 250 ML: 20 EMULSION INTRAVENOUS at 20:58

## 2022-01-01 RX ADMIN — IPRATROPIUM BROMIDE AND ALBUTEROL SULFATE 1 AMPULE: 2.5; .5 SOLUTION RESPIRATORY (INHALATION) at 19:11

## 2022-01-01 RX ADMIN — INSULIN LISPRO 3 UNITS: 100 INJECTION, SOLUTION INTRAVENOUS; SUBCUTANEOUS at 20:05

## 2022-01-01 RX ADMIN — DIAZEPAM 10 MG: 10 TABLET ORAL at 05:03

## 2022-01-01 RX ADMIN — CLOPIDOGREL BISULFATE 75 MG: 75 TABLET ORAL at 01:40

## 2022-01-01 RX ADMIN — INSULIN LISPRO 3 UNITS: 100 INJECTION, SOLUTION INTRAVENOUS; SUBCUTANEOUS at 11:09

## 2022-01-01 RX ADMIN — MUPIROCIN: 20 OINTMENT TOPICAL at 07:56

## 2022-01-01 RX ADMIN — FAMOTIDINE 20 MG: 20 TABLET ORAL at 07:31

## 2022-01-01 RX ADMIN — INSULIN LISPRO 9 UNITS: 100 INJECTION, SOLUTION INTRAVENOUS; SUBCUTANEOUS at 06:00

## 2022-01-01 RX ADMIN — IPRATROPIUM BROMIDE AND ALBUTEROL SULFATE 1 AMPULE: 2.5; .5 SOLUTION RESPIRATORY (INHALATION) at 15:51

## 2022-01-01 RX ADMIN — DEXAMETHASONE SODIUM PHOSPHATE 6 MG: 10 INJECTION INTRAMUSCULAR; INTRAVENOUS at 09:50

## 2022-01-01 RX ADMIN — IPRATROPIUM BROMIDE AND ALBUTEROL SULFATE 1 AMPULE: 2.5; .5 SOLUTION RESPIRATORY (INHALATION) at 03:10

## 2022-01-01 RX ADMIN — PROPOFOL 65 MCG/KG/MIN: 10 INJECTION, EMULSION INTRAVENOUS at 19:17

## 2022-01-01 RX ADMIN — PANTOPRAZOLE SODIUM 40 MG: 40 INJECTION, POWDER, FOR SOLUTION INTRAVENOUS at 20:02

## 2022-01-01 RX ADMIN — Medication 200 MCG/HR: at 08:36

## 2022-01-01 RX ADMIN — IPRATROPIUM BROMIDE AND ALBUTEROL SULFATE 1 AMPULE: 2.5; .5 SOLUTION RESPIRATORY (INHALATION) at 03:40

## 2022-01-01 RX ADMIN — IPRATROPIUM BROMIDE AND ALBUTEROL SULFATE 1 AMPULE: 2.5; .5 SOLUTION RESPIRATORY (INHALATION) at 11:57

## 2022-01-01 RX ADMIN — INSULIN LISPRO 6 UNITS: 100 INJECTION, SOLUTION INTRAVENOUS; SUBCUTANEOUS at 20:55

## 2022-01-01 RX ADMIN — ASPIRIN 81 MG: 81 TABLET, CHEWABLE ORAL at 08:16

## 2022-01-01 RX ADMIN — Medication: at 08:29

## 2022-01-01 RX ADMIN — DEXTROSE MONOHYDRATE 300 MG: 50 INJECTION, SOLUTION INTRAVENOUS at 20:45

## 2022-01-01 RX ADMIN — Medication 3 MG: at 01:44

## 2022-01-01 RX ADMIN — FAMOTIDINE 20 MG: 20 TABLET ORAL at 07:19

## 2022-01-01 RX ADMIN — ASPIRIN 81 MG: 81 TABLET, CHEWABLE ORAL at 07:31

## 2022-01-01 RX ADMIN — IPRATROPIUM BROMIDE AND ALBUTEROL SULFATE 1 AMPULE: 2.5; .5 SOLUTION RESPIRATORY (INHALATION) at 07:27

## 2022-01-01 RX ADMIN — PROPOFOL 65 MCG/KG/MIN: 10 INJECTION, EMULSION INTRAVENOUS at 08:05

## 2022-01-01 RX ADMIN — PANTOPRAZOLE SODIUM 40 MG: 40 INJECTION, POWDER, FOR SOLUTION INTRAVENOUS at 20:40

## 2022-01-01 RX ADMIN — SODIUM CHLORIDE, PRESERVATIVE FREE 10 ML: 5 INJECTION INTRAVENOUS at 20:00

## 2022-01-01 RX ADMIN — Medication 8 MG/HR: at 09:42

## 2022-01-01 RX ADMIN — CHOLECALCIFEROL (VITAMIN D3) 25 MCG (1,000 UNIT) TABLET 2000 UNITS: at 07:56

## 2022-01-01 RX ADMIN — SODIUM CHLORIDE 1.5 MCG/KG/MIN: 9 INJECTION, SOLUTION INTRAVENOUS at 23:57

## 2022-01-01 RX ADMIN — CLOPIDOGREL BISULFATE 75 MG: 75 TABLET ORAL at 09:19

## 2022-01-01 RX ADMIN — LEVOFLOXACIN 750 MG: 5 INJECTION, SOLUTION INTRAVENOUS at 13:47

## 2022-01-01 RX ADMIN — INSULIN GLARGINE 25 UNITS: 100 INJECTION, SOLUTION SUBCUTANEOUS at 08:46

## 2022-01-01 RX ADMIN — SODIUM CHLORIDE, PRESERVATIVE FREE 10 ML: 5 INJECTION INTRAVENOUS at 08:08

## 2022-01-01 RX ADMIN — INSULIN LISPRO 6 UNITS: 100 INJECTION, SOLUTION INTRAVENOUS; SUBCUTANEOUS at 20:17

## 2022-01-01 RX ADMIN — ATORVASTATIN CALCIUM 40 MG: 40 TABLET, FILM COATED ORAL at 20:41

## 2022-01-01 RX ADMIN — ENOXAPARIN SODIUM 30 MG: 100 INJECTION SUBCUTANEOUS at 13:15

## 2022-01-01 RX ADMIN — VANCOMYCIN HYDROCHLORIDE 1250 MG: 10 INJECTION, POWDER, LYOPHILIZED, FOR SOLUTION INTRAVENOUS at 02:18

## 2022-01-01 RX ADMIN — INSULIN LISPRO 6 UNITS: 100 INJECTION, SOLUTION INTRAVENOUS; SUBCUTANEOUS at 07:37

## 2022-01-01 RX ADMIN — Medication: at 09:47

## 2022-01-01 RX ADMIN — Medication 300 MCG/HR: at 19:38

## 2022-01-01 RX ADMIN — Medication 200 MCG/HR: at 09:55

## 2022-01-01 RX ADMIN — INSULIN LISPRO 6 UNITS: 100 INJECTION, SOLUTION INTRAVENOUS; SUBCUTANEOUS at 03:30

## 2022-01-01 RX ADMIN — IPRATROPIUM BROMIDE AND ALBUTEROL SULFATE 1 AMPULE: 2.5; .5 SOLUTION RESPIRATORY (INHALATION) at 02:46

## 2022-01-01 RX ADMIN — INSULIN LISPRO 3 UNITS: 100 INJECTION, SOLUTION INTRAVENOUS; SUBCUTANEOUS at 15:55

## 2022-01-01 RX ADMIN — REMDESIVIR 100 MG: 100 INJECTION, POWDER, LYOPHILIZED, FOR SOLUTION INTRAVENOUS at 01:56

## 2022-01-01 RX ADMIN — SENNOSIDES 17.2 MG: 8.6 TABLET, COATED ORAL at 10:10

## 2022-01-01 RX ADMIN — Medication: at 20:08

## 2022-01-01 RX ADMIN — Medication 200 MCG/HR: at 15:37

## 2022-01-01 RX ADMIN — ENOXAPARIN SODIUM 30 MG: 100 INJECTION SUBCUTANEOUS at 20:58

## 2022-01-01 RX ADMIN — CEFEPIME 2000 MG: 2 INJECTION, POWDER, FOR SOLUTION INTRAVENOUS at 05:08

## 2022-01-01 RX ADMIN — MIDAZOLAM HYDROCHLORIDE 2 MG: 2 INJECTION, SOLUTION INTRAMUSCULAR; INTRAVENOUS at 00:54

## 2022-01-01 RX ADMIN — SODIUM CHLORIDE, PRESERVATIVE FREE 10 ML: 5 INJECTION INTRAVENOUS at 20:44

## 2022-01-01 RX ADMIN — PROPOFOL 60 MCG/KG/MIN: 10 INJECTION, EMULSION INTRAVENOUS at 16:22

## 2022-01-01 RX ADMIN — Medication 200 MCG/HR: at 04:40

## 2022-01-01 RX ADMIN — MIDAZOLAM HYDROCHLORIDE 2 MG: 2 INJECTION, SOLUTION INTRAMUSCULAR; INTRAVENOUS at 05:45

## 2022-01-01 RX ADMIN — PROPOFOL 65 MCG/KG/MIN: 10 INJECTION, EMULSION INTRAVENOUS at 05:04

## 2022-01-01 RX ADMIN — DEXTROSE MONOHYDRATE 300 MG: 50 INJECTION, SOLUTION INTRAVENOUS at 20:32

## 2022-01-01 RX ADMIN — DOCUSATE SODIUM 100 MG: 50 LIQUID ORAL at 20:18

## 2022-01-01 RX ADMIN — Medication 2 PUFF: at 22:52

## 2022-01-01 RX ADMIN — OMEGA-3-ACID ETHYL ESTERS 2 G: 1 CAPSULE, LIQUID FILLED ORAL at 08:41

## 2022-01-01 RX ADMIN — DEXAMETHASONE SODIUM PHOSPHATE 6 MG: 10 INJECTION INTRAMUSCULAR; INTRAVENOUS at 09:45

## 2022-01-01 RX ADMIN — Medication: at 20:00

## 2022-01-01 RX ADMIN — FAMOTIDINE 20 MG: 20 TABLET ORAL at 20:17

## 2022-01-01 RX ADMIN — Medication: at 10:11

## 2022-01-01 RX ADMIN — ASPIRIN 81 MG: 81 TABLET, CHEWABLE ORAL at 08:31

## 2022-01-01 RX ADMIN — FAMOTIDINE 20 MG: 20 TABLET ORAL at 20:00

## 2022-01-01 RX ADMIN — DOCUSATE SODIUM 100 MG: 50 LIQUID ORAL at 20:11

## 2022-01-01 RX ADMIN — Medication 175 MCG/HR: at 12:45

## 2022-01-01 RX ADMIN — ATORVASTATIN CALCIUM 40 MG: 40 TABLET, FILM COATED ORAL at 20:00

## 2022-01-01 RX ADMIN — PANTOPRAZOLE SODIUM 40 MG: 40 INJECTION, POWDER, FOR SOLUTION INTRAVENOUS at 20:56

## 2022-01-01 RX ADMIN — IPRATROPIUM BROMIDE AND ALBUTEROL SULFATE 1 AMPULE: 2.5; .5 SOLUTION RESPIRATORY (INHALATION) at 16:49

## 2022-01-01 RX ADMIN — DOCUSATE SODIUM LIQUID 100 MG: 100 LIQUID ORAL at 11:56

## 2022-01-01 RX ADMIN — SODIUM CHLORIDE, PRESERVATIVE FREE 10 ML: 5 INJECTION INTRAVENOUS at 09:49

## 2022-01-01 RX ADMIN — DIAZEPAM 20 MG: 10 TABLET ORAL at 03:55

## 2022-01-01 RX ADMIN — ISOSORBIDE MONONITRATE 30 MG: 30 TABLET, EXTENDED RELEASE ORAL at 13:13

## 2022-01-01 RX ADMIN — IPRATROPIUM BROMIDE AND ALBUTEROL SULFATE 1 AMPULE: 2.5; .5 SOLUTION RESPIRATORY (INHALATION) at 07:18

## 2022-01-01 RX ADMIN — PROPOFOL 80 MCG/KG/MIN: 10 INJECTION, EMULSION INTRAVENOUS at 01:35

## 2022-01-01 RX ADMIN — SODIUM CHLORIDE, PRESERVATIVE FREE 10 ML: 5 INJECTION INTRAVENOUS at 20:56

## 2022-01-01 RX ADMIN — Medication 250 MCG/HR: at 23:27

## 2022-01-01 RX ADMIN — ATORVASTATIN CALCIUM 40 MG: 40 TABLET, FILM COATED ORAL at 19:59

## 2022-01-01 RX ADMIN — DEXTROSE MONOHYDRATE 300 MG: 50 INJECTION, SOLUTION INTRAVENOUS at 09:55

## 2022-01-01 RX ADMIN — MIDAZOLAM HYDROCHLORIDE 2 MG: 2 INJECTION, SOLUTION INTRAMUSCULAR; INTRAVENOUS at 13:08

## 2022-01-01 RX ADMIN — INSULIN LISPRO 3 UNITS: 100 INJECTION, SOLUTION INTRAVENOUS; SUBCUTANEOUS at 08:12

## 2022-01-01 RX ADMIN — VANCOMYCIN HYDROCHLORIDE 1250 MG: 10 INJECTION, POWDER, LYOPHILIZED, FOR SOLUTION INTRAVENOUS at 15:17

## 2022-01-01 RX ADMIN — SENNOSIDES 17.2 MG: 8.6 TABLET, COATED ORAL at 09:45

## 2022-01-01 RX ADMIN — IPRATROPIUM BROMIDE AND ALBUTEROL SULFATE 1 AMPULE: 2.5; .5 SOLUTION RESPIRATORY (INHALATION) at 15:53

## 2022-01-01 RX ADMIN — IPRATROPIUM BROMIDE AND ALBUTEROL SULFATE 1 AMPULE: 2.5; .5 SOLUTION RESPIRATORY (INHALATION) at 02:39

## 2022-01-01 RX ADMIN — INSULIN LISPRO 3 UNITS: 100 INJECTION, SOLUTION INTRAVENOUS; SUBCUTANEOUS at 08:00

## 2022-01-01 RX ADMIN — CHOLECALCIFEROL (VITAMIN D3) 25 MCG (1,000 UNIT) TABLET 2000 UNITS: at 07:31

## 2022-01-01 RX ADMIN — FAMOTIDINE 20 MG: 20 TABLET ORAL at 08:09

## 2022-01-01 RX ADMIN — OMEGA-3-ACID ETHYL ESTERS 2 G: 1 CAPSULE, LIQUID FILLED ORAL at 09:54

## 2022-01-01 RX ADMIN — IPRATROPIUM BROMIDE AND ALBUTEROL SULFATE 1 AMPULE: 2.5; .5 SOLUTION RESPIRATORY (INHALATION) at 23:20

## 2022-01-01 RX ADMIN — IPRATROPIUM BROMIDE AND ALBUTEROL SULFATE 1 AMPULE: 2.5; .5 SOLUTION RESPIRATORY (INHALATION) at 11:50

## 2022-01-01 RX ADMIN — SODIUM ZIRCONIUM CYCLOSILICATE 10 G: 10 POWDER, FOR SUSPENSION ORAL at 20:00

## 2022-01-01 RX ADMIN — Medication 300 MCG/HR: at 16:56

## 2022-01-01 RX ADMIN — IPRATROPIUM BROMIDE AND ALBUTEROL SULFATE 1 AMPULE: 2.5; .5 SOLUTION RESPIRATORY (INHALATION) at 18:43

## 2022-01-01 RX ADMIN — IPRATROPIUM BROMIDE AND ALBUTEROL SULFATE 1 AMPULE: 2.5; .5 SOLUTION RESPIRATORY (INHALATION) at 19:00

## 2022-01-01 RX ADMIN — DIAZEPAM 20 MG: 10 TABLET ORAL at 04:18

## 2022-01-01 RX ADMIN — MIDAZOLAM HYDROCHLORIDE 2 MG: 2 INJECTION, SOLUTION INTRAMUSCULAR; INTRAVENOUS at 14:52

## 2022-01-01 RX ADMIN — INSULIN LISPRO 3 UNITS: 100 INJECTION, SOLUTION INTRAVENOUS; SUBCUTANEOUS at 07:31

## 2022-01-01 RX ADMIN — ATORVASTATIN CALCIUM 40 MG: 40 TABLET, FILM COATED ORAL at 21:03

## 2022-01-01 RX ADMIN — IPRATROPIUM BROMIDE AND ALBUTEROL SULFATE 1 AMPULE: 2.5; .5 SOLUTION RESPIRATORY (INHALATION) at 02:43

## 2022-01-01 RX ADMIN — IPRATROPIUM BROMIDE AND ALBUTEROL SULFATE 1 AMPULE: 2.5; .5 SOLUTION RESPIRATORY (INHALATION) at 09:23

## 2022-01-01 RX ADMIN — CHOLECALCIFEROL (VITAMIN D3) 25 MCG (1,000 UNIT) TABLET 2000 UNITS: at 11:08

## 2022-01-01 RX ADMIN — CLOPIDOGREL BISULFATE 75 MG: 75 TABLET ORAL at 12:40

## 2022-01-01 RX ADMIN — DIAZEPAM 20 MG: 10 TABLET ORAL at 20:09

## 2022-01-01 RX ADMIN — IPRATROPIUM BROMIDE AND ALBUTEROL SULFATE 1 AMPULE: 2.5; .5 SOLUTION RESPIRATORY (INHALATION) at 07:35

## 2022-01-01 RX ADMIN — MUPIROCIN: 20 OINTMENT TOPICAL at 07:31

## 2022-01-01 RX ADMIN — SENNOSIDES 17.2 MG: 8.6 TABLET, COATED ORAL at 21:25

## 2022-01-01 RX ADMIN — DEXAMETHASONE SODIUM PHOSPHATE 4 MG: 4 INJECTION, SOLUTION INTRA-ARTICULAR; INTRALESIONAL; INTRAMUSCULAR; INTRAVENOUS; SOFT TISSUE at 08:41

## 2022-01-01 RX ADMIN — IPRATROPIUM BROMIDE AND ALBUTEROL SULFATE 1 AMPULE: 2.5; .5 SOLUTION RESPIRATORY (INHALATION) at 22:21

## 2022-01-01 RX ADMIN — FAMOTIDINE 20 MG: 20 TABLET ORAL at 08:16

## 2022-01-01 RX ADMIN — INSULIN LISPRO 3 UNITS: 100 INJECTION, SOLUTION INTRAVENOUS; SUBCUTANEOUS at 08:22

## 2022-01-01 RX ADMIN — SENNOSIDES 17.2 MG: 8.6 TABLET, COATED ORAL at 20:34

## 2022-01-01 RX ADMIN — DOCUSATE SODIUM LIQUID 100 MG: 100 LIQUID ORAL at 19:51

## 2022-01-01 RX ADMIN — SODIUM ZIRCONIUM CYCLOSILICATE 10 G: 10 POWDER, FOR SUSPENSION ORAL at 07:19

## 2022-01-01 RX ADMIN — IPRATROPIUM BROMIDE AND ALBUTEROL SULFATE 1 AMPULE: 2.5; .5 SOLUTION RESPIRATORY (INHALATION) at 19:01

## 2022-01-01 RX ADMIN — MIDAZOLAM HYDROCHLORIDE 2 MG: 2 INJECTION, SOLUTION INTRAMUSCULAR; INTRAVENOUS at 18:35

## 2022-01-01 RX ADMIN — PROPOFOL 55 MCG/KG/MIN: 10 INJECTION, EMULSION INTRAVENOUS at 07:33

## 2022-01-01 RX ADMIN — CLOPIDOGREL BISULFATE 75 MG: 75 TABLET ORAL at 13:14

## 2022-01-01 RX ADMIN — DIAZEPAM 20 MG: 10 TABLET ORAL at 04:35

## 2022-01-01 RX ADMIN — Medication 200 MCG/HR: at 01:42

## 2022-01-01 RX ADMIN — FAMOTIDINE 20 MG: 20 TABLET ORAL at 22:27

## 2022-01-01 RX ADMIN — CISATRACURIUM BESYLATE 10.4 MG: 2 INJECTION, SOLUTION INTRAVENOUS at 11:07

## 2022-01-01 RX ADMIN — DIAZEPAM 20 MG: 10 TABLET ORAL at 13:47

## 2022-01-01 RX ADMIN — INSULIN LISPRO 6 UNITS: 100 INJECTION, SOLUTION INTRAVENOUS; SUBCUTANEOUS at 23:55

## 2022-01-01 RX ADMIN — SODIUM CHLORIDE, PRESERVATIVE FREE 10 ML: 5 INJECTION INTRAVENOUS at 20:02

## 2022-01-01 RX ADMIN — INSULIN LISPRO 3 UNITS: 100 INJECTION, SOLUTION INTRAVENOUS; SUBCUTANEOUS at 04:30

## 2022-01-01 RX ADMIN — DOCUSATE SODIUM 100 MG: 50 LIQUID ORAL at 20:00

## 2022-01-01 RX ADMIN — OMEGA-3-ACID ETHYL ESTERS 2 G: 1 CAPSULE, LIQUID FILLED ORAL at 08:00

## 2022-01-01 RX ADMIN — ASPIRIN 81 MG: 81 TABLET, CHEWABLE ORAL at 08:41

## 2022-01-01 RX ADMIN — FAMOTIDINE 20 MG: 20 TABLET ORAL at 20:48

## 2022-01-01 RX ADMIN — FONDAPARINUX SODIUM 2.5 MG: 2.5 INJECTION, SOLUTION SUBCUTANEOUS at 08:25

## 2022-01-01 RX ADMIN — Medication 8 MG/HR: at 17:52

## 2022-01-01 RX ADMIN — OMEGA-3-ACID ETHYL ESTERS 2 G: 1 CAPSULE, LIQUID FILLED ORAL at 20:53

## 2022-01-01 RX ADMIN — IPRATROPIUM BROMIDE AND ALBUTEROL SULFATE 1 AMPULE: 2.5; .5 SOLUTION RESPIRATORY (INHALATION) at 07:46

## 2022-01-01 RX ADMIN — MIDAZOLAM HYDROCHLORIDE 2 MG: 2 INJECTION, SOLUTION INTRAMUSCULAR; INTRAVENOUS at 15:00

## 2022-01-01 RX ADMIN — CHOLECALCIFEROL (VITAMIN D3) 25 MCG (1,000 UNIT) TABLET 2000 UNITS: at 07:19

## 2022-01-01 RX ADMIN — IPRATROPIUM BROMIDE AND ALBUTEROL SULFATE 1 AMPULE: 2.5; .5 SOLUTION RESPIRATORY (INHALATION) at 11:23

## 2022-01-01 RX ADMIN — Medication 200 MCG/HR: at 13:54

## 2022-01-01 RX ADMIN — MIDAZOLAM HYDROCHLORIDE 2 MG: 2 INJECTION, SOLUTION INTRAMUSCULAR; INTRAVENOUS at 22:58

## 2022-01-01 RX ADMIN — Medication 250 MCG/HR: at 08:26

## 2022-01-01 RX ADMIN — ASPIRIN 81 MG: 81 TABLET, CHEWABLE ORAL at 09:20

## 2022-01-01 RX ADMIN — FENTANYL CITRATE 50 MCG: 50 INJECTION INTRAMUSCULAR; INTRAVENOUS at 12:45

## 2022-01-01 RX ADMIN — Medication 200 MCG/HR: at 11:34

## 2022-01-01 RX ADMIN — Medication: at 20:10

## 2022-01-01 RX ADMIN — ENOXAPARIN SODIUM 30 MG: 100 INJECTION SUBCUTANEOUS at 20:48

## 2022-01-01 RX ADMIN — INSULIN LISPRO 6 UNITS: 100 INJECTION, SOLUTION INTRAVENOUS; SUBCUTANEOUS at 03:51

## 2022-01-01 RX ADMIN — INSULIN LISPRO 6 UNITS: 100 INJECTION, SOLUTION INTRAVENOUS; SUBCUTANEOUS at 05:23

## 2022-01-01 RX ADMIN — IPRATROPIUM BROMIDE AND ALBUTEROL SULFATE 1 AMPULE: 2.5; .5 SOLUTION RESPIRATORY (INHALATION) at 23:03

## 2022-01-01 RX ADMIN — IPRATROPIUM BROMIDE AND ALBUTEROL SULFATE 1 AMPULE: 2.5; .5 SOLUTION RESPIRATORY (INHALATION) at 18:57

## 2022-01-01 RX ADMIN — Medication 10 MG/HR: at 08:30

## 2022-01-01 RX ADMIN — MEROPENEM 1000 MG: 1 INJECTION, POWDER, FOR SOLUTION INTRAVENOUS at 22:21

## 2022-01-01 RX ADMIN — Medication 300 MCG/HR: at 00:14

## 2022-01-01 RX ADMIN — OMEGA-3-ACID ETHYL ESTERS 2 G: 1 CAPSULE, LIQUID FILLED ORAL at 20:04

## 2022-01-01 RX ADMIN — OMEGA-3-ACID ETHYL ESTERS 2 G: 1 CAPSULE, LIQUID FILLED ORAL at 08:16

## 2022-01-01 RX ADMIN — SODIUM CHLORIDE, PRESERVATIVE FREE 10 ML: 5 INJECTION INTRAVENOUS at 07:20

## 2022-01-01 RX ADMIN — SODIUM ZIRCONIUM CYCLOSILICATE 10 G: 10 POWDER, FOR SUSPENSION ORAL at 21:00

## 2022-01-01 RX ADMIN — DIAZEPAM 10 MG: 10 TABLET ORAL at 19:59

## 2022-01-01 RX ADMIN — NOREPINEPHRINE BITARTRATE 5 MCG/MIN: 1 INJECTION, SOLUTION, CONCENTRATE INTRAVENOUS at 12:28

## 2022-01-01 RX ADMIN — PANTOPRAZOLE SODIUM 40 MG: 40 INJECTION, POWDER, FOR SOLUTION INTRAVENOUS at 20:10

## 2022-01-01 RX ADMIN — DOCUSATE SODIUM 100 MG: 50 LIQUID ORAL at 20:52

## 2022-01-01 RX ADMIN — SENNOSIDES 17.2 MG: 8.6 TABLET, COATED ORAL at 11:26

## 2022-01-01 RX ADMIN — SENNOSIDES 17.2 MG: 8.6 TABLET, COATED ORAL at 19:51

## 2022-01-01 RX ADMIN — IPRATROPIUM BROMIDE AND ALBUTEROL SULFATE 1 AMPULE: 2.5; .5 SOLUTION RESPIRATORY (INHALATION) at 15:01

## 2022-01-01 RX ADMIN — IPRATROPIUM BROMIDE AND ALBUTEROL SULFATE 1 AMPULE: 2.5; .5 SOLUTION RESPIRATORY (INHALATION) at 06:57

## 2022-01-01 RX ADMIN — OMEGA-3-ACID ETHYL ESTERS 2 G: 1 CAPSULE, LIQUID FILLED ORAL at 08:24

## 2022-01-01 RX ADMIN — Medication: at 07:57

## 2022-01-01 RX ADMIN — Medication: at 20:18

## 2022-01-01 RX ADMIN — SODIUM CHLORIDE, PRESERVATIVE FREE 10 ML: 5 INJECTION INTRAVENOUS at 20:58

## 2022-01-01 RX ADMIN — INSULIN LISPRO 6 UNITS: 100 INJECTION, SOLUTION INTRAVENOUS; SUBCUTANEOUS at 00:11

## 2022-01-01 RX ADMIN — CHOLECALCIFEROL (VITAMIN D3) 25 MCG (1,000 UNIT) TABLET 2000 UNITS: at 08:10

## 2022-01-01 RX ADMIN — SODIUM CHLORIDE, PRESERVATIVE FREE 10 ML: 5 INJECTION INTRAVENOUS at 07:32

## 2022-01-01 RX ADMIN — FENTANYL CITRATE 50 MCG: 50 INJECTION INTRAMUSCULAR; INTRAVENOUS at 15:40

## 2022-01-01 RX ADMIN — INSULIN GLARGINE 5 UNITS: 100 INJECTION, SOLUTION SUBCUTANEOUS at 11:10

## 2022-01-01 RX ADMIN — DIAZEPAM 20 MG: 10 TABLET ORAL at 11:47

## 2022-01-01 RX ADMIN — DEXAMETHASONE SODIUM PHOSPHATE 4 MG: 4 INJECTION, SOLUTION INTRA-ARTICULAR; INTRALESIONAL; INTRAMUSCULAR; INTRAVENOUS; SOFT TISSUE at 09:21

## 2022-01-01 RX ADMIN — IPRATROPIUM BROMIDE AND ALBUTEROL SULFATE 1 AMPULE: 2.5; .5 SOLUTION RESPIRATORY (INHALATION) at 07:13

## 2022-01-01 RX ADMIN — FAMOTIDINE 20 MG: 20 TABLET ORAL at 09:18

## 2022-01-01 RX ADMIN — Medication 200 MCG/HR: at 03:18

## 2022-01-01 RX ADMIN — Medication 200 MCG/HR: at 23:11

## 2022-01-01 RX ADMIN — DEXTROSE MONOHYDRATE 300 MG: 50 INJECTION, SOLUTION INTRAVENOUS at 20:20

## 2022-01-01 RX ADMIN — DEXAMETHASONE SODIUM PHOSPHATE 10 MG: 10 INJECTION INTRAMUSCULAR; INTRAVENOUS at 08:33

## 2022-01-01 RX ADMIN — FAMOTIDINE 20 MG: 20 TABLET ORAL at 08:52

## 2022-01-01 RX ADMIN — SODIUM CHLORIDE, POTASSIUM CHLORIDE, SODIUM LACTATE AND CALCIUM CHLORIDE: 600; 310; 30; 20 INJECTION, SOLUTION INTRAVENOUS at 02:54

## 2022-01-01 RX ADMIN — IPRATROPIUM BROMIDE AND ALBUTEROL SULFATE 1 AMPULE: 2.5; .5 SOLUTION RESPIRATORY (INHALATION) at 23:10

## 2022-01-01 RX ADMIN — INSULIN LISPRO 6 UNITS: 100 INJECTION, SOLUTION INTRAVENOUS; SUBCUTANEOUS at 16:00

## 2022-01-01 RX ADMIN — ATORVASTATIN CALCIUM 40 MG: 40 TABLET, FILM COATED ORAL at 20:57

## 2022-01-01 RX ADMIN — MIDAZOLAM HYDROCHLORIDE 2 MG: 2 INJECTION, SOLUTION INTRAMUSCULAR; INTRAVENOUS at 16:34

## 2022-01-01 RX ADMIN — SODIUM CHLORIDE, PRESERVATIVE FREE 10 ML: 5 INJECTION INTRAVENOUS at 21:09

## 2022-01-01 RX ADMIN — ASPIRIN 81 MG: 81 TABLET, CHEWABLE ORAL at 10:10

## 2022-01-01 RX ADMIN — MIDAZOLAM HYDROCHLORIDE 2 MG: 2 INJECTION, SOLUTION INTRAMUSCULAR; INTRAVENOUS at 08:29

## 2022-01-01 RX ADMIN — TOCILIZUMAB 648 MG: 180 INJECTION, SOLUTION SUBCUTANEOUS at 01:40

## 2022-01-01 RX ADMIN — Medication 6 MG/HR: at 06:26

## 2022-01-01 RX ADMIN — IPRATROPIUM BROMIDE AND ALBUTEROL 1 PUFF: 20; 100 SPRAY, METERED RESPIRATORY (INHALATION) at 16:03

## 2022-01-01 RX ADMIN — ATORVASTATIN CALCIUM 40 MG: 40 TABLET, FILM COATED ORAL at 20:17

## 2022-01-01 RX ADMIN — Medication 8 MG/HR: at 09:52

## 2022-01-01 RX ADMIN — IPRATROPIUM BROMIDE AND ALBUTEROL SULFATE 1 AMPULE: 2.5; .5 SOLUTION RESPIRATORY (INHALATION) at 11:19

## 2022-01-01 RX ADMIN — INSULIN LISPRO 6 UNITS: 100 INJECTION, SOLUTION INTRAVENOUS; SUBCUTANEOUS at 09:51

## 2022-01-01 RX ADMIN — Medication: at 19:52

## 2022-01-01 RX ADMIN — INSULIN GLARGINE 25 UNITS: 100 INJECTION, SOLUTION SUBCUTANEOUS at 08:56

## 2022-01-01 RX ADMIN — Medication 300 MCG/HR: at 15:58

## 2022-01-01 RX ADMIN — IPRATROPIUM BROMIDE AND ALBUTEROL SULFATE 1 AMPULE: 2.5; .5 SOLUTION RESPIRATORY (INHALATION) at 07:48

## 2022-01-01 RX ADMIN — DEXAMETHASONE SODIUM PHOSPHATE 4 MG: 4 INJECTION, SOLUTION INTRA-ARTICULAR; INTRALESIONAL; INTRAMUSCULAR; INTRAVENOUS; SOFT TISSUE at 08:30

## 2022-01-01 RX ADMIN — SODIUM ZIRCONIUM CYCLOSILICATE 10 G: 10 POWDER, FOR SUSPENSION ORAL at 15:08

## 2022-01-01 RX ADMIN — Medication 200 MCG/HR: at 00:07

## 2022-01-01 RX ADMIN — SODIUM CHLORIDE, PRESERVATIVE FREE 10 ML: 5 INJECTION INTRAVENOUS at 20:18

## 2022-01-01 RX ADMIN — DIAZEPAM 20 MG: 10 TABLET ORAL at 20:34

## 2022-01-01 RX ADMIN — PROPOFOL 65 MCG/KG/MIN: 10 INJECTION, EMULSION INTRAVENOUS at 10:36

## 2022-01-01 RX ADMIN — REMDESIVIR 100 MG: 100 INJECTION, POWDER, LYOPHILIZED, FOR SOLUTION INTRAVENOUS at 00:17

## 2022-01-01 RX ADMIN — LEVOFLOXACIN 750 MG: 5 INJECTION, SOLUTION INTRAVENOUS at 12:26

## 2022-01-01 RX ADMIN — INSULIN LISPRO 3 UNITS: 100 INJECTION, SOLUTION INTRAVENOUS; SUBCUTANEOUS at 17:00

## 2022-01-01 RX ADMIN — ENOXAPARIN SODIUM 30 MG: 100 INJECTION SUBCUTANEOUS at 19:51

## 2022-01-01 RX ADMIN — Medication 8 MG/HR: at 11:48

## 2022-01-01 RX ADMIN — IPRATROPIUM BROMIDE AND ALBUTEROL 1 PUFF: 20; 100 SPRAY, METERED RESPIRATORY (INHALATION) at 11:39

## 2022-01-01 RX ADMIN — SODIUM CHLORIDE, PRESERVATIVE FREE 10 ML: 5 INJECTION INTRAVENOUS at 08:21

## 2022-01-01 RX ADMIN — ACETAMINOPHEN 650 MG: 325 TABLET ORAL at 19:36

## 2022-01-01 RX ADMIN — Medication: at 20:44

## 2022-01-01 RX ADMIN — CHOLECALCIFEROL (VITAMIN D3) 25 MCG (1,000 UNIT) TABLET 2000 UNITS: at 07:41

## 2022-01-01 RX ADMIN — ENOXAPARIN SODIUM 30 MG: 100 INJECTION SUBCUTANEOUS at 08:22

## 2022-01-01 RX ADMIN — FENTANYL CITRATE 50 MCG: 50 INJECTION INTRAMUSCULAR; INTRAVENOUS at 08:30

## 2022-01-01 RX ADMIN — IPRATROPIUM BROMIDE AND ALBUTEROL SULFATE 1 AMPULE: 2.5; .5 SOLUTION RESPIRATORY (INHALATION) at 14:49

## 2022-01-01 RX ADMIN — Medication 200 MCG/HR: at 15:33

## 2022-01-01 RX ADMIN — PANTOPRAZOLE SODIUM 40 MG: 40 INJECTION, POWDER, FOR SOLUTION INTRAVENOUS at 20:34

## 2022-01-01 RX ADMIN — ENOXAPARIN SODIUM 30 MG: 100 INJECTION SUBCUTANEOUS at 00:05

## 2022-01-01 RX ADMIN — Medication 225 MCG/HR: at 14:03

## 2022-01-01 RX ADMIN — IPRATROPIUM BROMIDE AND ALBUTEROL SULFATE 1 AMPULE: 2.5; .5 SOLUTION RESPIRATORY (INHALATION) at 07:57

## 2022-01-01 RX ADMIN — ATORVASTATIN CALCIUM 40 MG: 40 TABLET, FILM COATED ORAL at 19:36

## 2022-01-01 RX ADMIN — Medication 300 MCG/HR: at 06:35

## 2022-01-01 RX ADMIN — MUPIROCIN: 20 OINTMENT TOPICAL at 10:00

## 2022-01-01 RX ADMIN — IPRATROPIUM BROMIDE AND ALBUTEROL SULFATE 1 AMPULE: 2.5; .5 SOLUTION RESPIRATORY (INHALATION) at 18:50

## 2022-01-01 RX ADMIN — ASPIRIN 81 MG: 81 TABLET, CHEWABLE ORAL at 13:14

## 2022-01-01 RX ADMIN — PROPOFOL 60 MCG/KG/MIN: 10 INJECTION, EMULSION INTRAVENOUS at 03:20

## 2022-01-01 RX ADMIN — IPRATROPIUM BROMIDE AND ALBUTEROL SULFATE 1 AMPULE: 2.5; .5 SOLUTION RESPIRATORY (INHALATION) at 15:36

## 2022-01-01 RX ADMIN — ACETAMINOPHEN 650 MG: 650 SUPPOSITORY RECTAL at 23:42

## 2022-01-01 RX ADMIN — IPRATROPIUM BROMIDE AND ALBUTEROL SULFATE 1 AMPULE: 2.5; .5 SOLUTION RESPIRATORY (INHALATION) at 18:59

## 2022-01-01 RX ADMIN — SODIUM CHLORIDE, PRESERVATIVE FREE 10 ML: 5 INJECTION INTRAVENOUS at 08:17

## 2022-01-01 RX ADMIN — FAMOTIDINE 20 MG: 20 TABLET ORAL at 19:36

## 2022-01-01 RX ADMIN — ATORVASTATIN CALCIUM 40 MG: 40 TABLET, FILM COATED ORAL at 20:48

## 2022-01-01 RX ADMIN — NOREPINEPHRINE BITARTRATE 10 MCG/MIN: 1 INJECTION, SOLUTION, CONCENTRATE INTRAVENOUS at 02:16

## 2022-01-01 RX ADMIN — DEXAMETHASONE SODIUM PHOSPHATE 6 MG: 10 INJECTION, SOLUTION INTRAMUSCULAR; INTRAVENOUS at 12:18

## 2022-01-01 RX ADMIN — CHOLECALCIFEROL (VITAMIN D3) 25 MCG (1,000 UNIT) TABLET 2000 UNITS: at 09:20

## 2022-01-01 RX ADMIN — IPRATROPIUM BROMIDE AND ALBUTEROL SULFATE 1 AMPULE: 2.5; .5 SOLUTION RESPIRATORY (INHALATION) at 22:46

## 2022-01-01 RX ADMIN — MIDAZOLAM HYDROCHLORIDE 2 MG: 2 INJECTION, SOLUTION INTRAMUSCULAR; INTRAVENOUS at 09:26

## 2022-01-01 RX ADMIN — DIAZEPAM 10 MG: 10 TABLET ORAL at 14:00

## 2022-01-01 RX ADMIN — POLYETHYLENE GLYCOL (3350) 17 G: 17 POWDER, FOR SOLUTION ORAL at 09:45

## 2022-01-01 RX ADMIN — SODIUM ZIRCONIUM CYCLOSILICATE 10 G: 10 POWDER, FOR SUSPENSION ORAL at 09:55

## 2022-01-01 RX ADMIN — INSULIN LISPRO 3 UNITS: 100 INJECTION, SOLUTION INTRAVENOUS; SUBCUTANEOUS at 08:31

## 2022-01-01 RX ADMIN — INSULIN LISPRO 5 UNITS: 100 INJECTION, SOLUTION INTRAVENOUS; SUBCUTANEOUS at 23:46

## 2022-01-01 RX ADMIN — DEXTROSE MONOHYDRATE 300 MG: 50 INJECTION, SOLUTION INTRAVENOUS at 10:13

## 2022-01-01 RX ADMIN — IPRATROPIUM BROMIDE AND ALBUTEROL 1 PUFF: 20; 100 SPRAY, METERED RESPIRATORY (INHALATION) at 08:34

## 2022-01-01 RX ADMIN — IPRATROPIUM BROMIDE AND ALBUTEROL SULFATE 1 AMPULE: 2.5; .5 SOLUTION RESPIRATORY (INHALATION) at 02:44

## 2022-01-01 RX ADMIN — INSULIN LISPRO 3 UNITS: 100 INJECTION, SOLUTION INTRAVENOUS; SUBCUTANEOUS at 12:03

## 2022-01-01 RX ADMIN — IPRATROPIUM BROMIDE AND ALBUTEROL 1 PUFF: 20; 100 SPRAY, METERED RESPIRATORY (INHALATION) at 16:12

## 2022-01-01 RX ADMIN — SODIUM CHLORIDE, PRESERVATIVE FREE 10 ML: 5 INJECTION INTRAVENOUS at 09:20

## 2022-01-01 RX ADMIN — PROPOFOL 55 MCG/KG/MIN: 10 INJECTION, EMULSION INTRAVENOUS at 23:00

## 2022-01-01 RX ADMIN — OMEGA-3-ACID ETHYL ESTERS 2 G: 1 CAPSULE, LIQUID FILLED ORAL at 10:12

## 2022-01-01 RX ADMIN — SENNOSIDES 17.2 MG: 8.6 TABLET, COATED ORAL at 20:57

## 2022-01-01 RX ADMIN — LEVOFLOXACIN 750 MG: 5 INJECTION, SOLUTION INTRAVENOUS at 12:35

## 2022-01-01 RX ADMIN — CLOPIDOGREL BISULFATE 75 MG: 75 TABLET ORAL at 07:19

## 2022-01-01 RX ADMIN — SENNOSIDES 17.2 MG: 8.6 TABLET, COATED ORAL at 08:10

## 2022-01-01 RX ADMIN — DEXAMETHASONE SODIUM PHOSPHATE 10 MG: 10 INJECTION INTRAMUSCULAR; INTRAVENOUS at 07:56

## 2022-01-01 RX ADMIN — CEFEPIME 2000 MG: 2 INJECTION, POWDER, FOR SOLUTION INTRAVENOUS at 11:22

## 2022-01-01 RX ADMIN — VANCOMYCIN HYDROCHLORIDE 1250 MG: 10 INJECTION, POWDER, LYOPHILIZED, FOR SOLUTION INTRAVENOUS at 14:39

## 2022-01-01 RX ADMIN — Medication 200 MCG/HR: at 11:20

## 2022-01-01 RX ADMIN — SODIUM CHLORIDE, PRESERVATIVE FREE 10 ML: 5 INJECTION INTRAVENOUS at 19:52

## 2022-01-01 RX ADMIN — DEXTROSE MONOHYDRATE 300 MG: 50 INJECTION, SOLUTION INTRAVENOUS at 08:47

## 2022-01-01 RX ADMIN — FAMOTIDINE 20 MG: 20 TABLET ORAL at 09:45

## 2022-01-01 RX ADMIN — IPRATROPIUM BROMIDE AND ALBUTEROL SULFATE 1 AMPULE: 2.5; .5 SOLUTION RESPIRATORY (INHALATION) at 11:07

## 2022-01-01 RX ADMIN — IPRATROPIUM BROMIDE AND ALBUTEROL SULFATE 1 AMPULE: 2.5; .5 SOLUTION RESPIRATORY (INHALATION) at 02:56

## 2022-01-01 RX ADMIN — SODIUM ZIRCONIUM CYCLOSILICATE 10 G: 10 POWDER, FOR SUSPENSION ORAL at 12:22

## 2022-01-01 RX ADMIN — DOCUSATE SODIUM LIQUID 100 MG: 100 LIQUID ORAL at 20:41

## 2022-01-01 RX ADMIN — DIAZEPAM 20 MG: 10 TABLET ORAL at 03:19

## 2022-01-01 RX ADMIN — IPRATROPIUM BROMIDE AND ALBUTEROL SULFATE 1 AMPULE: 2.5; .5 SOLUTION RESPIRATORY (INHALATION) at 11:37

## 2022-01-01 RX ADMIN — Medication 10 MG/HR: at 05:57

## 2022-01-01 RX ADMIN — INSULIN LISPRO 12 UNITS: 100 INJECTION, SOLUTION INTRAVENOUS; SUBCUTANEOUS at 16:45

## 2022-01-01 RX ADMIN — INSULIN LISPRO 6 UNITS: 100 INJECTION, SOLUTION INTRAVENOUS; SUBCUTANEOUS at 20:41

## 2022-01-01 RX ADMIN — Medication 200 MCG/HR: at 19:27

## 2022-01-01 RX ADMIN — IPRATROPIUM BROMIDE AND ALBUTEROL SULFATE 1 AMPULE: 2.5; .5 SOLUTION RESPIRATORY (INHALATION) at 07:06

## 2022-01-01 RX ADMIN — Medication 8 MG/HR: at 22:18

## 2022-01-01 RX ADMIN — DOCUSATE SODIUM 100 MG: 50 LIQUID ORAL at 09:50

## 2022-01-01 RX ADMIN — Medication: at 20:02

## 2022-01-01 RX ADMIN — INSULIN LISPRO 6 UNITS: 100 INJECTION, SOLUTION INTRAVENOUS; SUBCUTANEOUS at 04:55

## 2022-01-01 RX ADMIN — CHOLECALCIFEROL (VITAMIN D3) 25 MCG (1,000 UNIT) TABLET 2000 UNITS: at 09:50

## 2022-01-01 RX ADMIN — Medication 200 MCG/HR: at 22:12

## 2022-01-01 RX ADMIN — DEXAMETHASONE SODIUM PHOSPHATE 20 MG: 4 INJECTION, SOLUTION INTRA-ARTICULAR; INTRALESIONAL; INTRAMUSCULAR; INTRAVENOUS; SOFT TISSUE at 13:20

## 2022-01-01 RX ADMIN — CHOLECALCIFEROL (VITAMIN D3) 25 MCG (1,000 UNIT) TABLET 2000 UNITS: at 08:17

## 2022-01-01 RX ADMIN — DOCUSATE SODIUM 100 MG: 50 LIQUID ORAL at 12:32

## 2022-01-01 RX ADMIN — DIAZEPAM 20 MG: 10 TABLET ORAL at 11:41

## 2022-01-01 RX ADMIN — Medication 175 MCG/HR: at 06:37

## 2022-01-01 RX ADMIN — POLYETHYLENE GLYCOL (3350) 17 G: 17 POWDER, FOR SOLUTION ORAL at 09:50

## 2022-01-01 RX ADMIN — DOCUSATE SODIUM LIQUID 100 MG: 100 LIQUID ORAL at 07:55

## 2022-01-01 RX ADMIN — SENNOSIDES 17.2 MG: 8.6 TABLET, COATED ORAL at 09:50

## 2022-01-01 RX ADMIN — SODIUM CHLORIDE, PRESERVATIVE FREE 10 ML: 5 INJECTION INTRAVENOUS at 13:17

## 2022-01-01 RX ADMIN — DEXAMETHASONE SODIUM PHOSPHATE 6 MG: 10 INJECTION INTRAMUSCULAR; INTRAVENOUS at 08:25

## 2022-01-01 RX ADMIN — Medication: at 07:35

## 2022-01-01 RX ADMIN — DOCUSATE SODIUM 100 MG: 50 LIQUID ORAL at 08:07

## 2022-01-01 RX ADMIN — Medication 10 MG/HR: at 23:53

## 2022-01-01 RX ADMIN — SODIUM CHLORIDE, PRESERVATIVE FREE 10 ML: 5 INJECTION INTRAVENOUS at 20:15

## 2022-01-01 RX ADMIN — DEXTROSE MONOHYDRATE 300 MG: 50 INJECTION, SOLUTION INTRAVENOUS at 09:16

## 2022-01-01 RX ADMIN — Medication 175 MCG/HR: at 09:27

## 2022-01-01 RX ADMIN — IPRATROPIUM BROMIDE AND ALBUTEROL SULFATE 1 AMPULE: 2.5; .5 SOLUTION RESPIRATORY (INHALATION) at 03:20

## 2022-01-01 RX ADMIN — Medication: at 20:01

## 2022-01-01 RX ADMIN — Medication 8 MG/HR: at 22:37

## 2022-01-01 RX ADMIN — OMEGA-3-ACID ETHYL ESTERS 2 G: 1 CAPSULE, LIQUID FILLED ORAL at 22:41

## 2022-01-01 RX ADMIN — MUPIROCIN: 20 OINTMENT TOPICAL at 21:01

## 2022-01-01 RX ADMIN — DIAZEPAM 10 MG: 10 TABLET ORAL at 12:44

## 2022-01-01 RX ADMIN — INSULIN LISPRO 6 UNITS: 100 INJECTION, SOLUTION INTRAVENOUS; SUBCUTANEOUS at 08:01

## 2022-01-01 RX ADMIN — IPRATROPIUM BROMIDE AND ALBUTEROL SULFATE 1 AMPULE: 2.5; .5 SOLUTION RESPIRATORY (INHALATION) at 16:39

## 2022-01-01 RX ADMIN — DIAZEPAM 20 MG: 10 TABLET ORAL at 13:05

## 2022-01-01 RX ADMIN — Medication 25 MCG/HR: at 04:42

## 2022-01-01 RX ADMIN — CLOPIDOGREL BISULFATE 75 MG: 75 TABLET ORAL at 08:21

## 2022-01-01 RX ADMIN — DOCUSATE SODIUM 100 MG: 50 LIQUID ORAL at 21:25

## 2022-01-01 RX ADMIN — DIAZEPAM 20 MG: 10 TABLET ORAL at 04:49

## 2022-01-01 RX ADMIN — FENTANYL CITRATE 50 MCG: 50 INJECTION INTRAMUSCULAR; INTRAVENOUS at 02:09

## 2022-01-01 RX ADMIN — INSULIN LISPRO 6 UNITS: 100 INJECTION, SOLUTION INTRAVENOUS; SUBCUTANEOUS at 03:37

## 2022-01-01 RX ADMIN — CHOLECALCIFEROL (VITAMIN D3) 25 MCG (1,000 UNIT) TABLET 2000 UNITS: at 08:21

## 2022-01-01 RX ADMIN — ENOXAPARIN SODIUM 30 MG: 100 INJECTION SUBCUTANEOUS at 07:56

## 2022-01-01 RX ADMIN — INSULIN LISPRO 5 UNITS: 100 INJECTION, SOLUTION INTRAVENOUS; SUBCUTANEOUS at 04:45

## 2022-01-01 RX ADMIN — FENTANYL CITRATE 50 MCG: 50 INJECTION INTRAMUSCULAR; INTRAVENOUS at 16:43

## 2022-01-01 RX ADMIN — INSULIN LISPRO 6 UNITS: 100 INJECTION, SOLUTION INTRAVENOUS; SUBCUTANEOUS at 00:45

## 2022-01-01 RX ADMIN — SODIUM CHLORIDE 2 MCG/KG/MIN: 9 INJECTION, SOLUTION INTRAVENOUS at 11:14

## 2022-01-01 RX ADMIN — SODIUM CHLORIDE, PRESERVATIVE FREE 10 ML: 5 INJECTION INTRAVENOUS at 19:37

## 2022-01-01 RX ADMIN — MUPIROCIN: 20 OINTMENT TOPICAL at 20:18

## 2022-01-01 RX ADMIN — INSULIN LISPRO 3 UNITS: 100 INJECTION, SOLUTION INTRAVENOUS; SUBCUTANEOUS at 23:25

## 2022-01-01 RX ADMIN — CHOLECALCIFEROL (VITAMIN D3) 25 MCG (1,000 UNIT) TABLET 2000 UNITS: at 08:16

## 2022-01-01 RX ADMIN — Medication 250 MCG/HR: at 23:48

## 2022-01-01 RX ADMIN — DOCUSATE SODIUM 100 MG: 50 LIQUID ORAL at 08:10

## 2022-01-01 RX ADMIN — CEFEPIME 2000 MG: 2 INJECTION, POWDER, FOR SOLUTION INTRAVENOUS at 14:16

## 2022-01-01 RX ADMIN — Medication 300 MCG/HR: at 03:16

## 2022-01-01 RX ADMIN — MIDAZOLAM HYDROCHLORIDE 2 MG: 2 INJECTION, SOLUTION INTRAMUSCULAR; INTRAVENOUS at 03:01

## 2022-01-01 RX ADMIN — Medication: at 08:19

## 2022-01-01 RX ADMIN — Medication 8 MG/HR: at 02:00

## 2022-01-01 RX ADMIN — CHOLECALCIFEROL (VITAMIN D3) 25 MCG (1,000 UNIT) TABLET 2000 UNITS: at 23:58

## 2022-01-01 RX ADMIN — POLYETHYLENE GLYCOL (3350) 17 G: 17 POWDER, FOR SOLUTION ORAL at 08:52

## 2022-01-01 RX ADMIN — Medication: at 07:42

## 2022-01-01 RX ADMIN — PANTOPRAZOLE SODIUM 40 MG: 40 INJECTION, POWDER, FOR SOLUTION INTRAVENOUS at 08:25

## 2022-01-01 RX ADMIN — IPRATROPIUM BROMIDE AND ALBUTEROL 1 PUFF: 20; 100 SPRAY, METERED RESPIRATORY (INHALATION) at 15:28

## 2022-01-01 RX ADMIN — DEXAMETHASONE SODIUM PHOSPHATE 10 MG: 10 INJECTION INTRAMUSCULAR; INTRAVENOUS at 07:19

## 2022-01-01 RX ADMIN — IPRATROPIUM BROMIDE AND ALBUTEROL SULFATE 1 AMPULE: 2.5; .5 SOLUTION RESPIRATORY (INHALATION) at 07:15

## 2022-01-01 RX ADMIN — OMEGA-3-ACID ETHYL ESTERS 2 G: 1 CAPSULE, LIQUID FILLED ORAL at 09:19

## 2022-01-01 RX ADMIN — Medication: at 08:41

## 2022-01-01 RX ADMIN — SENNOSIDES 17.2 MG: 8.6 TABLET, COATED ORAL at 20:07

## 2022-01-01 RX ADMIN — PROPOFOL 55 MCG/KG/MIN: 10 INJECTION, EMULSION INTRAVENOUS at 19:41

## 2022-01-01 RX ADMIN — CHOLECALCIFEROL (VITAMIN D3) 25 MCG (1,000 UNIT) TABLET 2000 UNITS: at 13:14

## 2022-01-01 RX ADMIN — IPRATROPIUM BROMIDE AND ALBUTEROL SULFATE 1 AMPULE: 2.5; .5 SOLUTION RESPIRATORY (INHALATION) at 18:51

## 2022-01-01 RX ADMIN — INSULIN LISPRO 3 UNITS: 100 INJECTION, SOLUTION INTRAVENOUS; SUBCUTANEOUS at 23:31

## 2022-01-01 RX ADMIN — SODIUM CHLORIDE, PRESERVATIVE FREE 10 ML: 5 INJECTION INTRAVENOUS at 09:54

## 2022-01-01 RX ADMIN — CEFEPIME 2000 MG: 2 INJECTION, POWDER, FOR SOLUTION INTRAVENOUS at 04:38

## 2022-01-01 RX ADMIN — LEVOFLOXACIN 750 MG: 5 INJECTION, SOLUTION INTRAVENOUS at 11:55

## 2022-01-01 RX ADMIN — Medication 300 MCG/HR: at 09:06

## 2022-01-01 RX ADMIN — DEXAMETHASONE SODIUM PHOSPHATE 4 MG: 4 INJECTION, SOLUTION INTRA-ARTICULAR; INTRALESIONAL; INTRAMUSCULAR; INTRAVENOUS; SOFT TISSUE at 07:56

## 2022-01-01 RX ADMIN — DIAZEPAM 20 MG: 10 TABLET ORAL at 20:56

## 2022-01-01 RX ADMIN — IPRATROPIUM BROMIDE AND ALBUTEROL SULFATE 1 AMPULE: 2.5; .5 SOLUTION RESPIRATORY (INHALATION) at 07:02

## 2022-01-01 RX ADMIN — IPRATROPIUM BROMIDE AND ALBUTEROL SULFATE 1 AMPULE: 2.5; .5 SOLUTION RESPIRATORY (INHALATION) at 14:35

## 2022-01-01 RX ADMIN — Medication 300 MCG/HR: at 11:29

## 2022-01-01 RX ADMIN — IPRATROPIUM BROMIDE AND ALBUTEROL SULFATE 1 AMPULE: 2.5; .5 SOLUTION RESPIRATORY (INHALATION) at 12:17

## 2022-01-01 RX ADMIN — SENNOSIDES 17.2 MG: 8.6 TABLET, COATED ORAL at 21:03

## 2022-01-01 RX ADMIN — IPRATROPIUM BROMIDE AND ALBUTEROL SULFATE 1 AMPULE: 2.5; .5 SOLUTION RESPIRATORY (INHALATION) at 02:51

## 2022-01-01 RX ADMIN — DEXTROSE MONOHYDRATE 300 MG: 50 INJECTION, SOLUTION INTRAVENOUS at 21:13

## 2022-01-01 RX ADMIN — ATORVASTATIN CALCIUM 40 MG: 40 TABLET, FILM COATED ORAL at 21:00

## 2022-01-01 RX ADMIN — Medication 10 MG/HR: at 19:08

## 2022-01-01 RX ADMIN — MUPIROCIN: 20 OINTMENT TOPICAL at 21:09

## 2022-01-01 RX ADMIN — CHOLECALCIFEROL (VITAMIN D3) 25 MCG (1,000 UNIT) TABLET 2000 UNITS: at 11:28

## 2022-01-01 RX ADMIN — DIAZEPAM 10 MG: 10 TABLET ORAL at 20:01

## 2022-01-01 RX ADMIN — DEXTROSE MONOHYDRATE 300 MG: 50 INJECTION, SOLUTION INTRAVENOUS at 10:35

## 2022-01-01 RX ADMIN — IPRATROPIUM BROMIDE AND ALBUTEROL SULFATE 1 AMPULE: 2.5; .5 SOLUTION RESPIRATORY (INHALATION) at 19:09

## 2022-01-01 RX ADMIN — DEXTROSE MONOHYDRATE 300 MG: 50 INJECTION, SOLUTION INTRAVENOUS at 21:00

## 2022-01-01 RX ADMIN — PROPOFOL 60 MCG/KG/MIN: 10 INJECTION, EMULSION INTRAVENOUS at 23:13

## 2022-01-01 RX ADMIN — DOCUSATE SODIUM LIQUID 100 MG: 100 LIQUID ORAL at 10:10

## 2022-01-01 RX ADMIN — SODIUM CHLORIDE, PRESERVATIVE FREE 10 ML: 5 INJECTION INTRAVENOUS at 08:25

## 2022-01-01 RX ADMIN — SODIUM CHLORIDE, PRESERVATIVE FREE 10 ML: 5 INJECTION INTRAVENOUS at 08:29

## 2022-01-01 RX ADMIN — Medication 175 MCG/HR: at 19:16

## 2022-01-01 RX ADMIN — SODIUM ZIRCONIUM CYCLOSILICATE 10 G: 10 POWDER, FOR SUSPENSION ORAL at 20:03

## 2022-01-01 RX ADMIN — Medication 175 MCG/HR: at 01:58

## 2022-01-01 RX ADMIN — SENNOSIDES 17.2 MG: 8.6 TABLET, COATED ORAL at 20:52

## 2022-01-01 RX ADMIN — FAMOTIDINE 20 MG: 20 TABLET ORAL at 08:29

## 2022-01-01 RX ADMIN — IPRATROPIUM BROMIDE AND ALBUTEROL SULFATE 1 AMPULE: 2.5; .5 SOLUTION RESPIRATORY (INHALATION) at 06:52

## 2022-01-01 RX ADMIN — SODIUM CHLORIDE, PRESERVATIVE FREE 10 ML: 5 INJECTION INTRAVENOUS at 07:56

## 2022-01-01 RX ADMIN — FAMOTIDINE 20 MG: 20 TABLET ORAL at 23:59

## 2022-01-01 RX ADMIN — DIAZEPAM 10 MG: 10 TABLET ORAL at 04:39

## 2022-01-01 RX ADMIN — INSULIN LISPRO 6 UNITS: 100 INJECTION, SOLUTION INTRAVENOUS; SUBCUTANEOUS at 19:20

## 2022-01-01 RX ADMIN — IPRATROPIUM BROMIDE AND ALBUTEROL SULFATE 1 AMPULE: 2.5; .5 SOLUTION RESPIRATORY (INHALATION) at 03:38

## 2022-01-01 RX ADMIN — POLYETHYLENE GLYCOL (3350) 17 G: 17 POWDER, FOR SOLUTION ORAL at 12:32

## 2022-01-01 RX ADMIN — INSULIN GLARGINE 25 UNITS: 100 INJECTION, SOLUTION SUBCUTANEOUS at 11:47

## 2022-01-01 RX ADMIN — PANTOPRAZOLE SODIUM 40 MG: 40 INJECTION, POWDER, FOR SOLUTION INTRAVENOUS at 07:58

## 2022-01-01 RX ADMIN — MIDAZOLAM HYDROCHLORIDE 2 MG: 2 INJECTION, SOLUTION INTRAMUSCULAR; INTRAVENOUS at 02:09

## 2022-01-01 RX ADMIN — OMEGA-3-ACID ETHYL ESTERS 2 G: 1 CAPSULE, LIQUID FILLED ORAL at 19:52

## 2022-01-01 RX ADMIN — DIAZEPAM 10 MG: 10 TABLET ORAL at 14:07

## 2022-01-01 RX ADMIN — INSULIN LISPRO 9 UNITS: 100 INJECTION, SOLUTION INTRAVENOUS; SUBCUTANEOUS at 16:41

## 2022-01-01 RX ADMIN — DEXAMETHASONE SODIUM PHOSPHATE 10 MG: 10 INJECTION INTRAMUSCULAR; INTRAVENOUS at 08:01

## 2022-01-01 RX ADMIN — IPRATROPIUM BROMIDE AND ALBUTEROL SULFATE 1 AMPULE: 2.5; .5 SOLUTION RESPIRATORY (INHALATION) at 08:02

## 2022-01-01 RX ADMIN — DEXTROSE MONOHYDRATE 300 MG: 50 INJECTION, SOLUTION INTRAVENOUS at 21:42

## 2022-01-01 RX ADMIN — PANTOPRAZOLE SODIUM 40 MG: 40 INJECTION, POWDER, FOR SOLUTION INTRAVENOUS at 21:25

## 2022-01-01 RX ADMIN — INSULIN LISPRO 3 UNITS: 100 INJECTION, SOLUTION INTRAVENOUS; SUBCUTANEOUS at 00:30

## 2022-01-01 RX ADMIN — INSULIN LISPRO 6 UNITS: 100 INJECTION, SOLUTION INTRAVENOUS; SUBCUTANEOUS at 04:32

## 2022-01-01 RX ADMIN — SENNOSIDES 17.2 MG: 8.6 TABLET, COATED ORAL at 08:07

## 2022-01-01 RX ADMIN — CLOPIDOGREL BISULFATE 75 MG: 75 TABLET ORAL at 08:29

## 2022-01-01 RX ADMIN — FAMOTIDINE 20 MG: 20 TABLET ORAL at 00:05

## 2022-01-01 RX ADMIN — ENOXAPARIN SODIUM 30 MG: 100 INJECTION SUBCUTANEOUS at 20:18

## 2022-01-01 RX ADMIN — INSULIN LISPRO 6 UNITS: 100 INJECTION, SOLUTION INTRAVENOUS; SUBCUTANEOUS at 13:15

## 2022-01-01 RX ADMIN — ATORVASTATIN CALCIUM 40 MG: 40 TABLET, FILM COATED ORAL at 23:59

## 2022-01-01 RX ADMIN — Medication 10 MG/HR: at 19:50

## 2022-01-01 RX ADMIN — IPRATROPIUM BROMIDE AND ALBUTEROL SULFATE 1 AMPULE: 2.5; .5 SOLUTION RESPIRATORY (INHALATION) at 03:14

## 2022-01-01 RX ADMIN — INSULIN LISPRO 6 UNITS: 100 INJECTION, SOLUTION INTRAVENOUS; SUBCUTANEOUS at 12:28

## 2022-01-01 RX ADMIN — PROPOFOL 65 MCG/KG/MIN: 10 INJECTION, EMULSION INTRAVENOUS at 09:18

## 2022-01-01 RX ADMIN — FAMOTIDINE 20 MG: 20 TABLET ORAL at 08:30

## 2022-01-01 RX ADMIN — MEROPENEM 1000 MG: 1 INJECTION, POWDER, FOR SOLUTION INTRAVENOUS at 13:00

## 2022-01-01 RX ADMIN — PANTOPRAZOLE SODIUM 40 MG: 40 INJECTION, POWDER, FOR SOLUTION INTRAVENOUS at 08:43

## 2022-01-01 RX ADMIN — ASPIRIN 81 MG: 81 TABLET, CHEWABLE ORAL at 08:07

## 2022-01-01 RX ADMIN — MUPIROCIN: 20 OINTMENT TOPICAL at 20:41

## 2022-01-01 RX ADMIN — MIDAZOLAM HYDROCHLORIDE 2 MG: 2 INJECTION, SOLUTION INTRAMUSCULAR; INTRAVENOUS at 22:55

## 2022-01-01 RX ADMIN — SENNOSIDES 17.2 MG: 8.6 TABLET, COATED ORAL at 08:16

## 2022-01-01 RX ADMIN — INSULIN LISPRO 3 UNITS: 100 INJECTION, SOLUTION INTRAVENOUS; SUBCUTANEOUS at 03:45

## 2022-01-01 RX ADMIN — PANTOPRAZOLE SODIUM 40 MG: 40 INJECTION, POWDER, FOR SOLUTION INTRAVENOUS at 10:35

## 2022-01-01 RX ADMIN — INSULIN LISPRO 6 UNITS: 100 INJECTION, SOLUTION INTRAVENOUS; SUBCUTANEOUS at 19:37

## 2022-01-01 RX ADMIN — Medication 1 MG/HR: at 09:39

## 2022-01-01 RX ADMIN — Medication 250 MCG/HR: at 18:19

## 2022-01-01 RX ADMIN — IPRATROPIUM BROMIDE AND ALBUTEROL SULFATE 1 AMPULE: 2.5; .5 SOLUTION RESPIRATORY (INHALATION) at 18:38

## 2022-01-01 RX ADMIN — IPRATROPIUM BROMIDE AND ALBUTEROL SULFATE 1 AMPULE: 2.5; .5 SOLUTION RESPIRATORY (INHALATION) at 22:42

## 2022-01-01 RX ADMIN — IPRATROPIUM BROMIDE AND ALBUTEROL SULFATE 1 AMPULE: 2.5; .5 SOLUTION RESPIRATORY (INHALATION) at 15:42

## 2022-01-01 RX ADMIN — FAMOTIDINE 20 MG: 20 TABLET ORAL at 08:21

## 2022-01-01 RX ADMIN — CEFEPIME 2000 MG: 2 INJECTION, POWDER, FOR SOLUTION INTRAVENOUS at 20:04

## 2022-01-01 RX ADMIN — IPRATROPIUM BROMIDE AND ALBUTEROL SULFATE 1 AMPULE: 2.5; .5 SOLUTION RESPIRATORY (INHALATION) at 19:44

## 2022-01-01 RX ADMIN — PROPOFOL 80 MCG/KG/MIN: 10 INJECTION, EMULSION INTRAVENOUS at 18:45

## 2022-01-01 RX ADMIN — Medication 150 MCG/HR: at 20:57

## 2022-01-01 RX ADMIN — DIAZEPAM 20 MG: 10 TABLET ORAL at 12:57

## 2022-01-01 RX ADMIN — DEXAMETHASONE SODIUM PHOSPHATE 6 MG: 10 INJECTION INTRAMUSCULAR; INTRAVENOUS at 07:42

## 2022-01-01 RX ADMIN — INSULIN GLARGINE 25 UNITS: 100 INJECTION, SOLUTION SUBCUTANEOUS at 09:21

## 2022-01-01 RX ADMIN — DEXAMETHASONE SODIUM PHOSPHATE 10 MG: 10 INJECTION INTRAMUSCULAR; INTRAVENOUS at 08:22

## 2022-01-01 RX ADMIN — INSULIN GLARGINE 25 UNITS: 100 INJECTION, SOLUTION SUBCUTANEOUS at 09:50

## 2022-01-01 RX ADMIN — IPRATROPIUM BROMIDE AND ALBUTEROL SULFATE 1 AMPULE: 2.5; .5 SOLUTION RESPIRATORY (INHALATION) at 23:30

## 2022-01-01 RX ADMIN — INSULIN LISPRO 6 UNITS: 100 INJECTION, SOLUTION INTRAVENOUS; SUBCUTANEOUS at 12:33

## 2022-01-01 RX ADMIN — REMDESIVIR 100 MG: 100 INJECTION, POWDER, LYOPHILIZED, FOR SOLUTION INTRAVENOUS at 00:14

## 2022-01-01 RX ADMIN — FAMOTIDINE 20 MG: 20 TABLET ORAL at 21:09

## 2022-01-01 RX ADMIN — MIDAZOLAM HYDROCHLORIDE 2 MG: 2 INJECTION, SOLUTION INTRAMUSCULAR; INTRAVENOUS at 19:36

## 2022-01-01 RX ADMIN — SODIUM CHLORIDE: 9 INJECTION, SOLUTION INTRAVENOUS at 12:37

## 2022-01-01 RX ADMIN — PROPOFOL 65 MCG/KG/MIN: 10 INJECTION, EMULSION INTRAVENOUS at 05:29

## 2022-01-01 RX ADMIN — Medication 300 MCG/HR: at 00:12

## 2022-01-01 RX ADMIN — MUPIROCIN: 20 OINTMENT TOPICAL at 11:17

## 2022-01-01 RX ADMIN — IPRATROPIUM BROMIDE AND ALBUTEROL 1 PUFF: 20; 100 SPRAY, METERED RESPIRATORY (INHALATION) at 12:03

## 2022-01-01 RX ADMIN — SODIUM CHLORIDE, PRESERVATIVE FREE 10 ML: 5 INJECTION INTRAVENOUS at 20:35

## 2022-01-01 RX ADMIN — Medication 300 MCG/HR: at 03:00

## 2022-01-01 RX ADMIN — REMDESIVIR 200 MG: 100 INJECTION, POWDER, LYOPHILIZED, FOR SOLUTION INTRAVENOUS at 00:02

## 2022-01-01 RX ADMIN — DEXAMETHASONE SODIUM PHOSPHATE 10 MG: 10 INJECTION INTRAMUSCULAR; INTRAVENOUS at 10:50

## 2022-01-01 RX ADMIN — FONDAPARINUX SODIUM 2.5 MG: 2.5 INJECTION, SOLUTION SUBCUTANEOUS at 09:49

## 2022-01-01 RX ADMIN — Medication 200 MCG/HR: at 18:07

## 2022-01-01 RX ADMIN — SODIUM CHLORIDE, PRESERVATIVE FREE 10 ML: 5 INJECTION INTRAVENOUS at 07:42

## 2022-01-01 RX ADMIN — DIAZEPAM 10 MG: 10 TABLET ORAL at 20:57

## 2022-01-01 RX ADMIN — FAMOTIDINE 20 MG: 20 TABLET ORAL at 10:49

## 2022-01-01 RX ADMIN — LEVOFLOXACIN 750 MG: 5 INJECTION, SOLUTION INTRAVENOUS at 11:42

## 2022-01-01 RX ADMIN — INSULIN LISPRO 3 UNITS: 100 INJECTION, SOLUTION INTRAVENOUS; SUBCUTANEOUS at 12:44

## 2022-01-01 RX ADMIN — SENNOSIDES 17.2 MG: 8.6 TABLET, COATED ORAL at 20:00

## 2022-01-01 RX ADMIN — IPRATROPIUM BROMIDE AND ALBUTEROL SULFATE 1 AMPULE: 2.5; .5 SOLUTION RESPIRATORY (INHALATION) at 11:14

## 2022-01-01 RX ADMIN — PROPOFOL 60 MCG/KG/MIN: 10 INJECTION, EMULSION INTRAVENOUS at 06:30

## 2022-01-01 RX ADMIN — IPRATROPIUM BROMIDE AND ALBUTEROL 1 PUFF: 20; 100 SPRAY, METERED RESPIRATORY (INHALATION) at 07:55

## 2022-01-01 RX ADMIN — Medication 175 MCG/HR: at 13:17

## 2022-01-01 RX ADMIN — SODIUM CHLORIDE, PRESERVATIVE FREE 10 ML: 5 INJECTION INTRAVENOUS at 08:53

## 2022-01-01 RX ADMIN — Medication 175 MCG/HR: at 07:44

## 2022-01-01 RX ADMIN — INSULIN GLARGINE 25 UNITS: 100 INJECTION, SOLUTION SUBCUTANEOUS at 09:51

## 2022-01-01 RX ADMIN — LEVOFLOXACIN 750 MG: 5 INJECTION, SOLUTION INTRAVENOUS at 13:22

## 2022-01-01 RX ADMIN — CHOLECALCIFEROL (VITAMIN D3) 25 MCG (1,000 UNIT) TABLET 2000 UNITS: at 08:29

## 2022-01-01 RX ADMIN — Medication 175 MCG/HR: at 21:47

## 2022-01-01 RX ADMIN — DEXTROSE MONOHYDRATE 300 MG: 50 INJECTION, SOLUTION INTRAVENOUS at 20:36

## 2022-01-01 RX ADMIN — POLYETHYLENE GLYCOL (3350) 17 G: 17 POWDER, FOR SOLUTION ORAL at 11:56

## 2022-01-01 RX ADMIN — ASPIRIN 81 MG: 81 TABLET, CHEWABLE ORAL at 09:19

## 2022-01-01 RX ADMIN — SODIUM CHLORIDE, PRESERVATIVE FREE 10 ML: 5 INJECTION INTRAVENOUS at 20:11

## 2022-01-01 RX ADMIN — IPRATROPIUM BROMIDE AND ALBUTEROL SULFATE 1 AMPULE: 2.5; .5 SOLUTION RESPIRATORY (INHALATION) at 10:45

## 2022-01-01 RX ADMIN — DEXAMETHASONE SODIUM PHOSPHATE 10 MG: 10 INJECTION INTRAMUSCULAR; INTRAVENOUS at 09:18

## 2022-01-01 RX ADMIN — CHOLECALCIFEROL (VITAMIN D3) 25 MCG (1,000 UNIT) TABLET 2000 UNITS: at 08:30

## 2022-01-01 RX ADMIN — IPRATROPIUM BROMIDE AND ALBUTEROL SULFATE 1 AMPULE: 2.5; .5 SOLUTION RESPIRATORY (INHALATION) at 07:21

## 2022-01-01 RX ADMIN — INSULIN GLARGINE 20 UNITS: 100 INJECTION, SOLUTION SUBCUTANEOUS at 08:19

## 2022-01-01 RX ADMIN — IPRATROPIUM BROMIDE AND ALBUTEROL SULFATE 1 AMPULE: 2.5; .5 SOLUTION RESPIRATORY (INHALATION) at 20:13

## 2022-01-01 RX ADMIN — SODIUM CHLORIDE 2 MCG/KG/MIN: 9 INJECTION, SOLUTION INTRAVENOUS at 17:09

## 2022-01-01 RX ADMIN — DEXAMETHASONE SODIUM PHOSPHATE 4 MG: 4 INJECTION, SOLUTION INTRA-ARTICULAR; INTRALESIONAL; INTRAMUSCULAR; INTRAVENOUS; SOFT TISSUE at 10:10

## 2022-01-01 RX ADMIN — ASPIRIN 81 MG: 81 TABLET, CHEWABLE ORAL at 08:21

## 2022-01-01 RX ADMIN — DEXAMETHASONE SODIUM PHOSPHATE 10 MG: 10 INJECTION INTRAMUSCULAR; INTRAVENOUS at 08:10

## 2022-01-01 RX ADMIN — IPRATROPIUM BROMIDE AND ALBUTEROL SULFATE 1 AMPULE: 2.5; .5 SOLUTION RESPIRATORY (INHALATION) at 18:41

## 2022-01-01 RX ADMIN — DOCUSATE SODIUM 100 MG: 50 LIQUID ORAL at 21:02

## 2022-01-01 RX ADMIN — SODIUM CHLORIDE, PRESERVATIVE FREE 10 ML: 5 INJECTION INTRAVENOUS at 08:30

## 2022-01-01 RX ADMIN — IPRATROPIUM BROMIDE AND ALBUTEROL 1 PUFF: 20; 100 SPRAY, METERED RESPIRATORY (INHALATION) at 19:37

## 2022-01-01 RX ADMIN — SODIUM CHLORIDE 3 MCG/KG/MIN: 9 INJECTION, SOLUTION INTRAVENOUS at 02:40

## 2022-01-01 RX ADMIN — CEFEPIME 2000 MG: 2 INJECTION, POWDER, FOR SOLUTION INTRAVENOUS at 04:27

## 2022-01-01 RX ADMIN — IPRATROPIUM BROMIDE AND ALBUTEROL 1 PUFF: 20; 100 SPRAY, METERED RESPIRATORY (INHALATION) at 08:13

## 2022-01-01 RX ADMIN — INSULIN LISPRO 3 UNITS: 100 INJECTION, SOLUTION INTRAVENOUS; SUBCUTANEOUS at 04:28

## 2022-01-01 RX ADMIN — Medication: at 08:24

## 2022-01-01 RX ADMIN — NOREPINEPHRINE BITARTRATE 10 MCG/MIN: 1 INJECTION, SOLUTION, CONCENTRATE INTRAVENOUS at 04:25

## 2022-01-01 RX ADMIN — METOCLOPRAMIDE 10 MG: 5 INJECTION, SOLUTION INTRAMUSCULAR; INTRAVENOUS at 03:55

## 2022-01-01 RX ADMIN — INSULIN LISPRO 3 UNITS: 100 INJECTION, SOLUTION INTRAVENOUS; SUBCUTANEOUS at 17:37

## 2022-01-01 RX ADMIN — INSULIN LISPRO 9 UNITS: 100 INJECTION, SOLUTION INTRAVENOUS; SUBCUTANEOUS at 13:05

## 2022-01-01 RX ADMIN — CEFEPIME 2000 MG: 2 INJECTION, POWDER, FOR SOLUTION INTRAVENOUS at 12:40

## 2022-01-01 RX ADMIN — Medication 8 MG/HR: at 07:50

## 2022-01-01 RX ADMIN — ASPIRIN 81 MG: 81 TABLET, CHEWABLE ORAL at 01:40

## 2022-01-01 RX ADMIN — PANTOPRAZOLE SODIUM 40 MG: 40 INJECTION, POWDER, FOR SOLUTION INTRAVENOUS at 09:20

## 2022-01-01 RX ADMIN — IPRATROPIUM BROMIDE AND ALBUTEROL SULFATE 1 AMPULE: 2.5; .5 SOLUTION RESPIRATORY (INHALATION) at 23:01

## 2022-01-01 RX ADMIN — INSULIN LISPRO 6 UNITS: 100 INJECTION, SOLUTION INTRAVENOUS; SUBCUTANEOUS at 18:02

## 2022-01-01 RX ADMIN — Medication: at 11:16

## 2022-01-01 RX ADMIN — ASCORBIC ACID, VITAMIN A PALMITATE, CHOLECALCIFEROL, THIAMINE HYDROCHLORIDE, RIBOFLAVIN-5 PHOSPHATE SODIUM, PYRIDOXINE HYDROCHLORIDE, NIACINAMIDE, DEXPANTHENOL, ALPHA-TOCOPHEROL ACETATE, VITAMIN K1, FOLIC ACID, BIOTIN, CYANOCOBALAMIN: 200; 3300; 200; 6; 3.6; 6; 40; 15; 10; 150; 600; 60; 5 INJECTION, SOLUTION INTRAVENOUS at 20:57

## 2022-01-01 RX ADMIN — Medication 300 MCG/HR: at 01:59

## 2022-01-01 RX ADMIN — Medication: at 19:36

## 2022-01-01 RX ADMIN — MEROPENEM 1000 MG: 1 INJECTION, POWDER, FOR SOLUTION INTRAVENOUS at 21:00

## 2022-01-01 RX ADMIN — FAMOTIDINE 20 MG: 20 TABLET ORAL at 21:03

## 2022-01-01 RX ADMIN — CHOLECALCIFEROL (VITAMIN D3) 25 MCG (1,000 UNIT) TABLET 2000 UNITS: at 10:10

## 2022-01-01 RX ADMIN — FENTANYL CITRATE 50 MCG: 50 INJECTION INTRAMUSCULAR; INTRAVENOUS at 03:00

## 2022-01-01 RX ADMIN — Medication 3 MG: at 03:36

## 2022-01-01 RX ADMIN — VANCOMYCIN HYDROCHLORIDE 1250 MG: 10 INJECTION, POWDER, LYOPHILIZED, FOR SOLUTION INTRAVENOUS at 02:15

## 2022-01-01 RX ADMIN — IPRATROPIUM BROMIDE AND ALBUTEROL SULFATE 1 AMPULE: 2.5; .5 SOLUTION RESPIRATORY (INHALATION) at 22:44

## 2022-01-01 RX ADMIN — IPRATROPIUM BROMIDE AND ALBUTEROL SULFATE 1 AMPULE: 2.5; .5 SOLUTION RESPIRATORY (INHALATION) at 03:08

## 2022-01-01 RX ADMIN — ASPIRIN 81 MG: 81 TABLET, CHEWABLE ORAL at 08:17

## 2022-01-01 RX ADMIN — IPRATROPIUM BROMIDE AND ALBUTEROL SULFATE 1 AMPULE: 2.5; .5 SOLUTION RESPIRATORY (INHALATION) at 11:10

## 2022-01-01 RX ADMIN — SODIUM CHLORIDE, PRESERVATIVE FREE 10 ML: 5 INJECTION INTRAVENOUS at 10:12

## 2022-01-01 RX ADMIN — CLOPIDOGREL BISULFATE 75 MG: 75 TABLET ORAL at 10:50

## 2022-01-01 RX ADMIN — DIAZEPAM 20 MG: 10 TABLET ORAL at 19:57

## 2022-01-01 RX ADMIN — INSULIN LISPRO 3 UNITS: 100 INJECTION, SOLUTION INTRAVENOUS; SUBCUTANEOUS at 23:13

## 2022-01-01 RX ADMIN — SENNOSIDES 17.2 MG: 8.6 TABLET, COATED ORAL at 08:52

## 2022-01-01 RX ADMIN — Medication: at 12:29

## 2022-01-01 RX ADMIN — Medication 175 MCG/HR: at 01:48

## 2022-01-01 RX ADMIN — Medication: at 09:55

## 2022-01-01 RX ADMIN — CLOPIDOGREL BISULFATE 75 MG: 75 TABLET ORAL at 07:57

## 2022-01-01 RX ADMIN — Medication 250 MCG/HR: at 03:09

## 2022-01-01 RX ADMIN — SODIUM CHLORIDE 500 ML: 9 INJECTION, SOLUTION INTRAVENOUS at 05:54

## 2022-01-01 RX ADMIN — DEXAMETHASONE SODIUM PHOSPHATE 6 MG: 10 INJECTION INTRAMUSCULAR; INTRAVENOUS at 08:08

## 2022-01-01 RX ADMIN — SODIUM CHLORIDE: 9 INJECTION, SOLUTION INTRAVENOUS at 13:09

## 2022-01-01 RX ADMIN — PANTOPRAZOLE SODIUM 40 MG: 40 INJECTION, POWDER, FOR SOLUTION INTRAVENOUS at 08:08

## 2022-01-01 RX ADMIN — PANTOPRAZOLE SODIUM 40 MG: 40 INJECTION, POWDER, FOR SOLUTION INTRAVENOUS at 19:59

## 2022-01-01 RX ADMIN — Medication 175 MCG/HR: at 19:18

## 2022-01-01 RX ADMIN — IPRATROPIUM BROMIDE AND ALBUTEROL SULFATE 1 AMPULE: 2.5; .5 SOLUTION RESPIRATORY (INHALATION) at 07:29

## 2022-01-01 RX ADMIN — ASPIRIN 81 MG: 81 TABLET, CHEWABLE ORAL at 07:19

## 2022-01-01 RX ADMIN — PROPOFOL 55 MCG/KG/MIN: 10 INJECTION, EMULSION INTRAVENOUS at 03:16

## 2022-01-01 RX ADMIN — INSULIN LISPRO 6 UNITS: 100 INJECTION, SOLUTION INTRAVENOUS; SUBCUTANEOUS at 23:54

## 2022-01-01 RX ADMIN — PROPOFOL 65 MCG/KG/MIN: 10 INJECTION, EMULSION INTRAVENOUS at 20:39

## 2022-01-01 RX ADMIN — FAMOTIDINE 20 MG: 20 TABLET ORAL at 20:40

## 2022-01-01 RX ADMIN — REMDESIVIR 100 MG: 100 INJECTION, POWDER, LYOPHILIZED, FOR SOLUTION INTRAVENOUS at 23:27

## 2022-01-01 RX ADMIN — IPRATROPIUM BROMIDE AND ALBUTEROL SULFATE 1 AMPULE: 2.5; .5 SOLUTION RESPIRATORY (INHALATION) at 02:50

## 2022-01-01 RX ADMIN — SODIUM CHLORIDE 1000 ML: 9 INJECTION, SOLUTION INTRAVENOUS at 09:17

## 2022-01-01 RX ADMIN — DIAZEPAM 10 MG: 10 TABLET ORAL at 11:16

## 2022-01-01 RX ADMIN — Medication 250 MCG/HR: at 09:22

## 2022-01-01 RX ADMIN — INSULIN LISPRO 2 UNITS: 100 INJECTION, SOLUTION INTRAVENOUS; SUBCUTANEOUS at 00:38

## 2022-01-01 RX ADMIN — IPRATROPIUM BROMIDE AND ALBUTEROL SULFATE 1 AMPULE: 2.5; .5 SOLUTION RESPIRATORY (INHALATION) at 16:05

## 2022-01-01 RX ADMIN — MEROPENEM 1000 MG: 1 INJECTION, POWDER, FOR SOLUTION INTRAVENOUS at 13:59

## 2022-01-01 RX ADMIN — INSULIN LISPRO 9 UNITS: 100 INJECTION, SOLUTION INTRAVENOUS; SUBCUTANEOUS at 15:41

## 2022-01-01 RX ADMIN — IPRATROPIUM BROMIDE AND ALBUTEROL SULFATE 1 AMPULE: 2.5; .5 SOLUTION RESPIRATORY (INHALATION) at 10:27

## 2022-01-01 RX ADMIN — IPRATROPIUM BROMIDE AND ALBUTEROL 1 PUFF: 20; 100 SPRAY, METERED RESPIRATORY (INHALATION) at 22:05

## 2022-01-01 RX ADMIN — DEXAMETHASONE SODIUM PHOSPHATE 6 MG: 10 INJECTION INTRAMUSCULAR; INTRAVENOUS at 08:53

## 2022-01-01 RX ADMIN — Medication 175 MCG/HR: at 01:23

## 2022-01-01 RX ADMIN — Medication 175 MCG/HR: at 17:00

## 2022-01-01 RX ADMIN — NOREPINEPHRINE BITARTRATE 8 MCG/MIN: 1 INJECTION, SOLUTION, CONCENTRATE INTRAVENOUS at 13:44

## 2022-01-01 RX ADMIN — ENOXAPARIN SODIUM 30 MG: 100 INJECTION SUBCUTANEOUS at 10:49

## 2022-01-01 RX ADMIN — ASPIRIN 81 MG: 81 TABLET, CHEWABLE ORAL at 08:52

## 2022-01-01 RX ADMIN — Medication 300 MCG/HR: at 15:08

## 2022-01-01 RX ADMIN — Medication: at 09:20

## 2022-01-01 RX ADMIN — DEXAMETHASONE SODIUM PHOSPHATE 6 MG: 10 INJECTION, SOLUTION INTRAMUSCULAR; INTRAVENOUS at 18:00

## 2022-01-01 RX ADMIN — DIAZEPAM 20 MG: 10 TABLET ORAL at 20:11

## 2022-01-01 RX ADMIN — PANTOPRAZOLE SODIUM 40 MG: 40 INJECTION, POWDER, FOR SOLUTION INTRAVENOUS at 07:42

## 2022-01-01 RX ADMIN — SODIUM CHLORIDE, PRESERVATIVE FREE 10 ML: 5 INJECTION INTRAVENOUS at 08:34

## 2022-01-01 RX ADMIN — CEFTRIAXONE SODIUM 1000 MG: 1 INJECTION, POWDER, FOR SOLUTION INTRAMUSCULAR; INTRAVENOUS at 15:32

## 2022-01-01 RX ADMIN — DIAZEPAM 10 MG: 10 TABLET ORAL at 04:28

## 2022-01-01 RX ADMIN — DEXTROSE MONOHYDRATE 400 MG: 50 INJECTION, SOLUTION INTRAVENOUS at 19:38

## 2022-01-01 RX ADMIN — Medication 175 MCG/HR: at 05:34

## 2022-01-01 RX ADMIN — IPRATROPIUM BROMIDE AND ALBUTEROL SULFATE 1 AMPULE: 2.5; .5 SOLUTION RESPIRATORY (INHALATION) at 14:43

## 2022-01-01 RX ADMIN — Medication: at 20:56

## 2022-01-01 RX ADMIN — OMEGA-3-ACID ETHYL ESTERS 2 G: 1 CAPSULE, LIQUID FILLED ORAL at 20:59

## 2022-01-01 RX ADMIN — PROPOFOL 65 MCG/KG/MIN: 10 INJECTION, EMULSION INTRAVENOUS at 23:52

## 2022-01-01 RX ADMIN — INSULIN GLARGINE 10 UNITS: 100 INJECTION, SOLUTION SUBCUTANEOUS at 10:00

## 2022-01-01 RX ADMIN — IPRATROPIUM BROMIDE AND ALBUTEROL SULFATE 1 AMPULE: 2.5; .5 SOLUTION RESPIRATORY (INHALATION) at 11:43

## 2022-01-01 RX ADMIN — ENOXAPARIN SODIUM 30 MG: 100 INJECTION SUBCUTANEOUS at 10:12

## 2022-01-01 RX ADMIN — SODIUM POLYSTYRENE SULFONATE 30 G: 15 SUSPENSION ORAL; RECTAL at 09:49

## 2022-01-01 RX ADMIN — ATORVASTATIN CALCIUM 40 MG: 40 TABLET, FILM COATED ORAL at 00:05

## 2022-01-01 RX ADMIN — Medication 250 MCG/HR: at 14:25

## 2022-01-01 RX ADMIN — NOREPINEPHRINE BITARTRATE 5 MCG/MIN: 1 INJECTION, SOLUTION, CONCENTRATE INTRAVENOUS at 06:00

## 2022-01-01 RX ADMIN — INSULIN LISPRO 6 UNITS: 100 INJECTION, SOLUTION INTRAVENOUS; SUBCUTANEOUS at 19:41

## 2022-01-01 RX ADMIN — IOPAMIDOL 85 ML: 755 INJECTION, SOLUTION INTRAVENOUS at 15:23

## 2022-01-01 RX ADMIN — INSULIN LISPRO 9 UNITS: 100 INJECTION, SOLUTION INTRAVENOUS; SUBCUTANEOUS at 17:40

## 2022-01-01 RX ADMIN — CLOPIDOGREL BISULFATE 75 MG: 75 TABLET ORAL at 07:56

## 2022-01-01 RX ADMIN — Medication 200 MCG/HR: at 17:55

## 2022-01-01 RX ADMIN — INSULIN GLARGINE 25 UNITS: 100 INJECTION, SOLUTION SUBCUTANEOUS at 10:15

## 2022-01-01 RX ADMIN — ENOXAPARIN SODIUM 30 MG: 100 INJECTION SUBCUTANEOUS at 09:18

## 2022-01-01 RX ADMIN — Medication 10 MG/HR: at 22:07

## 2022-01-01 RX ADMIN — DIAZEPAM 20 MG: 10 TABLET ORAL at 04:01

## 2022-01-01 RX ADMIN — OMEGA-3-ACID ETHYL ESTERS 2 G: 1 CAPSULE, LIQUID FILLED ORAL at 20:40

## 2022-01-01 RX ADMIN — ENOXAPARIN SODIUM 30 MG: 100 INJECTION SUBCUTANEOUS at 00:00

## 2022-01-01 RX ADMIN — DEXAMETHASONE SODIUM PHOSPHATE 10 MG: 10 INJECTION INTRAMUSCULAR; INTRAVENOUS at 08:29

## 2022-01-01 RX ADMIN — Medication 200 MCG/HR: at 02:10

## 2022-01-01 RX ADMIN — INSULIN LISPRO 3 UNITS: 100 INJECTION, SOLUTION INTRAVENOUS; SUBCUTANEOUS at 11:48

## 2022-01-01 RX ADMIN — DIAZEPAM 20 MG: 10 TABLET ORAL at 06:06

## 2022-01-01 RX ADMIN — Medication 50 MCG/HR: at 15:00

## 2022-01-01 RX ADMIN — SENNOSIDES 17.2 MG: 8.6 TABLET, COATED ORAL at 20:18

## 2022-01-01 RX ADMIN — METOCLOPRAMIDE 10 MG: 5 INJECTION, SOLUTION INTRAMUSCULAR; INTRAVENOUS at 22:41

## 2022-01-01 RX ADMIN — CEFEPIME 2000 MG: 2 INJECTION, POWDER, FOR SOLUTION INTRAVENOUS at 21:33

## 2022-01-01 RX ADMIN — HEPARIN SODIUM 5000 UNITS: 1000 INJECTION INTRAVENOUS; SUBCUTANEOUS at 15:13

## 2022-01-01 RX ADMIN — Medication 1959 ML: at 13:17

## 2022-01-01 RX ADMIN — Medication: at 09:53

## 2022-01-01 RX ADMIN — OMEGA-3-ACID ETHYL ESTERS 2 G: 1 CAPSULE, LIQUID FILLED ORAL at 23:58

## 2022-01-01 RX ADMIN — SODIUM ZIRCONIUM CYCLOSILICATE 10 G: 10 POWDER, FOR SUSPENSION ORAL at 20:09

## 2022-01-01 RX ADMIN — INSULIN LISPRO 3 UNITS: 100 INJECTION, SOLUTION INTRAVENOUS; SUBCUTANEOUS at 11:01

## 2022-01-01 RX ADMIN — INSULIN LISPRO 3 UNITS: 100 INJECTION, SOLUTION INTRAVENOUS; SUBCUTANEOUS at 23:01

## 2022-01-01 RX ADMIN — INSULIN LISPRO 6 UNITS: 100 INJECTION, SOLUTION INTRAVENOUS; SUBCUTANEOUS at 10:16

## 2022-01-01 RX ADMIN — IPRATROPIUM BROMIDE AND ALBUTEROL SULFATE 1 AMPULE: 2.5; .5 SOLUTION RESPIRATORY (INHALATION) at 11:48

## 2022-01-01 RX ADMIN — Medication 200 MCG/HR: at 02:47

## 2022-01-01 RX ADMIN — IPRATROPIUM BROMIDE AND ALBUTEROL SULFATE 1 AMPULE: 2.5; .5 SOLUTION RESPIRATORY (INHALATION) at 07:25

## 2022-01-01 RX ADMIN — Medication 200 MCG/HR: at 10:35

## 2022-01-01 RX ADMIN — MIDAZOLAM HYDROCHLORIDE 2 MG: 2 INJECTION, SOLUTION INTRAMUSCULAR; INTRAVENOUS at 03:36

## 2022-01-01 RX ADMIN — Medication 200 MCG/HR: at 12:26

## 2022-01-01 RX ADMIN — SENNOSIDES 17.2 MG: 8.6 TABLET, COATED ORAL at 07:41

## 2022-01-01 RX ADMIN — PANTOPRAZOLE SODIUM 40 MG: 40 INJECTION, POWDER, FOR SOLUTION INTRAVENOUS at 20:09

## 2022-01-01 RX ADMIN — Medication 175 MCG/HR: at 13:36

## 2022-01-01 RX ADMIN — SODIUM CHLORIDE 2.5 MCG/KG/MIN: 9 INJECTION, SOLUTION INTRAVENOUS at 20:00

## 2022-01-01 RX ADMIN — SODIUM CHLORIDE, PRESERVATIVE FREE 10 ML: 5 INJECTION INTRAVENOUS at 08:40

## 2022-01-01 RX ADMIN — SODIUM CHLORIDE, PRESERVATIVE FREE 10 ML: 5 INJECTION INTRAVENOUS at 21:04

## 2022-01-01 RX ADMIN — IPRATROPIUM BROMIDE AND ALBUTEROL SULFATE 1 AMPULE: 2.5; .5 SOLUTION RESPIRATORY (INHALATION) at 09:25

## 2022-01-01 RX ADMIN — IPRATROPIUM BROMIDE AND ALBUTEROL SULFATE 1 AMPULE: 2.5; .5 SOLUTION RESPIRATORY (INHALATION) at 19:06

## 2022-01-01 RX ADMIN — Medication 175 MCG/HR: at 22:45

## 2022-01-01 RX ADMIN — ENOXAPARIN SODIUM 30 MG: 100 INJECTION SUBCUTANEOUS at 20:41

## 2022-01-01 RX ADMIN — MUPIROCIN: 20 OINTMENT TOPICAL at 19:47

## 2022-01-01 RX ADMIN — PROPOFOL 65 MCG/KG/MIN: 10 INJECTION, EMULSION INTRAVENOUS at 18:06

## 2022-01-01 RX ADMIN — LEVOFLOXACIN 750 MG: 5 INJECTION, SOLUTION INTRAVENOUS at 11:58

## 2022-01-01 RX ADMIN — Medication 175 MCG/HR: at 08:05

## 2022-01-01 RX ADMIN — POLYETHYLENE GLYCOL (3350) 17 G: 17 POWDER, FOR SOLUTION ORAL at 07:42

## 2022-01-01 RX ADMIN — CHOLECALCIFEROL (VITAMIN D3) 25 MCG (1,000 UNIT) TABLET 2000 UNITS: at 09:44

## 2022-01-01 RX ADMIN — IPRATROPIUM BROMIDE AND ALBUTEROL SULFATE 1 AMPULE: 2.5; .5 SOLUTION RESPIRATORY (INHALATION) at 22:54

## 2022-01-01 RX ADMIN — IPRATROPIUM BROMIDE AND ALBUTEROL SULFATE 1 AMPULE: 2.5; .5 SOLUTION RESPIRATORY (INHALATION) at 14:26

## 2022-01-01 RX ADMIN — DIAZEPAM 20 MG: 10 TABLET ORAL at 11:55

## 2022-01-01 RX ADMIN — ENOXAPARIN SODIUM 30 MG: 100 INJECTION SUBCUTANEOUS at 22:27

## 2022-01-01 RX ADMIN — INSULIN GLARGINE 10 UNITS: 100 INJECTION, SOLUTION SUBCUTANEOUS at 08:01

## 2022-01-01 RX ADMIN — FAMOTIDINE 20 MG: 20 TABLET ORAL at 21:00

## 2022-01-01 RX ADMIN — PROPOFOL 70 MCG/KG/MIN: 10 INJECTION, EMULSION INTRAVENOUS at 04:42

## 2022-01-01 RX ADMIN — Medication 200 MCG/HR: at 20:02

## 2022-01-01 RX ADMIN — VANCOMYCIN HYDROCHLORIDE 1250 MG: 10 INJECTION, POWDER, LYOPHILIZED, FOR SOLUTION INTRAVENOUS at 13:50

## 2022-01-01 RX ADMIN — DIAZEPAM 20 MG: 10 TABLET ORAL at 12:44

## 2022-01-01 RX ADMIN — Medication: at 08:53

## 2022-01-01 RX ADMIN — IPRATROPIUM BROMIDE AND ALBUTEROL SULFATE 1 AMPULE: 2.5; .5 SOLUTION RESPIRATORY (INHALATION) at 14:01

## 2022-01-01 RX ADMIN — ENOXAPARIN SODIUM 30 MG: 100 INJECTION SUBCUTANEOUS at 07:58

## 2022-01-01 RX ADMIN — OMEGA-3-ACID ETHYL ESTERS 2 G: 1 CAPSULE, LIQUID FILLED ORAL at 09:33

## 2022-01-01 RX ADMIN — POTASSIUM CHLORIDE 40 MEQ: 1500 TABLET, EXTENDED RELEASE ORAL at 08:20

## 2022-01-01 RX ADMIN — Medication 300 MCG/HR: at 22:59

## 2022-01-01 RX ADMIN — IPRATROPIUM BROMIDE AND ALBUTEROL SULFATE 1 AMPULE: 2.5; .5 SOLUTION RESPIRATORY (INHALATION) at 15:57

## 2022-01-01 RX ADMIN — DIAZEPAM 10 MG: 10 TABLET ORAL at 04:57

## 2022-01-01 RX ADMIN — DEXTROSE MONOHYDRATE 300 MG: 50 INJECTION, SOLUTION INTRAVENOUS at 22:08

## 2022-01-01 RX ADMIN — PROPOFOL 65 MCG/KG/MIN: 10 INJECTION, EMULSION INTRAVENOUS at 01:55

## 2022-01-01 RX ADMIN — OMEGA-3-ACID ETHYL ESTERS 2 G: 1 CAPSULE, LIQUID FILLED ORAL at 11:19

## 2022-01-01 RX ADMIN — INSULIN GLARGINE 25 UNITS: 100 INJECTION, SOLUTION SUBCUTANEOUS at 08:32

## 2022-01-01 RX ADMIN — Medication 300 MCG/HR: at 12:31

## 2022-01-01 RX ADMIN — ATORVASTATIN CALCIUM 40 MG: 40 TABLET, FILM COATED ORAL at 22:27

## 2022-01-01 RX ADMIN — IPRATROPIUM BROMIDE AND ALBUTEROL SULFATE 1 AMPULE: 2.5; .5 SOLUTION RESPIRATORY (INHALATION) at 15:06

## 2022-01-01 RX ADMIN — Medication: at 21:01

## 2022-01-01 RX ADMIN — Medication: at 20:35

## 2022-01-01 RX ADMIN — NOREPINEPHRINE BITARTRATE 9 MCG/MIN: 1 INJECTION, SOLUTION, CONCENTRATE INTRAVENOUS at 04:41

## 2022-01-01 RX ADMIN — CLOPIDOGREL BISULFATE 75 MG: 75 TABLET ORAL at 08:31

## 2022-01-01 RX ADMIN — Medication 3 MG: at 22:31

## 2022-01-01 RX ADMIN — POLYETHYLENE GLYCOL (3350) 17 G: 17 POWDER, FOR SOLUTION ORAL at 08:07

## 2022-01-01 RX ADMIN — INSULIN LISPRO 6 UNITS: 100 INJECTION, SOLUTION INTRAVENOUS; SUBCUTANEOUS at 00:20

## 2022-01-01 RX ADMIN — INSULIN GLARGINE 25 UNITS: 100 INJECTION, SOLUTION SUBCUTANEOUS at 08:22

## 2022-01-01 RX ADMIN — DIAZEPAM 20 MG: 10 TABLET ORAL at 04:43

## 2022-01-01 RX ADMIN — Medication: at 21:28

## 2022-01-01 RX ADMIN — DEXAMETHASONE SODIUM PHOSPHATE 10 MG: 10 INJECTION INTRAMUSCULAR; INTRAVENOUS at 07:31

## 2022-01-01 RX ADMIN — Medication 300 MCG/HR: at 07:27

## 2022-01-01 RX ADMIN — ASPIRIN 81 MG: 81 TABLET, CHEWABLE ORAL at 09:50

## 2022-01-01 RX ADMIN — IPRATROPIUM BROMIDE AND ALBUTEROL SULFATE 1 AMPULE: 2.5; .5 SOLUTION RESPIRATORY (INHALATION) at 16:11

## 2022-01-01 RX ADMIN — FENTANYL CITRATE 50 MCG: 50 INJECTION INTRAMUSCULAR; INTRAVENOUS at 06:46

## 2022-01-01 RX ADMIN — ASPIRIN 81 MG: 81 TABLET, CHEWABLE ORAL at 07:41

## 2022-01-01 RX ADMIN — Medication 200 MCG/HR: at 04:58

## 2022-01-01 RX ADMIN — INSULIN LISPRO 9 UNITS: 100 INJECTION, SOLUTION INTRAVENOUS; SUBCUTANEOUS at 20:08

## 2022-01-01 RX ADMIN — PROPOFOL 80 MCG/KG/MIN: 10 INJECTION, EMULSION INTRAVENOUS at 22:09

## 2022-01-01 RX ADMIN — IPRATROPIUM BROMIDE AND ALBUTEROL SULFATE 1 AMPULE: 2.5; .5 SOLUTION RESPIRATORY (INHALATION) at 23:53

## 2022-01-01 RX ADMIN — Medication 7 MG/HR: at 20:49

## 2022-01-01 RX ADMIN — SODIUM CHLORIDE, PRESERVATIVE FREE 10 ML: 5 INJECTION INTRAVENOUS at 13:16

## 2022-01-01 RX ADMIN — Medication 175 MCG/HR: at 13:11

## 2022-01-01 RX ADMIN — INSULIN LISPRO 6 UNITS: 100 INJECTION, SOLUTION INTRAVENOUS; SUBCUTANEOUS at 16:30

## 2022-01-01 RX ADMIN — SODIUM CHLORIDE, PRESERVATIVE FREE 10 ML: 5 INJECTION INTRAVENOUS at 21:28

## 2022-01-01 RX ADMIN — Medication 3 MG/HR: at 09:53

## 2022-01-01 RX ADMIN — FAMOTIDINE 20 MG: 20 TABLET ORAL at 13:14

## 2022-01-01 RX ADMIN — MIDAZOLAM HYDROCHLORIDE 2 MG: 2 INJECTION, SOLUTION INTRAMUSCULAR; INTRAVENOUS at 23:36

## 2022-01-01 RX ADMIN — PROPOFOL 65 MCG/KG/MIN: 10 INJECTION, EMULSION INTRAVENOUS at 22:07

## 2022-01-01 RX ADMIN — MIDAZOLAM HYDROCHLORIDE 2 MG: 2 INJECTION, SOLUTION INTRAMUSCULAR; INTRAVENOUS at 08:48

## 2022-01-01 RX ADMIN — INSULIN GLARGINE 25 UNITS: 100 INJECTION, SOLUTION SUBCUTANEOUS at 10:14

## 2022-01-01 RX ADMIN — NOREPINEPHRINE BITARTRATE 2 MCG/MIN: 1 INJECTION, SOLUTION, CONCENTRATE INTRAVENOUS at 16:00

## 2022-01-01 RX ADMIN — Medication 175 MCG/HR: at 03:22

## 2022-01-01 RX ADMIN — FAMOTIDINE 20 MG: 20 TABLET ORAL at 20:57

## 2022-01-01 RX ADMIN — OMEGA-3-ACID ETHYL ESTERS 2 G: 1 CAPSULE, LIQUID FILLED ORAL at 08:31

## 2022-01-01 RX ADMIN — SODIUM CHLORIDE, POTASSIUM CHLORIDE, SODIUM LACTATE AND CALCIUM CHLORIDE: 600; 310; 30; 20 INJECTION, SOLUTION INTRAVENOUS at 13:01

## 2022-01-01 RX ADMIN — CHOLECALCIFEROL (VITAMIN D3) 25 MCG (1,000 UNIT) TABLET 2000 UNITS: at 08:07

## 2022-01-01 RX ADMIN — MIDAZOLAM HYDROCHLORIDE 2 MG: 2 INJECTION, SOLUTION INTRAMUSCULAR; INTRAVENOUS at 00:15

## 2022-01-01 RX ADMIN — DOCUSATE SODIUM 100 MG: 50 LIQUID ORAL at 08:16

## 2022-01-01 RX ADMIN — Medication 300 MCG/HR: at 20:08

## 2022-01-01 RX ADMIN — INSULIN LISPRO 1 UNITS: 100 INJECTION, SOLUTION INTRAVENOUS; SUBCUTANEOUS at 11:48

## 2022-01-01 RX ADMIN — Medication 300 MCG/HR: at 12:45

## 2022-01-01 RX ADMIN — PANTOPRAZOLE SODIUM 40 MG: 40 INJECTION, POWDER, FOR SOLUTION INTRAVENOUS at 08:17

## 2022-01-01 RX ADMIN — VANCOMYCIN HYDROCHLORIDE 1250 MG: 10 INJECTION, POWDER, LYOPHILIZED, FOR SOLUTION INTRAVENOUS at 02:54

## 2022-01-01 RX ADMIN — PROPOFOL 65 MCG/KG/MIN: 10 INJECTION, EMULSION INTRAVENOUS at 11:58

## 2022-01-01 RX ADMIN — NOREPINEPHRINE BITARTRATE 3 MCG/MIN: 1 INJECTION, SOLUTION, CONCENTRATE INTRAVENOUS at 08:39

## 2022-01-01 RX ADMIN — Medication 200 MCG/HR: at 19:57

## 2022-01-01 RX ADMIN — Medication 3 MG: at 23:35

## 2022-01-01 RX ADMIN — MUPIROCIN: 20 OINTMENT TOPICAL at 09:55

## 2022-01-01 RX ADMIN — IPRATROPIUM BROMIDE AND ALBUTEROL SULFATE 1 AMPULE: 2.5; .5 SOLUTION RESPIRATORY (INHALATION) at 07:31

## 2022-01-01 RX ADMIN — PANTOPRAZOLE SODIUM 40 MG: 40 INJECTION, POWDER, FOR SOLUTION INTRAVENOUS at 08:30

## 2022-01-01 RX ADMIN — Medication 175 MCG/HR: at 11:15

## 2022-01-01 RX ADMIN — DEXTROSE MONOHYDRATE 300 MG: 50 INJECTION, SOLUTION INTRAVENOUS at 09:47

## 2022-01-01 RX ADMIN — MIDAZOLAM HYDROCHLORIDE 2 MG: 2 INJECTION, SOLUTION INTRAMUSCULAR; INTRAVENOUS at 23:08

## 2022-01-01 RX ADMIN — ASPIRIN 81 MG: 81 TABLET, CHEWABLE ORAL at 09:45

## 2022-01-01 RX ADMIN — IPRATROPIUM BROMIDE AND ALBUTEROL SULFATE 1 AMPULE: 2.5; .5 SOLUTION RESPIRATORY (INHALATION) at 15:09

## 2022-01-01 RX ADMIN — DEXTROSE MONOHYDRATE 300 MG: 50 INJECTION, SOLUTION INTRAVENOUS at 09:50

## 2022-01-01 RX ADMIN — ASPIRIN 81 MG: 81 TABLET, CHEWABLE ORAL at 08:10

## 2022-01-01 RX ADMIN — SODIUM CHLORIDE 1 MCG/KG/MIN: 9 INJECTION, SOLUTION INTRAVENOUS at 02:29

## 2022-01-01 RX ADMIN — IPRATROPIUM BROMIDE AND ALBUTEROL 1 PUFF: 20; 100 SPRAY, METERED RESPIRATORY (INHALATION) at 19:43

## 2022-01-01 RX ADMIN — MIDAZOLAM HYDROCHLORIDE 2 MG: 2 INJECTION, SOLUTION INTRAMUSCULAR; INTRAVENOUS at 06:46

## 2022-01-01 RX ADMIN — ENOXAPARIN SODIUM 30 MG: 100 INJECTION SUBCUTANEOUS at 20:00

## 2022-01-01 RX ADMIN — POLYETHYLENE GLYCOL (3350) 17 G: 17 POWDER, FOR SOLUTION ORAL at 15:12

## 2022-01-01 RX ADMIN — LEVOFLOXACIN 750 MG: 5 INJECTION, SOLUTION INTRAVENOUS at 12:57

## 2022-01-01 RX ADMIN — MEROPENEM 1000 MG: 1 INJECTION, POWDER, FOR SOLUTION INTRAVENOUS at 04:41

## 2022-01-01 RX ADMIN — Medication 200 MCG/HR: at 12:40

## 2022-01-01 RX ADMIN — DOCUSATE SODIUM 100 MG: 50 LIQUID ORAL at 08:52

## 2022-01-01 RX ADMIN — DEXTROSE MONOHYDRATE 400 MG: 50 INJECTION, SOLUTION INTRAVENOUS at 08:55

## 2022-01-01 RX ADMIN — FUROSEMIDE 40 MG: 10 INJECTION, SOLUTION INTRAVENOUS at 09:49

## 2022-01-01 RX ADMIN — ASPIRIN 81 MG: 81 TABLET, CHEWABLE ORAL at 07:55

## 2022-01-01 RX ADMIN — Medication 10 MG/HR: at 04:37

## 2022-01-01 RX ADMIN — Medication 200 MCG/HR: at 20:41

## 2022-01-01 RX ADMIN — IPRATROPIUM BROMIDE AND ALBUTEROL SULFATE 1 AMPULE: 2.5; .5 SOLUTION RESPIRATORY (INHALATION) at 23:37

## 2022-01-01 RX ADMIN — DIAZEPAM 20 MG: 10 TABLET ORAL at 21:25

## 2022-01-01 RX ADMIN — SODIUM CHLORIDE 3 MCG/KG/MIN: 9 INJECTION, SOLUTION INTRAVENOUS at 08:19

## 2022-01-01 RX ADMIN — SODIUM CHLORIDE, PRESERVATIVE FREE 10 ML: 5 INJECTION INTRAVENOUS at 20:57

## 2022-01-01 RX ADMIN — Medication 175 MCG/HR: at 16:38

## 2022-01-01 RX ADMIN — Medication 200 MCG/HR: at 04:41

## 2022-01-01 RX ADMIN — MEROPENEM 1000 MG: 1 INJECTION, POWDER, FOR SOLUTION INTRAVENOUS at 04:39

## 2022-01-01 RX ADMIN — SODIUM CHLORIDE, POTASSIUM CHLORIDE, SODIUM LACTATE AND CALCIUM CHLORIDE: 600; 310; 30; 20 INJECTION, SOLUTION INTRAVENOUS at 15:00

## 2022-01-01 RX ADMIN — NOREPINEPHRINE BITARTRATE 7 MCG/MIN: 1 INJECTION, SOLUTION, CONCENTRATE INTRAVENOUS at 18:21

## 2022-01-01 RX ADMIN — INSULIN LISPRO 6 UNITS: 100 INJECTION, SOLUTION INTRAVENOUS; SUBCUTANEOUS at 19:57

## 2022-01-01 RX ADMIN — LEUCINE, PHENYLALANINE, LYSINE, METHIONINE, ISOLEUCINE, VALINE, HISTIDINE, THREONINE, TRYPTOPHAN, ALANINE, GLYCINE, ARGININE, PROLINE, SERINE, TYROSINE, SODIUM ACETATE, DIBASIC POTASSIUM PHOSPHATE, MAGNESIUM CHLORIDE, SODIUM CHLORIDE, CALCIUM CHLORIDE, DEXTROSE
365; 280; 290; 200; 300; 290; 240; 210; 90; 1035; 515; 575; 340; 250; 20; 340; 261; 51; 59; 33; 20 INJECTION INTRAVENOUS at 18:29

## 2022-01-01 RX ADMIN — DEXTROSE MONOHYDRATE 300 MG: 50 INJECTION, SOLUTION INTRAVENOUS at 10:15

## 2022-01-01 RX ADMIN — SODIUM CHLORIDE, PRESERVATIVE FREE 10 ML: 5 INJECTION INTRAVENOUS at 00:11

## 2022-01-01 RX ADMIN — DIAZEPAM 20 MG: 10 TABLET ORAL at 20:40

## 2022-01-01 RX ADMIN — IPRATROPIUM BROMIDE AND ALBUTEROL SULFATE 1 AMPULE: 2.5; .5 SOLUTION RESPIRATORY (INHALATION) at 12:21

## 2022-01-01 RX ADMIN — Medication 200 MCG/HR: at 01:37

## 2022-01-01 ASSESSMENT — PAIN SCALES - GENERAL
PAINLEVEL_OUTOF10: 0
PAINLEVEL_OUTOF10: 5
PAINLEVEL_OUTOF10: 0
PAINLEVEL_OUTOF10: 2
PAINLEVEL_OUTOF10: 0
PAINLEVEL_OUTOF10: 2
PAINLEVEL_OUTOF10: 0

## 2022-01-01 ASSESSMENT — PULMONARY FUNCTION TESTS
PIF_VALUE: 21
PIF_VALUE: 40
PIF_VALUE: 33
PIF_VALUE: 24
PIF_VALUE: 28
PIF_VALUE: 33
PIF_VALUE: 26
PIF_VALUE: 36
PIF_VALUE: 32
PIF_VALUE: 31
PIF_VALUE: 23
PIF_VALUE: 43
PIF_VALUE: 31
PIF_VALUE: 26
PIF_VALUE: 28
PIF_VALUE: 30
PIF_VALUE: 29
PIF_VALUE: 20
PIF_VALUE: 24
PIF_VALUE: 25
PIF_VALUE: 26
PIF_VALUE: 26
PIF_VALUE: 27
PIF_VALUE: 19
PIF_VALUE: 34
PIF_VALUE: 30
PIF_VALUE: 32
PIF_VALUE: 35
PIF_VALUE: 33
PIF_VALUE: 32
PIF_VALUE: 25
PIF_VALUE: 32
PIF_VALUE: 36
PIF_VALUE: 32
PIF_VALUE: 25
PIF_VALUE: 24
PIF_VALUE: 33
PIF_VALUE: 32
PIF_VALUE: 31
PIF_VALUE: 26
PIF_VALUE: 23
PIF_VALUE: 28
PIF_VALUE: 30
PIF_VALUE: 34
PIF_VALUE: 23
PIF_VALUE: 22
PIF_VALUE: 28
PIF_VALUE: 27
PIF_VALUE: 34
PIF_VALUE: 36
PIF_VALUE: 24
PIF_VALUE: 21
PIF_VALUE: 32
PIF_VALUE: 32
PIF_VALUE: 26
PIF_VALUE: 29
PIF_VALUE: 32
PIF_VALUE: 22
PIF_VALUE: 28
PIF_VALUE: 31
PIF_VALUE: 33
PIF_VALUE: 36
PIF_VALUE: 22
PIF_VALUE: 30
PIF_VALUE: 28
PIF_VALUE: 33
PIF_VALUE: 21
PIF_VALUE: 24
PIF_VALUE: 32
PIF_VALUE: 29
PIF_VALUE: 31
PIF_VALUE: 31
PIF_VALUE: 24
PIF_VALUE: 20
PIF_VALUE: 26
PIF_VALUE: 34
PIF_VALUE: 25
PIF_VALUE: 28
PIF_VALUE: 27
PIF_VALUE: 32
PIF_VALUE: 29
PIF_VALUE: 31
PIF_VALUE: 27
PIF_VALUE: 24
PIF_VALUE: 32
PIF_VALUE: 21
PIF_VALUE: 27
PIF_VALUE: 28
PIF_VALUE: 35
PIF_VALUE: 28
PIF_VALUE: 26
PIF_VALUE: 30
PIF_VALUE: 20
PIF_VALUE: 34
PIF_VALUE: 36
PIF_VALUE: 30
PIF_VALUE: 25
PIF_VALUE: 32
PIF_VALUE: 26
PIF_VALUE: 28
PIF_VALUE: 29
PIF_VALUE: 23
PIF_VALUE: 35
PIF_VALUE: 32
PIF_VALUE: 32
PIF_VALUE: 34
PIF_VALUE: 25
PIF_VALUE: 34
PIF_VALUE: 32
PIF_VALUE: 20
PIF_VALUE: 27
PIF_VALUE: 19
PIF_VALUE: 27
PIF_VALUE: 32
PIF_VALUE: 32
PIF_VALUE: 26
PIF_VALUE: 27
PIF_VALUE: 20
PIF_VALUE: 30
PIF_VALUE: 33
PIF_VALUE: 20
PIF_VALUE: 33
PIF_VALUE: 27
PIF_VALUE: 32
PIF_VALUE: 33
PIF_VALUE: 31
PIF_VALUE: 23
PIF_VALUE: 24
PIF_VALUE: 26
PIF_VALUE: 31
PIF_VALUE: 31
PIF_VALUE: 32
PIF_VALUE: 24
PIF_VALUE: 29
PIF_VALUE: 33
PIF_VALUE: 26
PIF_VALUE: 21
PIF_VALUE: 20
PIF_VALUE: 26
PIF_VALUE: 29
PIF_VALUE: 28
PIF_VALUE: 34
PIF_VALUE: 29
PIF_VALUE: 27
PIF_VALUE: 28
PIF_VALUE: 19
PIF_VALUE: 28
PIF_VALUE: 30
PIF_VALUE: 34
PIF_VALUE: 27
PIF_VALUE: 32
PIF_VALUE: 30
PIF_VALUE: 31
PIF_VALUE: 29
PIF_VALUE: 30
PIF_VALUE: 26
PIF_VALUE: 26
PIF_VALUE: 29
PIF_VALUE: 28
PIF_VALUE: 26
PIF_VALUE: 25
PIF_VALUE: 24
PIF_VALUE: 35
PIF_VALUE: 34
PIF_VALUE: 26
PIF_VALUE: 24
PIF_VALUE: 31
PIF_VALUE: 33
PIF_VALUE: 25
PIF_VALUE: 28
PIF_VALUE: 32
PIF_VALUE: 33
PIF_VALUE: 31
PIF_VALUE: 31
PIF_VALUE: 34
PIF_VALUE: 32
PIF_VALUE: 28
PIF_VALUE: 29
PIF_VALUE: 26
PIF_VALUE: 25
PIF_VALUE: 30
PIF_VALUE: 28
PIF_VALUE: 30
PIF_VALUE: 29
PIF_VALUE: 31
PIF_VALUE: 29
PIF_VALUE: 31
PIF_VALUE: 30
PIF_VALUE: 40
PIF_VALUE: 31
PIF_VALUE: 24
PIF_VALUE: 27
PIF_VALUE: 23
PIF_VALUE: 19
PIF_VALUE: 25
PIF_VALUE: 25
PIF_VALUE: 31
PIF_VALUE: 31
PIF_VALUE: 24
PIF_VALUE: 37
PIF_VALUE: 23
PIF_VALUE: 32
PIF_VALUE: 26
PIF_VALUE: 27
PIF_VALUE: 34
PIF_VALUE: 31
PIF_VALUE: 25
PIF_VALUE: 24
PIF_VALUE: 36
PIF_VALUE: 32
PIF_VALUE: 34
PIF_VALUE: 35
PIF_VALUE: 24
PIF_VALUE: 30
PIF_VALUE: 26
PIF_VALUE: 32
PIF_VALUE: 27
PIF_VALUE: 29
PIF_VALUE: 28
PIF_VALUE: 30
PIF_VALUE: 28
PIF_VALUE: 32
PIF_VALUE: 24
PIF_VALUE: 28
PIF_VALUE: 31
PIF_VALUE: 25
PIF_VALUE: 28
PIF_VALUE: 21
PIF_VALUE: 32
PIF_VALUE: 25
PIF_VALUE: 26
PIF_VALUE: 23
PIF_VALUE: 32
PIF_VALUE: 24
PIF_VALUE: 18
PIF_VALUE: 24
PIF_VALUE: 21
PIF_VALUE: 23
PIF_VALUE: 20
PIF_VALUE: 30
PIF_VALUE: 32
PIF_VALUE: 25
PIF_VALUE: 26
PIF_VALUE: 30
PIF_VALUE: 27
PIF_VALUE: 28
PIF_VALUE: 22
PIF_VALUE: 26
PIF_VALUE: 35
PIF_VALUE: 30
PIF_VALUE: 30
PIF_VALUE: 34
PIF_VALUE: 32
PIF_VALUE: 33
PIF_VALUE: 26
PIF_VALUE: 34
PIF_VALUE: 25
PIF_VALUE: 21
PIF_VALUE: 22
PIF_VALUE: 23
PIF_VALUE: 25
PIF_VALUE: 32
PIF_VALUE: 26
PIF_VALUE: 25
PIF_VALUE: 35
PIF_VALUE: 25
PIF_VALUE: 28
PIF_VALUE: 28
PIF_VALUE: 30
PIF_VALUE: 35
PIF_VALUE: 35
PIF_VALUE: 25
PIF_VALUE: 25
PIF_VALUE: 28
PIF_VALUE: 27
PIF_VALUE: 32
PIF_VALUE: 28
PIF_VALUE: 29
PIF_VALUE: 21
PIF_VALUE: 33
PIF_VALUE: 27
PIF_VALUE: 33
PIF_VALUE: 31
PIF_VALUE: 34
PIF_VALUE: 23
PIF_VALUE: 31
PIF_VALUE: 33
PIF_VALUE: 34
PIF_VALUE: 32
PIF_VALUE: 31
PIF_VALUE: 20
PIF_VALUE: 32
PIF_VALUE: 19
PIF_VALUE: 27
PIF_VALUE: 33
PIF_VALUE: 23
PIF_VALUE: 37
PIF_VALUE: 27
PIF_VALUE: 25
PIF_VALUE: 32
PIF_VALUE: 28
PIF_VALUE: 32
PIF_VALUE: 19
PIF_VALUE: 30
PIF_VALUE: 21
PIF_VALUE: 27
PIF_VALUE: 27
PIF_VALUE: 34
PIF_VALUE: 31
PIF_VALUE: 35
PIF_VALUE: 32
PIF_VALUE: 28
PIF_VALUE: 30
PIF_VALUE: 26
PIF_VALUE: 34
PIF_VALUE: 29
PIF_VALUE: 28
PIF_VALUE: 28
PIF_VALUE: 33
PIF_VALUE: 25
PIF_VALUE: 31
PIF_VALUE: 23
PIF_VALUE: 30
PIF_VALUE: 27
PIF_VALUE: 25
PIF_VALUE: 30
PIF_VALUE: 26
PIF_VALUE: 24
PIF_VALUE: 29
PIF_VALUE: 21
PIF_VALUE: 29
PIF_VALUE: 26
PIF_VALUE: 28
PIF_VALUE: 26
PIF_VALUE: 13
PIF_VALUE: 30
PIF_VALUE: 21
PIF_VALUE: 32
PIF_VALUE: 22
PIF_VALUE: 37
PIF_VALUE: 33
PIF_VALUE: 22
PIF_VALUE: 29
PIF_VALUE: 22
PIF_VALUE: 28
PIF_VALUE: 30
PIF_VALUE: 32
PIF_VALUE: 33
PIF_VALUE: 34
PIF_VALUE: 35
PIF_VALUE: 28
PIF_VALUE: 32
PIF_VALUE: 24
PIF_VALUE: 25
PIF_VALUE: 21
PIF_VALUE: 33
PIF_VALUE: 31
PIF_VALUE: 35
PIF_VALUE: 32
PIF_VALUE: 25
PIF_VALUE: 23
PIF_VALUE: 34
PIF_VALUE: 36
PIF_VALUE: 27
PIF_VALUE: 20
PIF_VALUE: 34
PIF_VALUE: 30
PIF_VALUE: 25
PIF_VALUE: 31
PIF_VALUE: 19
PIF_VALUE: 28
PIF_VALUE: 21
PIF_VALUE: 25
PIF_VALUE: 25
PIF_VALUE: 28
PIF_VALUE: 31
PIF_VALUE: 33
PIF_VALUE: 28
PIF_VALUE: 20
PIF_VALUE: 20
PIF_VALUE: 29
PIF_VALUE: 34
PIF_VALUE: 24
PIF_VALUE: 32
PIF_VALUE: 28
PIF_VALUE: 31
PIF_VALUE: 26
PIF_VALUE: 31
PIF_VALUE: 33
PIF_VALUE: 31
PIF_VALUE: 30
PIF_VALUE: 20
PIF_VALUE: 33
PIF_VALUE: 29
PIF_VALUE: 27
PIF_VALUE: 29
PIF_VALUE: 29
PIF_VALUE: 21
PIF_VALUE: 33
PIF_VALUE: 25
PIF_VALUE: 34
PIF_VALUE: 26
PIF_VALUE: 37
PIF_VALUE: 24
PIF_VALUE: 28
PIF_VALUE: 23
PIF_VALUE: 32
PIF_VALUE: 35
PIF_VALUE: 31
PIF_VALUE: 29
PIF_VALUE: 33
PIF_VALUE: 24
PIF_VALUE: 21
PIF_VALUE: 20
PIF_VALUE: 31
PIF_VALUE: 31
PIF_VALUE: 28
PIF_VALUE: 24
PIF_VALUE: 30
PIF_VALUE: 32
PIF_VALUE: 35
PIF_VALUE: 20
PIF_VALUE: 20
PIF_VALUE: 18
PIF_VALUE: 20
PIF_VALUE: 27
PIF_VALUE: 26
PIF_VALUE: 31
PIF_VALUE: 36
PIF_VALUE: 29
PIF_VALUE: 26
PIF_VALUE: 22
PIF_VALUE: 32
PIF_VALUE: 20
PIF_VALUE: 28
PIF_VALUE: 31
PIF_VALUE: 30
PIF_VALUE: 23
PIF_VALUE: 37
PIF_VALUE: 22
PIF_VALUE: 19
PIF_VALUE: 33
PIF_VALUE: 32
PIF_VALUE: 27
PIF_VALUE: 31
PIF_VALUE: 28
PIF_VALUE: 36
PIF_VALUE: 32
PIF_VALUE: 29
PIF_VALUE: 29
PIF_VALUE: 30
PIF_VALUE: 27
PIF_VALUE: 35
PIF_VALUE: 35
PIF_VALUE: 36
PIF_VALUE: 31
PIF_VALUE: 22
PIF_VALUE: 25
PIF_VALUE: 31
PIF_VALUE: 27
PIF_VALUE: 20
PIF_VALUE: 26
PIF_VALUE: 28
PIF_VALUE: 32
PIF_VALUE: 35
PIF_VALUE: 24
PIF_VALUE: 27
PIF_VALUE: 27
PIF_VALUE: 21
PIF_VALUE: 27
PIF_VALUE: 21
PIF_VALUE: 30
PIF_VALUE: 34
PIF_VALUE: 36
PIF_VALUE: 26
PIF_VALUE: 23
PIF_VALUE: 26
PIF_VALUE: 22
PIF_VALUE: 28
PIF_VALUE: 31
PIF_VALUE: 32
PIF_VALUE: 27
PIF_VALUE: 22
PIF_VALUE: 33
PIF_VALUE: 27
PIF_VALUE: 32
PIF_VALUE: 36
PIF_VALUE: 32
PIF_VALUE: 37
PIF_VALUE: 39
PIF_VALUE: 29
PIF_VALUE: 32
PIF_VALUE: 37
PIF_VALUE: 28
PIF_VALUE: 28

## 2022-01-01 ASSESSMENT — ENCOUNTER SYMPTOMS
CHEST TIGHTNESS: 0
EYE REDNESS: 0
SINUS PAIN: 0
SORE THROAT: 0
ABDOMINAL PAIN: 0
CONSTIPATION: 0
VOMITING: 0
EYE DISCHARGE: 0
RHINORRHEA: 0
DIARRHEA: 0
SINUS PRESSURE: 0
NAUSEA: 0
COUGH: 1
SHORTNESS OF BREATH: 1

## 2022-01-09 PROBLEM — J96.01 ACUTE HYPOXEMIC RESPIRATORY FAILURE (HCC): Status: ACTIVE | Noted: 2022-01-01

## 2022-01-09 NOTE — ED PROVIDER NOTES
[address] ED  EMERGENCY DEPARTMENT ENCOUNTER        Pt Name: Shirley Huerta  MRN: [id number]  [place] 1965  Date of evaluation: 1/9/2022  Provider: Mabelene Crigler, PA-C  PCP: Tory Brown  ED Attending: Aileen Camacho MD    This patient was seen and evaluated by the attending physician Aileen Camacho MD.  I have not independently evaluated this patient. CHIEF COMPLAINT       Chief Complaint   Patient presents with    Pharyngitis     Has not felt well x 3 days, pulse ox 58% at triage but pt does not appear distressed  will recheck.  Headache       HISTORY OF PRESENT ILLNESS   (Location/Symptom, Timing/Onset, Context/Setting, Quality, Duration, Modifying Factors, Severity)  Note limiting factors. Shirley Huerta is a 61 y.o. male with a history of HTN, HLD, DM 2, CAD, MI, PAD, tobacco use presents via private vehicle for evaluation of a sore throat shortness of breath and a headache. Onset of symptoms 3 days prior to arrival.  Gradual onset of worsening symptoms. Which have been present to the current time. No tx prior to arrival.     Nursing Notes were all reviewed and agreed with or any disagreements were addressed  in the HPI. REVIEW OF SYSTEMS  (2-9 systems for level 4, 10 or more for level 5)     Review of Systems   Constitutional: Positive for fatigue. Negative for chills and fever. HENT: Negative. Negative for congestion, rhinorrhea, sinus pressure, sinus pain and sore throat. Eyes: Negative for discharge, redness and visual disturbance. Respiratory: Positive for cough and shortness of breath. Negative for chest tightness. Cardiovascular: Negative for chest pain and palpitations. Gastrointestinal: Negative for abdominal pain, constipation, diarrhea, nausea and vomiting. Genitourinary: Negative for difficulty urinating, dysuria and frequency. Musculoskeletal: Negative. Skin: Negative. Neurological: Positive for headaches. Negative for dizziness, weakness and numbness. Psychiatric/Behavioral: Negative. All other systems reviewed and are negative. Positivesand Pertinent negatives as per HPI. Except as noted above in the ROS, all other systems were reviewed and negative. PAST MEDICAL HISTORY     Past Medical History:   Diagnosis Date    Diabetes mellitus ([facility] [address]     Family history of early CAD     GERD (gastroesophageal reflux disease)     High cholesterol 12/2017    MI (myocardial infarction) ([facility] [address] 12/30/2017    ST Elevation MI    [facility] 12/30/2017    Smoker 12/2017    ST elevation myocardial infarction involving left anterior descending (LAD) coronary artery (HCC)          SURGICAL HISTORY       Past Surgical History:   Procedure Laterality Date    CHOLECYSTECTOMY      CORONARY ANGIOPLASTY WITH STENT PLACEMENT  12/30/2017    BASILIA- 3.0x 38- mLAD         CURRENT MEDICATIONS       Previous Medications    ALBUTEROL SULFATE  (90 BASE) MCG/ACT INHALER    INHALE 2 PUFFS BY MOUTH EVERY 6 HOURS AS NEEDED    ASPIRIN (ASPIRIN LOW DOSE) 81 MG CHEWABLE TABLET    CHEW 1 TABLET BY MOUTH EVERY DAY    ATORVASTATIN (LIPITOR) 40 MG TABLET    TAKE 1 TABLET BY MOUTH EVERY DAY AT NIGHT    CLOPIDOGREL (PLAVIX) 75 MG TABLET    TAKE 1 TABLET BY MOUTH EVERY DAY    FAMOTIDINE (PEPCID) 20 MG TABLET    Take 20 mg by mouth 2 times daily as needed    GLUCOSE (GLUTOSE) 40 % GEL    Infuse 100 mLs intravenously    IBUPROFEN (ADVIL;MOTRIN) 600 MG TABLET    Take 1 tablet by mouth 3 times daily as needed for Pain    ISOSORBIDE MONONITRATE (IMDUR) 30 MG EXTENDED RELEASE TABLET    Take by mouth    METFORMIN (GLUCOPHAGE) 500 MG TABLET    Take 500 mg by mouth daily     NAPROXEN (NAPROSYN) 500 MG TABLET    Take 500 mg by mouth 2 times daily    NITROGLYCERIN (NITROSTAT) 0.4 MG SL TABLET    DISSOLVE 1 TABLET UNDER TONGUE AT ONSET OF CHEST PAIN. IF NO RELIEF AFTER 1 DOSE CALL 911.  (MAX 3)    VASCEPA 1 G CAPS CAPSULE    TAKE 2 CAPSULES BY MOUTH TWICE A DAY         ALLERGIES     Patient has no known allergies. FAMILY HISTORY       Family History   Problem Relation Age of Onset    Heart Attack Mother          SOCIAL HISTORY       Social History     Socioeconomic History    Marital status:      Spouse name: None    Number of children: None    Years of education: None    Highest education level: None   Occupational History    None   Tobacco Use    Smoking status: Former Smoker     Packs/day: 1.00     Types: Cigarettes    Smokeless tobacco: Never Used    Tobacco comment: quit 12/30/17   Vaping Use    Vaping Use: Never used   Substance and Sexual Activity    Alcohol use: No    Drug use: No    Sexual activity: Yes     Partners: Female   Other Topics Concern    None   Social History Narrative    None     Social Determinants of Health     Financial Resource Strain:     Difficulty of Paying Living Expenses: Not on file   Food Insecurity:     Worried About Running Out of Food in the Last Year: Not on file    Matt of Food in the Last Year: Not on file   Transportation Needs:     Lack of Transportation (Medical): Not on file    Lack of Transportation (Non-Medical):  Not on file   Physical Activity:     Days of Exercise per Week: Not on file    Minutes of Exercise per Session: Not on file   Stress:     Feeling of Stress : Not on file   Social Connections:     Frequency of Communication with Friends and Family: Not on file    Frequency of Social Gatherings with Friends and Family: Not on file    Attends Cheondoism Services: Not on file    Active Member of Clubs or Organizations: Not on file    Attends Club or Organization Meetings: Not on file    Marital Status: Not on file   Intimate Partner Violence:     Fear of Current or Ex-Partner: Not on file    Emotionally Abused: Not on file    Physically Abused: Not on file    Sexually Abused: Not on file   Housing Stability:     Unable to Pay for Housing in the Last Year: Not on file    Number of Places Lived in the Last Year: Not on file    Unstable Housing in the Last Year: Not on file       SCREENINGS     NIH Score       [place] Coma Scale  Eye Opening: Spontaneous  Best Verbal Response: Oriented  Best Motor Response: Obeys commands  Christianne Coma Scale Score: 15    [facility]     Heart Score         PHYSICAL EXAM    (up to 7 for level 4, 8 ormore for level 5)     ED Triage Vitals [01/09/22 1137]   BP Temp Temp Source Pulse Resp SpO2 Height Weight   94/65 96.5 °F (35.8 °C) Oral 111 18 (!) 58 % 5' 3\" (1.6 m) 144 lb (65.3 kg)       Physical Exam  Vitals and nursing note reviewed. Constitutional:       Appearance: He is ill-appearing. He is not diaphoretic. HENT:      Head: Normocephalic. Nose: Nose normal.      Mouth/Throat:      Pharynx: No oropharyngeal exudate. Eyes:      General:         Right eye: No discharge. Left eye: No discharge. Conjunctiva/sclera: Conjunctivae normal.      Pupils: Pupils are equal, round, and reactive to light. Cardiovascular:      Rate and Rhythm: Regular rhythm. Tachycardia present. Heart sounds: Normal heart sounds. No murmur heard. No friction rub. No gallop. Pulmonary:      Effort: Tachypnea and respiratory distress present. Breath sounds: No wheezing. Comments: Decreased air movement throughout  Abdominal:      General: Bowel sounds are normal. There is no distension. Palpations: Abdomen is soft. Tenderness: There is no abdominal tenderness. Musculoskeletal:         General: Normal range of motion. Cervical back: Normal range of motion. Skin:     General: Skin is warm and dry. Neurological:      General: No focal deficit present. Mental Status: He is alert and oriented to person, place, and time.    Psychiatric:         Behavior: Behavior normal.           DIAGNOSTIC RESULTS   LABS:    Labs Reviewed   COVID-19 & INFLUENZA COMBO - Abnormal; Notable for the following components:       Result Value    SARS-CoV-2 RNA, RT PCR DETECTED (*)     All other components within normal limits    Narrative:     David Antonio  [facility] tel. [phone],  mary bustillos, 01/09/2022 12:56, by Mann Morrison  Performed at:  [facility],  [employer]   Phone [phone]   LACTATE, SEPSIS - Abnormal; Notable for the following components:    Lactic Acid, Sepsis 2.7 (*)     All other components within normal limits    Narrative:     Performed at:  [facility],  [employer]   Phone [phone]   CBC - Abnormal; Notable for the following components:    RBC 6.07 (*)     All other components within normal limits    Narrative:     Performed at:  [place] [address],  [employer]   Phone [phone]   COMPREHENSIVE METABOLIC PANEL W/ REFLEX TO MG FOR LOW K - Abnormal; Notable for the following components:    Sodium 129 (*)     Chloride 90 (*)     Glucose 140 (*)     CREATININE 0.8 (*)     Albumin/Globulin Ratio 1.0 (*)     Total Bilirubin 1.6 (*)     ALT 66 (*)      (*)     All other components within normal limits    Narrative:     Performed at:  [place] [address],  [employer]   Phone [phone]   LACTATE, SEPSIS - Abnormal; Notable for the following components:    Lactic Acid, Sepsis 3.1 (*)     All other components within normal limits    Narrative:     Performed at:  [place] [address],  [employer]   Phone [phone]   BLOOD GAS, VENOUS - Abnormal; Notable for the following components:    pCO2, [place] 33.4 (*)     HCO3, Venous 20.0 (*)     Base Excess, [place] -3.8 (*)     All other components within normal limits    Narrative:     Performed at:  [facility]) [facility], [place], [facility]   Phone [phone]   CULTURE, BLOOD 2   CULTURE, BLOOD 1   TROPONIN    Narrative:     Performed at:  [facility],  [place], [facility]   Phone [phone]   BRAIN NATRIURETIC PEPTIDE    Narrative:     Performed at:  [facility],  [place], [facility]   Phone [phone]   PROCALCITONIN    Narrative:     Performed at:  [facility],  [place], [facility]   Phone [phone]   MAGNESIUM    Narrative:     Performed at:  [facility]) [facility],  [place], [facility]   Phone [phone]       All other labs were within normal range or notreturned as of this dictation. EKG: All EKG's are interpreted by the Emergency Department Physician who either signs or Co-signs this chart in the absence of a cardiologist.  Please see their note for interpretation of EKG. RADIOLOGY:   Interpretation per the Radiologist below, if available at the time of this note:    CT CHEST PULMONARY EMBOLISM W CONTRAST   Final Result   1. No evidence of pulmonary embolism. 2.  Commonly reported imaging features of COVID-19 pneumonia are present. Other processes such as influenza pneumonia and organizing pneumonia, as can   be seen with drug toxicity and connective tissue disease, can cause a similar   imaging pattern. PneTyp   3. Mild compression deformity of T8 of uncertain chronicity, new since   05/29/2020. Similar mild moderate compression deformities at T10, T12.   4. Fatty liver. 5. Interval increase in size of now 2.7 cm fat attenuation lesion of the   pancreatic neck. Recommend further evaluation with nonemergent pancreas   protocol MRI. XR CHEST PORTABLE   Final Result   1. Bilateral interstitial thickening either due to edema or atypical   interstitial pneumonia.                CRITICAL CARE TIME Total critical care time today provided was 35 minutes. This excludes seperately billable procedures and family discussion time. Provided for acute resp failure, due to COVID requiring intervention with concern for potential decompensation. CONSULTS: hospitalist, agrees to accept the patient in stable condition. EMERGENCY DEPARTMENT COURSE and DIFFERENTIAL DIAGNOSIS/MDM:   Vitals:    Vitals:    01/09/22 1630 01/09/22 1645 01/09/22 1700 01/09/22 1715   BP: 117/78 109/64 124/67 90/62   Pulse: 114 111 111 118   Resp: 25 14 (!) 32 18   Temp:       TempSrc:       SpO2: 91% (!) 86% (!) 85% (!) 84%   Weight:       Height:           Patient was given the following medications:  Medications   ipratropium-albuterol (DUONEB) nebulizer solution 1 ampule (has no administration in time range)   dexamethasone (PF) (DECADRON) injection 6 mg (6 mg IntraVENous Given 1/9/22 1218)   0.9 % sodium chloride IV bolus 1,959 mL (1,959 mLs IntraVENous Bolus from Bag 1/9/22 1317)   cefTRIAXone (ROCEPHIN) 1000 mg IVPB in 50 mL D5W minibag (0 mg IntraVENous Stopped 1/9/22 1633)     And   levoFLOXacin (LEVAQUIN) 750 MG/150ML infusion 750 mg (0 mg IntraVENous Stopped 1/9/22 1454)   iopamidol (ISOVUE-370) 76 % injection 85 mL (85 mLs IntraVENous Given 1/9/22 1523)         Afebrile, stable, patient presents to the ED for evaluation. Patient presents to the ED with an SPO2 of 58% on room air. Placed on oxygen and then a nonrebreather he continues to remain hypoxic on nonrebreather with his SPO2 ranging in 86. Ordered Vapotherm. Steroids and breathing treatments for patient labs chest x-ray and CT of the chest is ordered. Covif and influenza evaluated. ED Course as of 01/09/22 1747   Felix Mcelroy Jan 09, 2022   1241 Elevated lactic acid therefore IV fluids and antibiotics are initiated.  [AR]   1331 Positive no elevation in procalcitonin. BNP mildly elevated 105. Troponin is not elevated. Lactic acid is elevated at 2.7.   Patient's sodium is 129 chloride of 90. IV fluids will help improve this. Patient's glucose with hyperglycemia with a glucose of 140. [AR]      ED Course User Index  [AR] Vinay Triana PA-C     Seen in conjunction with attending ED provider who agrees with assessment and plan. All questions are answered. The patient tolerated their visit well. They were seen and evaluated by the Juliette Sparks MD who agreed with the assessment and plan. The patient and / or the family were informed of the results of any tests, a time was given to answer questions, a plan was proposed and they agreed Mita Harris. FINAL IMPRESSION      1. Acute hypoxemic respiratory failure due to COVID-19 [facility])          DISPOSITION/PLAN   DISPOSITION Admitted 01/09/2022 05:44:51 PM      PATIENT REFERRED TO:  No follow-up provider specified. DISCHARGE MEDICATIONS:  New Prescriptions    No medications on file       DISCONTINUED MEDICATIONS:  Discontinued Medications    No medications on file              Pt was seen during the [place] 19 pandemic. Appropriate PPE worn by ME during patient encounters. Pt seen during a time with constrained hospital bed capacity and other potential inpatient and outpatient resources were constrained due to the viral pandemic.    Please note that portions of this note were completed with a voice recognition program.  Efforts were made to edit the dictations but occasionally words are mis-transcribed.)    Vinay Triana PA-C (electronically signed)        Vinay Triana PA-C  01/09/22 3777

## 2022-01-09 NOTE — LETTER
[address] [address]  Phone: [phone]             January 14, 2022    Patient: Naa Church   YOB: 1965   Date of Visit: 1/9/2022       To Whom It May Concern:    Naa Church was seen and treated in our facility  beginning 1/9/2022 and a discharge date is yet to be determined. Family has been in to see patient throughout his stay.   Sincerely,       Tj Benavides RN, BSN        Signature:__________________________________

## 2022-01-09 NOTE — LETTER
[address] [address]  Phone: [phone]             January 14, 2022    Patient: Alejandra Dorsey   YOB: 1965   Date of Visit: 1/9/2022       To Whom It May Concern:    Alejandra Dorsey was seen and treated in our facility  beginning 1/9/2022 and there is no discharge date at this time.  Family have been here visiting with him since his admission       Sincerely,       Sabiha Davis RN,BSN      Signature:__________________________________

## 2022-01-09 NOTE — ED NOTES
Pt O2 sat in 50% range on RA. Place on NC at 2, 3, and 5 liters. O2 sat increased to 77%. Pt placed on NR @ 15lpm.  O2 sat increased to 94%. Assisted pt to remove shirt and O2 sat dropped to 80%.         Prakash Pratt RN  01/09/22 0716

## 2022-01-09 NOTE — ED PROVIDER NOTES
I independently performed a history and physical on [place] [address]. All diagnostic, treatment, and disposition decisions were made by myself in conjunction with the advanced practice provider. I personally saw the patient and performed a substantive portion of the visit including all aspects of the medical decision making. For further details of [place] [address] emergency department encounter, please see KADE Fuller's documentation. Patient was evaluated due to concern for worsening shortness of breath over the past 3 days. He reports also having a headache with dizziness and sore throat. He arrived at 58% on room air and does not normally wear oxygen. Even while on a NRB initially in the ED, he did not appear to be in any significant respiratory distress and was sitting comfortably in the bed but did appear like he did not feel well. Physical:   Gen: Mild acute distress. AOx3. Ill-appearing  Psych: Depressed mood and affect  HEENT: NCAT, dry MM, PERRL  Neck: supple  Cardiac: tachycardia, regular rhythm  Lungs: diminished lung sounds in all lung fields, mild respiratory distress   Abdomen: soft and nontender with no R/D/G  Neuro: GCS = 15, moving all extremities equally     The Ekg interpreted by me shows  sinus tachycardia, axiq=851   Axis is   Left axis deviation  QTc is  within an acceptable range  Intervals and Durations are unremarkable. ST Segments: no acute change and nonspecific changes  No significant change from prior EKG dated - 6/30/21  No STEMI           Critical Care Time:    I personally saw the patient and independently provided 30 minutes of non-concurrent critical care out of a total shared critical care time provided. Includes repeat examinations, speaking with consultants, lab  interpretation, charting, treating for acute respiratory failure from COVID needing vapotherm    Excludes separate billable procedures.   Patient at risk for serious decompensation if not treated for this life-threatening condition. MDM:  Patient was evaluated due to concern for symptoms suggestive of COVID. He did ultimately have COVID based on lab work. He was initially started on Vapotherm and was tolerating this well in the ED for hours. CT did not show any pulmonary embolism. I did evaluate him multiple times and he was still able to speak to me but when I did see him again at 1620, he did appear slightly more tired but still answering questions appropriately. At that time, I did verify with him and he stated that he would be fine being intubated if needed. At this point, that was still not needed. I did speak to the hospitalist (Dr. Haleigh Roe) at [phone] about him and she is aware that the patient will need admission to ICU in case his respiratory status were to worsen overnight. I did evaluate one more time shortly after this and he still does not require intubation, but if symptoms do worsen, nurse is aware to tell either hospitalist or Dr. Ale Dobson in the ED about this, but at this point, he still was not requiring intubation, but again, he is aware this could happen if his symptoms continue to worsen. He was critical at time of admission based on concern for respiratory failure from COVID but was still tolerating the Vapotherm. He may also be a good candidate for BiPAP trial if vapotherm does ultimately fail, but based on having COVID, he is aware that intubation may be needed and he is okay with this if it ultimately comes to that. His condition was stable but guarded at time of admission and his wife is aware that the next day or 2 are critical as to how he will do related to Eliana.        Merry Bello MD  01/09/22 2060

## 2022-01-09 NOTE — ED NOTES
[facility] respiratory to place patient on vapo therm     Donna Sullivan  01/09/22 2921

## 2022-01-09 NOTE — ED NOTES
Called respiratory to switch patient from Vapotherm to [address], RN  01/09/22 5030

## 2022-01-09 NOTE — ED NOTES
[facility] sent to Dr. Miguel Ángel Chowdhury at [facility]  01/09/22 2502    [employer] completed with call back from Dr. Migel Walker  01/09/22 [phone]

## 2022-01-10 PROBLEM — U07.1 PNEUMONIA DUE TO COVID-19 VIRUS: Status: ACTIVE | Noted: 2022-01-01

## 2022-01-10 PROBLEM — U07.1 ACUTE HYPOXEMIC RESPIRATORY FAILURE DUE TO COVID-19 (HCC): Status: ACTIVE | Noted: 2022-01-01

## 2022-01-10 PROBLEM — J12.82 PNEUMONIA DUE TO COVID-19 VIRUS: Status: ACTIVE | Noted: 2022-01-01

## 2022-01-10 NOTE — ED PROVIDER NOTES
I was available for consultation on the patient if deemed necessary by ER staff. I was not involved in the care of this patient nor had any participation in the medical decision making process. I was the attending on duty when the patient was boarded in the ER while awaiting a bed assignment. I was available for consultation but was not requested to evaluate the patient, nor asked supervised the case. I did not see the patient and I am signing this chart administratively after the fact.           Katharine Morales MD  01/10/22 4700

## 2022-01-10 NOTE — ED NOTES
Intensivist phoned to find out if transfer options could be tried again to place this patient at another ICU  Due to bed status at [facility]. PTC phoned to review if all transfer options have been exhausted earlier. Actually, PTC states no transfer was ever initiated and a case will now be opened for this COVID + ICU level of care. [facility]       PTC tried other facilities in and out of network with no beds available.  [facility]  Private Vehicle

## 2022-01-10 NOTE — CARE COORDINATION
Case Management Assessment  Initial Evaluation      Patient Name: Poly Cerda  YOB: 1965  Diagnosis: Acute hypoxemic respiratory failure ([facility] [address] [J96.01]  Date / Time: 1/9/2022 11:42 AM    Admission status/Date:inpt  Chart Reviewed: Yes      Patient Interviewed: No   Family Interviewed:  Yes - spouse      Hospitalization in the last 30 days:  No      Health Care Decision Maker :   Primary Decision Maker: [address] - Spouse - [phone]    Secondary Decision Maker: [address] - Child - [phone]    (CM - must 1st enter selection under Navigator - emergency contact- Health Care Decision Maker Relationship and pick relationship)   Who do you trust or have selected to make healthcare decisions for you      Met with: pt's spouse via TC  Interview conducted  (bedside/phone):    Current PCP:   Kenneth Camacho required for SNF : Y, N          3 night stay required - Y, [employer]  Support Systems/Care Needs:    Transportation: self    Meal Preparation: self    Housing  Living Arrangements: home with spouse and adult chidren  Steps: 3  Intent for return to present living arrangements: tbd  Identified Issues: no    Home Care Information  Active with [address] : No Agency:(Services)     Passport/Waiver : No  :                      Phone Number:    Passport/Waiver Services: no          Durable Medical Equiptment   DME Provider: alisa  Equipment:   Walker___Cane___RTS___ BSC___Shower Chair___Hospital Bed___W/C____Other___electric scooter_____  02 at ____Liter(s)---wears(frequency)_______ [facility] ___ CPAP___ BiPap___   N/A____      Home O2 Use :  No    If No for home O2---if presently on O2 during hospitalization:  No  if yes CM to follow for potential DC O2 need  Informed of need for care provider to bring portable home O2 tank on day of discharge for nursing to connect prior to leaving:   Not Indicated  Verbalized agreement/Understanding:   Not Indicated    Community Service Affiliation  Dialysis:  No    · Agency:  · Location:  · Dialysis Schedule:  · Phone:   · Fax: Other Community Services: (ex:PT/OT,Mental Health,Wound Clinic, Cardio/Pul [address])    DISCHARGE PLAN: Explained Case Management role/services. Reviewed chart. Writer spoke with pt's spouse for initial interview. Pt from home with spouse and two adult sons. Pt uses electric scooter to get around at baseline. Pt is C-19+01/09/22, currently on BiPAP,FiO2 decreased to 80%. Following.

## 2022-01-10 NOTE — ED NOTES
Pt informed that this RN wanted to hold his breakfast tray because of his O2 level being low. Pt stated that he understood and was fine with that plan.      Maine Smiley RN  01/10/22 [id number]

## 2022-01-10 NOTE — PROGRESS NOTES
01/10/22 0211   NIV Type   $NIV $Daily Charge   Skin Assessment Clean, dry, & intact   Skin Protection for O2 Device Yes   NIV Started/Stopped On   Equipment Type V60   Mode Bilevel   Mask Type Full face mask   Mask Size Medium   Settings/Measurements   IPAP 14 cmH20   CPAP/EPAP 7 cmH2O   Rate Ordered 14   Resp 24   FiO2  100 %   Vt Exhaled 792 mL   Mask Leak (lpm) 11 lpm   Comfort Level Good   Using Accessory Muscles No   SpO2 91   Alarm Settings   Alarms On Y

## 2022-01-10 NOTE — H&P
History and Physical      Chief Complaint   Patient presents with    Pharyngitis     Has not felt well x 3 days, pulse ox 58% at triage but pt does not appear distressed  will recheck.  Headache        HISTORY OF PRESENT ILLNESS:     Patient is a 79-year-old white male with past medical history of hypertension, hyperlipidemia, diabetes mellitus type 2, CAD, PAD, tobacco abuse who came to the emergency room for complaints of sore throat, shortness of breath and headache. Symptoms started 3 days prior to admission. He is getting worse. In the emergency room he was found to have pulse ox of 58%. Patient tested positive for COVID-19. His oxygenation has gotten worse and he is presently on BiPAP he is presently on 80% on BiPAP. After being on BiPAP several hours patient was switched to Vapo therm. And is I am the ER patient is back on BiPAP. Patient has No Known Allergies.     Past Medical History:   Diagnosis Date    Diabetes mellitus ([facility] [address]     Family history of early CAD     GERD (gastroesophageal reflux disease)     High cholesterol 12/2017    MI (myocardial infarction) ([facility] [address] 12/30/2017    ST Elevation MI    NSTEMI (non-ST elevated myocardial infarction) ([facility] [address] 12/30/2017    Smoker 12/2017    ST elevation myocardial infarction involving left anterior descending (LAD) coronary artery (HCC)        Past Surgical History:   Procedure Laterality Date    CHOLECYSTECTOMY      CORONARY ANGIOPLASTY WITH STENT PLACEMENT  12/30/2017    BASILIA- 3.0x 38- mLAD       Scheduled Meds:   albuterol sulfate HFA  2 puff Inhalation BID    lidocaine 1 % injection  5 mL IntraDERmal Once    sodium chloride flush  5-40 mL IntraVENous 2 times per day    ipratropium-albuterol  1 ampule Inhalation Once    0.9 % sodium chloride  30 mL/kg IntraVENous Once    isosorbide mononitrate  30 mg Oral Daily    famotidine  20 mg Oral BID    atorvastatin  40 mg Oral Nightly    omega-3 acid ethyl esters  2 g Oral BID    clopidogrel  75 mg Oral Daily    aspirin  81 mg Oral Daily    sodium chloride flush  5-40 mL IntraVENous 2 times per day    enoxaparin  30 mg SubCUTAneous BID    dexamethasone  20 mg IntraVENous Q24H    Followed by   Carole Wong ON 1/15/2022] dexamethasone  10 mg IntraVENous Q24H    Vitamin D  2,000 Units Oral Daily    remdesivir IVPB  100 mg IntraVENous Q24H       Continuous Infusions:   sodium chloride      sodium chloride         PRN Meds:  sodium chloride flush, sodium chloride, albuterol sulfate HFA, nitroGLYCERIN, sodium chloride flush, sodium chloride, ondansetron **OR** ondansetron, polyethylene glycol, acetaminophen **OR** acetaminophen, potassium chloride, magnesium sulfate       reports that he has quit smoking. His smoking use included cigarettes. He smoked 1.00 pack per day. He has never used smokeless tobacco.    Family History   Problem Relation Age of Onset    Heart Attack Mother        Social History     Socioeconomic History    Marital status:      Spouse name: None    Number of children: None    Years of education: None    Highest education level: None   Occupational History    None   Tobacco Use    Smoking status: Former Smoker     Packs/day: 1.00     Types: Cigarettes    Smokeless tobacco: Never Used    Tobacco comment: quit 12/30/17   Vaping Use    Vaping Use: Never used   Substance and Sexual Activity    Alcohol use: No    Drug use: No    Sexual activity: Yes     Partners: Female   Other Topics Concern    None   Social History Narrative    None     Social Determinants of Health     Financial Resource Strain:     Difficulty of Paying Living Expenses: Not on file   Food Insecurity:     Worried About Running Out of Food in the Last Year: Not on file    Matt of Food in the Last Year: Not on file   Transportation Needs:     Lack of Transportation (Medical): Not on file    Lack of Transportation (Non-Medical):  Not on file   Physical Activity:     Days of Exercise per Week: Not on file    Minutes of Exercise per Session: Not on file   Stress:     Feeling of Stress : Not on file   Social Connections:     Frequency of Communication with Friends and Family: Not on file    Frequency of Social Gatherings with Friends and Family: Not on file    Attends Catholic Services: Not on file    Active Member of Clubs or Organizations: Not on file    Attends Club or Organization Meetings: Not on file    Marital Status: Not on file   Intimate Partner Violence:     Fear of Current or Ex-Partner: Not on file    Emotionally Abused: Not on file    Physically Abused: Not on file    Sexually Abused: Not on file   Housing Stability:     Unable to Pay for Housing in the Last Year: Not on file    Number of [place] in the Last Year: Not on file    Unstable Housing in the Last Year: Not on file     REVIEW OF SYSTEMS:   Constitutional: Negative for fever   HENT: Negative for sore throat   Eyes: Negative for redness   Respiratory: + for dyspnea, cough   Cardiovascular: Negative for chest pain   Gastrointestinal: Negative for vomiting, diarrhea   Genitourinary: Negative for hematuria   Musculoskeletal: Negative for arthralgias   Skin: Negative for rash   Neurological: Negative for syncope   Hematological: Negative for adenopathy   Psychiatric/Behavorial: Negative for anxiety    VS:   BP (!) 134/90   Pulse 103   Temp 96.5 °F (35.8 °C) (Oral)   Resp 15   Ht 5' 3\" (1.6 m)   Wt 144 lb (65.3 kg)   SpO2 91%   BMI 25.51 kg/m²     Gen: Awake and alert. Ill-appearing. .  Eyes: PERRL. No sclera icterus. No conjunctival injection. ENT: No discharge. Pharynx clear. Neck: Trachea midline. Normal thyroid. Resp: + accessory muscle use. + crackles. No wheezes. No rhonchi. No dullness on percussion. CV: Tachycardia. Regular rhythm. No murmur or rub. No edema. GI: Non-tender. Non-distended. No masses. No organomegaly. Normal bowel sounds. No hernia. Skin: Warm and dry.  No nodule on exposed extremities. No rash on exposed extremities. Lymph: No cervical LAD. No supraclavicular LAD. M/S: No cyanosis. No joint deformity. No clubbing. Neuro: Awake. Reflexes 2+ symmetric bilaterally. Moves all 4 extremities, non focal  Psych: Oriented x 3. No anxiety or agitation. CBC:   Recent Labs     01/09/22  1150   WBC 8.2   HGB 16.5   HCT 49.8   MCV 81.9        BMP:   Recent Labs     01/09/22  1150   *   K 3.5   CL 90*   CO2 25   BUN 19   CREATININE 0.8*     LIVER PROFILE:   Recent Labs     01/09/22  1150   *   ALT 66*   BILITOT 1.6*   ALKPHOS 111     PT/INR:   Recent Labs     01/10/22  0801   PROTIME 13.0*   INR 1.14*     Results for Jb Garnica (MRN [id number]) as of 1/10/2022 13:18   Ref. Range 1/9/2022 12:05   INFLUENZA A Latest Ref Range: NOT DETECTED  NOT DETECTED   INFLUENZA B Latest Ref Range: NOT DETECTED  NOT DETECTED   SARS-CoV-2 RNA, RT PCR Latest Ref Range: NOT DETECTED  DETECTED (A)     IR PICC WO SQ PORT/PUMP > 5 YEARS   Final Result   Successful placement of PICC line. CT CHEST PULMONARY EMBOLISM W CONTRAST   Final Result   1. No evidence of pulmonary embolism. 2.  Commonly reported imaging features of COVID-19 pneumonia are present. Other processes such as influenza pneumonia and organizing pneumonia, as can   be seen with drug toxicity and connective tissue disease, can cause a similar   imaging pattern. PneTyp   3. Mild compression deformity of T8 of uncertain chronicity, new since   05/29/2020. Similar mild moderate compression deformities at T10, T12.   4. Fatty liver. 5. Interval increase in size of now 2.7 cm fat attenuation lesion of the   pancreatic neck. Recommend further evaluation with nonemergent pancreas   protocol MRI. XR CHEST PORTABLE   Final Result   1. Bilateral interstitial thickening either due to edema or atypical   interstitial pneumonia.          XR CHEST PORTABLE    (Results Pending)        ASSESSMENT: Principal Problem:    Acute hypoxemic respiratory failure (HCC)  Active Problems:    Coronary artery disease    PAD (peripheral artery disease) (HCC)    HLD (hyperlipidemia)    GERD (gastroesophageal reflux disease)    DMII (diabetes mellitus, type 2) ([facility]    Essential hypertension    Pneumonia due to COVID-19 virus  Resolved Problems:    * No resolved hospital problems. *      PLAN:    #Acute respiratory failure with hypoxemia due to COVID-19. Patient admitted to hospital.  Pulmonary consultation. Supplemental oxygen. Continuous pulse ox and telemetry    #COVID-19 pneumonia. Work-up consistent with COVID-19 pneumonia. He is hypoxic. He meets criteria for treatment. Started on Decadron 6 mg a day. Started on remdesivir. Monitor LFTs and CBC due to high risk medication. He received Tocilizumab due to increased oxygen demands    #CAD stable. No CP. On ASA and Plavix. #PAD stable    #DM type 2. Monitor sugars. Expect sugars will be elevated due to steroids. SSI    #HLD On Lipitor. #Lovenox 30 mg bid for DVT prophylaxis. #Transaminitis due to COVID 19. #Hyponatremia due to COVID. On IVF. IMPORTANT: Please note that some portions of this note may have been created using Dragon voice recognition software. Some \"sound-alike\" and totally wrong word substitutions may have taken place due to known inherent limitations of any such software, including this voice recognition software. In spite of efforts to eliminate such errors, some may not have been corrected. So please read the note with this in mind and recognize such mistakes and understand the correct version using the  context. If there are still uncertainties in the mind of the medical provider reading this note about any aspect of the note, the provider can feel free to contact me. Thanks.        Graeme Mercer MD 1/10/2022 1:13 PM

## 2022-01-10 NOTE — CONSULTS
Remdesivir Day #1    Recent Labs     01/09/22  1150   WBC 8.2   BUN 19   CREATININE 0.8*   ALT 66*   *          Estimated Creatinine Clearance: 82 mL/min (A) (based on SCr of 0.8 mg/dL (L)). Pt has positive Covid test.  Oxygen 40 L per [facility] ordered. Remdesivir 200 mg IVPB today followed by 100 mg daily x4 days. Pharmacy will continue to monitor.      Fredi Ponce, PharmD, [facility], 1/9/2022 9:18 PM

## 2022-01-10 NOTE — CONSULTS
Reason for referral and CC: COVID 19 pneumonia in an unvaccinated individual    HISTORY OF PRESENT ILLNESS: 65-year-old male with history of diabetes and MI presented with shortness of breath. He started feeling symptoms on Thursday and progressively worsened. Was found to be severely hypoxic and short of breath he is currently on BiPAP 100%. PICC placed  Past Medical History:   Diagnosis Date    Diabetes mellitus ([facility] [address]     Family history of early CAD     GERD (gastroesophageal reflux disease)     High cholesterol 12/2017    MI (myocardial infarction) ([facility] [address] 12/30/2017    ST Elevation MI    NSTEMI (non-ST elevated myocardial infarction) ([facility] [address] 12/30/2017    Pneumonia due to COVID-19 virus 1/10/2022    Smoker 12/2017    ST elevation myocardial infarction involving left anterior descending (LAD) coronary artery (HCC)      Past Surgical History:   Procedure Laterality Date    CHOLECYSTECTOMY      CORONARY ANGIOPLASTY WITH STENT PLACEMENT  12/30/2017    BASILIA- 3.0x 45- mLAD     Family History  family history includes Heart Attack in his mother. Social History:  reports that he has quit smoking. His smoking use included cigarettes. He smoked 1.00 pack per day. He has never used smokeless tobacco.   reports no history of alcohol use. ALLERGIES:  Patient has No Known Allergies.   Continuous Infusions:   sodium chloride      sodium chloride       Scheduled Meds:   sodium chloride flush  5-40 mL IntraVENous 2 times per day    insulin lispro  0-18 Units SubCUTAneous TID WC    insulin lispro  0-9 Units SubCUTAneous Nightly    [START ON 1/11/2022] dexamethasone  10 mg IntraVENous Daily    albuterol-ipratropium  1 puff Inhalation 4x daily    0.9 % sodium chloride  30 mL/kg IntraVENous Once    isosorbide mononitrate  30 mg Oral Daily    famotidine  20 mg Oral BID    atorvastatin  40 mg Oral Nightly    omega-3 acid ethyl esters  2 g Oral BID    clopidogrel  75 mg Oral Daily    aspirin  81 mg Oral Daily    sodium chloride flush  5-40 mL IntraVENous 2 times per day    enoxaparin  30 mg SubCUTAneous BID    Vitamin D  2,000 Units Oral Daily    remdesivir IVPB  100 mg IntraVENous Q24H     PRN Meds:  sodium chloride flush, sodium chloride, albuterol sulfate HFA, nitroGLYCERIN, sodium chloride flush, sodium chloride, ondansetron **OR** ondansetron, polyethylene glycol, acetaminophen **OR** acetaminophen, potassium chloride, magnesium sulfate    REVIEW OF SYSTEMS:  Constitutional: Negative for fever  HENT: Negative for sore throat  Eyes: Negative for redness   Respiratory:+ for dyspnea, cough  Cardiovascular: Negative for chest pain  Gastrointestinal: Negative for vomiting, diarrhea   Genitourinary: Negative for hematuria   Musculoskeletal: Negative for arthralgias   Skin: Negative for rash  Neurological: Negative for syncope  Hematological: Negative for adenopathy  Psychiatric/Behavorial: Negative for anxiety    PHYSICAL EXAM: /78   Pulse 99   Temp 96.5 °F (35.8 °C) (Oral)   Resp 26   Ht 5' 3\" (1.6 m)   Wt 144 lb (65.3 kg)   SpO2 90%   BMI 25.51 kg/m²  on bipap  Constitutional:  No acute distress. Eyes: PERRL. Conjunctivae anicteric. ENT: Normal nose. Normal tongue. Neck:  Trachea is midline. No thyroid tenderness. Respiratory: + accessory muscle usage. no decreased breath sounds. No wheezes. + rales. No Rhonchi. Cardiovascular: Normal S1S2. No digit clubbing. No digit cyanosis. No LE edema. Capillary refill is normal.   Gastrointestinal: No mass palpated. No tenderness palpated. No umbilical hernia. Lymphatic: No cervical or supraclavicular adenopathy. Skin: No rash on the exposed extremities. No Nodules or induration on exposed extremities. Psychiatric: No anxiety or Agitation. Alert and Oriented to person, place and time.     CBC:   Recent Labs     01/09/22  1150   WBC 8.2   HGB 16.5   HCT 49.8   MCV 81.9        BMP:   Recent Labs     01/09/22  1150   *   K 3.5   CL 90* CO2 25   BUN 19   CREATININE 0.8*        Recent Labs     01/09/22  1150   *   ALT 66*   BILITOT 1.6*   ALKPHOS 111     Recent Labs     01/10/22  0801   PROTIME 13.0*   INR 1.14*     No results for input(s): NITRITE, COLORU, PHUR, LABCAST, WBCUA, RBCUA, MUCUS, TRICHOMONAS, YEAST, BACTERIA, CLARITYU, SPECGRAV, LEUKOCYTESUR, UROBILINOGEN, BILIRUBINUR, BLOODU, GLUCOSEU, AMORPHOUS in the last 72 hours. Invalid input(s): KETONESU  No results for input(s): PHART, SHL4OHZ, PO2ART in the last 72 hours. PCT 0.09  1/9/22 COVID 19 detected  Chest imaging was reviewed by me and showed   1/9/22 CTPA: no PE     Mediastinum: Similar mildly enlarged mediastinal and bilateral hilar lymph   nodes.  The heart and pericardium demonstrate no acute abnormality.  There is   no acute abnormality of the thoracic aorta.  Moderate coronary artery   calcification.       Lungs/pleura: Moderate emphysema.  Moderate bilateral peripherally oriented   ground-glass occupying slightly less than 50% of the lung parenchyma.  No   evidence of pleural effusion or pneumothorax.       Upper Abdomen: Fatty liver.  Interval increase in size of fat attenuation   lesion of the pancreatic neck measuring 2.7 x 2.1 cm on series 2, image 213,   previously 2.3 x 1.6 cm.       Soft Tissues/Bones: Mild compression deformity of T8 of uncertain chronicity,   new since 05/29/2020.  Similar mild moderate compression deformities at T10,   T12. Diffuse osteopenia. 1/10/22 CXR  Right PICC projects in normal position.       Stable interstitial and ground-glass opacities, most likely atypical   pneumonia.        ASSESSMENT:  · Acute hypoxic respiratory failure  · COVID 19 pneumonia in an unvaccinated individual  · Pulmonary emphysema  · Hyponatremia  · Elevated LFTS  · Tobacco abuse  · DM2  · CAD and PAD with h/o STEMI    PLAN:  Droplet Plus Airborne Precautions   Supplemental oxygen to keep saturation greater than 92%  Decadron 10 mg QD, D#2, monitor

## 2022-01-10 NOTE — ED NOTES
Pt moved from ED 5 to ED 10 with all personal belongings (2 bags). Bipap continues.  Francoise Patino Clinical

## 2022-01-10 NOTE — PROGRESS NOTES
01/10/22 1135   NIV Type   Equipment Type v60   Mode Bilevel   Mask Type Full face mask   Mask Size Medium   Bonnet size Medium   Settings/Measurements   IPAP 14 cmH20   CPAP/EPAP 7 cmH2O   Rate Ordered 14   Resp 20   FiO2  80 %   Vt Exhaled 993 mL   Minute Volume 17 Liters   Mask Leak (lpm) 18 lpm   Comfort Level Good   Using Accessory Muscles No   SpO2 93   Breath Sounds   Right Upper Lobe Diminished   Right Middle Lobe Diminished   Right Lower Lobe Diminished   Left Upper Lobe Diminished   Left Lower Lobe Diminished   Alarm Settings   Alarms On Y

## 2022-01-10 NOTE — PROGRESS NOTES
RT Inhaler-Nebulizer Bronchodilator Protocol Note    There is a bronchodilator order in the chart from a provider indicating to follow the RT Bronchodilator Protocol and there is an Initiate RT Inhaler-Nebulizer Bronchodilator Protocol order as well (see protocol at bottom of note). CXR Findings:  XR CHEST PORTABLE    Result Date: 1/9/2022  1. Bilateral interstitial thickening either due to edema or atypical interstitial pneumonia. The findings from the last RT Protocol Assessment were as follows:   History Pulmonary Disease: Chronic pulmonary disease  Respiratory Pattern: Dyspnea on exertion or RR 21-25 bpm  Breath Sounds: Slightly diminished and/or crackles  Cough: Strong, spontaneous, non-productive  Indication for Bronchodilator Therapy: Decreased or absent breath sounds  Bronchodilator Assessment Score: 6    Aerosolized bronchodilator medication orders have been revised according to the RT Inhaler-Nebulizer Bronchodilator Protocol below. Respiratory Therapist to perform RT Therapy Protocol Assessment initially then follow the protocol. Repeat RT Therapy Protocol Assessment PRN for score 0-3 or on second treatment, BID, and PRN for scores above 3. No Indications - adjust the frequency to every 6 hours PRN wheezing or bronchospasm, if no treatments needed after 48 hours then discontinue using Per Protocol order mode. If indication present, adjust the RT bronchodilator orders based on the Bronchodilator Assessment Score as indicated below. Use Inhaler orders unless patient has one or more of the following: on home nebulizer, not able to hold breath for 10 seconds, is not alert and oriented, cannot activate and use MDI correctly, or respiratory rate 25 breaths per minute or more, then use the equivalent nebulizer order(s) with same Frequency and PRN reasons based on the score. If a patient is on this medication at home then do not decrease Frequency below that used at home.     0-3 - enter or revise RT bronchodilator order(s) to equivalent RT Bronchodilator order with Frequency of every 4 hours PRN for wheezing or increased work of breathing using Per Protocol order mode. 4-6 - enter or revise RT Bronchodilator order(s) to two equivalent RT bronchodilator orders with one order with BID Frequency and one order with Frequency of every 4 hours PRN wheezing or increased work of breathing using Per Protocol order mode. 7-10 - enter or revise RT Bronchodilator order(s) to two equivalent RT bronchodilator orders with one order with TID Frequency and one order with Frequency of every 4 hours PRN wheezing or increased work of breathing using Per Protocol order mode. 11-13 - enter or revise RT Bronchodilator order(s) to one equivalent RT bronchodilator order with QID Frequency and an Albuterol order with Frequency of every 4 hours PRN wheezing or increased work of breathing using Per Protocol order mode. Greater than 13 - enter or revise RT Bronchodilator order(s) to one equivalent RT bronchodilator order with every 4 hours Frequency and an Albuterol order with Frequency of every 2 hours PRN wheezing or increased work of breathing using Per Protocol order mode.          Electronically signed by Harleen Veras RCP on 1/10/2022 at 2:25 AM

## 2022-01-10 NOTE — CONSULTS
Pharmacy Consult for Tocilizumab Initiation per Dr. Martinez June per [facility] P&T Committee  **All criteria need to be met to receive   Tocilizumab for COVID-19 patients**   Age    >22 years old   Yes   Ordering Provider    Restricted to ID, intensivists, or pulmonology  Hospitalist may order in ID absence      Yes   Laboratory Results    Confirmed positive COVID-19  CRP >75 mg/L     C-REACTIVE PROTEIN:  No results found for: CRPHS  ordered     Clinical Status       Within 7 days of symptom onset (< 7 days) OR   Within 2 days of hospital admission OR  Within 24 hours of mechanical ventilation       yes   Concomitant Therapy    Receiving systemic steroids for treatment of COVID 19     yes   Oxygen Status     <92% OR requiring supplemental oxygen    Yes     Contraindications    1. Invasive active mycobacterial or fungal infection  2. Platelet <551,030 or active bleeding  4. Significant immunosuppression    ?     None     Dosing Recommendations:  (One dose maximum)    Dose when compounding from SQ vial:  810 mg (5 syringes): Patient weight >90 kg x 1 dose   648 mg (4 syringes): Patient weight >65 kg to ? 90 kg x 1 dose   486 mg (3 syringes): Patient weight >40 kg to ? 65 kg x 1 dose   324mg (2 syringes): Patient weight ? 40 kg x 1 dose     Thank you for the consult,  Yumiko Lance, ChantellD, [facility], 1/9/2022 9:15 PM

## 2022-01-10 NOTE — PROGRESS NOTES
Pt asked to go back on bipap, he was starting to desat on vapotherm and starting to look more tired.         01/10/22 1428   NIV Type   NIV Started/Stopped On   Equipment Type v60   Mode Bilevel   Mask Type Full face mask   Mask Size Medium   Bonnet size Medium   Settings/Measurements   IPAP 14 cmH20   CPAP/EPAP 7 cmH2O   Rate Ordered 14   Resp 20   FiO2  100 %   Vt Exhaled 588 mL   Minute Volume 12 Liters   Mask Leak (lpm) 8 lpm   Comfort Level Good   Using Accessory Muscles No   SpO2 91   Breath Sounds   Right Upper Lobe Diminished   Right Middle Lobe Diminished   Right Lower Lobe Diminished   Left Upper Lobe Diminished   Left Lower Lobe Diminished   Alarm Settings   Alarms On Y

## 2022-01-11 NOTE — PROGRESS NOTES
01/11/22 0753   NIV Type   Equipment Type v60   Mode Bilevel   Mask Type Full face mask   Mask Size Medium   Bonnet size Medium   Settings/Measurements   IPAP 15 cmH20   CPAP/EPAP 10 cmH2O   Rate Ordered 14   Resp 14   FiO2  100 %   Vt Exhaled 663 mL   Minute Volume 15.1 Liters   Mask Leak (lpm) 15 lpm   Comfort Level Good   Using Accessory Muscles No   SpO2 96   Breath Sounds   Right Upper Lobe Diminished   Right Middle Lobe Diminished   Right Lower Lobe Diminished   Left Upper Lobe Diminished   Left Lower Lobe Diminished   Alarm Settings   Alarms On Y   Press Low Alarm 8 cmH2O   High Pressure Alarm 35 cmH2O   Apnea (secs) 20 secs   Resp Rate Low Alarm 14   High Respiratory Rate 45 br/min

## 2022-01-11 NOTE — PROGRESS NOTES
Weaned oxygen to 90%.         01/11/22 0932   NIV Type   Mode Bilevel   Mask Type Full face mask   Mask Size Medium   Bonnet size Medium   Settings/Measurements   IPAP 15 cmH20   CPAP/EPAP 10 cmH2O   Rate Ordered 14   Resp 20   FiO2  90 %   Vt Exhaled 857 mL   Minute Volume 18.7 Liters   Mask Leak (lpm) 12 lpm   Comfort Level Good   Using Accessory Muscles No   SpO2 98   Breath Sounds   Right Upper Lobe Diminished   Right Middle Lobe Diminished   Right Lower Lobe Diminished   Left Upper Lobe Diminished   Left Lower Lobe Diminished   Alarm Settings   Alarms On Y

## 2022-01-11 NOTE — PROGRESS NOTES
01/11/22 0306   NIV Type   Equipment Type V60   Mode Bilevel   Mask Type Full face mask   Mask Size Medium   Settings/Measurements   IPAP 15 cmH20   CPAP/EPAP 10 cmH2O   Rate Ordered 14   Resp 24   FiO2  100 %   Vt Exhaled 873 mL   Mask Leak (lpm) 21 lpm   Comfort Level Good   Using Accessory Muscles No   SpO2 93   Alarm Settings   Alarms On Y

## 2022-01-11 NOTE — PROGRESS NOTES
Remdesivir Day # 3    Recent Labs     01/09/22  1150 01/11/22  0808   WBC 8.2 13.7*   BUN 19 22*   CREATININE 0.8* 0.6*   ALT 66* 47*   * 98*       Estimated Creatinine Clearance: 114 mL/min (A) (based on SCr of 0.6 mg/dL (L)). Will continue with dose of 100 mg today and for the next 2 days thereafter. Pharmacy will continue to monitor.      Drake Chapa, Pharmacy Intern  PharmD Candidate [id number]

## 2022-01-11 NOTE — PROGRESS NOTES
RT Inhaler-Nebulizer Bronchodilator Protocol Note    There is a bronchodilator order in the chart from a provider indicating to follow the RT Bronchodilator Protocol and there is an Initiate RT Inhaler-Nebulizer Bronchodilator Protocol order as well (see protocol at bottom of note). CXR Findings:  XR CHEST PORTABLE    Result Date: 1/10/2022  Right PICC projects in normal position. Stable interstitial and ground-glass opacities, most likely atypical pneumonia. XR CHEST PORTABLE    Result Date: 1/9/2022  1. Bilateral interstitial thickening either due to edema or atypical interstitial pneumonia. The findings from the last RT Protocol Assessment were as follows:   History Pulmonary Disease: Chronic pulmonary disease  Respiratory Pattern: Dyspnea on exertion or RR 21-25 bpm  Breath Sounds: Slightly diminished and/or crackles  Cough: Strong, spontaneous, non-productive  Indication for Bronchodilator Therapy: Decreased or absent breath sounds  Bronchodilator Assessment Score: 6    Aerosolized bronchodilator medication orders have been revised according to the RT Inhaler-Nebulizer Bronchodilator Protocol below. Respiratory Therapist to perform RT Therapy Protocol Assessment initially then follow the protocol. Repeat RT Therapy Protocol Assessment PRN for score 0-3 or on second treatment, BID, and PRN for scores above 3. No Indications - adjust the frequency to every 6 hours PRN wheezing or bronchospasm, if no treatments needed after 48 hours then discontinue using Per Protocol order mode. If indication present, adjust the RT bronchodilator orders based on the Bronchodilator Assessment Score as indicated below.   Use Inhaler orders unless patient has one or more of the following: on home nebulizer, not able to hold breath for 10 seconds, is not alert and oriented, cannot activate and use MDI correctly, or respiratory rate 25 breaths per minute or more, then use the equivalent nebulizer order(s) with same Frequency and PRN reasons based on the score. If a patient is on this medication at home then do not decrease Frequency below that used at home. 0-3 - enter or revise RT bronchodilator order(s) to equivalent RT Bronchodilator order with Frequency of every 4 hours PRN for wheezing or increased work of breathing using Per Protocol order mode. 4-6 - enter or revise RT Bronchodilator order(s) to two equivalent RT bronchodilator orders with one order with BID Frequency and one order with Frequency of every 4 hours PRN wheezing or increased work of breathing using Per Protocol order mode. 7-10 - enter or revise RT Bronchodilator order(s) to two equivalent RT bronchodilator orders with one order with TID Frequency and one order with Frequency of every 4 hours PRN wheezing or increased work of breathing using Per Protocol order mode. 11-13 - enter or revise RT Bronchodilator order(s) to one equivalent RT bronchodilator order with QID Frequency and an Albuterol order with Frequency of every 4 hours PRN wheezing or increased work of breathing using Per Protocol order mode. Greater than 13 - enter or revise RT Bronchodilator order(s) to one equivalent RT bronchodilator order with every 4 hours Frequency and an Albuterol order with Frequency of every 2 hours PRN wheezing or increased work of breathing using Per Protocol order mode. Patient will benefit from treatments.      Electronically signed by Jabari Chun RCP on 1/11/2022 at 1:25 AM

## 2022-01-11 NOTE — PROGRESS NOTES
Pulmonary & Critical Care Medicine ICU Progress Note    CC: COVID 19 pneumonia in an unvaccinated individual    Events of Last 24 hours: remains dependent on Bipap 15/10 90%. Only short break on vapotherm    Invasive Lines: PICC placed 1/10/22    MV:  bipap     / / /FiO2 : 100 %  No results for input(s): PHART, NVV4TQO, PO2ART in the last 72 hours. IV:   sodium chloride         Vitals:  BP 97/71   Pulse 93   Temp 96.5 °F (35.8 °C) (Oral)   Resp 24   Ht 5' 2\" (1.575 m)   Wt 146 lb (66.2 kg)   SpO2 95%   BMI 26.70 kg/m²   on bipap    Intake/Output Summary (Last 24 hours) at 1/11/2022 0647  Last data filed at 1/10/2022 1954  Gross per 24 hour   Intake 53.64 ml   Output 300 ml   Net -246.36 ml     EXAM:  Constitutional: ill appearing. Not in distress. Eyes: PERRL. No sclera icterus. ENT: Normal Nose. Normal Tongue. Neck:  Trachea is midline. No thyroid tenderness. Respiratory: + mild accessory muscle usage. no decreased breath sounds. No wheezes. + rales. No Rhonchi. Cardiovascular: Normal S1S2. No murmur. No digit cyanosis. No digit clubbing. No LE edema. GI: Non-tender. Non-distended. Normal bowel sounds. No masses. No umbilical hernia. Skin: No rash on the exposed extremities. No Nodules on exposed extremities. Neuro: Alert. Follows commands. Moves all extremities. Psych: Alert. Oriented. No agitation, no anxiety.     Scheduled Meds:   insulin lispro  0-18 Units SubCUTAneous TID     insulin lispro  0-9 Units SubCUTAneous Nightly    dexamethasone  10 mg IntraVENous Daily    albuterol-ipratropium  1 puff Inhalation 4x daily    0.9 % sodium chloride  30 mL/kg IntraVENous Once    famotidine  20 mg Oral BID    atorvastatin  40 mg Oral Nightly    omega-3 acid ethyl esters  2 g Oral BID    clopidogrel  75 mg Oral Daily    aspirin  81 mg Oral Daily    sodium chloride flush  5-40 mL IntraVENous 2 times per day    enoxaparin  30 mg SubCUTAneous BID    Vitamin D  2,000 Units Oral Daily  remdesivir IVPB  100 mg IntraVENous Q24H     PRN Meds:  albuterol sulfate HFA, nitroGLYCERIN, sodium chloride flush, sodium chloride, ondansetron **OR** ondansetron, polyethylene glycol, acetaminophen **OR** acetaminophen, potassium chloride, magnesium sulfate    Results:  CBC:   Recent Labs     01/09/22  1150   WBC 8.2   HGB 16.5   HCT 49.8   MCV 81.9        BMP:   Recent Labs     01/09/22  1150   *   K 3.5   CL 90*   CO2 25   BUN 19   CREATININE 0.8*     LIVER PROFILE:   Recent Labs     01/09/22  1150   *   ALT 66*   BILITOT 1.6*   ALKPHOS 111       PCT 0.09  1/9/22 COVID 19 detected      Chest imaging was reviewed by me and showed   1/9/22 CTPA: no PE      Mediastinum: Similar mildly enlarged mediastinal and bilateral hilar lymph   nodes.  The heart and pericardium demonstrate no acute abnormality.  There is   no acute abnormality of the thoracic aorta.  Moderate coronary artery   calcification.       Lungs/pleura: Moderate emphysema.  Moderate bilateral peripherally oriented   ground-glass occupying slightly less than 50% of the lung parenchyma.  No   evidence of pleural effusion or pneumothorax.       Upper Abdomen: Fatty liver.  Interval increase in size of fat attenuation   lesion of the pancreatic neck measuring 2.7 x 2.1 cm on series 2, image 213,   previously 2.3 x 1.6 cm.       Soft Tissues/Bones: Mild compression deformity of T8 of uncertain chronicity,   new since 05/29/2020.  Similar mild moderate compression deformities at T10,   T12.  Diffuse osteopenia.         1/10/22 CXR  Right PICC projects in normal position.       Stable interstitial and ground-glass opacities, most likely atypical   pneumonia.         ASSESSMENT:  · Acute hypoxic respiratory failure  · COVID 19 pneumonia in an unvaccinated individual  · Pulmonary emphysema  · Hyponatremia  · Elevated LFTS  · Tobacco abuse  · DM2  · CAD and PAD with h/o STEMI     PLAN:  · Droplet Plus Airborne Precautions   · Supplemental oxygen to keep saturation greater than 92%  · Decadron 10 mg QD, D#3, monitor glucose closely  · Remdesevir D#2, daily monitoring of LFT to prevent liver toxicity. · Tocilizumab received 1/9/22  · MIVF  · Lovenox twice daily  · On ASA and plavix  · Tobacco cessation  Total critical care time caring for this patient with life threatening, unstable organ failure, including direct patient contact, management of life support systems, review of data including imaging and labs, discussions with other team members and physicians at least 31 minutes so far today, excluding procedures.

## 2022-01-11 NOTE — ED NOTES
RN at bedside receiving report and taking pt to admitting room.       Colby Santoro RN  01/11/22 5910

## 2022-01-12 NOTE — PROGRESS NOTES
Report given to Aracely Colmenares [place]. No needs expressed. POC transferred at this time. - Pt is on bipap 15/10 90%.

## 2022-01-12 NOTE — PROGRESS NOTES
RT Inhaler-Nebulizer Bronchodilator Protocol Note    There is a bronchodilator order in the chart from a provider indicating to follow the RT Bronchodilator Protocol and there is an Initiate RT Inhaler-Nebulizer Bronchodilator Protocol order as well (see protocol at bottom of note). CXR Findings:  XR CHEST PORTABLE    Result Date: 1/10/2022  Right PICC projects in normal position. Stable interstitial and ground-glass opacities, most likely atypical pneumonia. The findings from the last RT Protocol Assessment were as follows:   History Pulmonary Disease: Chronic pulmonary disease  Respiratory Pattern: Dyspnea on exertion or RR 21-25 bpm  Breath Sounds: Slightly diminished and/or crackles  Cough: Strong, spontaneous, non-productive  Indication for Bronchodilator Therapy: Decreased or absent breath sounds  Bronchodilator Assessment Score: 6    Aerosolized bronchodilator medication orders have been revised according to the RT Inhaler-Nebulizer Bronchodilator Protocol below. Respiratory Therapist to perform RT Therapy Protocol Assessment initially then follow the protocol. Repeat RT Therapy Protocol Assessment PRN for score 0-3 or on second treatment, BID, and PRN for scores above 3. No Indications - adjust the frequency to every 6 hours PRN wheezing or bronchospasm, if no treatments needed after 48 hours then discontinue using Per Protocol order mode. If indication present, adjust the RT bronchodilator orders based on the Bronchodilator Assessment Score as indicated below. Use Inhaler orders unless patient has one or more of the following: on home nebulizer, not able to hold breath for 10 seconds, is not alert and oriented, cannot activate and use MDI correctly, or respiratory rate 25 breaths per minute or more, then use the equivalent nebulizer order(s) with same Frequency and PRN reasons based on the score.   If a patient is on this medication at home then do not decrease Frequency below that used at home. 0-3 - enter or revise RT bronchodilator order(s) to equivalent RT Bronchodilator order with Frequency of every 4 hours PRN for wheezing or increased work of breathing using Per Protocol order mode. 4-6 - enter or revise RT Bronchodilator order(s) to two equivalent RT bronchodilator orders with one order with BID Frequency and one order with Frequency of every 4 hours PRN wheezing or increased work of breathing using Per Protocol order mode. 7-10 - enter or revise RT Bronchodilator order(s) to two equivalent RT bronchodilator orders with one order with TID Frequency and one order with Frequency of every 4 hours PRN wheezing or increased work of breathing using Per Protocol order mode. 11-13 - enter or revise RT Bronchodilator order(s) to one equivalent RT bronchodilator order with QID Frequency and an Albuterol order with Frequency of every 4 hours PRN wheezing or increased work of breathing using Per Protocol order mode. Greater than 13 - enter or revise RT Bronchodilator order(s) to one equivalent RT bronchodilator order with every 4 hours Frequency and an Albuterol order with Frequency of every 2 hours PRN wheezing or increased work of breathing using Per Protocol order mode.        Electronically signed by Rina Mike RCP on 1/11/2022 at 7:40 PM

## 2022-01-12 NOTE — PROGRESS NOTES
Care Rounds completed with Dr. Shamar Bowie and the multidisciplinary team. Reviewed labs, meds, vital signs, assessment and plan of care reviewed. See dictated note and new orders for details. Plan is to restart the patients diet. Encourage time off BiPAP for nutrition if possible.  Electronically signed by Hever House RN on 1/12/2022 at 11:00 AM

## 2022-01-12 NOTE — PROGRESS NOTES
01/11/22 2344   NIV Type   NIV Started/Stopped On   Equipment Type V60   Mode Bilevel   Mask Type Full face mask   Mask Size Medium   Settings/Measurements   IPAP 15 cmH20   CPAP/EPAP 10 cmH2O   Rate Ordered 14   Resp 23   Insp Rise Time (%) 2 %   FiO2  80 %   I Time/ I Time % 0.9 s   Vt Exhaled 772 mL   Minute Volume 17.5 Liters   Mask Leak (lpm) 16 lpm   Comfort Level Good   Using Accessory Muscles No   SpO2 95

## 2022-01-12 NOTE — PROGRESS NOTES
01/11/22 1937   NIV Type   NIV Started/Stopped On   Equipment Type V60   Mode Bilevel   Mask Type Full face mask   Mask Size Medium   Settings/Measurements   IPAP 15 cmH20   CPAP/EPAP 10 cmH2O   Rate Ordered 14   Resp 22   Insp Rise Time (%) 2 %   FiO2  90 %   I Time/ I Time % 0.9 s   Vt Exhaled 952 mL   Minute Volume 20.6 Liters   Mask Leak (lpm) 9 lpm   Comfort Level Good   Using Accessory Muscles No   SpO2 94

## 2022-01-12 NOTE — PROGRESS NOTES
Patient was 83% on 100% Vapotherm at 40L. He was not able to bring his saturations up despite coaching. Placed the patient back on BiPAP.

## 2022-01-12 NOTE — PROGRESS NOTES
insulin lispro  0-18 Units SubCUTAneous TID     insulin lispro  0-9 Units SubCUTAneous Nightly    dexamethasone  10 mg IntraVENous Daily    albuterol-ipratropium  1 puff Inhalation 4x daily    0.9 % sodium chloride  30 mL/kg IntraVENous Once    famotidine  20 mg Oral BID    atorvastatin  40 mg Oral Nightly    omega-3 acid ethyl esters  2 g Oral BID    clopidogrel  75 mg Oral Daily    aspirin  81 mg Oral Daily    sodium chloride flush  5-40 mL IntraVENous 2 times per day    enoxaparin  30 mg SubCUTAneous BID    Vitamin D  2,000 Units Oral Daily    remdesivir IVPB  100 mg IntraVENous Q24H       PRN Meds:  melatonin, albuterol sulfate HFA, nitroGLYCERIN, sodium chloride flush, sodium chloride, ondansetron **OR** ondansetron, polyethylene glycol, acetaminophen **OR** acetaminophen, potassium chloride, magnesium sulfate    Results:  CBC:   Recent Labs     01/09/22  1150 01/11/22  0808 01/12/22  0504   WBC 8.2 13.7* 15.1*   HGB 16.5 14.8 13.8   HCT 49.8 45.8 42.4   MCV 81.9 84.0 82.6    394 397     BMP:   Recent Labs     01/09/22  1150 01/11/22  0808 01/12/22  0504   * 140 140   K 3.5 3.8 3.6   CL 90* 102 102   CO2 25 25 27   BUN 19 22* 23*   CREATININE 0.8* 0.6* <0.5*     LIVER PROFILE:   Recent Labs     01/09/22  1150 01/11/22  0808 01/12/22  0504   * 98* 86*   ALT 66* 47* 47*   BILITOT 1.6* 1.1* 1.4*   ALKPHOS 111 104 117     PT/INR:   Recent Labs     01/10/22  0801   PROTIME 13.0*   INR 1.14*     Results for Lorenzo Rodriguez (MRN [id number]) as of 1/11/2022 06:37   Ref. Range 1/9/2022 16:30   CRP Latest Ref Range: 0.0 - 5.1 mg/L 55.2 (H)       Cultures:    Covid 19: detected  Influenza A and B: not detected  Blood culture:NGTD    Films:    XR CHEST PORTABLE   Final Result   Right PICC projects in normal position. Stable interstitial and ground-glass opacities, most likely atypical   pneumonia.          IR PICC WO SQ PORT/PUMP > 5 YEARS   Final Result   Successful placement of PICC line. CT CHEST PULMONARY EMBOLISM W CONTRAST   Final Result   1. No evidence of pulmonary embolism. 2.  Commonly reported imaging features of COVID-19 pneumonia are present. Other processes such as influenza pneumonia and organizing pneumonia, as can   be seen with drug toxicity and connective tissue disease, can cause a similar   imaging pattern. PneTyp   3. Mild compression deformity of T8 of uncertain chronicity, new since   05/29/2020. Similar mild moderate compression deformities at T10, T12.   4. Fatty liver. 5. Interval increase in size of now 2.7 cm fat attenuation lesion of the   pancreatic neck. Recommend further evaluation with nonemergent pancreas   protocol MRI. XR CHEST PORTABLE   Final Result   1. Bilateral interstitial thickening either due to edema or atypical   interstitial pneumonia. Assessment:    Principal Problem:    Acute hypoxemic respiratory failure due to COVID-19 [facility])  Active Problems:    Coronary artery disease    PAD (peripheral artery disease) (HCC)    HLD (hyperlipidemia)    GERD (gastroesophageal reflux disease)    DMII (diabetes mellitus, type 2) ([facility] [address]    Essential hypertension    Pneumonia due to COVID-19 virus  Resolved Problems:    * No resolved hospital problems. *         Plan:    #Acute respiratory failure with hypoxemia due to COVID-19. Patient admitted to hospital.  Pulmonary consultation. Supplemental oxygen. Continuous pulse ox and telemetry. He is on Bipap. Continue to monitor closely. Discussed with respiratory about transitioning him to Vapo therm.      #COVID-19 pneumonia. Work-up consistent with COVID-19 pneumonia. He is hypoxic. He meets criteria for treatment. Started on Decadron 6 mg a day #3. Started on remdesivir day#3. Monitor LFTs and CBC due to high risk medication. He received Tocilizumab due to increased oxygen demands     #CAD stable. No CP. On ASA and Plavix.     #PAD stable     #DM type 2.  Monitor sugars. Expect sugars will be elevated due to steroids. SSI     #HLD On Lipitor.     #Lovenox 30 mg bid for DVT prophylaxis.      #Transaminitis due to COVID 19.     #Hyponatremia due to COVID. On IVF. Resolved.          All questions and concerns were addressed to the patient/family. Alternatives to my treatment were discussed. The note was completed using EMR. Every effort was made to ensure accuracy; however, inadvertent computerized transcription errors may be present.          Steve Valderrama MD 6:58 AM 1/12/2022

## 2022-01-12 NOTE — PLAN OF CARE
Problem: Skin Integrity:  Goal: Absence of new skin breakdown  Description: Absence of new skin breakdown  Note: Moisture barrier wipes used and zinc paste applied to pt mark area/buttocks, bed pad changed. PT encouraged to turn to side for increase comfort.

## 2022-01-12 NOTE — PROGRESS NOTES
Pulmonary & Critical Care Medicine ICU Progress Note    CC: COVID 19 pneumonia in an unvaccinated individual    Events of Last 24 hours: remains dependent on Bipap 15/10 80-90%. Breaks intermittently on Vapothermn but he quickly requests to go back on bipap. Invasive Lines: PICC placed 1/10/22    MV:  bipap     / / /FiO2 : 100 %  No results for input(s): PHART, TUB5DWF, PO2ART in the last 72 hours. IV:   sodium chloride         Vitals:  /79   Pulse 93   Temp 97.1 °F (36.2 °C) (Axillary)   Resp 24   Ht 5' 2\" (1.575 m)   Wt 146 lb (66.2 kg)   SpO2 97%   BMI 26.70 kg/m²   on bipap    Intake/Output Summary (Last 24 hours) at 1/12/2022 0833  Last data filed at 1/12/2022 7664  Gross per 24 hour   Intake 284.27 ml   Output 1050 ml   Net -765.73 ml     EXAM:  Constitutional: ill appearing. Not in distress. Eyes: PERRL. No sclera icterus. ENT: Normal Nose. Normal Tongue. Neck:  Trachea is midline. No thyroid tenderness. Respiratory: + accessory muscle usage. no decreased breath sounds. No wheezes. + rales. No Rhonchi. Cardiovascular: Normal S1S2. No murmur. No digit cyanosis. No digit clubbing. No LE edema. GI: Non-tender. Non-distended. Normal bowel sounds. No masses. No umbilical hernia. Skin: No rash on the exposed extremities. No Nodules on exposed extremities. Neuro: Alert. Follows commands. Moves all extremities. Psych: Alert. Oriented. No agitation, no anxiety.     Scheduled Meds:   insulin lispro  0-18 Units SubCUTAneous TID WC    insulin lispro  0-9 Units SubCUTAneous Nightly    dexamethasone  10 mg IntraVENous Daily    albuterol-ipratropium  1 puff Inhalation 4x daily    0.9 % sodium chloride  30 mL/kg IntraVENous Once    famotidine  20 mg Oral BID    atorvastatin  40 mg Oral Nightly    omega-3 acid ethyl esters  2 g Oral BID    clopidogrel  75 mg Oral Daily    aspirin  81 mg Oral Daily    sodium chloride flush  5-40 mL IntraVENous 2 times per day    enoxaparin  30 mg SubCUTAneous BID    Vitamin D  2,000 Units Oral Daily    remdesivir IVPB  100 mg IntraVENous Q24H     PRN Meds:  melatonin, albuterol sulfate HFA, nitroGLYCERIN, sodium chloride flush, sodium chloride, ondansetron **OR** ondansetron, polyethylene glycol, acetaminophen **OR** acetaminophen, potassium chloride, magnesium sulfate    Results:  CBC:   Recent Labs     01/09/22  1150 01/11/22  0808 01/12/22  0504   WBC 8.2 13.7* 15.1*   HGB 16.5 14.8 13.8   HCT 49.8 45.8 42.4   MCV 81.9 84.0 82.6    394 397     BMP:   Recent Labs     01/09/22  1150 01/11/22  0808 01/12/22  0504   * 140 140   K 3.5 3.8 3.6   CL 90* 102 102   CO2 25 25 27   BUN 19 22* 23*   CREATININE 0.8* 0.6* <0.5*     LIVER PROFILE:   Recent Labs     01/09/22  1150 01/11/22  0808 01/12/22  0504   * 98* 86*   ALT 66* 47* 47*   BILITOT 1.6* 1.1* 1.4*   ALKPHOS 111 104 117       PCT 0.09  1/9/22 COVID 19 detected      Chest imaging was reviewed by me and showed   1/9/22 CTPA: no PE      Mediastinum: Similar mildly enlarged mediastinal and bilateral hilar lymph   nodes.  The heart and pericardium demonstrate no acute abnormality.  There is   no acute abnormality of the thoracic aorta.  Moderate coronary artery   calcification.       Lungs/pleura: Moderate emphysema.  Moderate bilateral peripherally oriented   ground-glass occupying slightly less than 50% of the lung parenchyma.  No   evidence of pleural effusion or pneumothorax.       Upper Abdomen: Fatty liver.  Interval increase in size of fat attenuation   lesion of the pancreatic neck measuring 2.7 x 2.1 cm on series 2, image 213,   previously 2.3 x 1.6 cm.       Soft Tissues/Bones: Mild compression deformity of T8 of uncertain chronicity,   new since 05/29/2020.  Similar mild moderate compression deformities at T10,   T12.  Diffuse osteopenia.         1/10/22 CXR  Right PICC projects in normal position.       Stable interstitial and ground-glass opacities, most likely atypical pneumonia.         ASSESSMENT:  · Acute hypoxic respiratory failure  · COVID 19 pneumonia in an unvaccinated individual  · Pulmonary emphysema  · Hyponatremia  · Elevated LFTS  · Tobacco abuse  · DM2  · CAD and PAD with h/o STEMI     PLAN:  · Droplet Plus Airborne Precautions   · Supplemental oxygen to keep saturation greater than 92%  · Decadron 10 mg QD, D#3, monitor glucose closely  · Remdesevir D#3, daily monitoring of LFT to prevent liver toxicity. · Tocilizumab received 1/9/22  · Diet  · Lovenox twice daily  · On ASA and plavix  · Tobacco cessation  Total critical care time caring for this patient with life threatening, unstable organ failure, including direct patient contact, management of life support systems, review of data including imaging and labs, discussions with other team members and physicians at least 31 minutes so far today, excluding procedures.

## 2022-01-12 NOTE — CARE COORDINATION
INTERDISCIPLINARY PLAN OF CARE CONFERENCE    Date/Time: 1/12/2022 11:56 AM  Completed by: Malen Aschoff, RN, Case Management      Patient Name:  Farooq Malcolm  YOB: 1965  Admitting Diagnosis: Acute hypoxemic respiratory failure ([facility] [address] [J96.01]  Acute hypoxemic respiratory failure due to COVID-19 ([facility] [address] [U07.1, J96.01]     Admit Date/Time:  1/9/2022 11:42 AM    Chart reviewed. Interdisciplinary team contacted or reviewed plan related to patient progress and discharge plans. Disciplines included Case Management, Nursing, and Dietitian. Current Status: IP 01/09/2021  PT/OT recommendation for discharge plan of care:     Expected D/C Disposition:  TBD  Confirmed plan per chart review  Discharge Plan Comments: Plans to return home. +C19 Day 3 (dx 01/09)   Bipap (w/short breaks on Vapotherm)  Lives with spouse and 2 adult sons. Gets around on motorized scooter at baseline. He does not have home O2 prior to admit. CM following and will reassess needs.

## 2022-01-13 NOTE — PROGRESS NOTES
Patient has remained mostly awake through night. He has tolerated bipap well with occasional periods where he would desaturate but often this was due to the bipap mask being loose and creating air leaks.  Once adjusted for fit his oxygen saturation would improve above 90%    Electronically signed by Kenny Li RN on 1/13/2022 at 7:49 AM

## 2022-01-13 NOTE — PROGRESS NOTES
01/13/22 0255   NIV Type   $NIV $Daily Charge   Skin Assessment Clean, dry, & intact   NIV Started/Stopped On   Equipment Type V60   Mode Bilevel   Mask Type Full face mask   Mask Size Medium   Settings/Measurements   IPAP 15 cmH20   CPAP/EPAP 10 cmH2O   Rate Ordered 14   FiO2  90 %   Vt Exhaled 921 mL   Minute Volume 16.5 Liters   Mask Leak (lpm) 17 lpm   Comfort Level Good   Using Accessory Muscles No   SpO2 91   Breath Sounds   Right Upper Lobe Diminished   Right Middle Lobe Diminished   Right Lower Lobe Diminished   Left Upper Lobe Diminished   Left Lower Lobe Diminished   Alarm Settings   Alarms On Y   Press Low Alarm 10 cmH2O   High Pressure Alarm 35 cmH2O   Apnea (secs) 20 secs   Resp Rate Low Alarm 14   High Respiratory Rate 45 br/min

## 2022-01-13 NOTE — PROGRESS NOTES
Internal Medicine ICU Progress Note      Events of Last 24 hours:     Was on Bipap yesterday. Switched to Vapotherm but asked to be put back on Bipap around 1642 hours. On Bipap with FiO2 of 90%. Back on Vapo therm this am. FiO2 is 100%. Invasive Lines: PICC placed on 1/10/22       MV:  N/A    No results for input(s): PHART, NJN6SQY, PO2ART in the last 72 hours. MV Settings:     / / /FiO2 : 90 %    IV:   sodium chloride         Vitals:  Temp  Av.6 °F (36.4 °C)  Min: 97.1 °F (36.2 °C)  Max: 98.7 °F (37.1 °C)  Pulse  Av.9  Min: 80  Max: 129  BP  Min: 123/78  Max: 144/82  SpO2  Av.8 %  Min: 87 %  Max: 97 %  FiO2   Av.2 %  Min: 80 %  Max: 100 %  Patient Vitals for the past 4 hrs:   BP Temp Temp src Pulse Resp SpO2   22 0524 132/83 97.4 °F (36.3 °C) Skin 94 20 (!) 87 %       CVP:        Intake/Output Summary (Last 24 hours) at 2022 3117  Last data filed at 2022 0524  Gross per 24 hour   Intake 300 ml   Output 800 ml   Net -500 ml       EXAM:  General: mild distress. Alert. Ill appearing but better overall. Eyes: PERRL. No sclera icterus. No conjunctiva injected. ENT: No discharge. Pharynx clear. Neck: Trachea midline. Normal thyroid. Resp: minimal accessory muscle use. + crackles. No wheezing. No rhonchi. No dullness on percussion. CV: Regular rate. Regular rhythm. No mumur or rub. No edema. No JVD. Palpable pedal pulses. GI: Non-tender. Non-distended. No masses. No organmegaly. Normal bowel sounds. No hernia. Skin: Warm and dry. No nodule on exposed extremities. No rash on exposed extremities. Lymph: No cervical LAD. No supraclavicular LAD. M/S: No cyanosis. No joint deformity. No clubbing. Neuro: Awake. Follows commands. Positive pupils/gag/corneals. Normal pain response. Psych: Oriented to person, place, time. No anxiety or agitation.      Medications:  Scheduled Meds:   insulin lispro  0-18 Units SubCUTAneous TID WC    insulin lispro  0-9 Units SubCUTAneous Nightly    dexamethasone  10 mg IntraVENous Daily    albuterol-ipratropium  1 puff Inhalation 4x daily    famotidine  20 mg Oral BID    atorvastatin  40 mg Oral Nightly    omega-3 acid ethyl esters  2 g Oral BID    clopidogrel  75 mg Oral Daily    aspirin  81 mg Oral Daily    sodium chloride flush  5-40 mL IntraVENous 2 times per day    enoxaparin  30 mg SubCUTAneous BID    Vitamin D  2,000 Units Oral Daily    remdesivir IVPB  100 mg IntraVENous Q24H       PRN Meds:  melatonin, albuterol sulfate HFA, nitroGLYCERIN, sodium chloride flush, sodium chloride, ondansetron **OR** ondansetron, polyethylene glycol, acetaminophen **OR** acetaminophen, potassium chloride, magnesium sulfate    Results:  CBC:   Recent Labs     01/11/22  0808 01/12/22  0504 01/13/22  0624   WBC 13.7* 15.1* 16.3*   HGB 14.8 13.8 14.7   HCT 45.8 42.4 45.4   MCV 84.0 82.6 83.5    397 373     BMP:   Recent Labs     01/11/22  0808 01/12/22  0504 01/13/22  0624    140 142   K 3.8 3.6 3.9    102 105   CO2 25 27 27   BUN 22* 23* 21*   CREATININE 0.6* <0.5* <0.5*     LIVER PROFILE:   Recent Labs     01/11/22  0808 01/12/22  0504 01/13/22  0624   AST 98* 86* 64*   ALT 47* 47* 49*   BILITOT 1.1* 1.4* 1.6*   ALKPHOS 104 117 115     PT/INR:   Recent Labs     01/10/22  0801   PROTIME 13.0*   INR 1.14*     Results for Jb Garnica (MRN [id number]) as of 1/11/2022 06:37   Ref. Range 1/9/2022 16:30   CRP Latest Ref Range: 0.0 - 5.1 mg/L 55.2 (H)       Cultures:    Covid 19: detected  Influenza A and B: not detected  Blood culture:NGTD    Films:    XR CHEST PORTABLE   Final Result   Right PICC projects in normal position. Stable interstitial and ground-glass opacities, most likely atypical   pneumonia. IR PICC WO SQ PORT/PUMP > 5 YEARS   Final Result   Successful placement of PICC line. CT CHEST PULMONARY EMBOLISM W CONTRAST   Final Result   1. No evidence of pulmonary embolism.    2.  Commonly reported imaging features of COVID-19 pneumonia are present. Other processes such as influenza pneumonia and organizing pneumonia, as can   be seen with drug toxicity and connective tissue disease, can cause a similar   imaging pattern. PneTyp   3. Mild compression deformity of T8 of uncertain chronicity, new since   05/29/2020. Similar mild moderate compression deformities at T10, T12.   4. Fatty liver. 5. Interval increase in size of now 2.7 cm fat attenuation lesion of the   pancreatic neck. Recommend further evaluation with nonemergent pancreas   protocol MRI. XR CHEST PORTABLE   Final Result   1. Bilateral interstitial thickening either due to edema or atypical   interstitial pneumonia. Assessment:    Principal Problem:    Acute hypoxemic respiratory failure due to COVID-19 [facility])  Active Problems:    Coronary artery disease    PAD (peripheral artery disease) (HCC)    HLD (hyperlipidemia)    GERD (gastroesophageal reflux disease)    DMII (diabetes mellitus, type 2) ([facility]    Essential hypertension    Pneumonia due to COVID-19 virus  Resolved Problems:    * No resolved hospital problems. *         Plan:    #Acute respiratory failure with hypoxemia due to COVID-19. Patient admitted to hospital.  Pulmonary consultation. Supplemental oxygen. Continuous pulse ox and telemetry. He is on Bipap. He was placed on Vapo therm but switch back to Bipap. He is on Vapo therm this am. Continue to monitor closely.       #COVID-19 pneumonia. Work-up consistent with COVID-19 pneumonia. He is hypoxic. He met criteria for treatment. He is on Decadron 10 mg a day #4. He is on remdesivir day#4. Monitor LFTs and CBC due to high risk medication. He received Tocilizumab due to increased oxygen demands     #CAD stable. No CP. On ASA and Plavix.     #PAD stable     #DM type 2. Monitor sugars. Expect sugars will be elevated due to steroids. SSI     #HLD On Lipitor.     #Lovenox 30 mg bid for DVT prophylaxis.    #Transaminitis due to COVID 19.     #Hyponatremia due to COVID. On IVF. Resolved.          All questions and concerns were addressed to the patient/family. Alternatives to my treatment were discussed. The note was completed using EMR. Every effort was made to ensure accuracy; however, inadvertent computerized transcription errors may be present.          Marisa Wang MD 7:12 AM 1/13/2022

## 2022-01-13 NOTE — PROGRESS NOTES
Pulmonary & Critical Care Medicine ICU Progress Note    CC: COVID 19 pneumonia in an unvaccinated individual    Events of Last 24 hours: remains dependent on Bipap 15/10 80-90%. Breaks intermittently on Vapotherm but he quickly needs to go back on Bipap    Invasive Lines: PICC placed 1/10/22    MV:  bipap     / / /FiO2 : 90 %  No results for input(s): PHART, MDV6KJO, PO2ART in the last 72 hours. IV:   sodium chloride         Vitals:  /83   Pulse 94   Temp 97.4 °F (36.3 °C) (Skin)   Resp 20   Ht 5' 2\" (1.575 m)   Wt 146 lb (66.2 kg)   SpO2 (!) 87%   BMI 26.70 kg/m²   on bipap    Intake/Output Summary (Last 24 hours) at 1/13/2022 0655  Last data filed at 1/13/2022 0524  Gross per 24 hour   Intake 300 ml   Output 800 ml   Net -500 ml     EXAM:  Constitutional: ill appearing. Not in distress. Eyes: PERRL. No sclera icterus. ENT: Normal Nose. Normal Tongue. Neck:  Trachea is midline. No thyroid tenderness. Respiratory: + accessory muscle usage. no decreased breath sounds. No wheezes. + rales. No Rhonchi. Cardiovascular: Normal S1S2. No murmur. No digit cyanosis. No digit clubbing. No LE edema. GI: Non-tender. Non-distended. Normal bowel sounds. No masses. No umbilical hernia. Skin: No rash on the exposed extremities. No Nodules on exposed extremities. Neuro: Alert. Follows commands. Moves all extremities. Psych: Alert. Oriented. No agitation, no anxiety.     Scheduled Meds:   insulin lispro  0-18 Units SubCUTAneous TID     insulin lispro  0-9 Units SubCUTAneous Nightly    dexamethasone  10 mg IntraVENous Daily    albuterol-ipratropium  1 puff Inhalation 4x daily    famotidine  20 mg Oral BID    atorvastatin  40 mg Oral Nightly    omega-3 acid ethyl esters  2 g Oral BID    clopidogrel  75 mg Oral Daily    aspirin  81 mg Oral Daily    sodium chloride flush  5-40 mL IntraVENous 2 times per day    enoxaparin  30 mg SubCUTAneous BID    Vitamin D  2,000 Units Oral Daily    remdesivir IVPB  100 mg IntraVENous Q24H     PRN Meds:  melatonin, albuterol sulfate HFA, nitroGLYCERIN, sodium chloride flush, sodium chloride, ondansetron **OR** ondansetron, polyethylene glycol, acetaminophen **OR** acetaminophen, potassium chloride, magnesium sulfate    Results:  CBC:   Recent Labs     01/11/22  0808 01/12/22  0504 01/13/22  0624   WBC 13.7* 15.1* 16.3*   HGB 14.8 13.8 14.7   HCT 45.8 42.4 45.4   MCV 84.0 82.6 83.5    397 373     BMP:   Recent Labs     01/11/22  0808 01/12/22  0504    140   K 3.8 3.6    102   CO2 25 27   BUN 22* 23*   CREATININE 0.6* <0.5*     LIVER PROFILE:   Recent Labs     01/11/22  0808 01/12/22  0504   AST 98* 86*   ALT 47* 47*   BILITOT 1.1* 1.4*   ALKPHOS 104 117       PCT 0.09  1/9/22 COVID 19 detected      Chest imaging was reviewed by me and showed   1/9/22 CTPA: no PE      Mediastinum: Similar mildly enlarged mediastinal and bilateral hilar lymph   nodes.  The heart and pericardium demonstrate no acute abnormality.  There is   no acute abnormality of the thoracic aorta.  Moderate coronary artery   calcification.       Lungs/pleura: Moderate emphysema.  Moderate bilateral peripherally oriented   ground-glass occupying slightly less than 50% of the lung parenchyma.  No   evidence of pleural effusion or pneumothorax.       Upper Abdomen: Fatty liver.  Interval increase in size of fat attenuation   lesion of the pancreatic neck measuring 2.7 x 2.1 cm on series 2, image 213,   previously 2.3 x 1.6 cm.       Soft Tissues/Bones: Mild compression deformity of T8 of uncertain chronicity,   new since 05/29/2020.  Similar mild moderate compression deformities at T10,   T12.  Diffuse osteopenia.         1/10/22 CXR  Right PICC projects in normal position.       Stable interstitial and ground-glass opacities, most likely atypical   pneumonia.         ASSESSMENT:  · Acute hypoxic respiratory failure  · COVID 19 pneumonia in an unvaccinated individual  · Pulmonary emphysema  · Hyponatremia  · Elevated LFTS  · Tobacco abuse  · DM2  · CAD and PAD with h/o STEMI     PLAN:  · Droplet Plus Airborne Precautions   · Supplemental oxygen to keep saturation greater than 92%  · Decadron 10 mg QD, D#4, monitor glucose closely  · Remdesevir D#4, daily monitoring of LFT to prevent liver toxicity. · Tocilizumab received 1/9/22  · LR at 50cc/hr  · Diet  · Lovenox twice daily  · On ASA and plavix  · Tobacco cessation  · Total critical care time caring for this patient with life threatening, unstable organ failure, including direct patient contact, management of life support systems, review of data including imaging and labs, discussions with other team members and physicians at least 31 minutes so far today, excluding procedures.

## 2022-01-13 NOTE — PROGRESS NOTES
Assessment complete. Patient had requested assistance in calling his wife. He was asking to remove the oxygen to be better able to talk to his wife. Explained to the patient that his oxygen saturation decreases too much to be able to remove the oxygen. Then assisted him with calling his wife on the portable phone. After he was finished talking his scheduled medications were given. Patient requested to have the bipap placed and was assisted in putting on mask and starting treatment. He denied further needs. Call light was placed in reach and confirmed that patient could operate. Will continue to monitor.      Electronically signed by William Cox RN on 1/13/2022 at 4:31 AM

## 2022-01-13 NOTE — PROGRESS NOTES
01/13/22 1516   NIV Type   NIV Started/Stopped On   Equipment Type v60   Mode Bilevel   Mask Type Full face mask   Mask Size Medium   Bonnet size Medium   Settings/Measurements   IPAP 15 cmH20   CPAP/EPAP 10 cmH2O   Rate Ordered 14   Resp 22   O2 Flow Rate (L/min) 40 L/min   FiO2  100 %   Vt Exhaled 920 mL   Minute Volume 21.4 Liters   Mask Leak (lpm) 32 lpm   Comfort Level Good   Using Accessory Muscles No   SpO2 91   Breath Sounds   Right Upper Lobe Diminished   Right Middle Lobe Diminished   Right Lower Lobe Diminished   Left Upper Lobe Diminished   Left Lower Lobe Diminished   Alarm Settings   Alarms On Y

## 2022-01-14 NOTE — CARE COORDINATION
INTERDISCIPLINARY PLAN OF CARE CONFERENCE    Date/Time: 1/14/2022 4:55 PM  Completed by: Soila Oliveira RN, Case Management      Patient Name:  Jose Odonnell  YOB: 1965  Admitting Diagnosis: Acute hypoxemic respiratory failure ([facility] [address] [J96.01]  Acute hypoxemic respiratory failure due to COVID-19 ([facility] [address] [U07.1, J96.01]     Admit Date/Time:  1/9/2022 11:42 AM    Chart reviewed. Interdisciplinary team contacted or reviewed plan related to patient progress and discharge plans. Disciplines included Case Management, Nursing, and Dietitian. Current Status: IP 01/09/2022  PT/OT recommendation for discharge plan of care:     Expected D/C Disposition:  TBD  +C19  ICU over flow. Intubated today  Lives w/family. Uses electric scooter.   CM following

## 2022-01-14 NOTE — PROGRESS NOTES
RT Inhaler-Nebulizer Bronchodilator Protocol Note    There is a bronchodilator order in the chart from a provider indicating to follow the RT Bronchodilator Protocol and there is an Initiate RT Inhaler-Nebulizer Bronchodilator Protocol order as well (see protocol at bottom of note). CXR Findings:  No results found. The findings from the last RT Protocol Assessment were as follows:   History Pulmonary Disease: Chronic pulmonary disease  Respiratory Pattern: Dyspnea on exertion or RR 21-25 bpm  Breath Sounds: Slightly diminished and/or crackles  Cough: Strong, spontaneous, non-productive  Indication for Bronchodilator Therapy: Decreased or absent breath sounds  Bronchodilator Assessment Score: 6    Aerosolized bronchodilator medication orders have been revised according to the RT Inhaler-Nebulizer Bronchodilator Protocol below. Respiratory Therapist to perform RT Therapy Protocol Assessment initially then follow the protocol. Repeat RT Therapy Protocol Assessment PRN for score 0-3 or on second treatment, BID, and PRN for scores above 3. No Indications - adjust the frequency to every 6 hours PRN wheezing or bronchospasm, if no treatments needed after 48 hours then discontinue using Per Protocol order mode. If indication present, adjust the RT bronchodilator orders based on the Bronchodilator Assessment Score as indicated below. Use Inhaler orders unless patient has one or more of the following: on home nebulizer, not able to hold breath for 10 seconds, is not alert and oriented, cannot activate and use MDI correctly, or respiratory rate 25 breaths per minute or more, then use the equivalent nebulizer order(s) with same Frequency and PRN reasons based on the score. If a patient is on this medication at home then do not decrease Frequency below that used at home.     0-3 - enter or revise RT bronchodilator order(s) to equivalent RT Bronchodilator order with Frequency of every 4 hours PRN for wheezing or increased work of breathing using Per Protocol order mode. 4-6 - enter or revise RT Bronchodilator order(s) to two equivalent RT bronchodilator orders with one order with BID Frequency and one order with Frequency of every 4 hours PRN wheezing or increased work of breathing using Per Protocol order mode. 7-10 - enter or revise RT Bronchodilator order(s) to two equivalent RT bronchodilator orders with one order with TID Frequency and one order with Frequency of every 4 hours PRN wheezing or increased work of breathing using Per Protocol order mode. 11-13 - enter or revise RT Bronchodilator order(s) to one equivalent RT bronchodilator order with QID Frequency and an Albuterol order with Frequency of every 4 hours PRN wheezing or increased work of breathing using Per Protocol order mode. Greater than 13 - enter or revise RT Bronchodilator order(s) to one equivalent RT bronchodilator order with every 4 hours Frequency and an Albuterol order with Frequency of every 2 hours PRN wheezing or increased work of breathing using Per Protocol order mode.          Electronically signed by Gali Clayton RCP on 1/14/2022 at 3:47 AM

## 2022-01-14 NOTE — PROGRESS NOTES
Assisted patient in calling his wife. Overheard the conversation and he was talking about his wife marrying someone else if he was gone or leaving him. After he was done talking to her she called the nursing station. She voiced concern that he was confused because of the subject of the conversation and their son also agreed that the patient seemed confused. Will monitor patient closely for signs of confusion.     Electronically signed by Goran Dillard RN on 1/14/2022 at 7:35 AM

## 2022-01-14 NOTE — PROGRESS NOTES
Waiting on bed in ICU 2 to open up so ppt can be moved there for proning. Pt still double and triple stacking, despite going up on sedation and giving PRNs. Call to Dr. Brown Plan to make him aware. 5:48 PM  Paralyze pt with nimbex IVP and gtt.

## 2022-01-14 NOTE — PROGRESS NOTES
01/13/22 1910   NIV Type   Equipment Type v60   Mode Bilevel   Mask Type Full face mask   Mask Size Medium   Settings/Measurements   IPAP 15 cmH20   CPAP/EPAP 10 cmH2O   Rate Ordered 14   Resp 18   FiO2  90 %   Vt Exhaled 972 mL   Minute Volume 24.3 Liters   Mask Leak (lpm) 15 lpm   Comfort Level Good   Using Accessory Muscles No   SpO2 96   Breath Sounds   Right Upper Lobe Diminished   Right Middle Lobe Diminished   Right Lower Lobe Diminished   Left Upper Lobe Diminished   Left Lower Lobe Diminished   Alarm Settings   Alarms On Y   Press Low Alarm 10 cmH2O   High Pressure Alarm 40 cmH2O   Delay Alarm 20 sec(s)   Resp Rate Low Alarm 14   High Respiratory Rate 45 br/min

## 2022-01-14 NOTE — PROGRESS NOTES
Pulmonary & Critical Care Medicine ICU Progress Note    CC: COVID 19 pneumonia in an unvaccinated individual    Events of Last 24 hours: remains dependent on Bipap 15/10 %. Breaks intermittently on Vapotherm but he quickly needs to go back on Bipap. Invasive Lines: PICC placed 1/10/22    MV:  bipap     / / /FiO2 : 100 %  No results for input(s): PHART, BPY2ZUH, PO2ART in the last 72 hours. IV:   lactated ringers 50 mL/hr at 01/13/22 1301    sodium chloride         Vitals:  /84   Pulse 107   Temp 97.8 °F (36.6 °C) (Axillary)   Resp 18   Ht 5' 2\" (1.575 m)   Wt 146 lb (66.2 kg)   SpO2 96%   BMI 26.70 kg/m²   on bipap    Intake/Output Summary (Last 24 hours) at 1/14/2022 0824  Last data filed at 1/14/2022 0734  Gross per 24 hour   Intake 540 ml   Output 1275 ml   Net -735 ml     EXAM:  Constitutional: ill appearing. Not in distress. Eyes: PERRL. No sclera icterus. ENT: Normal Nose. Normal Tongue. Neck:  Trachea is midline. No thyroid tenderness. Respiratory: + accessory muscle usage. no decreased breath sounds. No wheezes. + rales. No Rhonchi. Cardiovascular: Normal S1S2. No murmur. No digit cyanosis. No digit clubbing. No LE edema. GI: Non-tender. Non-distended. Normal bowel sounds. No masses. No umbilical hernia. Skin: No rash on the exposed extremities. No Nodules on exposed extremities. Neuro: Alert. Follows commands. Moves all extremities. Psych: Alert. Oriented to self and place. No agitation, no anxiety.     Scheduled Meds:   insulin lispro  0-18 Units SubCUTAneous TID WC    insulin lispro  0-9 Units SubCUTAneous Nightly    dexamethasone  10 mg IntraVENous Daily    albuterol-ipratropium  1 puff Inhalation 4x daily    famotidine  20 mg Oral BID    atorvastatin  40 mg Oral Nightly    omega-3 acid ethyl esters  2 g Oral BID    clopidogrel  75 mg Oral Daily    aspirin  81 mg Oral Daily    sodium chloride flush  5-40 mL IntraVENous 2 times per day    enoxaparin  30 mg SubCUTAneous BID    Vitamin D  2,000 Units Oral Daily     PRN Meds:  melatonin, albuterol sulfate HFA, nitroGLYCERIN, sodium chloride flush, sodium chloride, ondansetron **OR** ondansetron, polyethylene glycol, acetaminophen **OR** acetaminophen, potassium chloride, magnesium sulfate    Results:  CBC:   Recent Labs     01/12/22  0504 01/13/22  0624 01/14/22  0600   WBC 15.1* 16.3* 15.3*   HGB 13.8 14.7 14.5   HCT 42.4 45.4 43.7   MCV 82.6 83.5 81.2    373 325     BMP:   Recent Labs     01/12/22  0504 01/13/22  0624 01/14/22  0600    142 139   K 3.6 3.9 4.2    105 104   CO2 27 27 27   BUN 23* 21* 20   CREATININE <0.5* <0.5* <0.5*     LIVER PROFILE:   Recent Labs     01/12/22  0504 01/13/22  0624 01/14/22  0600   AST 86* 64* 54*   ALT 47* 49* 50*   BILITOT 1.4* 1.6* 2.0*   ALKPHOS 117 115 127       PCT 0.09  1/9/22 COVID 19 detected      Chest imaging was reviewed by me and showed   1/9/22 CTPA: no PE      Mediastinum: Similar mildly enlarged mediastinal and bilateral hilar lymph   nodes.  The heart and pericardium demonstrate no acute abnormality.  There is   no acute abnormality of the thoracic aorta.  Moderate coronary artery   calcification.       Lungs/pleura: Moderate emphysema.  Moderate bilateral peripherally oriented   ground-glass occupying slightly less than 50% of the lung parenchyma.  No   evidence of pleural effusion or pneumothorax.       Upper Abdomen: Fatty liver.  Interval increase in size of fat attenuation   lesion of the pancreatic neck measuring 2.7 x 2.1 cm on series 2, image 213,   previously 2.3 x 1.6 cm.       Soft Tissues/Bones: Mild compression deformity of T8 of uncertain chronicity,   new since 05/29/2020.  Similar mild moderate compression deformities at T10,   T12.  Diffuse osteopenia.         1/10/22 CXR  Right PICC projects in normal position.       Stable interstitial and ground-glass opacities, most likely atypical   pneumonia.         ASSESSMENT:  · Acute hypoxic respiratory failure  · COVID 19 pneumonia in an unvaccinated individual  · Pulmonary emphysema  · Hyponatremia  · Elevated LFTS  · Tobacco abuse  · DM2  · CAD and PAD with h/o STEMI     PLAN:  · Droplet Plus Airborne Precautions   · Supplemental oxygen to keep saturation greater than 92%  · Decadron 10 mg QD, D#5, monitor glucose closely  · Remdesevir D#5, daily monitoring of LFT to prevent liver toxicity. · Tocilizumab received 1/9/22  · LR at 50cc/hr  · Diet  · Lovenox twice daily  · On ASA and plavix  · Tobacco cessation  · Total critical care time caring for this patient with life threatening, unstable organ failure, including direct patient contact, management of life support systems, review of data including imaging and labs, discussions with other team members and physicians at least 31 minutes so far today, excluding procedures.

## 2022-01-14 NOTE — PROGRESS NOTES
Pt states that he is feeling worse, and more short of breath. HR up to the 130s and becoming hypoxic, down to 83% on 100% Bipap. RR in the 30s. Call to Dr. Katty Wolf to make him aware since intubation was possibly going to happen later this evening.

## 2022-01-14 NOTE — PROGRESS NOTES
01/14/22 0346   NIV Type   $NIV $Daily Charge   Equipment Type v60   Mode Bilevel   Mask Type Full face mask   Mask Size Medium   Settings/Measurements   IPAP 15 cmH20   CPAP/EPAP 10 cmH2O   Rate Ordered 14   FiO2  90 %   Vt Exhaled 946 mL   Minute Volume 24.6 Liters   Mask Leak (lpm) 16 lpm   Comfort Level Good   Using Accessory Muscles No   SpO2 90   Breath Sounds   Right Upper Lobe Diminished   Right Middle Lobe Diminished   Right Lower Lobe Diminished   Left Upper Lobe Diminished   Left Lower Lobe Diminished   Alarm Settings   Alarms On Y   Press Low Alarm 10 cmH2O   High Pressure Alarm 40 cmH2O   Delay Alarm 20 sec(s)   Resp Rate Low Alarm 14   High Respiratory Rate 45 br/min

## 2022-01-14 NOTE — PROGRESS NOTES
Pt intubated with 8.0 ETT without complication. ETT secured at 23 lip with Marian ETT oliveros. Placement confirmed with good color change on ETCO2 detector and bilateral breath sounds. Pt placed on ventilator with settings of A/C 28/240/100%/+14. Spo2 92%. Will continue to monitor.

## 2022-01-14 NOTE — PROGRESS NOTES
Internal Medicine ICU Progress Note      Events of Last 24 hours:     Placed on Bipap. Oxygen saturation still low. Now on FiO2 100% with Bipap. Invasive Lines: PICC placed on 1/10/22       MV:  N/A    No results for input(s): PHART, YXK2TPN, PO2ART in the last 72 hours. MV Settings:     / / /FiO2 : 100 %    IV:   lactated ringers 50 mL/hr at 22 1301    sodium chloride         Vitals:  Temp  Av.9 °F (36.6 °C)  Min: 97.6 °F (36.4 °C)  Max: 98.1 °F (36.7 °C)  Pulse  Av.6  Min: 85  Max: 129  BP  Min: 90/72  Max: 148/77  SpO2  Av.5 %  Min: 86 %  Max: 96 %  FiO2   Av.4 %  Min: 90 %  Max: 100 %  Patient Vitals for the past 4 hrs:   BP Temp Temp src Pulse Resp SpO2   22 0400 130/79 97.8 °F (36.6 °C) Axillary 85 23 (!) 88 %   22 0300 119/77 -- -- 86 22 (!) 87 %       CVP:        Intake/Output Summary (Last 24 hours) at 2022 0657  Last data filed at 2022 1716  Gross per 24 hour   Intake 540 ml   Output 525 ml   Net 15 ml       EXAM:  General: mild distress. Alert. Ill appearing on Bipap. Eyes: PERRL. No sclera icterus. No conjunctiva injected. ENT: No discharge. Pharynx clear. Neck: Trachea midline. Normal thyroid. Resp: minimal accessory muscle use. + crackles. No wheezing. No rhonchi. No dullness on percussion. CV: Regular rate. Regular rhythm. No mumur or rub. No edema. No JVD. Palpable pedal pulses. GI: Non-tender. Non-distended. No masses. No organmegaly. Normal bowel sounds. No hernia. Skin: Warm and dry. No nodule on exposed extremities. No rash on exposed extremities. Lymph: No cervical LAD. No supraclavicular LAD. M/S: No cyanosis. No joint deformity. No clubbing. Neuro: Awake. Follows commands. Positive pupils/gag/corneals. Normal pain response. Psych: Oriented to person, place, time. No anxiety or agitation.      Medications:  Scheduled Meds:   insulin lispro  0-18 Units SubCUTAneous TID WC    insulin lispro  0-9 Units SubCUTAneous Nightly  dexamethasone  10 mg IntraVENous Daily    albuterol-ipratropium  1 puff Inhalation 4x daily    famotidine  20 mg Oral BID    atorvastatin  40 mg Oral Nightly    omega-3 acid ethyl esters  2 g Oral BID    clopidogrel  75 mg Oral Daily    aspirin  81 mg Oral Daily    sodium chloride flush  5-40 mL IntraVENous 2 times per day    enoxaparin  30 mg SubCUTAneous BID    Vitamin D  2,000 Units Oral Daily       PRN Meds:  melatonin, albuterol sulfate HFA, nitroGLYCERIN, sodium chloride flush, sodium chloride, ondansetron **OR** ondansetron, polyethylene glycol, acetaminophen **OR** acetaminophen, potassium chloride, magnesium sulfate    Results:  CBC:   Recent Labs     01/12/22  0504 01/13/22  0624 01/14/22  0600   WBC 15.1* 16.3* 15.3*   HGB 13.8 14.7 14.5   HCT 42.4 45.4 43.7   MCV 82.6 83.5 81.2    373 325     BMP:   Recent Labs     01/12/22  0504 01/13/22  0624 01/14/22  0600    142 139   K 3.6 3.9 4.2    105 104   CO2 27 27 27   BUN 23* 21* 20   CREATININE <0.5* <0.5* <0.5*     LIVER PROFILE:   Recent Labs     01/12/22  0504 01/13/22  0624 01/14/22  0600   AST 86* 64* 54*   ALT 47* 49* 50*   BILITOT 1.4* 1.6* 2.0*   ALKPHOS 117 115 127     PT/INR:   No results for input(s): PROTIME, INR in the last 72 hours. Results for Marleni Mcnulty (MRN [id number]) as of 1/11/2022 06:37   Ref. Range 1/9/2022 16:30   CRP Latest Ref Range: 0.0 - 5.1 mg/L 55.2 (H)       Cultures:    Covid 19: detected  Influenza A and B: not detected  Blood culture:NGTD    Films:    XR CHEST PORTABLE   Final Result   Right PICC projects in normal position. Stable interstitial and ground-glass opacities, most likely atypical   pneumonia. IR PICC WO SQ PORT/PUMP > 5 YEARS   Final Result   Successful placement of PICC line. CT CHEST PULMONARY EMBOLISM W CONTRAST   Final Result   1. No evidence of pulmonary embolism. 2.  Commonly reported imaging features of COVID-19 pneumonia are present. Other processes such as influenza pneumonia and organizing pneumonia, as can   be seen with drug toxicity and connective tissue disease, can cause a similar   imaging pattern. PneTyp   3. Mild compression deformity of T8 of uncertain chronicity, new since   05/29/2020. Similar mild moderate compression deformities at T10, T12.   4. Fatty liver. 5. Interval increase in size of now 2.7 cm fat attenuation lesion of the   pancreatic neck. Recommend further evaluation with nonemergent pancreas   protocol MRI. XR CHEST PORTABLE   Final Result   1. Bilateral interstitial thickening either due to edema or atypical   interstitial pneumonia. Assessment:    Principal Problem:    Acute hypoxemic respiratory failure due to COVID-19 [facility])  Active Problems:    Coronary artery disease    PAD (peripheral artery disease) (HCC)    HLD (hyperlipidemia)    GERD (gastroesophageal reflux disease)    DMII (diabetes mellitus, type 2) ([facility] [address]    Essential hypertension    Pneumonia due to COVID-19 virus  Resolved Problems:    * No resolved hospital problems. *         Plan:    #Acute respiratory failure with hypoxemia due to COVID-19. Patient admitted to hospital.  Pulmonary consultation. Supplemental oxygen. Continuous pulse ox and telemetry. On Bipap with FiO2 of 100%. .       #COVID-19 pneumonia. Work-up consistent with COVID-19 pneumonia. He is hypoxic. He met criteria for treatment. He is on Decadron 10 mg a day #5. He is on remdesivir day#5. Monitor LFTs and CBC due to high risk medication. He received Tocilizumab due to increased oxygen demands     #CAD stable. No CP. On ASA and Plavix.     #PAD stable     #DM type 2. Monitor sugars. Expect sugars will be elevated due to steroids. SSI     #HLD On Lipitor.     #Lovenox 30 mg bid for DVT prophylaxis.      #Transaminitis due to COVID 19.     #Hyponatremia due to COVID. On IVF.  Resolved.        All questions and concerns were addressed to the patient/family. Alternatives to my treatment were discussed. The note was completed using EMR. Every effort was made to ensure accuracy; however, inadvertent computerized transcription errors may be present.          Maria Victoria Jack MD 6:57 AM 1/14/2022

## 2022-01-14 NOTE — PROGRESS NOTES
01/14/22 1140   NIV Type   Mode Bilevel   Mask Type Full face mask   Mask Size Small   Settings/Measurements   IPAP 18 cmH20   CPAP/EPAP 8 cmH2O   Rate Ordered 14   Resp 28   FiO2  100 %   Vt Exhaled 989 mL   Comfort Level Good   SpO2 95

## 2022-01-14 NOTE — PROGRESS NOTES
Patient Spo2 86% with bipap at 90% Fio2. Increased Fio2 to 100% and adjusted bipap mask due to air leak noted below chin. After about 15 minutes patient with not much improvement and Fio2 holding at 86-88%. Notified RT who is now at bedside assessing patient.     Electronically signed by Shahana Andersen RN on 1/14/2022 at 5:19 AM

## 2022-01-14 NOTE — PROGRESS NOTES
01/13/22 3766   NIV Type   Equipment Type v60   Mode Bilevel   Mask Type Full face mask   Mask Size Medium   Settings/Measurements   IPAP 15 cmH20   CPAP/EPAP 10 cmH2O   Rate Ordered 14   FiO2  90 %   Vt Exhaled 810 mL   Minute Volume 26.9 Liters   Mask Leak (lpm) 11 lpm   Comfort Level Good   Using Accessory Muscles No   SpO2 94   Breath Sounds   Right Upper Lobe Diminished   Right Middle Lobe Diminished   Right Lower Lobe Diminished   Left Upper Lobe Diminished   Left Lower Lobe Diminished   Alarm Settings   Alarms On Y   Press Low Alarm 10 cmH2O   High Pressure Alarm 40 cmH2O   Delay Alarm 20 sec(s)   Resp Rate Low Alarm 14   High Respiratory Rate 45 br/min

## 2022-01-14 NOTE — PROCEDURES
ENDOTRACHEAL INTUBATION    INDICATION: Life threatening respiratory failure    TIME OUT: taken    SEDATION: etomidate, rocuronium fentanyl and versed    PROCEDURE: Using video laryngoscopy, the vocal cords were well visualized and a 7.5 mm endotracheal tube was place directly through the cords. Good breath sounds auscultated bilaterally without sounds over abdomen. Good return of CO2 on the monitor. CXR is pending.

## 2022-01-14 NOTE — PROGRESS NOTES
Report to oncoming shift Rn and care of patient transferred.  Electronically signed by Yinka Honeycutt RN on 1/14/2022 at 7:42 AM

## 2022-01-15 NOTE — PROGRESS NOTES
Pt with c/o increased dyspnea and he does not like that vapo therm and NRB. Pt currently on right side, states that he can not lay on his stomach. RT in at this time and putting pt back on BiPap.

## 2022-01-15 NOTE — PROGRESS NOTES
Pt brought over from [address]. Mepilex applied to all bony prominences, ETT hold changed via RT. Pt proned by Rt and 4 Rns. Sr 60-70s. SpO2 96-98%. Levophed 11 mcg/min, propofol 80 mcg/kg/min, fentanyl 200 mcg/hr, IVF 75 ml/hr, Awaiting Nimbex gtt.

## 2022-01-15 NOTE — PROGRESS NOTES
01/15/22 0318   Vent Information   $Ventilation $Subsequent Day   Skin Assessment Clean, dry, & intact   Vent Type 840   Vent Mode AC/VC   Vt Ordered 340 mL   Rate Set 28 bmp   Peak Flow 52 L/min   FiO2  90 %   SpO2 93 %   SpO2/FiO2 ratio 103.33   Sensitivity 3   PEEP/CPAP 14   Humidification Source Heated wire   Humidification Temp Measured 37   Circuit Condensation Drained   Vent Patient Data   Peak Inspiratory Pressure 28 cmH2O   Mean Airway Pressure 20 cmH20   Rate Measured 28 br/min   Vt Exhaled 484 mL   Minute Volume 10.9 Liters   I:E Ratio 1:2   Cough/Sputum   Sputum How Obtained Endotracheal   Cough Productive   Sputum Amount Small   Sputum Color Creamy   Tenacity Thick   Spontaneous Breathing Trial (SBT) RT Doc   Pulse 78   Breath Sounds   Right Upper Lobe Diminished   Right Middle Lobe Diminished   Right Lower Lobe Diminished   Left Upper Lobe Diminished   Left Lower Lobe Diminished   Additional Respiratory  Assessments   Resp 22   Alarm Settings   High Pressure Alarm 40 cmH2O   Low Minute Volume Alarm 4 L/min   Apnea (secs) 20 secs   High Respiratory Rate 40 br/min   Low Exhaled Vt  200 mL

## 2022-01-15 NOTE — PROGRESS NOTES
High risk vesicant drug infusing:  ____x______    Multiple incompatible medications infusing:  _________    CVP Monitoring:  _________    Extremely difficult IV access challenge:  ___x_____    Continued need for central line access:  ____x______    Addressed with physician:  ___x_____    RIGHT PATIENT, RIGHT TIME, RIGHT LINE

## 2022-01-15 NOTE — PROGRESS NOTES
Call to wife, Gemini Graves, to make her aware that pt was transferred to a new room. Explained to her that pt will be started on a paralytic medication and will be put in prone position.

## 2022-01-15 NOTE — PLAN OF CARE
Nutrition Problem #1: Inadequate oral intake  Intervention: Food and/or Nutrient Delivery: Continue NPO,Start Tube Feeding  Nutritional Goals: patient will remain in NPO status until medically cleared to receive nutrition therapy

## 2022-01-15 NOTE — PROGRESS NOTES
01/14/22 2013   Vent Information   Vent Type 840   Vent Mode AC/VC   Vt Ordered 340 mL   Rate Set 28 bmp   Peak Flow 60 L/min   FiO2  90 %   SpO2 95 %   SpO2/FiO2 ratio 105.56   Sensitivity 3   PEEP/CPAP 14   Humidification Source Heated wire   Humidification Temp 37   Humidification Temp Measured 37   Circuit Condensation Drained   Vent Patient Data   Peak Inspiratory Pressure 27 cmH2O   Mean Airway Pressure 19 cmH20   Rate Measured 28 br/min   Vt Exhaled 375 mL   Minute Volume 9.6 Liters   I:E Ratio 1:2   Plateau Pressure 25 AWW10   Cough/Sputum   Sputum How Obtained Suctioned;Endotracheal   Cough Non-productive   Spontaneous Breathing Trial (SBT) RT Doc   Pulse 70   Breath Sounds   Right Upper Lobe Diminished   Right Middle Lobe Diminished   Right Lower Lobe Diminished   Left Upper Lobe Diminished   Left Lower Lobe Diminished   Additional Respiratory  Assessments   Resp 26   Position Prone   Oral Care Completed? Yes   Oral Care Mouth suctioned   Subglottic Suction Done? Yes   Alarm Settings   High Pressure Alarm 40 cmH2O   Low Minute Volume Alarm 4 L/min   Apnea (secs) 20 secs   High Respiratory Rate 40 br/min   Low Exhaled Vt  200 mL   ETT (adult)   Placement Date/Time: 01/14/22 1503   Timeout: Patient;Procedure; Appropriate Equipment;Correct Position  Preoxygenation: Yes  Mask Ventilation: Ventilated by mask (1)  Technique: Rapid sequence  Type: Cuffed  Tube Size: 8 mm  Laryngoscope: GlideScope  . ..    Secured at 24 cm   Measured From [address] [place] [address] Drive

## 2022-01-15 NOTE — PROGRESS NOTES
AM assessment completed. AM labs reviewed. VSS. Pt intubated, sedated & in prone positioning. Pt on levophed- titrating down as able. Pt in bilateral soft wrist restraints for safety. No new orders at present time.    Yang Newby RN, BSN

## 2022-01-15 NOTE — PROGRESS NOTES
BP low after intubation. Dr. Geraldine Dubin on unit. Per MD, give additional 500  Ml bolus of LR and start levo.

## 2022-01-15 NOTE — PROGRESS NOTES
Reassessment completed at this time. Wife and 2 sons are here visiting with pt, with MD approval.  [place] with dyspnea, but Sp02 maintained at 89-91%.

## 2022-01-15 NOTE — PROGRESS NOTES
Comprehensive Nutrition Assessment    Type and Reason for Visit:  Initial (LOS x 6 days and patient was intubated on 1/14/22)    Nutrition Recommendations/Plan:   1. TF recommendations - ADULT TUBE FEEDING; Orogastric; Peptide-Based formula - Vital AF 1.2 with a goal rate of 30 ml/hr x 20 hours. Start with 15 ml/hr and increase by 15 ml every 4 hours, as tolerated by patient, until goal rate can be achieved and maintained. Water flushes, 30 ml every 3 hours for tube patency. Please order + administer one proteinex P2Go once daily via feeding tube. 2. Monitor TF start, rate, intake, and tolerance + water flushes + administration of one proteinex P2Go once daily via feeding tube. 3. Monitor vent status, sedation type/amount (propofol at 65 mcg x 24 hours which = 680 kcals from lipids), TG checks, and plan of care. 4. Monitor nutrition-related labs, bowel function, and weight trends. Nutrition Assessment:  patient is nutritionally compromised AEB patient presented to hospital with c/o SOB, headache, dizziness, and sore throat + decreased appetite/po intake and he is at risk for further compromise d/t < 50% of meals consumed x 5 days admission, need for intubation on 1/14/22, and NPO status at this time; will continue NPO status and provide TF recommendations    Malnutrition Assessment:  Malnutrition Status:   At risk for malnutrition     Context:  Acute Illness     Findings of the 6 clinical characteristics of malnutrition:  Energy Intake:  7 - 50% or less of estimated energy requirements for 5 or more days  Weight Loss:  Unable to assess (only one actual weight was obtained during this admission and it was obtained on 1/15/22)     Body Fat Loss:  Unable to assess (COVID-19 +)     Muscle Mass Loss:  Unable to assess (COVID-19 +)    Fluid Accumulation:  No significant fluid accumulation     Strength:  Not Performed    Estimated Daily Nutrient Needs:  Energy (kcal):  1320 - 1518 kcals based on 20-23 kcals/kg/CBW; Weight Used for Energy Requirements:  Current     Protein (g):  65 - 81 g protein based on 1.2-1.5 g/kg/IBW; Weight Used for Protein Requirements:  Ideal        Fluid (ml/day):  1320 - 1518 ml; Method Used for Fluid Requirements:  1 ml/kcal      Nutrition Related Findings:  patient is intubated and sedated with 65 mcg propofol at this time; patient was intubated on 1/14/22; last documented BM was on 1/12/22; + active/audible bowel sounds; blood glucose trends and K are elevated; patient had not felt well x 3 days PTA; patient has lipitor, plavix, decadron, lovenox, pepcid, high-dose SSI, loraza, vitamin D, nimbex, fentanyl, levo in D5, and LR at 50 ml/hr ordered at this time      Wounds:  None       Current Nutrition Therapies:    Diet NPO    Anthropometric Measures:  · Height: 5' 2\" (157.5 cm)  · Current Body Weight: 146 lb 13.2 oz (66.6 kg) (obtained on 1/15/22; actual weight)   · Admission Body Weight: 146 lb 13.2 oz (66.6 kg) (obtained on 1/15/22; first actual weight obtained during this admission)    · Usual Body Weight: 143 lb (64.9 kg) (obtained on 8/5/21; unknown weight method)     · Ideal Body Weight: 118 lbs; % Ideal Body Weight 124.4 %   · BMI: 26.8   · BMI Categories: Overweight (BMI 25.0-29. 9)       Nutrition Diagnosis:   · Inadequate oral intake related to inadequate protein-energy intake,impaired respiratory function,increase demand for energy/nutrients as evidenced by NPO or clear liquid status due to medical condition,intubation,lab values,poor intake prior to admission      Nutrition Interventions:   Food and/or Nutrient Delivery:  Continue NPO,Start Tube Feeding  Nutrition Education/Counseling:  No recommendation at this time   Coordination of Nutrition Care:  Continue to monitor while inpatient,Interdisciplinary Rounds    Goals:  patient will remain in NPO status until medically cleared to receive nutrition therapy       Nutrition Monitoring and Evaluation: Behavioral-Environmental Outcomes:  None Identified   Food/Nutrient Intake Outcomes:  Diet Advancement/Tolerance,IVF Intake,Enteral Nutrition Intake/Tolerance  Physical Signs/Symptoms Outcomes:  Biochemical Data,GI Status,Hemodynamic Status,Weight,Skin     Discharge Planning:     Too soon to determine     Electronically signed by Jessy Stern RD, LD on 1/15/22 at 5:55 PM EST    Contact: [phone]

## 2022-01-15 NOTE — PROGRESS NOTES
Pulmonary & Critical Care Medicine ICU Progress Note    CC: COVID 19 pneumonia in an unvaccinated individual    Events of Last 24 hours: sedated on vent in prone  Levo 12  Fentanyl 175  Propofol 65  Invasive Lines: PICC placed 1/10/22    MV:  1/14/22-  Vent Mode: AC/VC Rate Set: 28 bmp/Vt Ordered: 340 mL/ /FiO2 : 90 %  Recent Labs     01/14/22  1620 01/15/22  0520   PHART 7.401 7.248*   [id number] 37.8 61.0*   PO2ART 72.0* 170.3*       IV:   fentaNYL 175 mcg/hr (01/15/22 0704)    propofol 65 mcg/kg/min (01/15/22 0704)    norepinephrine 13 mcg/min (01/15/22 0523)    cisatracurium (NIMBEX) infusion      lactated ringers 50 mL/hr at 01/15/22 0704    sodium chloride         Vitals:  /63   Pulse 69   Temp 96.6 °F (35.9 °C) (Axillary)   Resp 28   Ht 5' 2\" (1.575 m)   Wt 146 lb 13.2 oz (66.6 kg)   SpO2 98%   BMI 26.85 kg/m²   on vent    Intake/Output Summary (Last 24 hours) at 1/15/2022 0729  Last data filed at 1/15/2022 0704  Gross per 24 hour   Intake 4295.6 ml   Output 1350 ml   Net 2945.6 ml     General: intubated, ill appearing    ENT:   Pharynx with ETT. Neck: Trachea midline  Resp: No accessory muscle use. CV: Regular rate. Regular rhythm. GI:  prone.     Neuro: Sedated  Psych: Unable to obtain because the patient is non-communicative    Scheduled Meds:   ipratropium-albuterol  1 ampule Inhalation Q4H    cisatracurium Besylate  0.15 mg/kg IntraVENous Once    insulin lispro  0-18 Units SubCUTAneous TID WC    insulin lispro  0-9 Units SubCUTAneous Nightly    dexamethasone  10 mg IntraVENous Daily    famotidine  20 mg Oral BID    atorvastatin  40 mg Oral Nightly    omega-3 acid ethyl esters  2 g Oral BID    clopidogrel  75 mg Oral Daily    aspirin  81 mg Oral Daily    sodium chloride flush  5-40 mL IntraVENous 2 times per day    enoxaparin  30 mg SubCUTAneous BID    Vitamin D  2,000 Units Oral Daily     PRN Meds:  midazolam, fentanNYL, melatonin, albuterol sulfate HFA, nitroGLYCERIN, sodium chloride flush, sodium chloride, ondansetron **OR** ondansetron, polyethylene glycol, acetaminophen **OR** acetaminophen, potassium chloride, magnesium sulfate    Results:  CBC:   Recent Labs     01/13/22 0624 01/14/22  0600 01/15/22  0500   WBC 16.3* 15.3* 19.9*   HGB 14.7 14.5 14.5   HCT 45.4 43.7 43.7   MCV 83.5 81.2 82.7    325 405     BMP:   Recent Labs     01/13/22 0624 01/14/22  0600    139   K 3.9 4.2    104   CO2 27 27   BUN 21* 20   CREATININE <0.5* <0.5*     LIVER PROFILE:   Recent Labs     01/13/22 0624 01/14/22  0600   AST 64* 54*   ALT 49* 50*   BILITOT 1.6* 2.0*   ALKPHOS 115 127       PCT 0.09  1/9/22 COVID 19 detected      Chest imaging was reviewed by me and showed   1/9/22 CTPA: no PE      Mediastinum: Similar mildly enlarged mediastinal and bilateral hilar lymph   nodes.  The heart and pericardium demonstrate no acute abnormality.  There is   no acute abnormality of the thoracic aorta.  Moderate coronary artery   calcification.       Lungs/pleura: Moderate emphysema.  Moderate bilateral peripherally oriented   ground-glass occupying slightly less than 50% of the lung parenchyma.  No   evidence of pleural effusion or pneumothorax.       Upper Abdomen: Fatty liver.  Interval increase in size of fat attenuation   lesion of the pancreatic neck measuring 2.7 x 2.1 cm on series 2, image 213,   previously 2.3 x 1.6 cm.       Soft Tissues/Bones: Mild compression deformity of T8 of uncertain chronicity,   new since 05/29/2020.  Similar mild moderate compression deformities at T10,   T12. Diffuse osteopenia.         1/14/22 CXR  1. Endotracheal tube projects in appropriate position.    2. Increased bilateral nonspecific pulmonary opacities representing edema,   atelectasis and/or pneumonia.        ASSESSMENT:  · Acute hypoxic respiratory failure  · COVID 19 pneumonia in an unvaccinated individual  · Pulmonary emphysema  · Hyponatremia  · Elevated LFTS  · Tobacco abuse  · DM2  · CAD and PAD with h/o STEMI     PLAN:  · Droplet Plus Airborne Precautions   Mechanical ventilation as per my orders. The ventilator was adjusted by me at the bedside for unstable, life threatening respiratory failure. IV Propofol for sedation, target RASS -52, with daily spontaneous awakening trial.  Fentanyl PRN pain, gtt as needed  Head of bed 30 degrees or higher at all times  Daily spontaneous breathing trial once PEEP less than 8, FiO2 less than 55%. Prone 1/14/22-  · Decadron 10 mg QD, D#6, monitor glucose closely  · S/p Remdesevir D#5  And Tocilizumab received 1/9/22  · Inc RR to 32  · LR at 100cc/hr  · Start trophic TFs  · SSI  · Renal panel 1500  · Lovenox twice daily  · On ASA and plavix  · Tobacco cessation  · Total critical care time caring for this patient with life threatening, unstable organ failure, including direct patient contact, management of life support systems, review of data including imaging and labs, discussions with other team members and physicians at least 31 minutes so far today, excluding procedures.

## 2022-01-15 NOTE — PROGRESS NOTES
01/14/22 2243   Vent Information   Vent Type 840   Vent Mode AC/VC   Vt Ordered 340 mL   Rate Set 28 bmp   Peak Flow 60 L/min   FiO2  90 %   SpO2 94 %   SpO2/FiO2 ratio 104.44   Sensitivity 3   PEEP/CPAP 14   Humidification Source Heated wire   Humidification Temp 37   Humidification Temp Measured 37   Circuit Condensation Drained   Vent Patient Data   Peak Inspiratory Pressure 28 cmH2O   Mean Airway Pressure 19 cmH20   Rate Measured 28 br/min   Vt Exhaled 357 mL   Minute Volume 10 Liters   I:E Ratio 1:2   Plateau Pressure 24 [id number]   Cough/Sputum   Sputum How Obtained Suctioned;Endotracheal   Cough Productive   Sputum Amount Small   Sputum Color Creamy   Tenacity Thick   Spontaneous Breathing Trial (SBT) RT Doc   Pulse 78   Breath Sounds   Right Upper Lobe Diminished   Right Middle Lobe Diminished   Right Lower Lobe Diminished   Left Upper Lobe Diminished   Left Lower Lobe Diminished   Additional Respiratory  Assessments   Resp 27   Position Prone   Oral Care Completed? Yes   Oral Care Mouth suctioned   Subglottic Suction Done? Yes   Alarm Settings   High Pressure Alarm 40 cmH2O   Low Minute Volume Alarm 4 L/min   Apnea (secs) 20 secs   High Respiratory Rate 40 br/min   Low Exhaled Vt  200 mL   ETT (adult)   Placement Date/Time: 01/14/22 1503   Timeout: Patient;Procedure; Appropriate Equipment;Correct Position  Preoxygenation: Yes  Mask Ventilation: Ventilated by mask (1)  Technique: Rapid sequence  Type: Cuffed  Tube Size: 8 mm  Laryngoscope: GlideScope  . ..    Secured at 24 cm   Measured From Lips

## 2022-01-15 NOTE — PROGRESS NOTES
AM assessment completed, see flow sheet. Pt is alert and oriented. Vital signs are WNL, but Sp02 decreases to mid 80s at times. Respirations are even but appear labored. Switches pt to Vapo therm so that he could eat breakfast. Pt denies needs at this time. SR up x 2, and bed in low position. Call light is within reach.

## 2022-01-15 NOTE — PROGRESS NOTES
Pt with unresponsive to external stimuli at this time. However, continues to double and triple stack vent. Call to Dr. Vineet Flor. Return call with orders to give 2mg of versed and 50 mcg fentanyl now and increased fentanyl to 200 mcg/hr and increase propofol to 40 mcg/kg/min.

## 2022-01-15 NOTE — PROGRESS NOTES
Internal Medicine ICU Progress Note      Events of Last 24 hours:     1/15  Intubated    Prone this am - back to supine. PEEP 14, FiO2 70%. Invasive Lines: PICC placed on 1/10/22       MV:  N/A    Recent Labs     01/15/22  0520 01/15/22  1050   PHART 7.248* 7.282*   [id number] 61.0* 57.7*   PO2ART 170.3* 111.6*       MV Settings:  Vent Mode: AC/VC Rate Set: 32 bmp/Vt Ordered: 340 mL/ /FiO2 : 70 %    IV:   fentaNYL 175 mcg/hr (01/15/22 124)    propofol 65 mcg/kg/min (01/15/22 124)    norepinephrine 9 mcg/min (01/15/22 1334)    cisatracurium (NIMBEX) infusion      lactated ringers 50 mL/hr at 01/15/22 1247    sodium chloride         Vitals:  Temp  Av °F (36.1 °C)  Min: 96.6 °F (35.9 °C)  Max: 97.5 °F (36.4 °C)  Pulse  Av  Min: 53  Max: 101  BP  Min: 66/48  Max: 139/74  SpO2  Av.8 %  Min: 89 %  Max: 98 %  FiO2   Av.7 %  Min: 70 %  Max: 100 %  Patient Vitals for the past 4 hrs:   BP Temp Temp src Pulse Resp SpO2   01/15/22 1402 107/60 -- -- 85 (!) 32 96 %   01/15/22 1302 103/62 -- -- 86 (!) 32 98 %   01/15/22 1206 (!) 97/57 97.5 °F (36.4 °C) Axillary 78 (!) 32 96 %   01/15/22 1158 -- -- -- 75 (!) 32 96 %   01/15/22 1157 -- -- -- -- (!) 32 96 %       CVP:        Intake/Output Summary (Last 24 hours) at 1/15/2022 1511  Last data filed at 1/15/2022 1402  Gross per 24 hour   Intake 9654.06 ml   Output 1900 ml   Net 7754.06 ml       EXAM:  General: mild distress. Intubated and sedated  Eyes: PERRL. No sclera icterus. No conjunctiva injected. ENT: No discharge. Pharynx clear. Neck: Trachea midline. Normal thyroid. Resp: minimal accessory muscle use. + crackles. No wheezing. No rhonchi. No dullness on percussion. CV: Regular rate. Regular rhythm. No mumur or rub. No edema. No JVD. Palpable pedal pulses. GI: Non-tender. Non-distended. No masses. No organmegaly. Normal bowel sounds. No hernia. Skin: Warm and dry. No nodule on exposed extremities.  No rash on exposed extremities. Lymph: No cervical LAD. No supraclavicular LAD. M/S: No cyanosis. No joint deformity. No clubbing. Neuro: Awake. Follows commands. Positive pupils/gag/corneals. Normal pain response. Psych: Oriented to person, place, time. No anxiety or agitation. Medications:  Scheduled Meds:   ipratropium-albuterol  1 ampule Inhalation Q4H    cisatracurium Besylate  0.15 mg/kg IntraVENous Once    insulin lispro  0-18 Units SubCUTAneous TID WC    insulin lispro  0-9 Units SubCUTAneous Nightly    dexamethasone  10 mg IntraVENous Daily    famotidine  20 mg Oral BID    atorvastatin  40 mg Oral Nightly    omega-3 acid ethyl esters  2 g Oral BID    clopidogrel  75 mg Oral Daily    aspirin  81 mg Oral Daily    sodium chloride flush  5-40 mL IntraVENous 2 times per day    enoxaparin  30 mg SubCUTAneous BID    Vitamin D  2,000 Units Oral Daily       PRN Meds:  midazolam, fentanNYL, melatonin, albuterol sulfate HFA, nitroGLYCERIN, sodium chloride flush, sodium chloride, ondansetron **OR** ondansetron, polyethylene glycol, acetaminophen **OR** acetaminophen, potassium chloride, magnesium sulfate    Results:  CBC:   Recent Labs     01/13/22  0624 01/14/22  0600 01/15/22  0500   WBC 16.3* 15.3* 19.9*   HGB 14.7 14.5 14.5   HCT 45.4 43.7 43.7   MCV 83.5 81.2 82.7    325 405     BMP:   Recent Labs     01/13/22  0624 01/14/22  0600 01/15/22  0500    139 136   K 3.9 4.2 5.6*    104 101   CO2 27 27 24   BUN 21* 20 18   CREATININE <0.5* <0.5* <0.5*     LIVER PROFILE:   Recent Labs     01/13/22 0624 01/14/22  0600 01/15/22  0500   AST 64* 54* <5*   ALT 49* 50* <5*   BILITOT 1.6* 2.0* 1.7*   ALKPHOS 115 127 125     PT/INR:   No results for input(s): PROTIME, INR in the last 72 hours. Results for Gilles Billy (MRN [id number]) as of 1/11/2022 06:37   Ref.  Range 1/9/2022 16:30   CRP Latest Ref Range: 0.0 - 5.1 mg/L 55.2 (H)       Cultures:    Covid 19: detected  Influenza A and B: not detected  Blood culture:NGTD    Films:    XR CHEST PORTABLE   Final Result   1. Endotracheal tube projects in appropriate position. 2. Increased bilateral nonspecific pulmonary opacities representing edema,   atelectasis and/or pneumonia. XR CHEST PORTABLE   Final Result   Right PICC projects in normal position. Stable interstitial and ground-glass opacities, most likely atypical   pneumonia. IR PICC WO SQ PORT/PUMP > 5 YEARS   Final Result   Successful placement of PICC line. CT CHEST PULMONARY EMBOLISM W CONTRAST   Final Result   1. No evidence of pulmonary embolism. 2.  Commonly reported imaging features of COVID-19 pneumonia are present. Other processes such as influenza pneumonia and organizing pneumonia, as can   be seen with drug toxicity and connective tissue disease, can cause a similar   imaging pattern. PneTyp   3. Mild compression deformity of T8 of uncertain chronicity, new since   05/29/2020. Similar mild moderate compression deformities at T10, T12.   4. Fatty liver. 5. Interval increase in size of now 2.7 cm fat attenuation lesion of the   pancreatic neck. Recommend further evaluation with nonemergent pancreas   protocol MRI. XR CHEST PORTABLE   Final Result   1. Bilateral interstitial thickening either due to edema or atypical   interstitial pneumonia. XR CHEST PORTABLE    (Results Pending)   XR CHEST PORTABLE    (Results Pending)          Assessment:    Principal Problem:    Acute hypoxemic respiratory failure due to COVID-19 [facility])  Active Problems:    Coronary artery disease    PAD (peripheral artery disease) (HCC)    HLD (hyperlipidemia)    GERD (gastroesophageal reflux disease)    DMII [facility]diabetes mellitus, type 2) ([facility]    Essential hypertension    Pneumonia due to COVID-19 virus  Resolved Problems:    * No resolved hospital problems. *         Plan:    #Acute respiratory failure with hypoxemia due to COVID-19.   Patient admitted to hospital. Pulmonary consultation. Supplemental oxygen. Continuous pulse ox and telemetry. On Bipap with FiO2 of 100%. .    Intubated 1/14       #COVID-19 pneumonia. Work-up consistent with COVID-19 pneumonia. He is on Decadron 10 mg #6. Completed remdesivir 5 days  He received Tocilizumab       #CAD stable. No CP. On ASA and Plavix.       #PAD stable       #DM type 2. Monitor sugars. Expect sugars will be elevated due to steroids.  SSI       #HLD On Lipitor.       #Lovenox 30 mg bid for DVT prophylaxis.        #Transaminitis due to COVID 19.       #Hyponatremia   Resolved.          Andrea Guadalupe MD 3:11 PM 1/15/2022

## 2022-01-16 NOTE — PROGRESS NOTES
AM assessment completed. AM labs reviewed. Pt intubated & sedated- fentanyl & propofol. LR d/c'd. Levophed gtt at 7 mcg. Navarro in place for strict I/o. Pt in bilateral soft wrist restraints for safety. No new orders at present time. Jemma, RT at bedside.    Nicola Stone RN, BSN

## 2022-01-16 NOTE — PROGRESS NOTES
Internal Medicine ICU Progress Note      Events of Last 24 hours:     1/15  Intubated    Prone this am - back to supine. PEEP 14, FiO2 70%.   Prone again today. Intubated and sedated. Invasive Lines: PICC placed on 1/10/22       MV:  N/A    Recent Labs     01/15/22  1050 22  0440   PHART 7.282* 7.342*   [id number] 57.7* 53.2*   PO2ART 111.6* 75.7       MV Settings:  Vent Mode: AC/VC Rate Set: 32 bmp/Vt Ordered: 340 mL/ /FiO2 : 90 %    IV:   fentaNYL 175 mcg/hr (22 0744)    propofol 65 mcg/kg/min (22 1036)    norepinephrine 7 mcg/min (22 1216)    sodium chloride         Vitals:  Temp  Av.4 °F (36.3 °C)  Min: 96.9 °F (36.1 °C)  Max: 98.7 °F (37.1 °C)  Pulse  Av.7  Min: 59  Max: 135  BP  Min: 84/59  Max: 181/87  SpO2  Av.6 %  Min: 88 %  Max: 97 %  FiO2   Av.6 %  Min: 50 %  Max: 100 %  Patient Vitals for the past 4 hrs:   BP Temp Temp src Pulse Resp SpO2   22 1300 -- -- -- 115 (!) 32 91 %   22 1200 (!) 96/59 -- -- 135 (!) 33 93 %   22 1137 -- -- -- 90 30 95 %   22 1100 105/69 98.7 °F (37.1 °C) Axillary 93 28 95 %   22 1000 106/69 -- -- 100 (!) 32 97 %       CVP:        Intake/Output Summary (Last 24 hours) at 2022 1305  Last data filed at 2022 0422  Gross per 24 hour   Intake 1769.23 ml   Output 2000 ml   Net -230.77 ml       EXAM:  General: mild distress. Intubated and sedated  Eyes: PERRL. No sclera icterus. No conjunctiva injected. ENT: No discharge. Pharynx clear. Neck: Trachea midline. Normal thyroid. Resp: minimal accessory muscle use. + crackles. No wheezing. No rhonchi. No dullness on percussion. CV: Regular rate. Regular rhythm. No mumur or rub. No edema. No JVD. Palpable pedal pulses. GI: Non-tender. Non-distended. No masses. No organmegaly. Normal bowel sounds. No hernia. Skin: Warm and dry. No nodule on exposed extremities. No rash on exposed extremities. Lymph: No cervical LAD.  No supraclavicular LAD. M/S: No cyanosis. No joint deformity. No clubbing. Neuro: Awake. Follows commands. Positive pupils/gag/corneals. Normal pain response. Psych: Oriented to person, place, time. No anxiety or agitation. Medications:  Scheduled Meds:   sodium zirconium cyclosilicate  10 g Oral TID    insulin lispro  0-18 Units SubCUTAneous Q4H    ipratropium-albuterol  1 ampule Inhalation Q4H    cisatracurium Besylate  0.15 mg/kg IntraVENous Once    dexamethasone  10 mg IntraVENous Daily    famotidine  20 mg Oral BID    atorvastatin  40 mg Oral Nightly    omega-3 acid ethyl esters  2 g Oral BID    clopidogrel  75 mg Oral Daily    aspirin  81 mg Oral Daily    sodium chloride flush  5-40 mL IntraVENous 2 times per day    enoxaparin  30 mg SubCUTAneous BID    Vitamin D  2,000 Units Oral Daily       PRN Meds:  midazolam, fentanNYL, melatonin, albuterol sulfate HFA, nitroGLYCERIN, sodium chloride flush, sodium chloride, ondansetron **OR** ondansetron, polyethylene glycol, acetaminophen **OR** acetaminophen, potassium chloride, magnesium sulfate    Results:  CBC:   Recent Labs     01/14/22  0600 01/15/22  0500 01/16/22  0400   WBC 15.3* 19.9* 16.7*   HGB 14.5 14.5 13.5   HCT 43.7 43.7 40.9   MCV 81.2 82.7 82.6    405 318     BMP:   Recent Labs     01/15/22  0500 01/15/22  1516 01/16/22  0400    137 138   K 5.6* 5.4* 4.7    103 102   CO2 24 25 27   PHOS  --  4.3  --    BUN 18 16 15   CREATININE <0.5* <0.5* <0.5*     LIVER PROFILE:   Recent Labs     01/14/22  0600 01/15/22  0500 01/16/22  0400   AST 54* <5* 30   ALT 50* <5* 39   BILITOT 2.0* 1.7* 1.8*   ALKPHOS 127 125 97     PT/INR:   No results for input(s): PROTIME, INR in the last 72 hours. Results for Monae Staton (MRN [id number]) as of 1/11/2022 06:37   Ref.  Range 1/9/2022 16:30   CRP Latest Ref Range: 0.0 - 5.1 mg/L 55.2 (H)       Cultures:    Covid 19: detected  Influenza A and B: not detected  Blood culture:NGTD    Films:    XR CHEST PORTABLE   Final Result   Stable multifocal bilateral lung airspace disease consistent with previously   identified presence of COVID-19 viral pneumonia. Suspected moderate pulmonary interstitial edema. XR CHEST PORTABLE   Final Result   The support apparatus as described above. Continued increased interstitial opacity throughout both lungs, along with   more focal traits within the periphery the lungs, some combination of   interstitial edema and pneumonia. XR CHEST PORTABLE   Final Result   1. Endotracheal tube projects in appropriate position. 2. Increased bilateral nonspecific pulmonary opacities representing edema,   atelectasis and/or pneumonia. XR CHEST PORTABLE   Final Result   Right PICC projects in normal position. Stable interstitial and ground-glass opacities, most likely atypical   pneumonia. IR PICC WO SQ PORT/PUMP > 5 YEARS   Final Result   Successful placement of PICC line. CT CHEST PULMONARY EMBOLISM W CONTRAST   Final Result   1. No evidence of pulmonary embolism. 2.  Commonly reported imaging features of COVID-19 pneumonia are present. Other processes such as influenza pneumonia and organizing pneumonia, as can   be seen with drug toxicity and connective tissue disease, can cause a similar   imaging pattern. PneTyp   3. Mild compression deformity of T8 of uncertain chronicity, new since   05/29/2020. Similar mild moderate compression deformities at T10, T12.   4. Fatty liver. 5. Interval increase in size of now 2.7 cm fat attenuation lesion of the   pancreatic neck. Recommend further evaluation with nonemergent pancreas   protocol MRI. XR CHEST PORTABLE   Final Result   1. Bilateral interstitial thickening either due to edema or atypical   interstitial pneumonia.          XR CHEST PORTABLE    (Results Pending)          Assessment:    Principal Problem:    Acute hypoxemic respiratory failure due to COVID-19 [facility])  Active Problems:    Coronary artery disease    PAD (peripheral artery disease) (HCC)    HLD (hyperlipidemia)    GERD (gastroesophageal reflux disease)    DMII (diabetes mellitus, type 2) (HCC)    Essential hypertension    Pneumonia due to COVID-19 virus  Resolved Problems:    * No resolved hospital problems. *         Plan:    #Acute respiratory failure with hypoxemia due to COVID-19. Patient admitted to hospital.  Pulmonary consultation. Supplemental oxygen. Continuous pulse ox and telemetry. On Bipap with FiO2 of 100%. .    Intubated 1/14  Prone 1/15 and 1/16       #COVID-19 pneumonia. Work-up consistent with COVID-19 pneumonia. He is on Decadron 10 mg   Completed remdesivir 5 days  He received Tocilizumab       #CAD stable. No CP. On ASA and Plavix.       #PAD stable       #DM type 2. Monitor sugars. Expect sugars will be elevated due to steroids.  SSI       #HLD On Lipitor.       #Lovenox 30 mg bid for DVT prophylaxis.        #Transaminitis due to COVID 19.       #Hyponatremia   Resolved.          Rajeev Gaines MD 1:05 PM 1/16/2022

## 2022-01-16 NOTE — PROGRESS NOTES
01/15/22 1905   Vent Information   Vent Type 840   Vent Mode AC/VC   Vt Ordered 340 mL   Rate Set 32 bmp   Peak Flow 52 L/min   Pressure Support 0 cmH20   FiO2  50 %   SpO2 91 %   SpO2/FiO2 ratio 182   Sensitivity 3   PEEP/CPAP 10   Humidification Source Heated wire   Humidification Temp 37   Humidification Temp Measured 37   Vent Patient Data   Peak Inspiratory Pressure 19 cmH2O   Mean Airway Pressure 15 cmH20   Rate Measured 34 br/min   Vt Exhaled 400 mL   Minute Volume 12.4 Liters   I:E Ratio 1:1.50   Plateau Pressure 19 [id number]   Cough/Sputum   Sputum How Obtained Suctioned;Endotracheal   Cough Productive   Sputum Amount Small   Sputum Color Creamy   Tenacity Thick   Spontaneous Breathing Trial (SBT) RT Doc   Pulse 71   Breath Sounds   Right Upper Lobe Diminished   Right Middle Lobe Diminished   Right Lower Lobe Diminished   Left Upper Lobe Diminished   Left Lower Lobe Diminished   Additional Respiratory  Assessments   Resp (!) 34   Position Semi-Lee's   Oral Care Completed? Yes   Oral Care Mouth suctioned   Subglottic Suction Done? Yes   Alarm Settings   High Pressure Alarm 40 cmH2O   Low Minute Volume Alarm 4 L/min   Apnea (secs) 20 secs   High Respiratory Rate 45 br/min   Low Exhaled Vt  200 mL   ETT (adult)   Placement Date/Time: 01/14/22 1503   Timeout: Patient;Procedure; Appropriate Equipment;Correct Position  Preoxygenation: Yes  Mask Ventilation: Ventilated by mask (1)  Technique: Rapid sequence  Type: Cuffed  Tube Size: 8 mm  Laryngoscope: GlideScope  . ..    Secured at 24 cm   Measured From Lips   ET Placement Right

## 2022-01-16 NOTE — PROGRESS NOTES
RT Inhaler-Nebulizer Bronchodilator Protocol Note    There is a bronchodilator order in the chart from a provider indicating to follow the RT Bronchodilator Protocol and there is an Initiate RT Inhaler-Nebulizer Bronchodilator Protocol order as well (see protocol at bottom of note). CXR Findings:  XR CHEST PORTABLE    Result Date: 1/16/2022  Stable multifocal bilateral lung airspace disease consistent with previously identified presence of COVID-19 viral pneumonia. Suspected moderate pulmonary interstitial edema. XR CHEST PORTABLE    Result Date: 1/15/2022  The support apparatus as described above. Continued increased interstitial opacity throughout both lungs, along with more focal traits within the periphery the lungs, some combination of interstitial edema and pneumonia. The findings from the last RT Protocol Assessment were as follows:   History Pulmonary Disease: Chronic pulmonary disease  Respiratory Pattern: Severe use of accessory muscle or RR>30 bpm  Breath Sounds: Slightly diminished and/or crackles  Cough: Weak, non-productive  Indication for Bronchodilator Therapy: Decreased or absent breath sounds  Bronchodilator Assessment Score: 15    Aerosolized bronchodilator medication orders have been revised according to the RT Inhaler-Nebulizer Bronchodilator Protocol below. Respiratory Therapist to perform RT Therapy Protocol Assessment initially then follow the protocol. Repeat RT Therapy Protocol Assessment PRN for score 0-3 or on second treatment, BID, and PRN for scores above 3. No Indications - adjust the frequency to every 6 hours PRN wheezing or bronchospasm, if no treatments needed after 48 hours then discontinue using Per Protocol order mode. If indication present, adjust the RT bronchodilator orders based on the Bronchodilator Assessment Score as indicated below.   Use Inhaler orders unless patient has one or more of the following: on home nebulizer, not able to hold breath for 10 seconds, is not alert and oriented, cannot activate and use MDI correctly, or respiratory rate 25 breaths per minute or more, then use the equivalent nebulizer order(s) with same Frequency and PRN reasons based on the score. If a patient is on this medication at home then do not decrease Frequency below that used at home. 0-3 - enter or revise RT bronchodilator order(s) to equivalent RT Bronchodilator order with Frequency of every 4 hours PRN for wheezing or increased work of breathing using Per Protocol order mode. 4-6 - enter or revise RT Bronchodilator order(s) to two equivalent RT bronchodilator orders with one order with BID Frequency and one order with Frequency of every 4 hours PRN wheezing or increased work of breathing using Per Protocol order mode. 7-10 - enter or revise RT Bronchodilator order(s) to two equivalent RT bronchodilator orders with one order with TID Frequency and one order with Frequency of every 4 hours PRN wheezing or increased work of breathing using Per Protocol order mode. 11-13 - enter or revise RT Bronchodilator order(s) to one equivalent RT bronchodilator order with QID Frequency and an Albuterol order with Frequency of every 4 hours PRN wheezing or increased work of breathing using Per Protocol order mode. Greater than 13 - enter or revise RT Bronchodilator order(s) to one equivalent RT bronchodilator order with every 4 hours Frequency and an Albuterol order with Frequency of every 2 hours PRN wheezing or increased work of breathing using Per Protocol order mode.          Electronically signed by Chayo Saldivar RCP on 1/16/2022 at 6:55 PM

## 2022-01-16 NOTE — PROGRESS NOTES
Pulmonary & Critical Care Medicine ICU Progress Note    CC: COVID 19 pneumonia in an unvaccinated individual    Events of Last 24 hours: sedated on vent in prone position  Levo 7  Fentanyl 175  Propofol 60  Invasive Lines: PICC placed 1/10/22    MV:  1/14/22-  Vent Mode: AC/VC Rate Set: 32 bmp/Vt Ordered: 340 mL/ /FiO2 : 70 %  Recent Labs     01/15/22  1050 01/16/22  0440   PHART 7.282* 7.342*   CST0VVB 57.7* 53.2*   PO2ART 111.6* 75.7       IV:   fentaNYL 175 mcg/hr (01/16/22 0422)    propofol 60 mcg/kg/min (01/16/22 0630)    norepinephrine 7 mcg/min (01/16/22 0422)    cisatracurium (NIMBEX) infusion      lactated ringers 50 mL/hr at 01/16/22 0422    sodium chloride         Vitals:  BP 99/65   Pulse 79   Temp 96.9 °F (36.1 °C) (Axillary)   Resp (!) 32   Ht 5' 2\" (1.575 m)   Wt 154 lb 15.7 oz (70.3 kg)   SpO2 92%   BMI 28.35 kg/m²   on vent    Intake/Output Summary (Last 24 hours) at 1/16/2022 0718  Last data filed at 1/16/2022 0422  Gross per 24 hour   Intake 7127.69 ml   Output 2000 ml   Net 5127.69 ml     General: intubated, ill appearing    ENT:   Pharynx with ETT. Neck: Trachea midline  Resp: No accessory muscle use. CV: Regular rate. Regular rhythm. GI:  prone.     Neuro: Sedated  Psych: Unable to obtain because the patient is non-communicative    Scheduled Meds:   sodium zirconium cyclosilicate  10 g Oral TID    insulin lispro  0-18 Units SubCUTAneous Q4H    ipratropium-albuterol  1 ampule Inhalation Q4H    cisatracurium Besylate  0.15 mg/kg IntraVENous Once    dexamethasone  10 mg IntraVENous Daily    famotidine  20 mg Oral BID    atorvastatin  40 mg Oral Nightly    omega-3 acid ethyl esters  2 g Oral BID    clopidogrel  75 mg Oral Daily    aspirin  81 mg Oral Daily    sodium chloride flush  5-40 mL IntraVENous 2 times per day    enoxaparin  30 mg SubCUTAneous BID    Vitamin D  2,000 Units Oral Daily     PRN Meds:  midazolam, fentanNYL, melatonin, albuterol sulfate HFA, nitroGLYCERIN, sodium chloride flush, sodium chloride, ondansetron **OR** ondansetron, polyethylene glycol, acetaminophen **OR** acetaminophen, potassium chloride, magnesium sulfate    Results:  CBC:   Recent Labs     01/14/22  0600 01/15/22  0500 01/16/22  0400   WBC 15.3* 19.9* 16.7*   HGB 14.5 14.5 13.5   HCT 43.7 43.7 40.9   MCV 81.2 82.7 82.6    405 318     BMP:   Recent Labs     01/15/22  0500 01/15/22  1516 01/16/22  0400    137 138   K 5.6* 5.4* 4.7    103 102   CO2 24 25 27   PHOS  --  4.3  --    BUN 18 16 15   CREATININE <0.5* <0.5* <0.5*     LIVER PROFILE:   Recent Labs     01/14/22  0600 01/15/22  0500 01/16/22  0400   AST 54* <5* 30   ALT 50* <5* 39   BILITOT 2.0* 1.7* 1.8*   ALKPHOS 127 125 97       PCT 0.09  1/9/22 COVID 19 detected      Chest imaging was reviewed by me and showed   1/9/22 CTPA: no PE      Mediastinum: Similar mildly enlarged mediastinal and bilateral hilar lymph   nodes.  The heart and pericardium demonstrate no acute abnormality.  There is   no acute abnormality of the thoracic aorta.  Moderate coronary artery   calcification.       Lungs/pleura: Moderate emphysema.  Moderate bilateral peripherally oriented   ground-glass occupying slightly less than 50% of the lung parenchyma.  No   evidence of pleural effusion or pneumothorax.       Upper Abdomen: Fatty liver.  Interval increase in size of fat attenuation   lesion of the pancreatic neck measuring 2.7 x 2.1 cm on series 2, image 213,   previously 2.3 x 1.6 cm.       Soft Tissues/Bones: Mild compression deformity of T8 of uncertain chronicity,   new since 05/29/2020.  Similar mild moderate compression deformities at T10,   T12. Diffuse osteopenia.         1/16/22 CXR: stable multifocal ASD.  Lines/ETT in place.     ASSESSMENT:  · Acute hypoxic respiratory failure  · COVID 19 pneumonia in an unvaccinated individual  · Pulmonary emphysema  · Hyponatremia  · Elevated LFTS  · Tobacco abuse  · DM2  · CAD and PAD with h/o STEMI     PLAN:  · Droplet Plus Airborne Precautions   Mechanical ventilation as per my orders. The ventilator was adjusted by me at the bedside for unstable, life threatening respiratory failure. IV Propofol for sedation, target RASS -52, with daily spontaneous awakening trial.  Fentanyl PRN pain, gtt as needed  Head of bed 30 degrees or higher at all times  Daily spontaneous breathing trial once PEEP less than 8, FiO2 less than 55%. Prone 1/14/22-  · Decadron 10 mg QD, D#7, monitor glucose closely  · S/p Remdesevir D#5  And Tocilizumab received 1/9/22  · Stop IVF  · Start trophic TFs  · SSI  · Renal panel 1500  · Lovenox twice daily  · On ASA and plavix  · Tobacco cessation  · Total critical care time caring for this patient with life threatening, unstable organ failure, including direct patient contact, management of life support systems, review of data including imaging and labs, discussions with other team members and physicians at least 31 minutes so far today, excluding procedures.

## 2022-01-16 NOTE — PLAN OF CARE
Problem: Infection - Central Venous Catheter-Associated Bloodstream Infection:  Goal: Will show no infection signs and symptoms  Description: Will show no infection signs and symptoms  Outcome: Ongoing     Problem: Airway Clearance - Ineffective  Goal: Achieve or maintain patent airway  Outcome: Ongoing     Problem: Gas Exchange - Impaired  Goal: Absence of hypoxia  Outcome: Ongoing  Goal: Promote optimal lung function  Outcome: Ongoing     Problem: Breathing Pattern - Ineffective  Goal: Ability to achieve and maintain a regular respiratory rate  Outcome: Ongoing     Problem:  Body Temperature -  Risk of, Imbalanced  Goal: Ability to maintain a body temperature within defined limits  Outcome: Ongoing  Goal: Will regain or maintain usual level of consciousness  Outcome: Ongoing  Goal: Complications related to the disease process, condition or treatment will be avoided or minimized  Outcome: Ongoing     Problem: Isolation Precautions - Risk of Spread of Infection  Goal: Prevent transmission of infection  Outcome: Ongoing     Problem: Nutrition Deficits  Goal: Optimize nutritional status  Outcome: Ongoing     Problem: Risk for Fluid Volume Deficit  Goal: Maintain normal heart rhythm  Outcome: Ongoing  Goal: Maintain absence of muscle cramping  Outcome: Ongoing  Goal: Maintain normal serum potassium, sodium, calcium, phosphorus, and pH  Outcome: Ongoing     Problem: Loneliness or Risk for Loneliness  Goal: Demonstrate positive use of time alone when socialization is not possible  Outcome: Ongoing     Problem: Fatigue  Goal: Verbalize increase energy and improved vitality  Outcome: Ongoing     Problem: Patient Education: Go to Patient Education Activity  Goal: Patient/Family Education  Outcome: Ongoing     Problem: Skin Integrity:  Goal: Absence of new skin breakdown  Description: Absence of new skin breakdown  Outcome: Ongoing     Problem: Falls - Risk of:  Goal: Will remain free from falls  Description: Will remain free from falls  Outcome: Ongoing  Goal: Absence of physical injury  Description: Absence of physical injury  Outcome: Ongoing     Problem: Non-Violent Restraints  Goal: Removal from restraints as soon as assessed to be safe  Outcome: Ongoing  Goal: No harm/injury to patient while restraints in use  Outcome: Ongoing  Goal: Patient's dignity will be maintained  Outcome: Ongoing     Problem: Nutrition  Goal: Optimal nutrition therapy  1/15/2022 2019 by Dona Camacho RN  Outcome: Ongoing  1/15/2022 1754 by Ana Mercado RD, DAVIE  Outcome: Not Met This Shift  Goal: Understanding of nutritional guidelines  1/15/2022 2019 by Dona Camacho RN  Outcome: Ongoing  1/15/2022 1754 by Ana Mercado RD, DAVIE  Outcome: Not Met This Shift   Pt resting in bed with eyes closed, tolerating vent settings, IV's infusing, TF on hold d/t no pump available, yeh patent, B/L wrist restraints assessed, no s/s of distress.

## 2022-01-16 NOTE — PROGRESS NOTES
RT Inhaler-Nebulizer Bronchodilator Protocol Note    There is a bronchodilator order in the chart from a provider indicating to follow the RT Bronchodilator Protocol and there is an Initiate RT Inhaler-Nebulizer Bronchodilator Protocol order as well (see protocol at bottom of note). CXR Findings:  XR CHEST PORTABLE    Result Date: 1/15/2022  The support apparatus as described above. Continued increased interstitial opacity throughout both lungs, along with more focal traits within the periphery the lungs, some combination of interstitial edema and pneumonia. XR CHEST PORTABLE    Result Date: 1/14/2022  1. Endotracheal tube projects in appropriate position. 2. Increased bilateral nonspecific pulmonary opacities representing edema, atelectasis and/or pneumonia. The findings from the last RT Protocol Assessment were as follows:   History Pulmonary Disease: Chronic pulmonary disease  Respiratory Pattern: Severe use of accessory muscle or RR>30 bpm  Breath Sounds: Slightly diminished and/or crackles  Cough: Weak, productive  Indication for Bronchodilator Therapy: Decreased or absent breath sounds  Bronchodilator Assessment Score: 14    Aerosolized bronchodilator medication orders have been revised according to the RT Inhaler-Nebulizer Bronchodilator Protocol below. Respiratory Therapist to perform RT Therapy Protocol Assessment initially then follow the protocol. Repeat RT Therapy Protocol Assessment PRN for score 0-3 or on second treatment, BID, and PRN for scores above 3. No Indications - adjust the frequency to every 6 hours PRN wheezing or bronchospasm, if no treatments needed after 48 hours then discontinue using Per Protocol order mode. If indication present, adjust the RT bronchodilator orders based on the Bronchodilator Assessment Score as indicated below.   Use Inhaler orders unless patient has one or more of the following: on home nebulizer, not able to hold breath for 10 seconds, is not alert and oriented, cannot activate and use MDI correctly, or respiratory rate 25 breaths per minute or more, then use the equivalent nebulizer order(s) with same Frequency and PRN reasons based on the score. If a patient is on this medication at home then do not decrease Frequency below that used at home. 0-3 - enter or revise RT bronchodilator order(s) to equivalent RT Bronchodilator order with Frequency of every 4 hours PRN for wheezing or increased work of breathing using Per Protocol order mode. 4-6 - enter or revise RT Bronchodilator order(s) to two equivalent RT bronchodilator orders with one order with BID Frequency and one order with Frequency of every 4 hours PRN wheezing or increased work of breathing using Per Protocol order mode. 7-10 - enter or revise RT Bronchodilator order(s) to two equivalent RT bronchodilator orders with one order with TID Frequency and one order with Frequency of every 4 hours PRN wheezing or increased work of breathing using Per Protocol order mode. 11-13 - enter or revise RT Bronchodilator order(s) to one equivalent RT bronchodilator order with QID Frequency and an Albuterol order with Frequency of every 4 hours PRN wheezing or increased work of breathing using Per Protocol order mode. Greater than 13 - enter or revise RT Bronchodilator order(s) to one equivalent RT bronchodilator order with every 4 hours Frequency and an Albuterol order with Frequency of every 2 hours PRN wheezing or increased work of breathing using Per Protocol order mode.        Electronically signed by Alexandrea oCmer RCP on 1/15/2022 at 7:09 PM

## 2022-01-16 NOTE — PROGRESS NOTES
High risk vesicant drug infusing:  ____x______    Multiple incompatible medications infusing:  ____x_____    CVP Monitoring:  _________    Extremely difficult IV access challenge:  x________    Continued need for central line access:  ___x_______    Addressed with physician:  ____x____    RIGHT PATIENT, RIGHT TIME, RIGHT LINE

## 2022-01-16 NOTE — PROGRESS NOTES
Pt resting comfortably. VSS. Pt remains in prone positioning. Fentanyl & propofol gtt continues. Spoke w/ pts wife & updated.

## 2022-01-16 NOTE — PROGRESS NOTES
01/15/22 2330   Vent Information   Vent Type 840   Vent Mode AC/VC   Vt Ordered 340 mL   Rate Set 32 bmp   Peak Flow 60 L/min   Pressure Support 0 cmH20   FiO2  50 %   SpO2 (!) 89 %   SpO2/FiO2 ratio 178   Sensitivity 3   PEEP/CPAP 10   Humidification Source Heated wire   Humidification Temp Measured 37   Circuit Condensation Drained   Vent Patient Data   Peak Inspiratory Pressure 21 cmH2O   Mean Airway Pressure 14 cmH20   Rate Measured 32 br/min   Vt Exhaled 407 mL   Minute Volume 11.6 Liters   I:E Ratio 1:2.00   Cough/Sputum   Sputum How Obtained Endotracheal   Cough Productive   Sputum Amount Small   Sputum Color Creamy   Tenacity Thick   Spontaneous Breathing Trial (SBT) RT Doc   Pulse 72   Breath Sounds   Right Upper Lobe Diminished   Right Middle Lobe Diminished   Right Lower Lobe Diminished   Left Upper Lobe Diminished   Left Lower Lobe Diminished   Additional Respiratory  Assessments   Resp (!) 32   Alarm Settings   High Pressure Alarm 40 cmH2O   Low Minute Volume Alarm 4 L/min   Apnea (secs) 20 secs   High Respiratory Rate 45 br/min   Low Exhaled Vt  200 mL   Patient Observation   Observations 8ett 24 at lip

## 2022-01-17 NOTE — PROGRESS NOTES
Pt temp 95.7 F. Bear hugger placed. CM-SR, VSS on Levo gtt infusing @ 8 mcg/min to maintain MAP >65. Pt is resting w/out evidence of discomfort or distress @ this time.

## 2022-01-17 NOTE — PROGRESS NOTES
Internal Medicine ICU Progress Note    covid pneumonia, hypoxia      Events of Last 24 hours:       Intubated    Proned- back to supine. PEEP 14, FiO2 70%.   Pt see in supine position today   Hypotensive on levophed   Improving fio2         Invasive Lines: PICC placed on 1/10/22       MV:  N/A    Recent Labs     22  0440 22  0501   PHART 7.342* 7.371   [id number] 53.2* 51.6*   PO2ART 75.7 118.7*       MV Settings:  Vent Mode: AC/VC Rate Set: 32 bmp/Vt Ordered: 340 mL/ /FiO2 : 80 %    IV:   fentaNYL 175 mcg/hr (22 075)    propofol 65 mcg/kg/min (22)    norepinephrine 8 mcg/min (22)    sodium chloride         Vitals:  Temp  Av.4 °F (36.9 °C)  Min: 97.9 °F (36.6 °C)  Max: 98.7 °F (37.1 °C)  Pulse  Av.7  Min: 73  Max: 135  BP  Min: 67/55  Max: 156/73  SpO2  Av %  Min: 87 %  Max: 99 %  FiO2   Av.8 %  Min: 70 %  Max: 100 %  Patient Vitals for the past 4 hrs:   BP Pulse Resp SpO2   22 0700 109/62 73 (!) 32 --   22 0535 (!) 106/58 79 (!) 32 99 %   22 0500 115/65 76 (!) 32 99 %   22 0435 107/65 84 (!) 32 99 %   22 0406 (!) 156/73 88 (!) 32 99 %       CVP:        Intake/Output Summary (Last 24 hours) at 2022 0802  Last data filed at 2022 0533  Gross per 24 hour   Intake 4494.23 ml   Output 2540 ml   Net 1954.23 ml       EXAM:  General:  Middle aged male, old appearing. Intubated and sedated  Eyes: PERRL. No sclera icterus. No conjunctiva injected. ENT - oral ETT and OG noted . Neck: Trachea midline. Normal thyroid. Resp: diffuse jose + crackles. No wheezing. No rhonchi. CV: Regular rate. Regular rhythm. No mumur or rub. No edema. No JVD. Palpable pedal pulses. GI: Non-tender. Non-distended. No masses. No organmegaly. Normal bowel sounds. No hernia. Skin: Warm and dry. No nodule on exposed extremities. No rash on exposed extremities. Lymph: No cervical LAD. No supraclavicular LAD. M/S: No cyanosis. No joint deformity. No clubbing. Neuro: sedated       Medications:  Scheduled Meds:   sodium zirconium cyclosilicate  10 g Oral TID    insulin lispro  0-18 Units SubCUTAneous Q4H    ipratropium-albuterol  1 ampule Inhalation Q4H    cisatracurium Besylate  0.15 mg/kg IntraVENous Once    dexamethasone  10 mg IntraVENous Daily    famotidine  20 mg Oral BID    atorvastatin  40 mg Oral Nightly    omega-3 acid ethyl esters  2 g Oral BID    clopidogrel  75 mg Oral Daily    aspirin  81 mg Oral Daily    sodium chloride flush  5-40 mL IntraVENous 2 times per day    enoxaparin  30 mg SubCUTAneous BID    Vitamin D  2,000 Units Oral Daily       PRN Meds:  midazolam, fentanNYL, melatonin, albuterol sulfate HFA, nitroGLYCERIN, sodium chloride flush, sodium chloride, ondansetron **OR** ondansetron, polyethylene glycol, acetaminophen **OR** acetaminophen, potassium chloride, magnesium sulfate    Results:  CBC:   Recent Labs     01/15/22  0500 01/16/22  0400 01/17/22  0501   WBC 19.9* 16.7* 15.5*   HGB 14.5 13.5 12.8*   HCT 43.7 40.9 36.1*   MCV 82.7 82.6 82.2    318 278     BMP:   Recent Labs     01/15/22  1516 01/16/22  0400 01/17/22  0501    138 130*   K 5.4* 4.7 3.8    102 94*   CO2 25 27 29   PHOS 4.3  --   --    BUN 16 15 16   CREATININE <0.5* <0.5* <0.5*     LIVER PROFILE:   Recent Labs     01/15/22  0500 01/16/22  0400 01/17/22  0501   AST <5* 30 <5*   ALT <5* 39 <5*   BILITOT 1.7* 1.8* 2.1*   ALKPHOS 125 97 87     PT/INR:   No results for input(s): PROTIME, INR in the last 72 hours. Results for Christian More (MRN [id number]) as of 1/11/2022 06:37   Ref.  Range 1/9/2022 16:30   CRP Latest Ref Range: 0.0 - 5.1 mg/L 55.2 (H)       Cultures:    Covid 19: detected  Influenza A and B: not detected  Blood culture:NGTD    Films:    XR CHEST PORTABLE   Final Result   Stable multifocal bilateral lung airspace disease consistent with previously   identified presence of COVID-19 viral pneumonia. Suspected moderate pulmonary interstitial edema. XR CHEST PORTABLE   Final Result   The support apparatus as described above. Continued increased interstitial opacity throughout both lungs, along with   more focal traits within the periphery the lungs, some combination of   interstitial edema and pneumonia. XR CHEST PORTABLE   Final Result   1. Endotracheal tube projects in appropriate position. 2. Increased bilateral nonspecific pulmonary opacities representing edema,   atelectasis and/or pneumonia. XR CHEST PORTABLE   Final Result   Right PICC projects in normal position. Stable interstitial and ground-glass opacities, most likely atypical   pneumonia. IR PICC WO SQ PORT/PUMP > 5 YEARS   Final Result   Successful placement of PICC line. CT CHEST PULMONARY EMBOLISM W CONTRAST   Final Result   1. No evidence of pulmonary embolism. 2.  Commonly reported imaging features of COVID-19 pneumonia are present. Other processes such as influenza pneumonia and organizing pneumonia, as can   be seen with drug toxicity and connective tissue disease, can cause a similar   imaging pattern. PneTyp   3. Mild compression deformity of T8 of uncertain chronicity, new since   05/29/2020. Similar mild moderate compression deformities at T10, T12.   4. Fatty liver. 5. Interval increase in size of now 2.7 cm fat attenuation lesion of the   pancreatic neck. Recommend further evaluation with nonemergent pancreas   protocol MRI. XR CHEST PORTABLE   Final Result   1. Bilateral interstitial thickening either due to edema or atypical   interstitial pneumonia.          XR CHEST PORTABLE    (Results Pending)   XR CHEST PORTABLE    (Results Pending)          Assessment and Plan          COVID PNA  Acute Hypoxic Respiratory Failure    - imaging with bilateral pna pattern  - does not wear O2 at baseline  - Admit to Med Surg, droplet + precautions, tele  - supp O2, progressive hypoxemia, now on vent support  -Intubated 1/14  Prone 1/15 and 1/16  - on  Decadron D#8, completed 5 days of  Remdesivir, s/p Tocilizumab  - pulmonary consulted   - lovenox bid         Abnormal lFT - likely from viral infection   Monitored with remdesivir and improved     CAD - stable. No CP. On ASA and Plavix. PAD stable     DM type 2  Hyperglycemia with steroids, on ssi      HLD On Lipitor.     Lovenox 30 mg bid for DVT prophylaxis.                  Beba Diaz MD 8:02 AM 1/17/2022

## 2022-01-17 NOTE — PROGRESS NOTES
01/17/22 0250   Vent Information   Vent Type 840   Vent Mode AC/VC   Vt Ordered 340 mL   Rate Set 32 bmp   Peak Flow 55 L/min   Pressure Support 0 cmH20   FiO2  90 %   SpO2 98 %   SpO2/FiO2 ratio 108.89   Sensitivity 3   PEEP/CPAP 12   Humidification Source Heated wire   Humidification Temp 37   Humidification Temp Measured 37   Vent Patient Data   Peak Inspiratory Pressure 23 cmH2O   Mean Airway Pressure 17 cmH20   Rate Measured 32 br/min   Vt Exhaled 367 mL   Minute Volume 11.7 Liters   I:E Ratio 1:1.80   Plateau Pressure 22 FRY96   Cough/Sputum   Sputum How Obtained Suctioned;Endotracheal   Cough Non-productive   Spontaneous Breathing Trial (SBT) RT Doc   Pulse 78   Breath Sounds   Right Upper Lobe Diminished   Right Middle Lobe Diminished   Right Lower Lobe Diminished   Left Upper Lobe Diminished   Left Lower Lobe Diminished   Additional Respiratory  Assessments   Resp (!) 32   Position Prone   Oral Care Completed? Yes   Oral Care Mouth suctioned   Subglottic Suction Done? Yes   Alarm Settings   High Pressure Alarm 40 cmH2O   Low Minute Volume Alarm 4 L/min   Apnea (secs) 20 secs   High Respiratory Rate 45 br/min   Low Exhaled Vt  200 mL   ETT (adult)   Placement Date/Time: 01/14/22 1503   Timeout: Patient;Procedure; Appropriate Equipment;Correct Position  Preoxygenation: Yes  Mask Ventilation: Ventilated by mask (1)  Technique: Rapid sequence  Type: Cuffed  Tube Size: 8 mm  Laryngoscope: GlideScope  . ..    Secured at 24 cm   Measured From Lips

## 2022-01-17 NOTE — PROGRESS NOTES
Pulmonary & Critical Care Medicine ICU Progress Note    CC: COVID-19 pneumonia in an unvaccinated individual    Events of Last 24 hours:   Proned overnight   Levo 8  Fentanyl 175  Propofol 65  PEEP 12--> 10  FiO2 70%--> 60%  Plateau pressure 20  PaO2/FiO2 148      Invasive Lines: PICC placed 1/10/22    MV:  1/14/22-  Vent Mode: AC/VC Rate Set: 32 bmp/Vt Ordered: 340 mL/ /FiO2 : 80 %  Recent Labs     01/16/22  0440 01/17/22  0501   PHART 7.342* 7.371   [id number] 53.2* 51.6*   PO2ART 75.7 118.7*       IV:   fentaNYL 175 mcg/hr (01/17/22 0158)    propofol 65 mcg/kg/min (01/17/22 0533)    norepinephrine 8 mcg/min (01/17/22 0533)    sodium chloride         Vitals:  BP (!) 106/58   Pulse 79   Temp 97.9 °F (36.6 °C) (Oral)   Resp (!) 32   Ht 5' 2\" (1.575 m)   Wt 154 lb 15.7 oz (70.3 kg)   SpO2 99%   BMI 28.35 kg/m²   on vent AC 32/340    Intake/Output Summary (Last 24 hours) at 1/17/2022 0637  Last data filed at 1/17/2022 0533  Gross per 24 hour   Intake 4494.23 ml   Output 2540 ml   Net 1954.23 ml     EXAM:  General: ill appearing. Intubated sedated. Eyes: PERRL. No sclera icterus. No conjunctival injection. ENT: No discharge. Pharynx clear. ET tube in place  Neck: Trachea midline. Normal thyroid. Resp: No accessory muscle use. Few crackles. No wheezing. No rhonchi. CV: Regular rate. Regular rhythm. No mumur or rub. No edema. GI: Non-tender. Non-distended. No masses. Skin: Warm and dry. No nodule on exposed extremities. No rash on exposed extremities. Lymph: No cervical LAD. No supraclavicular LAD. M/S: No cyanosis. No joint deformity. No clubbing. Neuro: Intubated sedated. + response to painful stimuli. Not following commands.   Psych: Noncommunicative unable to obtain    Scheduled Meds:   sodium zirconium cyclosilicate  10 g Oral TID    insulin lispro  0-18 Units SubCUTAneous Q4H    ipratropium-albuterol  1 ampule Inhalation Q4H    cisatracurium Besylate  0.15 mg/kg IntraVENous Once    dexamethasone  10 mg IntraVENous Daily    famotidine  20 mg Oral BID    atorvastatin  40 mg Oral Nightly    omega-3 acid ethyl esters  2 g Oral BID    clopidogrel  75 mg Oral Daily    aspirin  81 mg Oral Daily    sodium chloride flush  5-40 mL IntraVENous 2 times per day    enoxaparin  30 mg SubCUTAneous BID    Vitamin D  2,000 Units Oral Daily     PRN Meds:  midazolam, fentanNYL, melatonin, albuterol sulfate HFA, nitroGLYCERIN, sodium chloride flush, sodium chloride, ondansetron **OR** ondansetron, polyethylene glycol, acetaminophen **OR** acetaminophen, potassium chloride, magnesium sulfate    Results:  CBC:   Recent Labs     01/15/22  0500 01/16/22  0400 01/17/22  0501   WBC 19.9* 16.7* 15.5*   HGB 14.5 13.5 12.8*   HCT 43.7 40.9 36.1*   MCV 82.7 82.6 82.2    318 278     BMP:   Recent Labs     01/15/22  1516 01/16/22  0400 01/17/22  0501    138 130*   K 5.4* 4.7 3.8    102 94*   CO2 25 27 29   PHOS 4.3  --   --    BUN 16 15 16   CREATININE <0.5* <0.5* <0.5*     LIVER PROFILE:   Recent Labs     01/15/22  0500 01/16/22  0400 01/17/22  0501   AST <5* 30 <5*   ALT <5* 39 <5*   BILITOT 1.7* 1.8* 2.1*   ALKPHOS 125 97 87       Micro  1/9 BC NGTD  1/9 COVID-19 detected    Imaging   Chest x-ray 1/17 imaging reviewed by me and showed  Satisfactory ETT position  Satisfactory PICC line position   Multifocal fine ASD               ASSESSMENT:  · Acute hypoxemic respiratory failure  · COVID 19 pneumonia in an unvaccinated individual  · ARDS  · Pulmonary emphysema  · Pancreatic nodule, 2.7 cm increase in size  · DM2 with hyperglycemia   · Elevated LFTS  · Tobacco abuse  · CAD and PAD with h/o STEMI     PLAN:  · Droplet Plus Airborne Precautions   Mechanical ventilation as per my orders. The ventilator was adjusted by me at the bedside for unstable, life threatening respiratory failure.   IV Propofol for sedation, target RASS -52, with daily spontaneous awakening trial.  Fentanyl PRN pain, gtt as needed  Check TG   Head of bed 30 degrees or higher at all times  Daily spontaneous breathing trial once PEEP less than 8, FiO2 less than 55%. Prone 1/14/22-  · Decadron 10 mg QD, D#8, monitor glucose closely  · S/p Remdesevir D#5  And Tocilizumab received 1/9/22  · TFs trophic   · On ASA and plavix  · Tobacco cessation  · D/C Lokelma   · LE doppler   · Follow LFTs  · Blood sugar control ISS, with goal 150-180- add lantus 10   · GI prophylaxis: Pepcid  · DVT prophylaxis: Lovenox BID   · MRSA prophylaxis: Bactroban        Total critical care time caring for this patient with life threatening, unstable organ failure, including direct patient contact, management of life support systems, review of data including imaging and labs, discussions with other team members and physicians at least 32 minutes so far today, excluding procedures.

## 2022-01-17 NOTE — PROGRESS NOTES
01/16/22 2320   Vent Information   Vent Type 840   Vent Mode AC/VC   Vt Ordered 340 mL   Rate Set 32 bmp   Peak Flow 55 L/min   Pressure Support 0 cmH20   FiO2  90 %   SpO2 96 %   SpO2/FiO2 ratio 106.67   Sensitivity 3   PEEP/CPAP 12   Humidification Source Heated wire   Humidification Temp Measured 37   Circuit Condensation Drained   Vent Patient Data   Peak Inspiratory Pressure 25 cmH2O   Mean Airway Pressure 17 cmH20   Rate Measured 32 br/min   Vt Exhaled 368 mL   Minute Volume 11.7 Liters   I:E Ratio 1:1.80   Cough/Sputum   Sputum How Obtained Endotracheal   Cough Productive   Sputum Amount Small   Sputum Color Brown   Tenacity Thick   Spontaneous Breathing Trial (SBT) RT Doc   Pulse 81   Breath Sounds   Right Upper Lobe Diminished   Right Middle Lobe Diminished   Right Lower Lobe Diminished   Left Upper Lobe Diminished   Left Lower Lobe Diminished   Additional Respiratory  Assessments   Resp (!) 32   Alarm Settings   High Pressure Alarm 40 cmH2O   Low Minute Volume Alarm 4 L/min   Apnea (secs) 20 secs   High Respiratory Rate 45 br/min   Low Exhaled Vt  200 mL   Patient Observation   Observations 8ett 24 at lip

## 2022-01-17 NOTE — CARE COORDINATION
INTERDISCIPLINARY PLAN OF CARE CONFERENCE    Date/Time: 1/17/2022 1:47 PM  Completed by: Naheed Granda RN, Case Management      Patient Name:  Amanda Torres  YOB: 1965  Admitting Diagnosis: Acute hypoxemic respiratory failure ([facility] [address] [J96.01]  Acute hypoxemic respiratory failure due to COVID-19 ([facility] [address] [U07.1, J96.01]     Admit Date/Time:  1/9/2022 11:42 AM    Chart reviewed. Interdisciplinary team contacted or reviewed plan related to patient progress and discharge plans. Disciplines included Case Management, Nursing, and Dietitian. Current Status: IP 01/09/2022  PT/OT recommendation for discharge plan of care:     Expected D/C Disposition:  TBD    Discharge Plan Comments:   +C19 ICU/VENT/pronning  Lives w/sp. Gets around on electric scooter at home. CM following.

## 2022-01-18 NOTE — PROGRESS NOTES
Internal Medicine ICU Progress Note    covid pneumonia, hypoxia      Events of Last 24 hours:       Intubated    Proned- back to supine. PEEP 14, FiO2 70%.   Pt see in supine position today   ETT adjusted overnight for abn position  Hypotensive on levophed   Improving Fio2         Invasive Lines: PICC placed on 1/10/22       MV:  N/A    Recent Labs     22  0501 22  0440   PHART 7.371 7.418   [id number] 51.6* 50.5*   PO2ART 118.7* 70.4*       MV Settings:  Vent Mode: AC/VC Rate Set: 32 bmp/Vt Ordered: 340 mL/ /FiO2 : 80 %    IV:   dextrose      fentaNYL 175 mcg/hr (22 014)    propofol 65 mcg/kg/min (22 050)    norepinephrine 8.5 mcg/min (22 1437)    sodium chloride         Vitals:  Temp  Av.4 °F (36.3 °C)  Min: 94.6 °F (34.8 °C)  Max: 98.6 °F (37 °C)  Pulse  Av.8  Min: 66  Max: 123  BP  Min: 81/56  Max: 183/93  SpO2  Av.3 %  Min: 69 %  Max: 99 %  FiO2   Av.6 %  Min: 50 %  Max: 100 %  Patient Vitals for the past 4 hrs:   BP Temp Temp src Pulse Resp SpO2   22 0700 (!) 84/55 97.9 °F (36.6 °C) Axillary 102 (!) 32 95 %   22 0640 (!) 90 -- -- 106 (!) 32 --   22 0620 (!) 95/57 -- -- 103 (!) 32 --   22 0605 (!) 89/56 -- -- 105 (!) 32 --   22 0510 90/60 -- -- 111 (!) 32 91 %   22 0450 90/ -- -- 115 (!) 32 93 %   22 0440 92/ -- -- 119 (!) 33 94 %   22 0420 88/ -- -- 118 (!) 32 94 %   22 0405 87/68 -- -- 123 (!) 32 94 %   22 0350 85/62 -- -- 120 (!) 32 93 %   22 0340 93/66 -- -- 107 (!) 32 94 %   22 0338 -- -- -- 105 (!) 32 94 %       CVP:        Intake/Output Summary (Last 24 hours) at 2022 0735  Last data filed at 2022 0510  Gross per 24 hour   Intake 1689.47 ml   Output 1550 ml   Net 139.47 ml       EXAM:  General:  Middle aged male, old appearing. Intubated and sedated  Eyes: PERRL. No sclera icterus. No conjunctiva injected. ENT - oral ETT and OG noted .   Neck: Trachea midline. Normal thyroid. Resp: diffuse jose + crackles. No wheezing. No rhonchi. CV: Regular rate. Regular rhythm. No mumur or rub. No edema. No JVD. Palpable pedal pulses. GI: Non-tender. Non-distended. No masses. No organmegaly. Normal bowel sounds. No hernia. Skin: developing edema  Lymph: No cervical LAD. No supraclavicular LAD. M/S: No cyanosis. No joint deformity. No clubbing. Neuro: sedated       Medications:  Scheduled Meds:   mupirocin   Nasal BID    insulin glargine  10 Units SubCUTAneous QAM    insulin lispro  0-18 Units SubCUTAneous Q4H    ipratropium-albuterol  1 ampule Inhalation Q4H    cisatracurium Besylate  0.15 mg/kg IntraVENous Once    dexamethasone  10 mg IntraVENous Daily    famotidine  20 mg Oral BID    atorvastatin  40 mg Oral Nightly    omega-3 acid ethyl esters  2 g Oral BID    clopidogrel  75 mg Oral Daily    aspirin  81 mg Oral Daily    sodium chloride flush  5-40 mL IntraVENous 2 times per day    enoxaparin  30 mg SubCUTAneous BID    Vitamin D  2,000 Units Oral Daily       PRN Meds:  glucose, dextrose, glucagon (rDNA), dextrose, midazolam, fentanNYL, melatonin, albuterol sulfate HFA, nitroGLYCERIN, sodium chloride flush, sodium chloride, ondansetron **OR** ondansetron, polyethylene glycol, acetaminophen **OR** acetaminophen, potassium chloride, magnesium sulfate    Results:  CBC:   Recent Labs     01/16/22  0400 01/17/22  0501 01/18/22  0440   WBC 16.7* 15.5* 16.3*   HGB 13.5 12.8* 12.1*   HCT 40.9 36.1* 35.3*   MCV 82.6 82.2 82.3    278 257     BMP:   Recent Labs     01/15/22  1516 01/15/22  1516 01/16/22  0400 01/17/22  0501 01/18/22  0440      < > 138 130* 136   K 5.4*  --  4.7 3.8 4.4      < > 102 94* 97*   CO2 25   < > 27 29 32   PHOS 4.3  --   --   --   --    BUN 16   < > 15 16 22*   CREATININE <0.5*   < > <0.5* <0.5* <0.5*    < > = values in this interval not displayed.      LIVER PROFILE:   Recent Labs     01/16/22  0400 01/17/22  0501 01/18/22  0440   AST 30 <5* 62*   ALT 39 <5* <5*   BILITOT 1.8* 2.1* 2.0*   ALKPHOS 97 87 82     PT/INR:   No results for input(s): PROTIME, INR in the last 72 hours. Results for Christian More (MRN [id number]) as of 1/11/2022 06:37   Ref. Range 1/9/2022 16:30   CRP Latest Ref Range: 0.0 - 5.1 mg/L 55.2 (H)       Cultures:    Covid 19: detected  Influenza A and B: not detected  Blood culture:NGTD    Films:    XR CHEST PORTABLE   Final Result   1. Endotracheal tube terminates 1.6 cm from the benton. Recommend 2 cm   retraction. 2.  Slightly increased diffuse bilateral airspace and interstitial opacities. The findings were sent to the [facility] Results Communication Team at 12:48 a.m.   on 01/18/2022 to be communicated to a licensed caregiver. XR CHEST PORTABLE   Final Result   Mild interval worsening with moderate infiltrates in the lungs. These are   most pronounced in the right lung. 5 cm indeterminate dense opacity in the midline-right upper abdomen. Correlate for oral contrast in bowel. VL Extremity Venous Bilateral   Final Result      XR CHEST PORTABLE   Final Result   Stable multifocal bilateral lung airspace disease consistent with previously   identified presence of COVID-19 viral pneumonia. Suspected moderate pulmonary interstitial edema. XR CHEST PORTABLE   Final Result   The support apparatus as described above. Continued increased interstitial opacity throughout both lungs, along with   more focal traits within the periphery the lungs, some combination of   interstitial edema and pneumonia. XR CHEST PORTABLE   Final Result   1. Endotracheal tube projects in appropriate position. 2. Increased bilateral nonspecific pulmonary opacities representing edema,   atelectasis and/or pneumonia. XR CHEST PORTABLE   Final Result   Right PICC projects in normal position.       Stable interstitial and ground-glass opacities, most likely atypical   pneumonia. IR PICC WO SQ PORT/PUMP > 5 YEARS   Final Result   Successful placement of PICC line. CT CHEST PULMONARY EMBOLISM W CONTRAST   Final Result   1. No evidence of pulmonary embolism. 2.  Commonly reported imaging features of COVID-19 pneumonia are present. Other processes such as influenza pneumonia and organizing pneumonia, as can   be seen with drug toxicity and connective tissue disease, can cause a similar   imaging pattern. PneTyp   3. Mild compression deformity of T8 of uncertain chronicity, new since   05/29/2020. Similar mild moderate compression deformities at T10, T12.   4. Fatty liver. 5. Interval increase in size of now 2.7 cm fat attenuation lesion of the   pancreatic neck. Recommend further evaluation with nonemergent pancreas   protocol MRI. XR CHEST PORTABLE   Final Result   1. Bilateral interstitial thickening either due to edema or atypical   interstitial pneumonia. XR CHEST PORTABLE    (Results Pending)   XR CHEST PORTABLE    (Results Pending)          Assessment and Plan      COVID PNA  Acute Hypoxic Respiratory Failure    - imaging with bilateral pna pattern  - does not wear O2 at baseline  - Admit to Med Surg, droplet + precautions, tele  - supp O2, progressive hypoxemia, now on vent support  -Intubated 1/14  Prone 1/15 and 1/16  - on  Decadron D#9, completed 5 days of  Remdesivir, s/p Tocilizumab  - pulmonary consulted   - lovenox bid         Abnormal lFT - likely from viral infection   Monitored with remdesivir and improved     CAD - stable. No CP. On ASA and Plavix. PAD stable     DM type 2  Hyperglycemia with steroids, on ssi      HLD On Lipitor.     Lovenox 30 mg bid for DVT prophylaxis.    Diet - On TF  Full code             Tessa Andersen MD 7:35 AM 1/18/2022

## 2022-01-18 NOTE — PROGRESS NOTES
RT Inhaler-Nebulizer Bronchodilator Protocol Note    There is a bronchodilator order in the chart from a provider indicating to follow the RT Bronchodilator Protocol and there is an Initiate RT Inhaler-Nebulizer Bronchodilator Protocol order as well (see protocol at bottom of note). CXR Findings:  XR CHEST PORTABLE    Result Date: 1/17/2022  Mild interval worsening with moderate infiltrates in the lungs. These are most pronounced in the right lung. 5 cm indeterminate dense opacity in the midline-right upper abdomen. Correlate for oral contrast in bowel. XR CHEST PORTABLE    Result Date: 1/16/2022  Stable multifocal bilateral lung airspace disease consistent with previously identified presence of COVID-19 viral pneumonia. Suspected moderate pulmonary interstitial edema. The findings from the last RT Protocol Assessment were as follows:   History Pulmonary Disease: Chronic pulmonary disease  Respiratory Pattern: Use of accessory muscles, prolonged exhalation, or RR 26-30 bpm  Breath Sounds: Slightly diminished and/or crackles  Cough: Weak, productive  Indication for Bronchodilator Therapy: Decreased or absent breath sounds  Bronchodilator Assessment Score: 12    Aerosolized bronchodilator medication orders have been revised according to the RT Inhaler-Nebulizer Bronchodilator Protocol below. Respiratory Therapist to perform RT Therapy Protocol Assessment initially then follow the protocol. Repeat RT Therapy Protocol Assessment PRN for score 0-3 or on second treatment, BID, and PRN for scores above 3. No Indications - adjust the frequency to every 6 hours PRN wheezing or bronchospasm, if no treatments needed after 48 hours then discontinue using Per Protocol order mode. If indication present, adjust the RT bronchodilator orders based on the Bronchodilator Assessment Score as indicated below.   Use Inhaler orders unless patient has one or more of the following: on home nebulizer, not able to hold breath for 10 seconds, is not alert and oriented, cannot activate and use MDI correctly, or respiratory rate 25 breaths per minute or more, then use the equivalent nebulizer order(s) with same Frequency and PRN reasons based on the score. If a patient is on this medication at home then do not decrease Frequency below that used at home. 0-3 - enter or revise RT bronchodilator order(s) to equivalent RT Bronchodilator order with Frequency of every 4 hours PRN for wheezing or increased work of breathing using Per Protocol order mode. 4-6 - enter or revise RT Bronchodilator order(s) to two equivalent RT bronchodilator orders with one order with BID Frequency and one order with Frequency of every 4 hours PRN wheezing or increased work of breathing using Per Protocol order mode. 7-10 - enter or revise RT Bronchodilator order(s) to two equivalent RT bronchodilator orders with one order with TID Frequency and one order with Frequency of every 4 hours PRN wheezing or increased work of breathing using Per Protocol order mode. 11-13 - enter or revise RT Bronchodilator order(s) to one equivalent RT bronchodilator order with QID Frequency and an Albuterol order with Frequency of every 4 hours PRN wheezing or increased work of breathing using Per Protocol order mode. Greater than 13 - enter or revise RT Bronchodilator order(s) to one equivalent RT bronchodilator order with every 4 hours Frequency and an Albuterol order with Frequency of every 2 hours PRN wheezing or increased work of breathing using Per Protocol order mode.        Electronically signed by Mary Aleman RCP on 1/17/2022 at 11:01 PM

## 2022-01-18 NOTE — PROGRESS NOTES
01/17/22 1944   Vent Information   Vent Type 840   Vent Mode AC/VC   Vt Ordered 340 mL   Rate Set 32 bmp   Peak Flow 55 L/min   Pressure Support 0 cmH20   FiO2  55 %   SpO2 91 %   SpO2/FiO2 ratio 165.45   Sensitivity 3   PEEP/CPAP 8   Humidification Source Heated wire   Humidification Temp Measured 37   Circuit Condensation Drained   Vent Patient Data   Peak Inspiratory Pressure 19 cmH2O   Mean Airway Pressure 13 cmH20   Rate Measured 32 br/min   Vt Exhaled 413 mL   Minute Volume 11.9 Liters   I:E Ratio 1:1.80   Plateau Pressure 17 [id number]   Static Compliance 45 mL/cmH2O   Dynamic Compliance 41 mL/cmH2O   Cough/Sputum   Sputum How Obtained Endotracheal   Cough Productive   Sputum Amount Small   Sputum Color Red   Tenacity Thick   Spontaneous Breathing Trial (SBT) RT Doc   Pulse 82   Breath Sounds   Right Upper Lobe Diminished   Right Middle Lobe Diminished   Right Lower Lobe Diminished   Left Upper Lobe Diminished   Left Lower Lobe Diminished   Additional Respiratory  Assessments   Resp (!) 32   Alarm Settings   High Pressure Alarm 40 cmH2O   Low Minute Volume Alarm 4 L/min   Apnea (secs) 20 secs   High Respiratory Rate 45 br/min   Low Exhaled Vt  200 mL   Patient Observation   Observations 8ett 24 at lip

## 2022-01-18 NOTE — PROGRESS NOTES
Pt returned to supine positioning x4 RN & 1 RT. Pt tolerated w/o incident.  Radiology called for CXR

## 2022-01-18 NOTE — PROGRESS NOTES
After routine turn patient with sudden oxygen desaturation refractory to suction, reposition, prn sedation or changes in fio2/peep. Respiratory therapy at bedside for vent changes. Still no improvement. Stat chest xray obtained and patient proned. Improvement in oxygen saturations and able to decrease fio2 to 80% and peep to 10. Dr. Taran Hallman updated on cxr findings. Verbal order to pull ett back 3 cm. Respiratory therapy notified. Currently respiratory and hemodynamically stable   Monitoring closely.

## 2022-01-18 NOTE — PROGRESS NOTES
RT Nebulizer Bronchodilator Protocol Note    There is a bronchodilator order in the chart from a provider indicating to follow the RT Bronchodilator Protocol and there is an Initiate RT Bronchodilator Protocol order as well (see protocol at bottom of note). CXR Findings:  XR CHEST PORTABLE    Result Date: 1/18/2022  Unremarkable appearance of an endotracheal tube. Unchanged patchy interstitial lung markings which may reflect atypical pneumonia or pulmonary edema. XR CHEST PORTABLE    Result Date: 1/18/2022  1. Endotracheal tube terminates 1.6 cm from the benton. Recommend 2 cm retraction. 2.  Slightly increased diffuse bilateral airspace and interstitial opacities. The findings were sent to the [facility] Results Communication Team at 12:48 a.m. on 01/18/2022 to be communicated to a licensed caregiver. XR CHEST PORTABLE    Result Date: 1/17/2022  Mild interval worsening with moderate infiltrates in the lungs. These are most pronounced in the right lung. 5 cm indeterminate dense opacity in the midline-right upper abdomen. Correlate for oral contrast in bowel. The findings from the last RT Protocol Assessment were as follows:  Smoking: Chronic pulmonary disease  Respiratory Pattern: Use of accessory muscles, prolonged exhalation, or RR 26-30 bpm  Breath Sounds: Intermittent or unilateral wheezes  Cough: Weak, productive  Indication for Bronchodilator Therapy: (P) Wheezing associated with pulm disorder  Bronchodilator Assessment Score: 14    Aerosolized bronchodilator medication orders have been revised according to the RT Nebulizer Bronchodilator Protocol below. Respiratory Therapist to perform RT Therapy Protocol Assessment initially then follow the protocol. Repeat RT Therapy Protocol Assessment PRN for score 0-3 or on second treatment, BID, and PRN for scores above 3.     No Indications - adjust the frequency to every 6 hours PRN wheezing or bronchospasm, if no treatments needed after 48 hours then discontinue using Per Protocol order mode. If indication present, adjust the RT bronchodilator orders based on the Bronchodilator Assessment Score as indicated below. If a patient is on this medication at home then do not decrease Frequency below that used at home. 0-3 - enter or revise RT bronchodilator order(s) to equivalent RT Bronchodilator order with Frequency of every 4 hours PRN for wheezing or increased work of breathing using Per Protocol order mode. 4-6 - enter or revise RT Bronchodilator order(s) to two equivalent RT bronchodilator orders with one order with BID Frequency and one order with Frequency of every 4 hours PRN wheezing or increased work of breathing using Per Protocol order mode. 7-10 - enter or revise RT Bronchodilator order(s) to two equivalent RT bronchodilator orders with one order with TID Frequency and one order with Frequency of every 4 hours PRN wheezing or increased work of breathing using Per Protocol order mode. 11-13 - enter or revise RT Bronchodilator order(s) to one equivalent RT bronchodilator order with QID Frequency and an Albuterol order with Frequency of every 4 hours PRN wheezing or increased work of breathing using Per Protocol order mode. Greater than 13 - enter or revise RT Bronchodilator order(s) to one equivalent RT bronchodilator order with every 4 hours Frequency and an Albuterol order with Frequency of every 2 hours PRN wheezing or increased work of breathing using Per Protocol order mode. RT to enter RT Home Evaluation for COPD & MDI Assessment order using Per Protocol order mode.     Electronically signed by Lucio Trejo RCP on 1/18/2022 at 5:01 PM

## 2022-01-18 NOTE — PROGRESS NOTES
Pt remains in supine position- o2 sat >92% on 80% peep +12  No new orders at present time- awaiting rounds.

## 2022-01-18 NOTE — PROGRESS NOTES
Physician Progress Note      PATIENTFesidra Burn  CSN #:                  [id number]  :                       1965  ADMIT DATE:       2022 11:42 AM  DISCH DATE:  RESPONDING  PROVIDER #:        Leonila Og MD          QUERY TEXT:    Pt admitted with COVID-19 and noted to have leukocytosis, tachycardia,   tachypnea, and lactic-2.7/3.1. If possible, please document in progress notes   and discharge summary if you are evaluating and/or treating: The medical record reflects the following:  Risk Factors: emphysema, covid pneumonia, dm, htn, cad  Clinical Indicators: leukocytosis wbc-8.2 up to 19.9, tachycardia, tachypnea,   and lactic-2.7/3.1, acute respiratory failure  Treatment: lactic, blood cultures, NS bolus 30ml/kg, decadron, levophed,   remdesivir, tocilzumab, ICU    Thank you for your assistance,  Audrey Cabrera RN,BSN,CCDS,CRCR  Options provided:  -- Sepsis present on admission due to COVID-19 infection  -- Sepsis not present on admission due to COVID-19 infection  -- Sepsis present on admission due to COVID-19 pneumonia  -- Sepsis not present on admission due to COVID-19 pneumonia  -- Covid-19 infection without sepsis  -- Covid-19 pneumonia without sepsis  -- Other - I will add my own diagnosis  -- Disagree - Not applicable / Not valid  -- Disagree - Clinically unable to determine / Unknown  -- Refer to Clinical Documentation Reviewer    PROVIDER RESPONSE TEXT:    This patient has Covid-19 infection without sepsis.     Query created by: Pallavi Bowman on 2022 8:58 AM      Electronically signed by:  Leonila Og MD 2022 7:36 AM

## 2022-01-18 NOTE — CONSULTS
[address] [address] Continence Nurse  Consult Note       NAME:  Chato RECORD NUMBER:  [id number]  AGE: 61 y.o. GENDER: male  : 1965  TODAY'S DATE:  2022    Subjective Pt is intubated and sedated. +Covid, was prone, supine at this time. Reason for WOCN Evaluation and Assessment: shauna Owens is a 61 y.o. male referred by:   [x] Physician  [x] Nursing  [] Other:     Wound Identification:  Wound Type: pressure  Contributing Factors: chronic pressure, decreased mobility and +covid, proned      Patient Goal of Care: MARY  [] Wound Healing  [] Odor Control  [] Palliative Care  [] Pain Control   [] Other:         PAST MEDICAL HISTORY        Diagnosis Date    Diabetes mellitus ([facility]     Family history of early CAD     GERD (gastroesophageal reflux disease)     High cholesterol 2017    MI (myocardial infarction) ([facility] 2017    ST Elevation MI    NSTEMI (non-ST elevated myocardial infarction) ([facility] 2017    Pneumonia due to COVID-19 virus 1/10/2022    Smoker 2017    ST elevation myocardial infarction involving left anterior descending (LAD) coronary artery ([facility]        PAST SURGICAL HISTORY    Past Surgical History:   Procedure Laterality Date    CHOLECYSTECTOMY      CORONARY ANGIOPLASTY WITH STENT PLACEMENT  2017    BASILIA- 3.0x 45- mLAD       FAMILY HISTORY    Family History   Problem Relation Age of Onset    Heart Attack Mother        SOCIAL HISTORY    Social History     Tobacco Use    Smoking status: Former Smoker     Packs/day: 1.00     Types: Cigarettes    Smokeless tobacco: Never Used    Tobacco comment: quit 17   Vaping Use    Vaping Use: Never used   Substance Use Topics    Alcohol use: No    Drug use: No       ALLERGIES    No Known Allergies    MEDICATIONS    No current facility-administered medications on file prior to encounter.      Current Outpatient Medications on File Prior to Encounter   Medication Sig Dispense Refill    nitroGLYCERIN (NITROSTAT) 0.4 MG SL tablet DISSOLVE 1 TABLET UNDER TONGUE AT ONSET OF CHEST PAIN. IF NO RELIEF AFTER 1 DOSE CALL 911.  (MAX 3) 25 tablet 3    clopidogrel (PLAVIX) 75 MG tablet TAKE 1 TABLET BY MOUTH EVERY DAY 90 tablet 3    aspirin (ASPIRIN LOW DOSE) 81 MG chewable tablet CHEW 1 TABLET BY MOUTH EVERY DAY 90 tablet 3    ibuprofen (ADVIL;MOTRIN) 600 MG tablet Take 1 tablet by mouth 3 times daily as needed for Pain 30 tablet 0    VASCEPA 1 g CAPS capsule TAKE 2 CAPSULES BY MOUTH TWICE A DAY (Patient not taking: Reported on 8/5/2021) 120 capsule 3    atorvastatin (LIPITOR) 40 MG tablet TAKE 1 TABLET BY MOUTH EVERY DAY AT NIGHT 90 tablet 3    naproxen (NAPROSYN) 500 MG tablet Take 500 mg by mouth 2 times daily (Patient not taking: Reported on 8/5/2021)      isosorbide mononitrate (IMDUR) 30 MG extended release tablet Take by mouth      famotidine (PEPCID) 20 MG tablet Take 20 mg by mouth 2 times daily as needed      albuterol sulfate  (90 Base) MCG/ACT inhaler INHALE 2 PUFFS BY MOUTH EVERY 6 HOURS AS NEEDED      glucose (GLUTOSE) 40 % GEL Infuse 100 mLs intravenously      metFORMIN (GLUCOPHAGE) 500 MG tablet Take 500 mg by mouth daily          Objective    BP 90/61   Pulse 81   Temp 97.8 °F (36.6 °C) (Axillary)   Resp 28   Ht 5' 2\" (1.575 m)   Wt 154 lb 15.7 oz (70.3 kg)   SpO2 99%   BMI 28.35 kg/m²     LABS:  WBC:    Lab Results   Component Value Date    WBC 16.3 01/18/2022     H/H:    Lab Results   Component Value Date    HGB 12.1 01/18/2022    HCT 35.3 01/18/2022     PTT:    Lab Results   Component Value Date    APTT 31.3 06/23/2018   [APTT}  PT/INR:    Lab Results   Component Value Date    PROTIME 13.0 01/10/2022    INR 1.14 01/10/2022     HgBA1c:    Lab Results   Component Value Date    LABA1C 6.5 08/15/2011       Assessment   Clemente Risk Score: Clemente Scale Score: 17    Patient Active Problem List   Diagnosis Code    Coronary artery disease I25.10    Tobacco abuse Z72.0    Hypotension I95.9    Fracture of vertebra due to osteoporosis ([facility]) M80.08XA    Chest pain R07.9    Ischemic cardiomyopathy I25.5    PAD (peripheral artery disease) ([facility]) I73.9    History of MI (myocardial infarction) I25.2    HLD (hyperlipidemia) E78.5    GERD (gastroesophageal reflux disease) K21.9    SOB (shortness of breath) R06.02    Other fatigue R53.83    DMII (diabetes mellitus, type 2) ([facility]) E11.9    Essential hypertension I10    Acute hypoxemic respiratory failure due to COVID-19 ([facility]) U07.1, J96.01    Pneumonia due to COVID-19 virus U07.1, J12.82    Acute hypoxemic respiratory failure ([facility]) J96.01    ARDS (adult respiratory distress syndrome) ([facility]) J80    Hyperglycemia R73.9    Pulmonary emphysema ([address] J43.9       Measurements:  Wound 01/18/22 Jaw Left; Lower STDI on chin (Active)   Number of days: 0      Chin:  2x2.2cm, 100% purple nonblanchable discoloration found when pt turned supine after proning. Plan Venelex initiated by nursing.   Continue to apply 2-3 times daily and prn         Specialty Bed Required : N/A   [] Low Air Loss   [] Pressure Redistribution  [] Fluid Immersion  [] Bariatric  [] Total Pressure Relief  [] Other:     Current Diet: Diet NPO  ADULT TUBE FEEDING; Orogastric; Peptide Based; Continuous; 15; Yes; 30; Q 4 hours; 30; 30; Q 3 hours  Dietician consult:  Yes    Discharge Plan:  Placement for patient upon discharge:TBD  Patient appropriate for Outpatient [address]: [facility]    Referrals:  [x]   [] [facility]  [] Supplies  [] Other    Patient/Caregiver Teaching:  Level of patient/caregiver understanding able to:   [] Indicates understanding       [] Needs reinforcement  [] Unsuccessful      [] Verbal Understanding  [] Demonstrated understanding       [] No evidence of learning  [] Refused teaching         [x] N/A       Electronically signed by Andrea Perez RN, CWOCN on 1/18/2022 at 3:11 PM

## 2022-01-18 NOTE — PROGRESS NOTES
Pulmonary & Critical Care Medicine ICU Progress Note    CC: COVID-19 pneumonia in an unvaccinated individual    Events of Last 24 hours:   Proned overnight   Levo 5  Fentanyl 175  Propofol 65  PEEP 12--> 10 by me  FiO2 80%--> 70% by me  Plateau pressure 22  PaO2/FiO2 87.5      Invasive Lines: PICC placed 1/10/22    MV:  1/14/22-  Vent Mode: AC/VC Rate Set: 32 bmp/Vt Ordered: 340 mL/ /FiO2 : 80 %  Recent Labs     01/17/22  0501 01/18/22  0440   PHART 7.371 7.418   UMF2HOE 51.6* 50.5*   PO2ART 118.7* 70.4*       IV:   dextrose      fentaNYL 175 mcg/hr (01/18/22 0148)    propofol 65 mcg/kg/min (01/18/22 0504)    norepinephrine 8.5 mcg/min (01/17/22 1437)    sodium chloride         Vitals:  BP (!) 90/57   Pulse 106   Temp 97 °F (36.1 °C)   Resp (!) 32   Ht 5' 2\" (1.575 m)   Wt 154 lb 15.7 oz (70.3 kg)   SpO2 91%   BMI 28.35 kg/m²   on vent AC 32/340    Intake/Output Summary (Last 24 hours) at 1/18/2022 0655  Last data filed at 1/18/2022 0510  Gross per 24 hour   Intake 1689.47 ml   Output 1550 ml   Net 139.47 ml     EXAM:  General: ill appearing. Intubated sedated. Eyes: PERRL. No sclera icterus. No conjunctival injection. ENT: No discharge. Pharynx clear. ET tube in place  Neck: Trachea midline. Normal thyroid. Resp: No accessory muscle use. Few crackles. No wheezing. No rhonchi. CV: Regular rate. Regular rhythm. No mumur or rub. No edema. GI: Non-tender. Non-distended. No masses. Skin: Warm and dry. No nodule on exposed extremities. No rash on exposed extremities. Lymph: No cervical LAD. No supraclavicular LAD. M/S: No cyanosis. No joint deformity. No clubbing. Neuro: Intubated sedated. + response to painful stimuli. Not following commands.   Psych: Noncommunicative unable to obtain    Scheduled Meds:   mupirocin   Nasal BID    insulin glargine  10 Units SubCUTAneous QAM    insulin lispro  0-18 Units SubCUTAneous Q4H    ipratropium-albuterol  1 ampule Inhalation Q4H    cisatracurium Besylate  0.15 mg/kg IntraVENous Once    dexamethasone  10 mg IntraVENous Daily    famotidine  20 mg Oral BID    atorvastatin  40 mg Oral Nightly    omega-3 acid ethyl esters  2 g Oral BID    clopidogrel  75 mg Oral Daily    aspirin  81 mg Oral Daily    sodium chloride flush  5-40 mL IntraVENous 2 times per day    enoxaparin  30 mg SubCUTAneous BID    Vitamin D  2,000 Units Oral Daily     PRN Meds:  glucose, dextrose, glucagon (rDNA), dextrose, midazolam, fentanNYL, melatonin, albuterol sulfate HFA, nitroGLYCERIN, sodium chloride flush, sodium chloride, ondansetron **OR** ondansetron, polyethylene glycol, acetaminophen **OR** acetaminophen, potassium chloride, magnesium sulfate    Results:  CBC:   Recent Labs     01/16/22  0400 01/17/22  0501 01/18/22  0440   WBC 16.7* 15.5* 16.3*   HGB 13.5 12.8* 12.1*   HCT 40.9 36.1* 35.3*   MCV 82.6 82.2 82.3    278 257     BMP:   Recent Labs     01/15/22  1516 01/15/22  1516 01/16/22  0400 01/17/22  0501 01/18/22  0440      < > 138 130* 136   K 5.4*  --  4.7 3.8 4.4      < > 102 94* 97*   CO2 25   < > 27 29 32   PHOS 4.3  --   --   --   --    BUN 16   < > 15 16 22*   CREATININE <0.5*   < > <0.5* <0.5* <0.5*    < > = values in this interval not displayed.      LIVER PROFILE:   Recent Labs     01/16/22  0400 01/17/22  0501 01/18/22  0440   AST 30 <5* 62*   ALT 39 <5* <5*   BILITOT 1.8* 2.1* 2.0*   ALKPHOS 97 87 82       Micro  1/9 BC NGTD  1/9 COVID-19 detected    Imaging   Chest x-ray 1/18 imaging reviewed by me and showed  Satisfactory ETT position  Satisfactory PICC line position   Multifocal fine ASD  - better       LE dopper 1/17   Within the limits of this exam, there is no evidence of deep or superficial  venous thrombosis in the lower extremities bilaterally.    Chronic phlebitic processes involving the left small saphenous vein       ASSESSMENT:  · Acute hypoxemic respiratory failure  · COVID 19 pneumonia in an unvaccinated individual  · ARDS  · Pulmonary emphysema  · Pancreatic nodule, 2.7 cm increase in size  · DM2 with hyperglycemia   · Elevated LFTS  · Tobacco abuse  · CAD and PAD with h/o STEMI     PLAN:  · Droplet Plus Airborne Precautions   Mechanical ventilation as per my orders. The ventilator was adjusted by me at the bedside for unstable, life threatening respiratory failure. IV Propofol for sedation, target RASS -52, with daily spontaneous awakening trial.  Fentanyl PRN pain, gtt as needed  Follow up  TG   Head of bed 30 degrees or higher at all times  Daily spontaneous breathing trial once PEEP less than 8, FiO2 less than 55%. Prone 1/14/22-  · Decadron 10 mg QD, D#9, monitor glucose closely  · S/p Remdesevir D#5  And Tocilizumab received 1/9/22  · TFs trophic   · On ASA and plavix  · Tobacco cessation  · Follow LFTs  · Blood sugar control ISS, with goal 150-180-increase lantus 20   · GI prophylaxis: Pepcid  · DVT prophylaxis: Lovenox BID   · MRSA prophylaxis: Bactroban        Total critical care time caring for this patient with life threatening, unstable organ failure, including direct patient contact, management of life support systems, review of data including imaging and labs, discussions with other team members and physicians at least 31 minutes so far today, excluding procedures.

## 2022-01-18 NOTE — PROGRESS NOTES
FiO2 decraesed to 60%  Peep is 10cwp         01/18/22 1501   Vent Information   Vt Ordered 340 mL   Rate Set 32 bmp   Peak Flow 55 L/min   Pressure Support 0 cmH20   FiO2  60 %   SpO2 99 %   SpO2/FiO2 ratio 165   Sensitivity 3   PEEP/CPAP 10   Humidification Source Heated wire   Humidification Temp Measured 37   Vent Patient Data   Peak Inspiratory Pressure 27 cmH2O   Mean Airway Pressure 15 cmH20   Rate Measured 32 br/min   Vt Exhaled 374 mL   Minute Volume 11 Liters   I:E Ratio 1:1.80   Cough/Sputum   Sputum How Obtained None   Spontaneous Breathing Trial (SBT) RT Doc   Pulse 81   Breath Sounds   Right Upper Lobe Diminished   Right Middle Lobe Diminished   Right Lower Lobe Diminished   Left Upper Lobe Diminished   Left Lower Lobe Diminished   Additional Respiratory  Assessments   Resp 28   Position Reverse Trendelenburg   Oral Care Completed? Yes   Oral Care Mouth suctioned   Subglottic Suction Done? Yes   Alarm Settings   High Pressure Alarm 40 cmH2O   Low Minute Volume Alarm 4 L/min   High Respiratory Rate 45 br/min   ETT (adult)   Placement Date/Time: 01/14/22 1503   Timeout: Patient;Procedure; Appropriate Equipment;Correct Position  Preoxygenation: Yes  Mask Ventilation: Ventilated by mask (1)  Technique: Rapid sequence  Type: Cuffed  Tube Size: 8 mm  Laryngoscope: GlideScope  . .. Secured at 24 cm   Measured From [address] [place] [address],[address] By [email or web address] tape; Commercial tube oliveros   Site Condition Dry

## 2022-01-18 NOTE — PROGRESS NOTES
01/18/22 0338   Vent Information   Vent Type 840   Vent Mode AC/VC   Vt Ordered 340 mL   Rate Set 32 bmp   Peak Flow 55 L/min   Pressure Support 0 cmH20   FiO2  80 %   SpO2 94 %   SpO2/FiO2 ratio 117.5   Sensitivity 3   PEEP/CPAP 10   Humidification Source Heated wire   Humidification Temp Measured 37   Circuit Condensation Drained   Vent Patient Data   Peak Inspiratory Pressure 21 cmH2O   Mean Airway Pressure 15 cmH20   Rate Measured 32 br/min   Vt Exhaled 344 mL   Minute Volume 12 Liters   I:E Ratio 1:1.80   Cough/Sputum   Sputum How Obtained Endotracheal   Cough Productive   Sputum Amount Moderate   Sputum Color Creamy; Red   Tenacity Thick   Spontaneous Breathing Trial (SBT) RT Doc   Pulse 105   Breath Sounds   Right Upper Lobe Diminished   Right Middle Lobe Diminished   Right Lower Lobe Diminished   Left Upper Lobe Diminished   Left Lower Lobe Diminished   Additional Respiratory  Assessments   Resp (!) 32   Alarm Settings   High Pressure Alarm 40 cmH2O   Low Minute Volume Alarm 4 L/min   Apnea (secs) 20 secs   High Respiratory Rate 45 br/min   Low Exhaled Vt  200 mL   Patient Observation   Observations 8ett 24 at lip

## 2022-01-18 NOTE — PROGRESS NOTES
01/17/22 2300   Vent Information   Vent Type 840   Vent Mode AC/VC   Vt Ordered 340 mL   Rate Set 32 bmp   Peak Flow 55 L/min   Pressure Support 0 cmH20   FiO2  55 %   SpO2 92 %   SpO2/FiO2 ratio 167.27   Sensitivity 3   PEEP/CPAP 8   Vent Patient Data   Peak Inspiratory Pressure 20 cmH2O   Mean Airway Pressure 13 cmH20   Rate Measured 32 br/min   Vt Exhaled 336 mL   Minute Volume 11.2 Liters   I:E Ratio 1:1.80   Cough/Sputum   Sputum How Obtained Suctioned;Endotracheal   Sputum Amount Small   Sputum Color Red   Tenacity Thick   Spontaneous Breathing Trial (SBT) RT Doc   Pulse 87   Breath Sounds   Right Upper Lobe Diminished   Right Middle Lobe Diminished   Right Lower Lobe Diminished   Left Upper Lobe Diminished   Left Lower Lobe Diminished   Additional Respiratory  Assessments   Resp (!) 32   Position Semi-Lee's   Alarm Settings   High Pressure Alarm 40 cmH2O   Low Minute Volume Alarm 4 L/min   High Respiratory Rate 45 br/min

## 2022-01-19 NOTE — PROGRESS NOTES
01/18/22 1904   Vent Information   Vent Type 840   Vent Mode AC/VC   Vt Ordered 340 mL   Rate Set 32 bmp   Peak Flow 55 L/min   Pressure Support 0 cmH20   FiO2  60 %   SpO2 93 %   SpO2/FiO2 ratio 155   Sensitivity 3   PEEP/CPAP 10   Vent Patient Data   Peak Inspiratory Pressure 21 cmH2O   Mean Airway Pressure 15 cmH20   Rate Measured 37 br/min   Vt Exhaled 320 mL   Minute Volume 9.31 Liters   I:E Ratio 1:1.80   Cough/Sputum   Sputum How Obtained Suctioned;Endotracheal   Sputum Amount Moderate   Sputum Color Creamy; Red   Tenacity Thick   Spontaneous Breathing Trial (SBT) RT Doc   Pulse 83   Breath Sounds   Right Upper Lobe Diminished   Right Middle Lobe Diminished   Right Lower Lobe Diminished   Left Upper Lobe Diminished   Left Lower Lobe Diminished   Additional Respiratory  Assessments   Resp (!) 32   Alarm Settings   High Pressure Alarm 40 cmH2O   Low Minute Volume Alarm 4 L/min   High Respiratory Rate 45 br/min   Patient Observation   Observations ETT 8.0 24 @ lip   ETT (adult)   Placement Date/Time: 01/14/22 1503   Timeout: Patient;Procedure; Appropriate Equipment;Correct Position  Preoxygenation: Yes  Mask Ventilation: Ventilated by mask (1)  Technique: Rapid sequence  Type: Cuffed  Tube Size: 8 mm  Laryngoscope: GlideScope  . ..    Secured at 24 cm   Measured From Lips

## 2022-01-19 NOTE — PROGRESS NOTES
Comprehensive Nutrition Assessment    Type and Reason for Visit:  Reassess    Nutrition Recommendations/Plan:   1. Modified TF order - ADULT TUBE FEEDING; Orogastric; Peptide-Based formula - Vital AF 1.2 with a trophic rate of 15 ml/hr x 20 hours + modified water flushes to 30 ml every 4 hours for tube patency + added one proteinex P2Go TWICE daily via feeding tube since TF rate is trophic. 2. Monitor TF rate, intake, and tolerance + water flushes + administration of one proteinex P2Go TWICE daily via feeding tube. 3. Monitor vent status, sedation type/amount (propofol currently at 55 mcg x 24 hours which = 610 kcals from lipids), TG checks (TG check on 1/18/22 was 1,626 mg/dl), and plan of care. 4. Monitor nutrition-related labs, bowel function (last documented BM was on 1/12/22), and weight trends. Nutrition Assessment:  patient is improving from a nutritional standpoint AEB patient is receiving trophic TF and he is tolerating well at this time, however, he remains at risk for further compromise d/t inadequate nutrition intake, increased nutrition needs r/t COVID-19 virus, altered nutrition-related labs, and need for EN as sole source of nutrition at this time; will modify TF order to Vital AF 1.2 with a trophic rate of 15 ml/hr x 20 hours + modify water flushes to 30 ml every 4 hours + cal dd one proteinex P2Go TWICE daily while patient is receiving trophic TF rate    Malnutrition Assessment:  Malnutrition Status:   At risk for malnutrition  Context:  Acute Illness     Findings of the 6 clinical characteristics of malnutrition:  Energy Intake:  7 - 50% or less of estimated energy requirements for 5 or more days  Weight Loss:  No significant weight loss     Body Fat Loss:  Unable to assess (COVID-19 +)     Muscle Mass Loss:  Unable to assess (COVID-19 +)    Fluid Accumulation:  No significant fluid accumulation     Strength:  Not Performed    Estimated Daily Nutrient Needs:  Energy (kcal):  1320 - 1518 kcals based on 20-23 kcals/kg/CBW; Weight Used for Energy Requirements:  Current     Protein (g):  65 - 81 g protein based on 1.2-1.5 g/kg/IBW;  Weight Used for Protein Requirements:  Ideal        Fluid (ml/day):  1320 - 1518 ml; Method Used for Fluid Requirements:  1 ml/kcal      Nutrition Related Findings:  patient remains intubated and sedated on 55 mcg propofol at this time; abdomen is soft, flat, and bowel sounds are active; last documented BM was on 1/12/22; h/h, Na, Cl, and Ca are low; blood glucose trends, BUN, and AST are elevated; + DTI on L lower chin; patient has lipitor, plavix, decadron, lovenox, pepcid, high-dose SSI, 20 units Lantus each morning, bactroban, vitamin D, fentanyl, and levo in D5 ordered at this time      Wounds:  Deep Tissue Injury,Pressure Injury (DTI on L lower chin)       Current Nutrition Therapies:    Current Tube Feeding (TF) Orders:  · Feeding Route: Orogastric  · Formula: Peptide Based  · Schedule: Continuous  · Additives/Modulars: Protein (one proteinex P2Go once daily via feeding tube)  · Water Flushes: 30 ml every 4 hours for tube patency  · Current TF & Flush Orders Provides: Vital AF 1.2 with a trophic rate of 15 ml/hr x 20 hours = 300 ml TV, 360 kcals, 23 g protein, and 243 ml free water + 30 ml water flushes every 4 hours for tube patency  · Goal TF & Flush Orders Provides: Vital AF 1.2 with a goal rate of 35 ml/hr x 20 hours = 700 ml TV, 840 kcals, 53 g protein, and 568 ml free water + 30 ml water flushes every 4 hours for tube patency + 26 g protein and 104 kcals from one proteinex P2Go once daily (79 g protein and 944 kcals total)      Anthropometric Measures:  · Height: 5' 2\" (157.5 cm)  · Current Body Weight: 153 lb 3.5 oz (69.5 kg) (obtained on 1/19/22; actual weight)   · Admission Body Weight: 146 lb 13.2 oz (66.6 kg) (obtained on 1/15/22; first actual weight obtained during this admission)    · Usual Body Weight: 146 lb 13.2 oz (66.6 kg) (obtained on 1/15/22; first actual weight obtained during this admission)     · Ideal Body Weight: 118 lbs; % Ideal Body Weight 129.8 %   · BMI: 28   · BMI Categories: Overweight (BMI 25.0-29. 9)       Nutrition Diagnosis:   · Inadequate oral intake related to inadequate protein-energy intake,impaired respiratory function,increase demand for energy/nutrients as evidenced by NPO or clear liquid status due to medical condition,intubation,lab values,poor intake prior to admission      Nutrition Interventions:   Food and/or Nutrient Delivery:  Continue NPO,Modify Tube Feeding  Nutrition Education/Counseling:  No recommendation at this time   Coordination of Nutrition Care:  Continue to monitor while inpatient,Interdisciplinary Rounds    Goals:  patient will tolerate Vital AF 1.2 with a trophic rate of 15 ml/hr x 20 hours without GI distress, without s/s of aspiration, and without additional lab/fluid disturbances       Nutrition Monitoring and Evaluation:   Behavioral-Environmental Outcomes:  None Identified   Food/Nutrient Intake Outcomes:  Enteral Nutrition Intake/Tolerance  Physical Signs/Symptoms Outcomes:  Biochemical Data,Constipation,GI Status,Hemodynamic Status,Skin,Weight     Discharge Planning:     Too soon to determine     Electronically signed by Kayy Rosenberg RD, LD on 1/19/22 at 9:02 AM EST    Contact: [phone]

## 2022-01-19 NOTE — PROGRESS NOTES
Pulmonary & Critical Care Medicine ICU Progress Note    CC: COVID-19 pneumonia in an unvaccinated individual    Events of Last 24 hours:   Supine overnight   Levo 7  Fentanyl 175  Propofol 55  PEEP 12   FiO2 60%   Plateau pressure 21  PaO2/FiO2 88      Invasive Lines: PICC placed 1/10/22    MV:  1/14/22-  Vent Mode: AC/VC Rate Set: 32 bmp/Vt Ordered: 340 mL/ /FiO2 : 60 %  Recent Labs     01/18/22  0440 01/19/22  0400   PHART 7.418 7.409   [id number] 50.5* 52.7*   PO2ART 70.4* 53.4*       IV:   dextrose      fentaNYL 175 mcg/hr (01/19/22 0658)    propofol 55 mcg/kg/min (01/19/22 0658)    norepinephrine 7 mcg/min (01/19/22 0658)    sodium chloride         Vitals:  BP 94/66   Pulse 91   Temp 98 °F (36.7 °C) (Oral)   Resp (!) 32   Ht 5' 2\" (1.575 m)   Wt 153 lb 3.5 oz (69.5 kg)   SpO2 91%   BMI 28.02 kg/m²   on vent AC 32/340    Intake/Output Summary (Last 24 hours) at 1/19/2022 0704  Last data filed at 1/19/2022 0658  Gross per 24 hour   Intake 2191.59 ml   Output 975 ml   Net 1216.59 ml     EXAM:  General: ill appearing. Intubated sedated. Eyes: PERRL. No sclera icterus. No conjunctival injection. ENT: No discharge. Pharynx clear. ET tube in place  Neck: Trachea midline. Normal thyroid. Resp: No accessory muscle use. Few crackles. No wheezing. Few rhonchi. CV: Regular rate. Regular rhythm. No mumur or rub. No edema. GI: Non-tender. Non-distended. No masses. Skin: Warm and dry. No nodule on exposed extremities. No rash on exposed extremities. Lymph: No cervical LAD. No supraclavicular LAD. M/S: No cyanosis. No joint deformity. No clubbing. Neuro: Intubated sedated. + response to painful stimuli. Not following commands.   Psych: Noncommunicative unable to obtain    Scheduled Meds:   Venelex   Topical BID    insulin glargine  20 Units SubCUTAneous QAM    mupirocin   Nasal BID    insulin lispro  0-18 Units SubCUTAneous Q4H    ipratropium-albuterol  1 ampule Inhalation Q4H    dexamethasone 10 mg IntraVENous Daily    famotidine  20 mg Oral BID    atorvastatin  40 mg Oral Nightly    omega-3 acid ethyl esters  2 g Oral BID    clopidogrel  75 mg Oral Daily    aspirin  81 mg Oral Daily    sodium chloride flush  5-40 mL IntraVENous 2 times per day    enoxaparin  30 mg SubCUTAneous BID    Vitamin D  2,000 Units Oral Daily     PRN Meds:  albuterol, glucose, dextrose, glucagon (rDNA), dextrose, midazolam, fentanNYL, melatonin, nitroGLYCERIN, sodium chloride flush, sodium chloride, ondansetron **OR** ondansetron, polyethylene glycol, acetaminophen **OR** acetaminophen, potassium chloride, magnesium sulfate    Results:  CBC:   Recent Labs     01/17/22  0501 01/18/22  0440 01/19/22  0400   WBC 15.5* 16.3* 19.0*   HGB 12.8* 12.1* 10.4*   HCT 36.1* 35.3* 30.2*   MCV 82.2 82.3 83.2    257 205     BMP:   Recent Labs     01/17/22  0501 01/18/22  0440 01/19/22  0400   * 136 132*   K 3.8 4.4 4.3   CL 94* 97* 95*   CO2 29 32 30   BUN 16 22* 38*   CREATININE <0.5* <0.5* <0.5*     LIVER PROFILE:   Recent Labs     01/17/22  0501 01/18/22  0440 01/19/22  0400   AST <5* 62* 59*   ALT <5* <5* <5*   BILITOT 2.1* 2.0* 2.0*   ALKPHOS 87 82 80       Micro  1/9 BC NGTD  1/9 COVID-19 detected    Imaging   Chest x-ray 1/19 imaging reviewed by me and showed  Satisfactory ETT position  Satisfactory PICC line position   Multifocal fine ASD  - no changes       LE dopper 1/17   Within the limits of this exam, there is no evidence of deep or superficial  venous thrombosis in the lower extremities bilaterally.    Chronic phlebitic processes involving the left small saphenous vein       ASSESSMENT:  · Acute hypoxemic respiratory failure  · COVID 19 pneumonia in an unvaccinated individual  · ARDS  · Pulmonary emphysema  · Leukocytosis - wore   · Hypertriglyceridemia   · Pancreatic nodule, 2.7 cm increase in size  · DM2 with hyperglycemia   · Elevated LFTS  · Tobacco abuse  · CAD and PAD with h/o STEMI     PLAN:  · Droplet Plus Airborne Precautions   Mechanical ventilation as per my orders. The ventilator was adjusted by me at the bedside for unstable, life threatening respiratory failure. IV Versed for sedation, target RASS -52, with daily spontaneous awakening trial.  Fentanyl PRN pain, gtt as needed  D/C Propofol and follow up TG  Head of bed 30 degrees or higher at all times  Daily spontaneous breathing trial once PEEP less than 8, FiO2 less than 55%. Prone 1/14/22-  · Decadron 10 mg QD, D#11, monitor glucose closely  · S/p Remdesevir D#5  And Tocilizumab received 1/9/22  · TFs trophic   · On ASA and plavix  · Tobacco cessation  · Follow LFTs  · BM regiment   · Blood sugar control ISS, with goal 150-180- lantus 20   · GI prophylaxis: Pepcid  · DVT prophylaxis: Lovenox BID   · MRSA prophylaxis: Bactroban        Total critical care time caring for this patient with life threatening, unstable organ failure, including direct patient contact, management of life support systems, review of data including imaging and labs, discussions with other team members and physicians at least 32 minutes so far today, excluding procedures.

## 2022-01-19 NOTE — PROGRESS NOTES
Report given to [facility] for transfer in pt care. Pt in stable condition.    Brooks Espinoza RN, BSN

## 2022-01-19 NOTE — PLAN OF CARE
Nutrition Problem #1: Inadequate oral intake  Intervention: Food and/or Nutrient Delivery: Continue NPO,Modify Tube Feeding  Nutritional Goals: patient will tolerate Vital AF 1.2 with a trophic rate of 15 ml/hr x 20 hours without GI distress, without s/s of aspiration, and without additional lab/fluid disturbances

## 2022-01-19 NOTE — PROGRESS NOTES
RT Inhaler-Nebulizer Bronchodilator Protocol Note    There is a bronchodilator order in the chart from a provider indicating to follow the RT Bronchodilator Protocol and there is an Initiate RT Inhaler-Nebulizer Bronchodilator Protocol order as well (see protocol at bottom of note). CXR Findings:  XR CHEST PORTABLE    Result Date: 1/18/2022  Unremarkable appearance of an endotracheal tube. Unchanged patchy interstitial lung markings which may reflect atypical pneumonia or pulmonary edema. XR CHEST PORTABLE    Result Date: 1/18/2022  1. Endotracheal tube terminates 1.6 cm from the benton. Recommend 2 cm retraction. 2.  Slightly increased diffuse bilateral airspace and interstitial opacities. The findings were sent to the [facility] Results Communication Team at 12:48 a.m. on 01/18/2022 to be communicated to a licensed caregiver. XR CHEST PORTABLE    Result Date: 1/17/2022  Mild interval worsening with moderate infiltrates in the lungs. These are most pronounced in the right lung. 5 cm indeterminate dense opacity in the midline-right upper abdomen. Correlate for oral contrast in bowel. The findings from the last RT Protocol Assessment were as follows:   History Pulmonary Disease: Chronic pulmonary disease  Respiratory Pattern: Use of accessory muscles, prolonged exhalation, or RR 26-30 bpm  Breath Sounds: Intermittent or unilateral wheezes  Cough: Weak, productive  Indication for Bronchodilator Therapy: Decreased or absent breath sounds  Bronchodilator Assessment Score: 14    Aerosolized bronchodilator medication orders have been revised according to the RT Inhaler-Nebulizer Bronchodilator Protocol below. Respiratory Therapist to perform RT Therapy Protocol Assessment initially then follow the protocol. Repeat RT Therapy Protocol Assessment PRN for score 0-3 or on second treatment, BID, and PRN for scores above 3.     No Indications - adjust the frequency to every 6 hours PRN wheezing or bronchospasm, if no treatments needed after 48 hours then discontinue using Per Protocol order mode. If indication present, adjust the RT bronchodilator orders based on the Bronchodilator Assessment Score as indicated below. Use Inhaler orders unless patient has one or more of the following: on home nebulizer, not able to hold breath for 10 seconds, is not alert and oriented, cannot activate and use MDI correctly, or respiratory rate 25 breaths per minute or more, then use the equivalent nebulizer order(s) with same Frequency and PRN reasons based on the score. If a patient is on this medication at home then do not decrease Frequency below that used at home. 0-3 - enter or revise RT bronchodilator order(s) to equivalent RT Bronchodilator order with Frequency of every 4 hours PRN for wheezing or increased work of breathing using Per Protocol order mode. 4-6 - enter or revise RT Bronchodilator order(s) to two equivalent RT bronchodilator orders with one order with BID Frequency and one order with Frequency of every 4 hours PRN wheezing or increased work of breathing using Per Protocol order mode. 7-10 - enter or revise RT Bronchodilator order(s) to two equivalent RT bronchodilator orders with one order with TID Frequency and one order with Frequency of every 4 hours PRN wheezing or increased work of breathing using Per Protocol order mode. 11-13 - enter or revise RT Bronchodilator order(s) to one equivalent RT bronchodilator order with QID Frequency and an Albuterol order with Frequency of every 4 hours PRN wheezing or increased work of breathing using Per Protocol order mode. Greater than 13 - enter or revise RT Bronchodilator order(s) to one equivalent RT bronchodilator order with every 4 hours Frequency and an Albuterol order with Frequency of every 2 hours PRN wheezing or increased work of breathing using Per Protocol order mode.        Electronically signed by Ana M Shipman RCP on 1/19/2022 at 3:14 AM

## 2022-01-19 NOTE — CARE COORDINATION
INTERDISCIPLINARY PLAN OF CARE CONFERENCE    Date/Time: 1/19/2022 1:30 PM  Completed by: Tyree Ochoa RN, Case Management      Patient Name:  Claudell Adler  YOB: 1965  Admitting Diagnosis: Acute hypoxemic respiratory failure ([facility] [address] [J96.01]  Acute hypoxemic respiratory failure due to COVID-19 ([facility] [address] [U07.1, J96.01]     Admit Date/Time:  1/9/2022 11:42 AM    Chart reviewed. Interdisciplinary team contacted or reviewed plan related to patient progress and discharge plans. Disciplines included Case Management, Nursing, and Dietitian. Current Status:inpt  PT/OT recommendation for discharge plan of care: tbd    Expected D/C Disposition:  tbd    Discharge Plan Comments: Reviewed chart. Pt cont in ICU on Ventilator,C-19+. Pt from home with spouse. Pt uses electric scooter at baseline. Following.     Home O2 in place on admit: No  Pt informed of need to bring portable home O2 tank on day of discharge for nursing to connect prior to leaving:  Not Indicated  Verbalized agreement/Understanding:  Not Indicated

## 2022-01-19 NOTE — PROGRESS NOTES
pedal pulses. GI: Non-tender. Non-distended. No masses. No organmegaly. Normal bowel sounds. No hernia. Skin: developing edema  Lymph: No cervical LAD. No supraclavicular LAD. M/S: No cyanosis. No joint deformity. No clubbing. Neuro: sedated       Medications:  Scheduled Meds:   Venelex   Topical BID    insulin glargine  20 Units SubCUTAneous QAM    mupirocin   Nasal BID    insulin lispro  0-18 Units SubCUTAneous Q4H    ipratropium-albuterol  1 ampule Inhalation Q4H    dexamethasone  10 mg IntraVENous Daily    famotidine  20 mg Oral BID    atorvastatin  40 mg Oral Nightly    omega-3 acid ethyl esters  2 g Oral BID    clopidogrel  75 mg Oral Daily    aspirin  81 mg Oral Daily    sodium chloride flush  5-40 mL IntraVENous 2 times per day    enoxaparin  30 mg SubCUTAneous BID    Vitamin D  2,000 Units Oral Daily       PRN Meds:  albuterol, glucose, dextrose, glucagon (rDNA), dextrose, midazolam, fentanNYL, melatonin, nitroGLYCERIN, sodium chloride flush, sodium chloride, ondansetron **OR** ondansetron, polyethylene glycol, acetaminophen **OR** acetaminophen, potassium chloride, magnesium sulfate    Results:  CBC:   Recent Labs     01/17/22  0501 01/18/22  0440 01/19/22  0400   WBC 15.5* 16.3* 19.0*   HGB 12.8* 12.1* 10.4*   HCT 36.1* 35.3* 30.2*   MCV 82.2 82.3 83.2    257 205     BMP:   Recent Labs     01/17/22  0501 01/18/22  0440 01/19/22  0400   * 136 132*   K 3.8 4.4 4.3   CL 94* 97* 95*   CO2 29 32 30   BUN 16 22* 38*   CREATININE <0.5* <0.5* <0.5*     LIVER PROFILE:   Recent Labs     01/17/22  0501 01/18/22  0440 01/19/22  0400   AST <5* 62* 59*   ALT <5* <5* <5*   BILITOT 2.1* 2.0* 2.0*   ALKPHOS 87 82 80     PT/INR:   No results for input(s): PROTIME, INR in the last 72 hours. Results for Rubin Gonzalez (MRN [id number]) as of 1/11/2022 06:37   Ref.  Range 1/9/2022 16:30   CRP Latest Ref Range: 0.0 - 5.1 mg/L 55.2 (H)       Cultures:    Covid 19: detected  Influenza A and B: not detected  Blood culture:NGTD    Films:    XR CHEST PORTABLE   Final Result   Unremarkable appearance of an endotracheal tube. Unchanged patchy interstitial lung markings which may reflect atypical   pneumonia or pulmonary edema. XR CHEST PORTABLE   Final Result   1. Endotracheal tube terminates 1.6 cm from the benton. Recommend 2 cm   retraction. 2.  Slightly increased diffuse bilateral airspace and interstitial opacities. The findings were sent to the [facility] Results Communication Team at 12:48 a.m.   on 01/18/2022 to be communicated to a licensed caregiver. XR CHEST PORTABLE   Final Result   Mild interval worsening with moderate infiltrates in the lungs. These are   most pronounced in the right lung. 5 cm indeterminate dense opacity in the midline-right upper abdomen. Correlate for oral contrast in bowel. VL Extremity Venous Bilateral   Final Result      XR CHEST PORTABLE   Final Result   Stable multifocal bilateral lung airspace disease consistent with previously   identified presence of COVID-19 viral pneumonia. Suspected moderate pulmonary interstitial edema. XR CHEST PORTABLE   Final Result   The support apparatus as described above. Continued increased interstitial opacity throughout both lungs, along with   more focal traits within the periphery the lungs, some combination of   interstitial edema and pneumonia. XR CHEST PORTABLE   Final Result   1. Endotracheal tube projects in appropriate position. 2. Increased bilateral nonspecific pulmonary opacities representing edema,   atelectasis and/or pneumonia. XR CHEST PORTABLE   Final Result   Right PICC projects in normal position. Stable interstitial and ground-glass opacities, most likely atypical   pneumonia. IR PICC WO SQ PORT/PUMP > 5 YEARS   Final Result   Successful placement of PICC line.          CT CHEST PULMONARY EMBOLISM W CONTRAST   Final Result 1. No evidence of pulmonary embolism. 2.  Commonly reported imaging features of COVID-19 pneumonia are present. Other processes such as influenza pneumonia and organizing pneumonia, as can   be seen with drug toxicity and connective tissue disease, can cause a similar   imaging pattern. PneTyp   3. Mild compression deformity of T8 of uncertain chronicity, new since   05/29/2020. Similar mild moderate compression deformities at T10, T12.   4. Fatty liver. 5. Interval increase in size of now 2.7 cm fat attenuation lesion of the   pancreatic neck. Recommend further evaluation with nonemergent pancreas   protocol MRI. XR CHEST PORTABLE   Final Result   1. Bilateral interstitial thickening either due to edema or atypical   interstitial pneumonia. XR CHEST PORTABLE    (Results Pending)   XR CHEST PORTABLE    (Results Pending)          Assessment and Plan      COVID PNA  Acute Hypoxic Respiratory Failure    - imaging with bilateral pna pattern  - does not wear O2 at baseline  - Admit to Med Surg, droplet + precautions, tele  - supp O2, progressive hypoxemia, now on vent support  -Intubated 1/14  Prone 1/15 and 1/16  - on  Decadron D#11, completed 5 days of  Remdesivir, s/p Tocilizumab  - pulmonary consulted   - lovenox bid         Abnormal lFT - likely from viral infection   Monitored with remdesivir and improved     CAD - stable. No CP. On ASA and Plavix. PAD stable     DM type 2  Hyperglycemia with steroids, on ssi      HLD On Lipitor.     Lovenox 30 mg bid for DVT prophylaxis.    Diet - On TF  Full code             Nevaeh Kilgore MD 7:47 AM 1/19/2022

## 2022-01-19 NOTE — PROGRESS NOTES
01/19/22 0315   Vent Information   Vent Type 840   Vent Mode AC/VC   Vt Ordered 340 mL   Rate Set 32 bmp   Peak Flow 55 L/min   Pressure Support 0 cmH20   FiO2  75 %   SpO2 (!) 89 %   SpO2/FiO2 ratio 118.67   Sensitivity 3   PEEP/CPAP 12   Vent Patient Data   Peak Inspiratory Pressure 25 cmH2O   Mean Airway Pressure 14 cmH20   Rate Measured 40 br/min   Vt Exhaled 356 mL   Minute Volume 7.02 Liters   I:E Ratio 1:1.80   Plateau Pressure 21 XAN88   Static Compliance 40 mL/cmH2O   Dynamic Compliance 27 mL/cmH2O   Cough/Sputum   Sputum How Obtained Suctioned;Endotracheal   Sputum Amount Small   Sputum Color Creamy; Red   Tenacity Thick   Spontaneous Breathing Trial (SBT) RT Doc   Pulse 102   Breath Sounds   Right Upper Lobe Diminished   Right Middle Lobe Diminished   Right Lower Lobe Diminished   Left Upper Lobe Diminished   Left Lower Lobe Diminished   Additional Respiratory  Assessments   Resp (!) 32   Alarm Settings   High Pressure Alarm 40 cmH2O   Low Minute Volume Alarm 4 L/min   High Respiratory Rate 45 br/min   ETT (adult)   Placement Date/Time: 01/14/22 9963   Timeout: Patient;Procedure; Appropriate Equipment;Correct Position  Preoxygenation: Yes  Mask Ventilation: Ventilated by mask (1)  Technique: Rapid sequence  Type: Cuffed  Tube Size: 8 mm  Laryngoscope: GlideScope  . ..    ET Placement Right

## 2022-01-19 NOTE — PROGRESS NOTES
Pts wife at bedside- updated on pt status. Pt appears to be grimacing some & coughing. Versed gtt increased.

## 2022-01-19 NOTE — PROGRESS NOTES
Propofol gtt weaned off per order. Pt on versed & fentanyl for sedation. TF remains at 15 ml/hr.    Ursula Ceballos RN, BSN

## 2022-01-20 NOTE — PROGRESS NOTES
01/19/22 1903   Vent Information   Vent Type 840   Vent Mode AC/VC   Vt Ordered 340 mL   Rate Set 32 bmp   Peak Flow 55 L/min   Pressure Support 0 cmH20   FiO2  70 %   SpO2 93 %   SpO2/FiO2 ratio 132.86   Sensitivity 3   PEEP/CPAP 12   Humidification Source Heated wire   Humidification Temp Measured 37   Circuit Condensation Drained   Vent Patient Data   Peak Inspiratory Pressure 35 cmH2O   Mean Airway Pressure 18 cmH20   Rate Measured 35 br/min   Vt Exhaled 296 mL   Minute Volume 10.1 Liters   I:E Ratio 1:1.80   Plateau Pressure 23 XPN12   Static Compliance 41 mL/cmH2O   Dynamic Compliance 16 mL/cmH2O   Cough/Sputum   Sputum How Obtained Endotracheal   Cough Productive   Sputum Amount Small   Sputum Color Red   Tenacity Thick   Spontaneous Breathing Trial (SBT) RT Doc   Pulse 107   Breath Sounds   Right Upper Lobe Diminished   Right Middle Lobe Diminished   Right Lower Lobe Diminished   Left Upper Lobe Diminished   Left Lower Lobe Diminished   Additional Respiratory  Assessments   Resp (!) 34   Alarm Settings   High Pressure Alarm 40 cmH2O   Low Minute Volume Alarm 4 L/min   Apnea (secs) 20 secs   High Respiratory Rate 45 br/min   Low Exhaled Vt  200 mL   Patient Observation   Observations 8ett 24 at lip

## 2022-01-20 NOTE — PROGRESS NOTES
Internal Medicine ICU Progress Note    covid pneumonia, hypoxia      Events of Last 24 hours:       Intubated    Proned- back to supine.   Pt see in supine position today   Bloody ETT secretions, drop in h.h noted  Hypotensive on levophed   Improving Fio2 to 60 % , PEEP down to 10        Invasive Lines: PICC placed on 1/10/22       MV:  N/A    Recent Labs     22  0400 22  0500   PHART 7.409 7. 401   [id number] 52.7* 48.8*   PO2ART 53.4* 82.8       MV Settings:  Vent Mode: AC/VC Rate Set: 32 bmp/Vt Ordered: 340 mL/ /FiO2 : 70 %    IV:   midazolam 3 mg/hr (22 421)    dextrose      fentaNYL 175 mcg/hr (22)    norepinephrine 10 mcg/min (22)    sodium chloride         Vitals:  Temp  Av.5 °F (36.9 °C)  Min: 97.2 °F (36.2 °C)  Max: 100.1 °F (37.8 °C)  Pulse  Av.9  Min: 90  Max: 129  BP  Min: 76/57  Max: 133/86  SpO2  Av.5 %  Min: 89 %  Max: 98 %  FiO2   Av.6 %  Min: 60 %  Max: 70 %  Patient Vitals for the past 4 hrs:   BP Temp Temp src Pulse Resp SpO2   22 0700 102/67 98.1 °F (36.7 °C) Axillary 95 (!) 32 94 %   22 0605 112/72 -- -- 93 (!) 32 93 %   22 0535 118/73 -- -- 90 (!) 33 94 %   22 0505 85/62 -- -- 113 (!) 32 92 %   22 0435 107/69 -- -- 108 (!) 33 90 %   22 0405 (!) 98/58 -- -- 109 26 91 %   22 0400 -- -- -- 110 (!) 34 91 %       CVP:        Intake/Output Summary (Last 24 hours) at 2022 0736  Last data filed at 2022 8235  Gross per 24 hour   Intake 1571.66 ml   Output 1050 ml   Net 521.66 ml       EXAM:  General:  Middle aged male, old appearing. Intubated and sedated  Eyes: PERRL. No sclera icterus. No conjunctiva injected. ENT - oral ETT and OG noted . Neck: Trachea midline. Normal thyroid. Resp- improving jose air entry, resolving  jose + crackles. No wheezing. No rhonchi. CV: Regular rate. Regular rhythm. No mumur or rub. No edema. No JVD. Palpable pedal pulses. GI: Non-tender. Non-distended. No masses. No organmegaly. Normal bowel sounds. No hernia. Skin: developing edema to all ex t  Lymph: No cervical LAD. No supraclavicular LAD. M/S: No cyanosis. No joint deformity. No clubbing. Neuro: sedated       Medications:  Scheduled Meds:   senna  2 tablet Oral BID    And    docusate  100 mg Oral BID    Venelex   Topical BID    insulin glargine  20 Units SubCUTAneous QAM    mupirocin   Nasal BID    insulin lispro  0-18 Units SubCUTAneous Q4H    ipratropium-albuterol  1 ampule Inhalation Q4H    dexamethasone  10 mg IntraVENous Daily    famotidine  20 mg Oral BID    atorvastatin  40 mg Oral Nightly    omega-3 acid ethyl esters  2 g Oral BID    clopidogrel  75 mg Oral Daily    aspirin  81 mg Oral Daily    sodium chloride flush  5-40 mL IntraVENous 2 times per day    enoxaparin  30 mg SubCUTAneous BID    Vitamin D  2,000 Units Oral Daily       PRN Meds:  albuterol, glucose, dextrose, glucagon (rDNA), dextrose, midazolam, fentanNYL, melatonin, nitroGLYCERIN, sodium chloride flush, sodium chloride, ondansetron **OR** ondansetron, polyethylene glycol, acetaminophen **OR** acetaminophen, potassium chloride, magnesium sulfate    Results:  CBC:   Recent Labs     01/18/22 0440 01/19/22  0400 01/20/22  0500   WBC 16.3* 19.0* 24.6*   HGB 12.1* 10.4* 8.3*   HCT 35.3* 30.2* 25.4*   MCV 82.3 83.2 82.7    205 187     BMP:   Recent Labs     01/18/22  0440 01/19/22  0400 01/20/22  0500    132* 136   K 4.4 4.3 4.6   CL 97* 95* 99   CO2 32 30 34*   BUN 22* 38* 42*   CREATININE <0.5* <0.5* <0.5*     LIVER PROFILE:   Recent Labs     01/18/22 0440 01/19/22  0400 01/20/22  0500   AST 62* 59* 58*   ALT <5* <5* 64*   BILITOT 2.0* 2.0* 2.4*   ALKPHOS 82 80 93     PT/INR:   No results for input(s): PROTIME, INR in the last 72 hours. Results for Gilles Billy (MRN [id number]) as of 1/11/2022 06:37   Ref.  Range 1/9/2022 16:30   CRP Latest Ref Range: 0.0 - 5.1 mg/L 55.2 (H) Cultures:    Covid 19: detected  Influenza A and B: not detected  Blood culture:NGTD  Sputum pending    Films:    XR CHEST PORTABLE   Final Result   Unchanged multifocal bilateral atypical pneumonia. XR CHEST PORTABLE   Final Result   Unremarkable appearance of an endotracheal tube. Unchanged patchy interstitial lung markings which may reflect atypical   pneumonia or pulmonary edema. XR CHEST PORTABLE   Final Result   1. Endotracheal tube terminates 1.6 cm from the benton. Recommend 2 cm   retraction. 2.  Slightly increased diffuse bilateral airspace and interstitial opacities. The findings were sent to the [facility] Results Communication Team at 12:48 a.m.   on 01/18/2022 to be communicated to a licensed caregiver. XR CHEST PORTABLE   Final Result   Mild interval worsening with moderate infiltrates in the lungs. These are   most pronounced in the right lung. 5 cm indeterminate dense opacity in the midline-right upper abdomen. Correlate for oral contrast in bowel. VL Extremity Venous Bilateral   Final Result      XR CHEST PORTABLE   Final Result   Stable multifocal bilateral lung airspace disease consistent with previously   identified presence of COVID-19 viral pneumonia. Suspected moderate pulmonary interstitial edema. XR CHEST PORTABLE   Final Result   The support apparatus as described above. Continued increased interstitial opacity throughout both lungs, along with   more focal traits within the periphery the lungs, some combination of   interstitial edema and pneumonia. XR CHEST PORTABLE   Final Result   1. Endotracheal tube projects in appropriate position. 2. Increased bilateral nonspecific pulmonary opacities representing edema,   atelectasis and/or pneumonia. XR CHEST PORTABLE   Final Result   Right PICC projects in normal position.       Stable interstitial and ground-glass opacities, most likely atypical pneumonia. IR PICC WO SQ PORT/PUMP > 5 YEARS   Final Result   Successful placement of PICC line. CT CHEST PULMONARY EMBOLISM W CONTRAST   Final Result   1. No evidence of pulmonary embolism. 2.  Commonly reported imaging features of COVID-19 pneumonia are present. Other processes such as influenza pneumonia and organizing pneumonia, as can   be seen with drug toxicity and connective tissue disease, can cause a similar   imaging pattern. PneTyp   3. Mild compression deformity of T8 of uncertain chronicity, new since   05/29/2020. Similar mild moderate compression deformities at T10, T12.   4. Fatty liver. 5. Interval increase in size of now 2.7 cm fat attenuation lesion of the   pancreatic neck. Recommend further evaluation with nonemergent pancreas   protocol MRI. XR CHEST PORTABLE   Final Result   1. Bilateral interstitial thickening either due to edema or atypical   interstitial pneumonia. XR CHEST PORTABLE    (Results Pending)          Assessment and Plan      COVID PNA  Acute Hypoxic Respiratory Failure    - imaging with bilateral pna pattern  - does not wear O2 at baseline  - Admit to Med Surg, droplet + precautions, tele  - supp O2, progressive hypoxemia, now on vent support  -Intubated 1/14  Prone 1/15 and 1/16  - on  Decadron D#12, now at 6 mg ,  completed 5 days of  Remdesivir,   s/p Tocilizumab   - planned for cefepime and vancomycin today   - bloody secretions in ETT. Can hold ASA and PLAVIX   - pulmonary consulted   - lovenox can be held    Hypotension - likely sepsis   - adding cefepime and vanc  - sp fluid boluses - on levophed now      Anemia - multifactorial   - monitor and transfuse PRBC less than 7     Abnormal lFT - likely from viral infection   Monitor, stable      CAD - stable. No CP. Hold  ASA and Plavix. PAD stable     DM type 2  Hyperglycemia with steroids, on ssi     HLD On Lipitor.     Can Hold Lovenox 30 mg bid for DVT prophylaxis.    Diet - On TF  Full code             Manuel Hendricks MD 7:36 AM 1/20/2022

## 2022-01-20 NOTE — PROGRESS NOTES
Dr. Gilbert Chase updated on pt cond. Pt placed on 100% FIO2 for decreased SaO2 in the  Mid 80's. 1 liter of NS bolus given & CVP checked. CVP 10. Will bolus 500ml per Dr's orders.

## 2022-01-20 NOTE — CONSULTS
Pharmacy Note  Vancomycin Consult    Michael Maza is a 61 y.o. male started on Vancomycin for pneumonia; consult received from Dr. Kendell Carrillo to manage therapy. Also receiving the following antibiotics: Cefepime. Patient Active Problem List   Diagnosis    Coronary artery disease    Tobacco abuse    Hypotension    Fracture of vertebra due to osteoporosis (HCC)    Chest pain    Ischemic cardiomyopathy    PAD (peripheral artery disease) (HCC)    History of MI (myocardial infarction)    HLD (hyperlipidemia)    GERD (gastroesophageal reflux disease)    SOB (shortness of breath)    Other fatigue    DMII (diabetes mellitus, type 2) ([facility] [address]    Essential hypertension    Acute hypoxemic respiratory failure due to COVID-19 ([facility] [address]    Pneumonia due to COVID-19 virus    Acute hypoxemic respiratory failure (HCC)    ARDS (adult respiratory distress syndrome) ([facility])    Hyperglycemia    Pulmonary emphysema ([facility])    Leukocytosis    Hypertriglyceridemia     Allergies:  Patient has no known allergies. Temp max: 97.9 degrees F    Recent Labs     01/19/22  0400 01/20/22  0500   BUN 38* 42*   CREATININE <0.5* <0.5*   WBC 19.0* 24.6*       Intake/Output Summary (Last 24 hours) at 1/20/2022 1236  Last data filed at 1/20/2022 6473  Gross per 24 hour   Intake 1571.66 ml   Output 1050 ml   Net 521.66 ml     Culture Date      Source                       Results      Ht Readings from Last 1 Encounters:   01/19/22 5' 2\" (1.575 m)        Wt Readings from Last 1 Encounters:   01/19/22 153 lb 3.5 oz (69.5 kg)       Body mass index is 28.02 kg/m². CrCl cannot be calculated (This lab value cannot be used to calculate CrCl because it is not a number: <0.5). Goal Trough Level: 12.6 mcg/mL    Assessment/Plan:  Will initiate Vancomycin with a one time loading dose of 1250 mg x1, followed by 1250 mg IV every 12 hours. Timing of trough level will be determined based on culture results, renal function, and clinical response.      Thank you for the consult. Will continue to follow.   ANISH Gilliland.Ph.1/20/202212:37 PM

## 2022-01-20 NOTE — PROGRESS NOTES
Dr. Master Perez notified via perfect serve patient hemoglobin down 2 grams in last 24 hours from 10.2 to 8.3. Only overt bleeding is small/moderate amount from endotracheal tube that has been present greater than 3-4 days.

## 2022-01-20 NOTE — PROGRESS NOTES
Continues to have bloody secretions from endotracheal tube with suction. Remains in prone position. Tolerating turning every two hours with oxygen saturations greater than 88%. Monitor closely.

## 2022-01-20 NOTE — PROGRESS NOTES
01/20/22 0243   Vent Information   Vent Type 840   Vent Mode AC/VC   Vt Ordered 340 mL   Rate Set 32 bmp   Peak Flow 55 L/min   Pressure Support 0 cmH20   FiO2  70 %   SpO2 97 %   SpO2/FiO2 ratio 138.57   Sensitivity 3   PEEP/CPAP 12   Humidification Source Heated wire   Humidification Temp Measured 37   Circuit Condensation Drained   Vent Patient Data   Peak Inspiratory Pressure 28 cmH2O   Mean Airway Pressure 18 cmH20   Rate Measured 32 br/min   Vt Exhaled 417 mL   Minute Volume 12.2 Liters   I:E Ratio 1:1.80   Cough/Sputum   Sputum How Obtained Endotracheal   Cough Productive   Sputum Amount Moderate   Sputum Color Red   Tenacity Thick   Spontaneous Breathing Trial (SBT) RT Doc   Pulse 101   Breath Sounds   Right Upper Lobe Diminished   Right Middle Lobe Diminished   Right Lower Lobe Diminished   Left Upper Lobe Diminished   Left Lower Lobe Diminished   Additional Respiratory  Assessments   Resp (!) 32   Alarm Settings   High Pressure Alarm 40 cmH2O   Low Minute Volume Alarm 4 L/min   Apnea (secs) 20 secs   High Respiratory Rate 45 br/min   Low Exhaled Vt  200 mL   Patient Observation   Observations 8ett 24 at lip

## 2022-01-20 NOTE — PROGRESS NOTES
0-18 Units SubCUTAneous Q4H    ipratropium-albuterol  1 ampule Inhalation Q4H    dexamethasone  10 mg IntraVENous Daily    famotidine  20 mg Oral BID    atorvastatin  40 mg Oral Nightly    omega-3 acid ethyl esters  2 g Oral BID    clopidogrel  75 mg Oral Daily    aspirin  81 mg Oral Daily    sodium chloride flush  5-40 mL IntraVENous 2 times per day    enoxaparin  30 mg SubCUTAneous BID    Vitamin D  2,000 Units Oral Daily     PRN Meds:  albuterol, glucose, dextrose, glucagon (rDNA), dextrose, midazolam, fentanNYL, melatonin, nitroGLYCERIN, sodium chloride flush, sodium chloride, ondansetron **OR** ondansetron, polyethylene glycol, acetaminophen **OR** acetaminophen, potassium chloride, magnesium sulfate    Results:  CBC:   Recent Labs     01/18/22  0440 01/19/22  0400 01/20/22  0500   WBC 16.3* 19.0* 24.6*   HGB 12.1* 10.4* 8.3*   HCT 35.3* 30.2* 25.4*   MCV 82.3 83.2 82.7    205 187     BMP:   Recent Labs     01/18/22  0440 01/19/22  0400 01/20/22  0500    132* 136   K 4.4 4.3 4.6   CL 97* 95* 99   CO2 32 30 34*   BUN 22* 38* 42*   CREATININE <0.5* <0.5* <0.5*     LIVER PROFILE:   Recent Labs     01/18/22  0440 01/19/22  0400 01/20/22  0500   AST 62* 59* 58*   ALT <5* <5* 64*   BILITOT 2.0* 2.0* 2.4*   ALKPHOS 82 80 93       Micro  1/9 BC NGTD  1/9 COVID-19 detected  1/20 [facility]  1/20 UC  1/20 Resp       Imaging   Chest x-ray 1/20 imaging reviewed by me and showed  Satisfactory ETT position  Satisfactory PICC line position   Multifocal fine ASD  - worse       LE dopper 1/17   Within the limits of this exam, there is no evidence of deep or superficial  venous thrombosis in the lower extremities bilaterally.    Chronic phlebitic processes involving the left small saphenous vein       ASSESSMENT:  · Acute hypoxemic respiratory failure  · COVID 19 pneumonia in an unvaccinated individual  · Septic shock  · ARDS  · Bloody respiratory secretions-probably traumatic from suctioning  · Pulmonary emphysema  · Leukocytosis -worsening  · Hypertriglyceridemia   · Anemia  · Pancreatic nodule, 2.7 cm increase in size  · DM2 with hyperglycemia   · Elevated LFTS  · Tobacco abuse  · CAD and PAD with h/o STEMI     PLAN:  · Droplet Plus Airborne Precautions   · Mechanical ventilation as per my orders. The ventilator was adjusted by me at the bedside for unstable, life threatening respiratory failure. · IV Versed for sedation, target RASS -52, with daily spontaneous awakening trial.  Fentanyl PRN pain, gtt as needed  · Add Valium 10 TID   · Head of bed 30 degrees or higher at all times  · Daily spontaneous breathing trial once PEEP less than 8, FiO2 less than 55%. · Prone 1/14/22-  · Decadron QD, D#12 change to 6 mg, monitor glucose closely  · S/p Remdesevir D#5 and Tocilizumab received 1/9/22  · Vanc and Cefepime   · Repeat cultures  · IV Levophed to keep MAP > 65 mmHg or SBP>90  · IVF  cc bolus PRN target SBP>90 up to 4 liters  · TFs trophic   · On ASA   · Hold Plavix given and anemia and bloody secretions   · Tobacco cessation  · Follow LFTs  · BM regiment   · Monitor H&H and transfuse for hemoglobin less than 7  · Check PT INR  · Blood sugar control ISS, with goal 150-180- lantus 20   · GI prophylaxis: Pepcid  · DVT prophylaxis: Lovenox BID   · MRSA prophylaxis: Bactroban  · Discussed with family at the bedside and multiple good questions were answered. Total critical care time caring for this patient with life threatening, unstable organ failure, including direct patient contact, management of life support systems, review of data including imaging and labs, discussions with other team members and physicians at least 31 minutes so far today, excluding procedures.

## 2022-01-20 NOTE — PROGRESS NOTES
RT Inhaler-Nebulizer Bronchodilator Protocol Note    There is a bronchodilator order in the chart from a provider indicating to follow the RT Bronchodilator Protocol and there is an Initiate RT Inhaler-Nebulizer Bronchodilator Protocol order as well (see protocol at bottom of note). CXR Findings:  XR CHEST PORTABLE    Result Date: 1/20/2022  Stable pattern of diffuse, multifocal airspace opacities. XR CHEST PORTABLE    Result Date: 1/19/2022  Unchanged multifocal bilateral atypical pneumonia. The findings from the last RT Protocol Assessment were as follows:   History Pulmonary Disease: (P) Chronic pulmonary disease  Respiratory Pattern: (P) Use of accessory muscles, prolonged exhalation, or RR 26-30 bpm  Breath Sounds: (P) Intermittent or unilateral wheezes  Cough: (P) Weak, productive  Indication for Bronchodilator Therapy: (P) Decreased or absent breath sounds  Bronchodilator Assessment Score: (P) 14    Aerosolized bronchodilator medication orders have been revised according to the RT Inhaler-Nebulizer Bronchodilator Protocol below. Respiratory Therapist to perform RT Therapy Protocol Assessment initially then follow the protocol. Repeat RT Therapy Protocol Assessment PRN for score 0-3 or on second treatment, BID, and PRN for scores above 3. No Indications - adjust the frequency to every 6 hours PRN wheezing or bronchospasm, if no treatments needed after 48 hours then discontinue using Per Protocol order mode. If indication present, adjust the RT bronchodilator orders based on the Bronchodilator Assessment Score as indicated below. Use Inhaler orders unless patient has one or more of the following: on home nebulizer, not able to hold breath for 10 seconds, is not alert and oriented, cannot activate and use MDI correctly, or respiratory rate 25 breaths per minute or more, then use the equivalent nebulizer order(s) with same Frequency and PRN reasons based on the score.   If a patient is on this medication at home then do not decrease Frequency below that used at home. 0-3 - enter or revise RT bronchodilator order(s) to equivalent RT Bronchodilator order with Frequency of every 4 hours PRN for wheezing or increased work of breathing using Per Protocol order mode. 4-6 - enter or revise RT Bronchodilator order(s) to two equivalent RT bronchodilator orders with one order with BID Frequency and one order with Frequency of every 4 hours PRN wheezing or increased work of breathing using Per Protocol order mode. 7-10 - enter or revise RT Bronchodilator order(s) to two equivalent RT bronchodilator orders with one order with TID Frequency and one order with Frequency of every 4 hours PRN wheezing or increased work of breathing using Per Protocol order mode. 11-13 - enter or revise RT Bronchodilator order(s) to one equivalent RT bronchodilator order with QID Frequency and an Albuterol order with Frequency of every 4 hours PRN wheezing or increased work of breathing using Per Protocol order mode. Greater than 13 - enter or revise RT Bronchodilator order(s) to one equivalent RT bronchodilator order with every 4 hours Frequency and an Albuterol order with Frequency of every 2 hours PRN wheezing or increased work of breathing using Per Protocol order mode.      .    Electronically signed by Hyun Oseguera RCP on 1/20/2022 at 4:59 PM

## 2022-01-20 NOTE — PROGRESS NOTES
CM-SR, VSS on Levo gtt @ 10mcg/min. Pt remains afebrile, UO WNL. 1500 ml bolus given this shift to maintain CVP 12-15. CVP is 14 @ this time. Pt is resting w/out evidence of distress @ this time. FIO2 95% PEEP 12. Small amount of blood noted from pt's mouth. Dr. Valreia Mccormack updated on current Hbg, PT, & INR results.

## 2022-01-20 NOTE — PROGRESS NOTES
01/19/22 2000   RT Protocol   History Pulmonary Disease 2   Respiratory pattern 6   Breath sounds 4   Cough 2   Indications for Bronchodilator Therapy Decreased or absent breath sounds   Bronchodilator Assessment Score 14   RT Inhaler-Nebulizer Bronchodilator Protocol Note    There is a bronchodilator order in the chart from a provider indicating to follow the RT Bronchodilator Protocol and there is an Initiate RT Inhaler-Nebulizer Bronchodilator Protocol order as well (see protocol at bottom of note). CXR Findings:  XR CHEST PORTABLE    Result Date: 1/19/2022  Unchanged multifocal bilateral atypical pneumonia. XR CHEST PORTABLE    Result Date: 1/18/2022  Unremarkable appearance of an endotracheal tube. Unchanged patchy interstitial lung markings which may reflect atypical pneumonia or pulmonary edema. XR CHEST PORTABLE    Result Date: 1/18/2022  1. Endotracheal tube terminates 1.6 cm from the benton. Recommend 2 cm retraction. 2.  Slightly increased diffuse bilateral airspace and interstitial opacities. The findings were sent to the [facility] Results Communication Team at 12:48 a.m. on 01/18/2022 to be communicated to a licensed caregiver. The findings from the last RT Protocol Assessment were as follows:   History Pulmonary Disease: Chronic pulmonary disease  Respiratory Pattern: Use of accessory muscles, prolonged exhalation, or RR 26-30 bpm  Breath Sounds: Intermittent or unilateral wheezes  Cough: Weak, productive  Indication for Bronchodilator Therapy: Decreased or absent breath sounds  Bronchodilator Assessment Score: 14    Aerosolized bronchodilator medication orders have been revised according to the RT Inhaler-Nebulizer Bronchodilator Protocol below. Respiratory Therapist to perform RT Therapy Protocol Assessment initially then follow the protocol. Repeat RT Therapy Protocol Assessment PRN for score 0-3 or on second treatment, BID, and PRN for scores above 3.     No Indications - adjust the frequency to every 6 hours PRN wheezing or bronchospasm, if no treatments needed after 48 hours then discontinue using Per Protocol order mode. If indication present, adjust the RT bronchodilator orders based on the Bronchodilator Assessment Score as indicated below. Use Inhaler orders unless patient has one or more of the following: on home nebulizer, not able to hold breath for 10 seconds, is not alert and oriented, cannot activate and use MDI correctly, or respiratory rate 25 breaths per minute or more, then use the equivalent nebulizer order(s) with same Frequency and PRN reasons based on the score. If a patient is on this medication at home then do not decrease Frequency below that used at home. 0-3 - enter or revise RT bronchodilator order(s) to equivalent RT Bronchodilator order with Frequency of every 4 hours PRN for wheezing or increased work of breathing using Per Protocol order mode. 4-6 - enter or revise RT Bronchodilator order(s) to two equivalent RT bronchodilator orders with one order with BID Frequency and one order with Frequency of every 4 hours PRN wheezing or increased work of breathing using Per Protocol order mode. 7-10 - enter or revise RT Bronchodilator order(s) to two equivalent RT bronchodilator orders with one order with TID Frequency and one order with Frequency of every 4 hours PRN wheezing or increased work of breathing using Per Protocol order mode. 11-13 - enter or revise RT Bronchodilator order(s) to one equivalent RT bronchodilator order with QID Frequency and an Albuterol order with Frequency of every 4 hours PRN wheezing or increased work of breathing using Per Protocol order mode.       Greater than 13 - enter or revise RT Bronchodilator order(s) to one equivalent RT bronchodilator order with every 4 hours Frequency and an Albuterol order with Frequency of every 2 hours PRN wheezing or increased work of breathing using Per Protocol order mode.     Electronically signed by Justyna Lindsay RCP on 1/19/2022 at 8:45 PM

## 2022-01-21 NOTE — PROGRESS NOTES
01/21/22 0336   Vent Information   Vent Type 840   Vent Mode AC/VC   Vt Ordered 340 mL   Rate Set 32 bmp   Peak Flow 55 L/min   Pressure Support 0 cmH20   FiO2  95 %   SpO2 93 %   SpO2/FiO2 ratio 97.89   Sensitivity 3   PEEP/CPAP 12   Humidification Source Heated wire   Humidification Temp 37   Humidification Temp Measured 36.8   Circuit Condensation Drained   Vent Patient Data   Peak Inspiratory Pressure 25 cmH2O   Mean Airway Pressure 19 cmH20   Rate Measured 32 br/min   Vt Exhaled 419 mL   Minute Volume 14 Liters   I:E Ratio 1:1.80   Plateau Pressure 23 TGO49   Cough/Sputum   Sputum How Obtained Endotracheal;Suctioned   Cough Productive   Sputum Amount Small   Sputum Color Creamy; Red   Tenacity Thick   Spontaneous Breathing Trial (SBT) RT Doc   Pulse 88   Breath Sounds   Right Upper Lobe Diminished   Right Middle Lobe Diminished   Right Lower Lobe Diminished   Left Upper Lobe Diminished   Left Lower Lobe Diminished   Additional Respiratory  Assessments   Resp (!) 32   Position Prone   Alarm Settings   High Pressure Alarm 40 cmH2O   Low Minute Volume Alarm 4 L/min   High Respiratory Rate 45 br/min   Patient Observation   Observations ETT SIZE 8.0, SECURED AT 24 LIP LINE. AMBU BAG AT HEAD OF BED. WATER GOOD. ETT (adult)   Placement Date/Time: 01/14/22 1503   Timeout: Patient;Procedure; Appropriate Equipment;Correct Position  Preoxygenation: Yes  Mask Ventilation: Ventilated by mask (1)  Technique: Rapid sequence  Type: Cuffed  Tube Size: 8 mm  Laryngoscope: GlideScope  . ..    Secured at 24 cm   Measured From [address] [place] [address],[address] By Cloth tape;Twill tape   Site Condition Dry

## 2022-01-21 NOTE — PROGRESS NOTES
IntraVENous Q12H    senna  2 tablet Oral BID    And    docusate  100 mg Oral BID    Venelex   Topical BID    insulin glargine  20 Units SubCUTAneous QAM    mupirocin   Nasal BID    insulin lispro  0-18 Units SubCUTAneous Q4H    ipratropium-albuterol  1 ampule Inhalation Q4H    famotidine  20 mg Oral BID    atorvastatin  40 mg Oral Nightly    omega-3 acid ethyl esters  2 g Oral BID    [Held by provider] clopidogrel  75 mg Oral Daily    aspirin  81 mg Oral Daily    sodium chloride flush  5-40 mL IntraVENous 2 times per day    [Held by provider] enoxaparin  30 mg SubCUTAneous BID    Vitamin D  2,000 Units Oral Daily     PRN Meds:  sodium chloride, albuterol, glucose, dextrose, glucagon (rDNA), dextrose, midazolam, fentanNYL, melatonin, nitroGLYCERIN, sodium chloride flush, sodium chloride, ondansetron **OR** ondansetron, polyethylene glycol, acetaminophen **OR** acetaminophen, potassium chloride, magnesium sulfate    Results:  CBC:   Recent Labs     01/19/22  0400 01/19/22  0400 01/20/22  0500 01/20/22  0500 01/20/22  1500 01/20/22  2230 01/21/22  0602   WBC 19.0*  --  24.6*  --   --   --  27.6*   HGB 10.4*   < > 8.3*   < > 7.5* 7.6* 7.4*   HCT 30.2*   < > 25.4*   < > 23.4* 23.8* 23.2*   MCV 83.2  --  82.7  --   --   --  84.3     --  187  --   --   --  174    < > = values in this interval not displayed.      BMP:   Recent Labs     01/19/22  0400 01/20/22  0500 01/21/22  0507   * 136 137   K 4.3 4.6 4.5   CL 95* 99 103   CO2 30 34* 30   BUN 38* 42* 31*   CREATININE <0.5* <0.5* <0.5*     LIVER PROFILE:   Recent Labs     01/19/22  0400 01/20/22  0500 01/21/22  0507   AST 59* 58* 66*   ALT <5* 64* 56*   BILITOT 2.0* 2.4* 2.1*   ALKPHOS 80 93 102       Micro  1/9 BC NGTD  1/9 COVID-19 detected  1/20 [facility]  1/20 UC  1/20 Resp       Imaging   Chest x-ray 1/21 imaging reviewed by me and showed   Satisfactory ETT position  Satisfactory PICC line position   Multifocal fine ASD  -worsening on the left today    LE dopper 1/17   Within the limits of this exam, there is no evidence of deep or superficial  venous thrombosis in the lower extremities bilaterally. Chronic phlebitic processes involving the left small saphenous vein       ASSESSMENT:  · Acute hypoxemic respiratory failure  · COVID 19 pneumonia in an unvaccinated individual  · Septic shock  · Severe ARDS  · Bloody respiratory secretions-probably traumatic from suctioning  · Pulmonary emphysema  · Leukocytosis -worsening  · Hypertriglyceridemia   · Anemia- no active bleed   · Pancreatic nodule, 2.7 cm increase in size  · DM2 with hyperglycemia   · Elevated LFTS  · Tobacco abuse  · CAD and PAD with h/o STEMI     PLAN:  · Droplet Plus Airborne Precautions   · Mechanical ventilation as per my orders. The ventilator was adjusted by me at the bedside for unstable, life threatening respiratory failure. · IV Versed for sedation, target RASS -52, with daily spontaneous awakening trial.  Fentanyl PRN pain, gtt as needed  · Valium 10 TID   · Add Nimbex for ventilator dyssynchrony   · Head of bed 30 degrees or higher at all times  · Daily spontaneous breathing trial once PEEP less than 8, FiO2 less than 55%. · Prone 1/14/22-  · Decadron QD, D#13 change to 6 mg, monitor glucose closely  · S/p Remdesevir D#5 and Tocilizumab received 1/9/22  · Vanc and Cefepime D#2  · Follow up Cx   · IV Levophed to keep MAP > 65 mmHg or SBP>90  · TFs trophic   · On ASA - Holding Plavix given and anemia and bloody secretions   · Tobacco cessation  · Follow LFTs  · BM regiment   · Monitor H&H and transfuse for hemoglobin less than 7  · Blood sugar control ISS, increase lantus 25 with goal 150-180  · GI prophylaxis: Pepcid  · DVT prophylaxis: Heaprin drip given profound hypoxemia and instability for testing   · MRSA prophylaxis: Bactroban  · Discussed with family at the bedside and multiple good questions were answered.         Total critical care time caring for this patient with life threatening, unstable organ failure, including direct patient contact, management of life support systems, review of data including imaging and labs, discussions with other team members and physicians at least 31 minutes so far today, excluding procedures.

## 2022-01-21 NOTE — PROGRESS NOTES
RT Inhaler-Nebulizer Bronchodilator Protocol Note    There is a bronchodilator order in the chart from a provider indicating to follow the RT Bronchodilator Protocol and there is an Initiate RT Inhaler-Nebulizer Bronchodilator Protocol order as well (see protocol at bottom of note). CXR Findings:  XR CHEST PORTABLE    Result Date: 1/20/2022  Stable pattern of diffuse, multifocal airspace opacities. XR CHEST PORTABLE    Result Date: 1/19/2022  Unchanged multifocal bilateral atypical pneumonia. The findings from the last RT Protocol Assessment were as follows:   History Pulmonary Disease: Chronic pulmonary disease  Respiratory Pattern: Use of accessory muscles, prolonged exhalation, or RR 26-30 bpm  Breath Sounds: Slightly diminished and/or crackles  Cough: Weak, productive  Indication for Bronchodilator Therapy: Decreased or absent breath sounds  Bronchodilator Assessment Score: 12    Aerosolized bronchodilator medication orders have been revised according to the RT Inhaler-Nebulizer Bronchodilator Protocol below. Respiratory Therapist to perform RT Therapy Protocol Assessment initially then follow the protocol. Repeat RT Therapy Protocol Assessment PRN for score 0-3 or on second treatment, BID, and PRN for scores above 3. No Indications - adjust the frequency to every 6 hours PRN wheezing or bronchospasm, if no treatments needed after 48 hours then discontinue using Per Protocol order mode. If indication present, adjust the RT bronchodilator orders based on the Bronchodilator Assessment Score as indicated below. Use Inhaler orders unless patient has one or more of the following: on home nebulizer, not able to hold breath for 10 seconds, is not alert and oriented, cannot activate and use MDI correctly, or respiratory rate 25 breaths per minute or more, then use the equivalent nebulizer order(s) with same Frequency and PRN reasons based on the score.   If a patient is on this medication at home then do not decrease Frequency below that used at home. 0-3 - enter or revise RT bronchodilator order(s) to equivalent RT Bronchodilator order with Frequency of every 4 hours PRN for wheezing or increased work of breathing using Per Protocol order mode. 4-6 - enter or revise RT Bronchodilator order(s) to two equivalent RT bronchodilator orders with one order with BID Frequency and one order with Frequency of every 4 hours PRN wheezing or increased work of breathing using Per Protocol order mode. 7-10 - enter or revise RT Bronchodilator order(s) to two equivalent RT bronchodilator orders with one order with TID Frequency and one order with Frequency of every 4 hours PRN wheezing or increased work of breathing using Per Protocol order mode. 11-13 - enter or revise RT Bronchodilator order(s) to one equivalent RT bronchodilator order with QID Frequency and an Albuterol order with Frequency of every 4 hours PRN wheezing or increased work of breathing using Per Protocol order mode. Greater than 13 - enter or revise RT Bronchodilator order(s) to one equivalent RT bronchodilator order with every 4 hours Frequency and an Albuterol order with Frequency of every 2 hours PRN wheezing or increased work of breathing using Per Protocol order mode. PATIENT WILL BENEFIT FROM TREATMENTS.      Electronically signed by Lola Sawyer RCP on 1/20/2022 at 11:58 PM

## 2022-01-21 NOTE — PROGRESS NOTES
Comprehensive Nutrition Assessment    Type and Reason for Visit:  Reassess    Nutrition Recommendations/Plan:   1. Continue current TF order - Vital AF 1.2 with a low goal rate of 15 ml/hr x 20 hours + 30 ml water flushes every 4 hours for tube patency + one proteinex P2Go TWICE daily via feeding tube. Since propofol is off, recommended TF regimen, if appropriate: Vital AF 1.2 with a goal rate of 55 ml/hr x 20 hours + 30 ml water flushes every 4 hours for tube patency. 2. Monitor Tf rate, intake, and tolerance + water flushes + administration of one proteinex P2Go TWICE daily while TF goal rate is low. 3. Monitor vent status and plan of care. 4. Monitor nutrition-related labs, bowel function (last documented BM was on 1/12/22), and weight trends. Nutrition Assessment:  patient continues to improve from a nutritional standpoint AEB he is tolerating Vital AF 1.2 at current goal rate of 15 ml/hr x 20 hours without issues, however, he remains at risk for further compromise d/t need for EN as sole source of nutrition, altered nutrition-related labs, and increased nutrition needs r/t COVID-19 virus; will continue Vital AF 1.2 with a low goal rate of 15 ml/hr x 20 hours + 30 ml water flushes every 4 hours for tube patency + one proteinex P2Go TWICE daily via feeding tube    Malnutrition Assessment:  Malnutrition Status: At risk for malnutrition   Context:  Acute Illness     Findings of the 6 clinical characteristics of malnutrition:  Energy Intake:  7 - 50% or less of estimated energy requirements for 5 or more days  Weight Loss:  Unable to assess (patient is + 11.1L fluid since admission)     Body Fat Loss:  Unable to assess (COVID-19 +)     Muscle Mass Loss:  Unable to assess (COVID-19 +)    Fluid Accumulation:  No significant fluid accumulation     Strength:  Not Performed    Estimated Daily Nutrient Needs:  Energy (kcal):  1320 - 1518 kcals based on 20-23 kcals/kg/CBW;  Weight Used for Energy Requirements:  Current     Protein (g):  65 - 81 g protein based on 1.2-1.5 g/kg/IBW;  Weight Used for Protein Requirements:  Ideal        Fluid (ml/day):  1320 - 1518 ml; Method Used for Fluid Requirements:  1 ml/kcal      Nutrition Related Findings:  patient remains intubated; propofol is not infusing at this time; last documented BM was on 1/12/22; bowel sounds are active; TF is infusing at low goal rate of 15 ml/hr x 20 hours; blood glucose trends are elevated; BUN and AST/ALT are elevated; h/h and Ca are low; patient has vitamin D, bactroban, lovaza, glycolax, Senokot, colace, decadron, lipitor, valium, pepcid, 25 units Lantus each morning, fentanyl, versed in D5, levo in D5 and high-dose SSI ordered at this time      Wounds:  Deep Tissue Injury,Pressure Injury (DTI on L lower chin)       Current Nutrition Therapies:    Current Tube Feeding (TF) Orders:  · Feeding Route: Orogastric  · Formula: Peptide Based  · Schedule: Continuous  · Additives/Modulars: Protein (one proteinex P2Go TWICE daily while TF rate is so low)  · Water Flushes: 30 ml every 4 hours for tube patency  · Current TF & Flush Orders Provides: Vital AF 1.2 with a low goal rate of 15 ml/hr x 20 hours = 300 ml TV, 360 kcals, 23 g protein, and 243 ml free water + 30 ml water flushes every 4 hours for tube patency + 52 g protein and 208 kcals from one proteinex P2Go TWICE daily (75 g protein and 568 kcals total)  · Goal TF & Flush Orders Provides: Vital AF 1.2 with a goal rate of 55 ml/hr x 20 hours = 1100 ml TV, 1320 kcals, 83 g protein, and 892 ml free water + 30 ml water flushes every 4 hours for tube patency + d/c proteinex P2Go once this goal rate is reached      Anthropometric Measures:  · Height: 5' 2\" (157.5 cm)  · Current Body Weight: 151 lb 10.8 oz (68.8 kg) (obtained on 1/21/22; actual weight)   · Admission Body Weight: 146 lb 13.2 oz (66.6 kg) (obtained on 1/15/22; first actual weight obtained for this admission)    · Usual Body Weight: 146 lb 13.2 oz (66.6 kg) (obtained on 1/15/22; first actual weight obtained for this admission)     · Ideal Body Weight: 118 lbs; % Ideal Body Weight 128.5 %   · BMI: 27.7  · BMI Categories: Overweight (BMI 25.0-29. 9)       Nutrition Diagnosis:   · Inadequate oral intake related to inadequate protein-energy intake,impaired respiratory function,increase demand for energy/nutrients as evidenced by NPO or clear liquid status due to medical condition,intubation,lab values,poor intake prior to admission      Nutrition Interventions:   Food and/or Nutrient Delivery:  Continue NPO,Continue Current Tube Feeding  Nutrition Education/Counseling:  No recommendation at this time   Coordination of Nutrition Care:  Continue to monitor while inpatient,Interdisciplinary Rounds    Goals:  patient will tolerate Vital AF 1.2 with a trophic rate of 15 ml/hr x 20 hours without GI distress, without s/s of aspiration, and without additional lab/fluid disturbances       Nutrition Monitoring and Evaluation:   Behavioral-Environmental Outcomes:  None Identified   Food/Nutrient Intake Outcomes:  Enteral Nutrition Intake/Tolerance  Physical Signs/Symptoms Outcomes:  Biochemical Data,Constipation,GI Status,Hemodynamic Status,Skin,Weight     Discharge Planning:     Too soon to determine     Electronically signed by Maxey Sandhoff, RD, LD on 1/21/22 at 11:08 AM EST    Contact: [phone]

## 2022-01-21 NOTE — PROGRESS NOTES
01/21/22 0543   Vent Information   Vt Ordered 340 mL   Rate Set 32 bmp   Peak Flow 65 L/min   Pressure Support 0 cmH20   FiO2  100 %   SpO2 90 %   SpO2/FiO2 ratio 90   Sensitivity 3   PEEP/CPAP 12   Vent Patient Data   Peak Inspiratory Pressure 31 cmH2O   Mean Airway Pressure 15 cmH20   Rate Measured 35 br/min   Vt Exhaled 414 mL   Minute Volume 13.9 Liters   I:E Ratio 1:2.20   Spontaneous Breathing Trial (SBT) RT Doc   Pulse 96   Additional Respiratory  Assessments   Resp 30   Alarm Settings   High Pressure Alarm 40 cmH2O   Low Minute Volume Alarm 4 L/min   High Respiratory Rate 45 br/min

## 2022-01-21 NOTE — PROGRESS NOTES
01/20/22 2235   Vent Information   Vent Type 840   Vent Mode AC/VC   Vt Ordered 340 mL   Rate Set 32 bmp   Peak Flow 55 L/min   Pressure Support 0 cmH20   FiO2  95 %   SpO2 92 %   SpO2/FiO2 ratio 96.84   Sensitivity 3   PEEP/CPAP 12   Humidification Source Heated wire   Humidification Temp 37   Humidification Temp Measured 37   Circuit Condensation Drained   Vent Patient Data   Peak Inspiratory Pressure 34 cmH2O   Mean Airway Pressure 13 cmH20   Rate Measured 37 br/min   Vt Exhaled 373 mL   Minute Volume 13.7 Liters   I:E Ratio 1:1.50   Plateau Pressure 27 SIH74   Cough/Sputum   Sputum How Obtained Endotracheal;Suctioned   Cough Productive   Sputum Amount Moderate   Sputum Color Creamy; Red   Tenacity Thick   Spontaneous Breathing Trial (SBT) RT Doc   Pulse 71   Breath Sounds   Right Upper Lobe Diminished   Right Middle Lobe Diminished   Right Lower Lobe Diminished   Left Upper Lobe Diminished   Left Lower Lobe Diminished   Additional Respiratory  Assessments   Resp 30   Position Prone   Alarm Settings   High Pressure Alarm 40 cmH2O   Low Minute Volume Alarm 4 L/min   High Respiratory Rate 45 br/min   Patient Observation   Observations ETT SIZE 8.0, SECURED AT 24 LIP LINE. AMBU BAG AT HEAD OF BED. WATER GOOD. ETT (adult)   Placement Date/Time: 01/14/22 1503   Timeout: Patient;Procedure; Appropriate Equipment;Correct Position  Preoxygenation: Yes  Mask Ventilation: Ventilated by mask (1)  Technique: Rapid sequence  Type: Cuffed  Tube Size: 8 mm  Laryngoscope: GlideScope  . ..    Secured at 24 cm   Measured From [address] [place] [address],[address] By Commercial tube oliveros   Site Condition Dry

## 2022-01-21 NOTE — PROGRESS NOTES
01/20/22 1901   Vent Information   Vent Type 840   Vent Mode AC/VC   Vt Ordered 340 mL   Rate Set 32 bmp   Peak Flow 55 L/min   Pressure Support 0 cmH20   FiO2  95 %   SpO2 92 %   SpO2/FiO2 ratio 96.84   Sensitivity 3   PEEP/CPAP 12   Humidification Source Heated wire   Humidification Temp 37   Humidification Temp Measured 37   Circuit Condensation Drained   Vent Patient Data   Peak Inspiratory Pressure 35 cmH2O   Mean Airway Pressure 17 cmH20   Rate Measured 38 br/min   Vt Exhaled 527 mL   Minute Volume 12.8 Liters   I:E Ratio 1:1.80   Plateau Pressure 25 RQD08   Static Compliance 41 mL/cmH2O   Dynamic Compliance 23 mL/cmH2O   Cough/Sputum   Sputum How Obtained Endotracheal;Suctioned   Cough Productive   Sputum Amount Small   Sputum Color Creamy; Red   Tenacity Thick   Spontaneous Breathing Trial (SBT) RT Doc   Pulse 84   Breath Sounds   Right Upper Lobe Diminished   Right Middle Lobe Diminished   Right Lower Lobe Diminished   Left Upper Lobe Diminished   Left Lower Lobe Diminished   Additional Respiratory  Assessments   Resp 28   Position Prone   Alarm Settings   High Pressure Alarm 40 cmH2O   Low Minute Volume Alarm 4 L/min   High Respiratory Rate 45 br/min   Patient Observation   Observations ETT SIZE 8.0, SECURED AT 24 LIP LINE. AMBU BAG AT HEAD OF BED. WATER GOOD. ETT (adult)   Placement Date/Time: 01/14/22 1503   Timeout: Patient;Procedure; Appropriate Equipment;Correct Position  Preoxygenation: Yes  Mask Ventilation: Ventilated by mask (1)  Technique: Rapid sequence  Type: Cuffed  Tube Size: 8 mm  Laryngoscope: GlideScope  . ..    Secured at 24 cm   Measured From Lips   ET Placement Right   Secured By Cloth tape;Twill tape   Site Condition Dry

## 2022-01-21 NOTE — PROGRESS NOTES
01/21/22 0542   Vent Information   Vt Ordered 340 mL   Rate Set 32 bmp   Peak Flow 65 L/min   Pressure Support 0 cmH20   FiO2  100 %   SpO2 (!) 89 %   SpO2/FiO2 ratio 89   Sensitivity 3   PEEP/CPAP 12   Vent Patient Data   Peak Inspiratory Pressure 23 cmH2O   Mean Airway Pressure 18 cmH20   Rate Measured 33 br/min   Vt Exhaled 502 mL   Minute Volume 15.3 Liters   I:E Ratio 1:2.20   Spontaneous Breathing Trial (SBT) RT Doc   Pulse 100   Additional Respiratory  Assessments   Resp 28   Alarm Settings   High Pressure Alarm 40 cmH2O   Low Minute Volume Alarm 4 L/min   High Respiratory Rate 45 br/min

## 2022-01-21 NOTE — PROGRESS NOTES
Pharmacy to Manage Heparin Infusion per [facility]    Dx:R/O PE  Pt wt = _61__ (will use adjusted wt if actual body weight > 120% ideal body weight). Baseline anti-Xa and/or aPTT =    Oral factor Xa-inhibitors may alter and elevate anti-Xa levels used for unfractionated heparin monitoring. As a result, anti-Xa monitoring is not accurate while Xa-inhibitor activity is detectable. Utilize aPTT monitoring when patient received an oral factor Xa-inhibitor (apixaban, betrixaban, edoxaban or rivaroxaban) within 72 hours prior to admission (please document last administration time). The goal is to allow a washout of oral factor Xa-inhibitors by using aPTT for 72 hours, then change to ant-Xa levels for UFH. High Dose Heparin Infusion  Heparin 80 units/kg IVP bolus  (5000 units) followed by Heparin infusion at 18 units/kg/hr ( 1100 units/hr) (recommended initial max dose 2100 units/hr).     Goal anti-Xa 0.3-0.7 IU/m  Heparin Anti-Xa @ [address] [facility] D 1/21/20221:19 PM  .

## 2022-01-21 NOTE — PROGRESS NOTES
Pharmacy Vancomycin Consult     Vancomycin Day: 2  Current Dosinmg q12h  Current indication: HAP    Temp max:      Recent Labs     22  0500 22  0507 22  0602   BUN 42* 31*  --    CREATININE <0.5* <0.5*  --    WBC 24.6*  --  27.6*       Intake/Output Summary (Last 24 hours) at 2022 1437  Last data filed at 2022 1200  Gross per 24 hour   Intake 1262.91 ml   Output 1910 ml   Net -647.09 ml     Culture Date      Source                       Results  NGTD    Ht Readings from Last 1 Encounters:   22 5' 2\" (1.575 m)        Wt Readings from Last 1 Encounters:   22 151 lb 10.8 oz (68.8 kg)       Body mass index is 27.74 kg/m². CrCl cannot be calculated (This lab value cannot be used to calculate CrCl because it is not a number: <0.5).     Level: 10.8    Assessment/Plan:  Continue with 1250mg q12h,   1250 mg every 12 hours, starting on 2022, 02:00 EST  Target exposure: AUC24 (range) 400-600 mg/L.hr (  AUC24,ss: 490 mg/L.hr(  Probability of AUC24 > 400: 83 %(  Ctrough,ss: 13.9 mg/L(  Probability of Ctrough,ss > 20: 10 %(  Probability of nephrotoxicity (Lodise MILAN ): 9 %

## 2022-01-21 NOTE — PLAN OF CARE
Nutrition Problem #1: Inadequate oral intake  Intervention: Food and/or Nutrient Delivery: Continue NPO,Continue Current Tube Feeding  Nutritional Goals: patient will tolerate Vital AF 1.2 with a trophic rate of 15 ml/hr x 20 hours without GI distress, without s/s of aspiration, and without additional lab/fluid disturbances

## 2022-01-21 NOTE — PLAN OF CARE
Problem: Infection - Central Venous Catheter-Associated Bloodstream Infection:  Goal: Will show no infection signs and symptoms  Description: Will show no infection signs and symptoms  Outcome: Ongoing     Problem: Airway Clearance - Ineffective  Goal: Achieve or maintain patent airway  Outcome: Ongoing     Problem: Gas Exchange - Impaired  Goal: Absence of hypoxia  Outcome: Ongoing  Goal: Promote optimal lung function  Outcome: Ongoing     Problem: Breathing Pattern - Ineffective  Goal: Ability to achieve and maintain a regular respiratory rate  Outcome: Ongoing     Problem:  Body Temperature -  Risk of, Imbalanced  Goal: Ability to maintain a body temperature within defined limits  Outcome: Ongoing  Goal: Will regain or maintain usual level of consciousness  Outcome: Ongoing  Goal: Complications related to the disease process, condition or treatment will be avoided or minimized  Outcome: Ongoing     Problem: Isolation Precautions - Risk of Spread of Infection  Goal: Prevent transmission of infection  Outcome: Ongoing     Problem: Nutrition Deficits  Goal: Optimize nutritional status  Outcome: Ongoing     Problem: Risk for Fluid Volume Deficit  Goal: Maintain normal heart rhythm  Outcome: Ongoing  Goal: Maintain absence of muscle cramping  Outcome: Ongoing  Goal: Maintain normal serum potassium, sodium, calcium, phosphorus, and pH  Outcome: Ongoing     Problem: Loneliness or Risk for Loneliness  Goal: Demonstrate positive use of time alone when socialization is not possible  Outcome: Ongoing     Problem: Fatigue  Goal: Verbalize increase energy and improved vitality  Outcome: Ongoing     Problem: Patient Education: Go to Patient Education Activity  Goal: Patient/Family Education  Outcome: Ongoing     Problem: Skin Integrity:  Goal: Absence of new skin breakdown  Description: Absence of new skin breakdown  Outcome: Ongoing     Problem: Falls - Risk of:  Goal: Will remain free from falls  Description: Will remain free from falls  Outcome: Ongoing  Goal: Absence of physical injury  Description: Absence of physical injury  Outcome: Ongoing     Problem: Non-Violent Restraints  Goal: Removal from restraints as soon as assessed to be safe  Outcome: Ongoing  Goal: No harm/injury to patient while restraints in use  Outcome: Ongoing  Goal: Patient's dignity will be maintained  Outcome: Ongoing     Problem: Nutrition  Goal: Optimal nutrition therapy  Outcome: Ongoing  Goal: Understanding of nutritional guidelines  Outcome: Ongoing   Pt prone in bed, tolerating vent settings, IV's infusing, TF patent with 100 ml brown residual, yeh patent, B/L wrist restraints assessed, no s/s of distress.

## 2022-01-21 NOTE — PROGRESS NOTES
01/21/22 1851   Vent Information   Vent Type 840   Vent Mode AC/VC   Vt Ordered 340 mL   Rate Set 32 bmp   Peak Flow 60 L/min   FiO2  100 %   SpO2 90 %   SpO2/FiO2 ratio 90   Sensitivity 3   PEEP/CPAP 12   Humidification Source Heated wire   Humidification Temp 37   Humidification Temp Measured 36.7   Circuit Condensation Drained   Vent Patient Data   Peak Inspiratory Pressure 27 cmH2O   Mean Airway Pressure 17 cmH20   Rate Measured 32 br/min   Vt Exhaled 375 mL   Minute Volume 11.9 Liters   I:E Ratio 1:2.0   Plateau Pressure 26 [id number]   Static Compliance 27 mL/cmH2O   Dynamic Compliance 25 mL/cmH2O   Cough/Sputum   Sputum How Obtained Endotracheal;Suctioned   Cough Non-productive   Sputum Amount None   Sputum Color None   Tenacity None   Spontaneous Breathing Trial (SBT) RT Doc   Pulse 121   Breath Sounds   Right Upper Lobe Diminished   Right Middle Lobe Diminished   Right Lower Lobe Diminished   Left Upper Lobe Diminished   Left Lower Lobe Diminished   Additional Respiratory  Assessments   Resp (!) 32   Position Prone   Alarm Settings   High Pressure Alarm 40 cmH2O   Low Minute Volume Alarm 4 L/min   Apnea (secs) 20 secs   High Respiratory Rate 45 br/min   Low Exhaled Vt  200 mL   Patient Observation   Observations ETT SIZE 8.0, SECURED AT 24 LIP LINE. AMBU BAG AT HEAD OF BED. WATER GOOD./    ETT (adult)   Placement Date/Time: 01/14/22 1503   Timeout: Patient;Procedure; Appropriate Equipment;Correct Position  Preoxygenation: Yes  Mask Ventilation: Ventilated by mask (1)  Technique: Rapid sequence  Type: Cuffed  Tube Size: 8 mm  Laryngoscope: GlideScope  . ..    Secured at 24 cm   Measured From [address] tape;Pink tape   Site Condition Dry

## 2022-01-21 NOTE — CARE COORDINATION
INTERDISCIPLINARY PLAN OF CARE CONFERENCE    Date/Time: 1/21/2022 10:39 AM  Completed by: Isabela Davis RN, Case Management      Patient Name:  Julien Villagomez  YOB: 1965  Admitting Diagnosis: Acute hypoxemic respiratory failure ([facility] [address] [J96.01]  Acute hypoxemic respiratory failure due to COVID-19 ([facility] [address] [U07.1, J96.01]     Admit Date/Time:  1/9/2022 11:42 AM    Chart reviewed. Interdisciplinary team contacted or reviewed plan related to patient progress and discharge plans. Disciplines included Case Management, Nursing, and Dietitian. Current Status:ICU C19+ intubated 100% FiO2; on Levo    Discharge Plan Comments: Chart reviewed. Pt from home with spouse, uses electric scooter at baseline. Will follow and develop dcp as pt progresses.     Home O2 in place on admit: No

## 2022-01-21 NOTE — PROGRESS NOTES
RT Inhaler-Nebulizer Bronchodilator Protocol Note    There is a bronchodilator order in the chart from a provider indicating to follow the RT Bronchodilator Protocol and there is an Initiate RT Inhaler-Nebulizer Bronchodilator Protocol order as well (see protocol at bottom of note). CXR Findings:  XR CHEST PORTABLE    Result Date: 1/21/2022  Interval increase left-sided pulmonary opacities representing worsening edema, atelectasis or pneumonia. Slightly improved aeration in the right upper lung zone. XR CHEST PORTABLE    Result Date: 1/20/2022  Stable pattern of diffuse, multifocal airspace opacities. The findings from the last RT Protocol Assessment were as follows:   History Pulmonary Disease: (P) Chronic pulmonary disease  Respiratory Pattern: (P) Severe use of accessory muscle or RR>30 bpm  Breath Sounds: (P) Slightly diminished and/or crackles  Cough: (P) Weak, non-productive  Indication for Bronchodilator Therapy: (P) Decreased or absent breath sounds  Bronchodilator Assessment Score: (P) 15    Aerosolized bronchodilator medication orders have been revised according to the RT Inhaler-Nebulizer Bronchodilator Protocol below. Respiratory Therapist to perform RT Therapy Protocol Assessment initially then follow the protocol. Repeat RT Therapy Protocol Assessment PRN for score 0-3 or on second treatment, BID, and PRN for scores above 3. No Indications - adjust the frequency to every 6 hours PRN wheezing or bronchospasm, if no treatments needed after 48 hours then discontinue using Per Protocol order mode. If indication present, adjust the RT bronchodilator orders based on the Bronchodilator Assessment Score as indicated below.   Use Inhaler orders unless patient has one or more of the following: on home nebulizer, not able to hold breath for 10 seconds, is not alert and oriented, cannot activate and use MDI correctly, or respiratory rate 25 breaths per minute or more, then use the equivalent nebulizer order(s) with same Frequency and PRN reasons based on the score. If a patient is on this medication at home then do not decrease Frequency below that used at home. 0-3 - enter or revise RT bronchodilator order(s) to equivalent RT Bronchodilator order with Frequency of every 4 hours PRN for wheezing or increased work of breathing using Per Protocol order mode. 4-6 - enter or revise RT Bronchodilator order(s) to two equivalent RT bronchodilator orders with one order with BID Frequency and one order with Frequency of every 4 hours PRN wheezing or increased work of breathing using Per Protocol order mode. 7-10 - enter or revise RT Bronchodilator order(s) to two equivalent RT bronchodilator orders with one order with TID Frequency and one order with Frequency of every 4 hours PRN wheezing or increased work of breathing using Per Protocol order mode. 11-13 - enter or revise RT Bronchodilator order(s) to one equivalent RT bronchodilator order with QID Frequency and an Albuterol order with Frequency of every 4 hours PRN wheezing or increased work of breathing using Per Protocol order mode. Greater than 13 - enter or revise RT Bronchodilator order(s) to one equivalent RT bronchodilator order with every 4 hours Frequency and an Albuterol order with Frequency of every 2 hours PRN wheezing or increased work of breathing using Per Protocol order mode. RT to enter RT Home Evaluation for COPD & MDI Assessment order using Per Protocol order mode.     Electronically signed by Latha Jacobs RCP on 1/21/2022 at 1:44 PM

## 2022-01-21 NOTE — PROGRESS NOTES
08:25 Pt  Turned supine for daily chest x ray, pt not able to tolerate supine, o2 sats  83-85 % , pt re proned   09:00 Pt intubated sedated back in prone position , assessment as charted BSWR on for safety  09:10 Critical care rounds completed @ bedside w/ Dr. Benoit Khanna pt sedated well but not synchronized with vent , Dr. Benoit Khanna order paralytic will start when available  from Rx, wife updated   13:00 Pt remains intubated, sedated on paralytic Reassessment as charted BP stable , o2 sats 95 % on 100 % fio2 via vent   16:05 Reassessment unchanged and as charted , pt remains intubated,sedated on paralytic in prone position   19:00 Handoff report given to night nurse Kate, pt stable @ handoff intubated,sedated on paralytic

## 2022-01-22 NOTE — PROGRESS NOTES
01/21/22 2314   Vent Information   Vent Type 840   Vent Mode AC/VC   Vt Ordered 340 mL   Rate Set 32 bmp   Peak Flow 60 L/min   FiO2  100 %   SpO2 (!) 87 %   SpO2/FiO2 ratio 87   Sensitivity 3   PEEP/CPAP 12   Humidification Source Heated wire   Humidification Temp 37   Humidification Temp Measured 37   Circuit Condensation Drained   Vent Patient Data   Peak Inspiratory Pressure 28 cmH2O   Mean Airway Pressure 18 cmH20   Rate Measured 32 br/min   Vt Exhaled 367 mL   Minute Volume 11.8 Liters   I:E Ratio 1:2.0   Plateau Pressure 27 [id number]   Cough/Sputum   Sputum How Obtained Endotracheal;Suctioned   Cough Non-productive   Spontaneous Breathing Trial (SBT) RT Doc   Pulse 130   Breath Sounds   Right Upper Lobe Diminished   Right Middle Lobe Diminished   Right Lower Lobe Diminished   Left Upper Lobe Diminished   Left Lower Lobe Diminished   Additional Respiratory  Assessments   Resp 28   Position Prone   Alarm Settings   High Pressure Alarm 40 cmH2O   Low Minute Volume Alarm 4 L/min   Apnea (secs) 20 secs   High Respiratory Rate 45 br/min   Low Exhaled Vt  200 mL   Patient Observation   Observations ETT SIE 8.0, SECURED AT 24 LIP LINE. AMBU BAG AT HEAD OF BED. WATER GOOD. ETT (adult)   Placement Date/Time: 01/14/22 1503   Timeout: Patient;Procedure; Appropriate Equipment;Correct Position  Preoxygenation: Yes  Mask Ventilation: Ventilated by mask (1)  Technique: Rapid sequence  Type: Cuffed  Tube Size: 8 mm  Laryngoscope: GlideScope  . ..    Secured at 24 cm   Measured From [address] [place] [address],[address] By Cloth tape;Twill tape   Site Condition Dry

## 2022-01-22 NOTE — PROGRESS NOTES
Called and spoke with spouse Abbe Boast. Informed wife of patient's change in status.   Wife working on a ride to come to the hospital.  Also called Oscar Multani, their  at [address] [place] [address] Street request.

## 2022-01-22 NOTE — PROGRESS NOTES
Port CXR complete. Pt dropped to 75% again. Pt being bagged to keep O2 sat at 85%. Pt's family notified by cassie Coronel RN. Pt O2 sat back to 80% while on ventilator. 100% FIO2 and PEEP - 14 then returned to 12. Dr Rand Garcia at bedside, CXR reviewed by MD. Lasix 40 mg IVP given. Pt SpO2 80% on 100% FiO2 and 12 peep.

## 2022-01-22 NOTE — PROGRESS NOTES
01/22/22 1843   Vent Information   Vent Type 840   Vent Mode AC/VC   Vt Ordered 340 mL   Rate Set 36 bmp   Peak Flow 60 L/min   Pressure Support 0 cmH20   FiO2  95 %   SpO2 91 %   SpO2/FiO2 ratio 95.79   Sensitivity 3   PEEP/CPAP 16   Humidification Source Heated wire   Humidification Temp 37   Humidification Temp Measured 36.4   Circuit Condensation Drained   Vent Patient Data   Peak Inspiratory Pressure 36 cmH2O   Mean Airway Pressure 23 cmH20   Rate Measured 36 br/min   Vt Exhaled 365 mL   Minute Volume 13.1 Liters   I:E Ratio 1:1.70   Plateau Pressure 34 [id number]   Static Compliance 20 mL/cmH2O   Dynamic Compliance 18 mL/cmH2O   Cough/Sputum   Sputum How Obtained Endotracheal;Suctioned   Cough Productive   Sputum Amount Small   Sputum Color Creamy; Red   Tenacity Thick   Spontaneous Breathing Trial (SBT) RT Doc   Pulse 112   Breath Sounds   Right Upper Lobe Diminished   Right Middle Lobe Diminished   Right Lower Lobe Diminished   Left Upper Lobe Diminished   Left Lower Lobe Diminished   Additional Respiratory  Assessments   Resp (!) 36   Position Prone   Alarm Settings   High Pressure Alarm 50 cmH2O   Low Minute Volume Alarm 4 L/min   High Respiratory Rate 45 br/min   Patient Observation   Observations ETT SIZE 8.0, SECURED AT 24 LIP LINE. AMBU BAG AT HEAD OF BED. WATER GOOD. ETT (adult)   Placement Date/Time: 01/14/22 1503   Timeout: Patient;Procedure; Appropriate Equipment;Correct Position  Preoxygenation: Yes  Mask Ventilation: Ventilated by mask (1)  Technique: Rapid sequence  Type: Cuffed  Tube Size: 8 mm  Laryngoscope: GlideScope  . ..    Secured at 24 cm   Measured From [address] [place] [address],[address] By Cloth tape;Twill tape   Site Condition Dry

## 2022-01-22 NOTE — PROGRESS NOTES
Pharmacy - RE:  High-dose Heparin drip  Current rate = 920 units/h  (9.2 ml/h)  Anti-Xa drawn @ 0300 = 1.43 IU/ml   Goal = 0.3 - 0.7  Hold drip x 1.5 hours (4-5:30 AM), then restart at a reduced rate of 740 units/h (7.4 ml/h). Obtain another anti-Xa today @ 1200.

## 2022-01-22 NOTE — PROGRESS NOTES
Spoke with the Clinical  [facility] for permission for the patients family to stay overnight incase there is a change in the patients condition. They are currently leaving to get some food and will return in a little while. Called ED registration and updated them that Mr. Valentina Rueda is able to have family stay tonight.  Electronically signed by Vinnie Duron RN on 1/22/2022 at 6:39 PM

## 2022-01-22 NOTE — PROGRESS NOTES
Peep increased to 14cwp         01/22/22 0653   Vent Information   Vent Type 840   Vent Mode AC/VC   Vt Ordered 340 mL   Rate Set 32 bmp   Peak Flow 60 L/min   FiO2  100 %   SpO2 (!) 85 %   SpO2/FiO2 ratio 85   Sensitivity 3   PEEP/CPAP 14   Humidification Source Heated wire   Humidification Temp Measured 37   Vent Patient Data   Peak Inspiratory Pressure 34 cmH2O   Mean Airway Pressure 22 cmH20   Rate Measured 32 br/min   Vt Exhaled 367 mL   Minute Volume 11.8 Liters   I:E Ratio 1:1.6   Plateau Pressure 33 VAC22   Static Compliance 20 mL/cmH2O   Cough/Sputum   Sputum How Obtained None   Spontaneous Breathing Trial (SBT) RT Doc   Pulse 116   Breath Sounds   Right Upper Lobe Diminished   Right Middle Lobe Diminished   Right Lower Lobe Diminished   Left Upper Lobe Diminished   Left Lower Lobe Diminished   Additional Respiratory  Assessments   Resp 29   Position Prone   Oral Care Completed? Yes   Oral Care Mouth suctioned   Subglottic Suction Done? Yes   Cuff Pressure (cm H2O) 27 cm H2O   Alarm Settings   High Pressure Alarm 45 cmH2O   Low Minute Volume Alarm 4 L/min   Apnea (secs) 20 secs   High Respiratory Rate 45 br/min   Low Exhaled Vt  200 mL   ETT (adult)   Placement Date/Time: 01/14/22 1503   Timeout: Patient;Procedure; Appropriate Equipment;Correct Position  Preoxygenation: Yes  Mask Ventilation: Ventilated by mask (1)  Technique: Rapid sequence  Type: Cuffed  Tube Size: 8 mm  Laryngoscope: GlideScope  . ..    Secured at 24 cm   Measured From [address] [place] [address],[address] By Commercial tube oliveros   Site Condition Dry

## 2022-01-22 NOTE — PROGRESS NOTES
01/22/22 0257   Vent Information   Vent Type 840   Vent Mode AC/VC   Vt Ordered 340 mL   Rate Set 32 bmp   Peak Flow 60 L/min   FiO2  100 %   SpO2 (!) 86 %   SpO2/FiO2 ratio 86   Sensitivity 3   PEEP/CPAP 12   Humidification Source Heated wire   Humidification Temp 37   Humidification Temp Measured 36.8   Circuit Condensation Drained   Vent Patient Data   Peak Inspiratory Pressure 28 cmH2O   Mean Airway Pressure 17 cmH20   Rate Measured 32 br/min   Vt Exhaled 369 mL   Minute Volume 11.8 Liters   I:E Ratio 1:2.6   Plateau Pressure 27 [id number]   Cough/Sputum   Sputum How Obtained Endotracheal;Suctioned   Cough Non-productive   Sputum Amount None   Sputum Color None   Tenacity None   Spontaneous Breathing Trial (SBT) RT Doc   Pulse 117   Breath Sounds   Right Upper Lobe Diminished   Right Middle Lobe Diminished   Right Lower Lobe Diminished   Left Upper Lobe Diminished   Left Lower Lobe Diminished   Additional Respiratory  Assessments   Resp (!) 32   Position Prone   Alarm Settings   High Pressure Alarm 40 cmH2O   Low Minute Volume Alarm 4 L/min   Apnea (secs) 20 secs   High Respiratory Rate 45 br/min   Low Exhaled Vt  200 mL   Patient Observation   Observations ETT SIZE 8.0, SECURED AT 24 LIP LINE. AMBU BAG AT HEAD OF BED. WATER GOOD. ETT (adult)   Placement Date/Time: 01/14/22 1503   Timeout: Patient;Procedure; Appropriate Equipment;Correct Position  Preoxygenation: Yes  Mask Ventilation: Ventilated by mask (1)  Technique: Rapid sequence  Type: Cuffed  Tube Size: 8 mm  Laryngoscope: GlideScope  . ..    Secured at 24 cm   Measured From [address] [place] [address],[address] By Cloth tape;Twill tape   Site Condition Dry

## 2022-01-22 NOTE — PROGRESS NOTES
Pharmacy - RE:  High-dose Heparin drip  Current rate = 920 units/h  (9.2 ml/h)  Anti-Xa drawn @11:35 = 0.9 IU/ml   Reduce rate to 620 units/h (6.2 ml/h).   Obtain another anti-Xa today @ [address] [place], [place]

## 2022-01-22 NOTE — PROGRESS NOTES
Pulmonary & Critical Care Medicine ICU Progress Note    CC: COVID-19 pneumonia in an unvaccinated individual    Events of Last 24 hours:   Progressive hypoxemia, maxed on ventilator setting, PEEP increased to 14  Heparin drip   Proned overnight   Levophed 8  Fentanyl 200  Versed 7  Nimbex 3 mcg/kg/min  Propofol off  PEEP 14--> 16  FiO2 100%   1 unit PRBC overnight     Invasive Lines: PICC placed 1/10/22    MV:  1/14/22-  Vent Mode: AC/VC Rate Set: 32 bmp/Vt Ordered: 340 mL/ /FiO2 : 100 %  Recent Labs     01/21/22  0515 01/22/22  0505   PHART 7.412 7.318*   YOC5VNA 43.9 57.2*   PO2ART 52.6* 52.1*       IV:   heparin (PORCINE) Infusion 740 Units/hr (01/22/22 0526)    cisatracurium (NIMBEX) infusion 3 mcg/kg/min (01/22/22 0418)    propofol      sodium chloride      sodium chloride 500 mL/hr at 01/20/22 1925    midazolam 7 mg/hr (01/22/22 0418)    dextrose      fentaNYL 200 mcg/hr (01/22/22 0418)    norepinephrine 8 mcg/min (01/22/22 0418)    sodium chloride         Vitals:  /69   Pulse 116   Temp 97.8 °F (36.6 °C) (Axillary)   Resp 29   Ht 5' 2\" (1.575 m)   Wt 156 lb 12 oz (71.1 kg)   SpO2 (!) 85%   BMI 28.67 kg/m²   on vent AC 32/340    Intake/Output Summary (Last 24 hours) at 1/22/2022 0732  Last data filed at 1/22/2022 0418  Gross per 24 hour   Intake 2215.43 ml   Output 1540 ml   Net 675.43 ml     EXAM:  General: ill appearing. Intubated sedated. Eyes: PERRL. No sclera icterus. No conjunctival injection. ENT: No discharge. Pharynx clear. ET tube in place  Neck: Trachea midline. Normal thyroid. Resp: No accessory muscle use. Few crackles. No wheezing. Few rhonchi. CV: Regular rate. Regular rhythm. No mumur or rub. LE  edema. GI: Non-tender. Non-distended. No masses. Skin: Warm and dry. No nodule on exposed extremities. No rash on exposed extremities. Lymph: No cervical LAD. No supraclavicular LAD. M/S: No cyanosis. No joint deformity. No clubbing. Neuro: Intubated sedated. Paralyzed  Psych: Noncommunicative unable to obtain    Scheduled Meds:   insulin glargine  25 Units SubCUTAneous QAM    polyethylene glycol  17 g Oral Daily    cefepime  2,000 mg IntraVENous Q8H    diazePAM  10 mg Oral Q8H    dexamethasone  6 mg IntraVENous Daily    vancomycin  1,250 mg IntraVENous Q12H    senna  2 tablet Oral BID    And    docusate  100 mg Oral BID    Venelex   Topical BID    insulin lispro  0-18 Units SubCUTAneous Q4H    ipratropium-albuterol  1 ampule Inhalation Q4H    famotidine  20 mg Oral BID    atorvastatin  40 mg Oral Nightly    omega-3 acid ethyl esters  2 g Oral BID    [Held by provider] clopidogrel  75 mg Oral Daily    aspirin  81 mg Oral Daily    sodium chloride flush  5-40 mL IntraVENous 2 times per day    Vitamin D  2,000 Units Oral Daily     PRN Meds:  heparin (porcine), heparin (porcine), sodium chloride, sodium chloride, albuterol, glucose, dextrose, glucagon (rDNA), dextrose, midazolam, fentanNYL, melatonin, nitroGLYCERIN, sodium chloride flush, sodium chloride, ondansetron **OR** ondansetron, polyethylene glycol, acetaminophen **OR** acetaminophen, potassium chloride, magnesium sulfate    Results:  CBC:   Recent Labs     01/20/22  0500 01/20/22  1500 01/21/22  0602 01/21/22  0602 01/21/22  1403 01/21/22  2305 01/22/22  0405   WBC 24.6*  --  27.6*  --   --   --  28.9*   HGB 8.3*   < > 7.4*   < > 7.1* 6.7* 7.8*   HCT 25.4*   < > 23.2*   < > 22.7* 21.3* 23.6*   MCV 82.7  --  84.3  --   --   --  87.4     --  174  --   --   --  112*    < > = values in this interval not displayed.      BMP:   Recent Labs     01/20/22  0500 01/21/22  0507 01/22/22  0405    137 132*   K 4.6 4.5 5.2*   CL 99 103 100   CO2 34* 30 28   BUN 42* 31* 34*   CREATININE <0.5* <0.5* 0.6*     LIVER PROFILE:   Recent Labs     01/20/22  0500 01/21/22  0507 01/22/22  0405   AST 58* 66* 73*   ALT 64* 56* 63*   BILITOT 2.4* 2.1* 1.5*   ALKPHOS 93 102 116       Micro  1/9 BC NGTD  1/9 COVID-19 detected  1/20 BC NGTD   1/20 UC NGTD   1/20 Resp gram negative       Imaging   Chest x-ray 1/22 imaging reviewed by me and showed   High ETT position  Satisfactory PICC line position   Multifocal fine ASD  -worsening on R    LE dopper 1/17   Within the limits of this exam, there is no evidence of deep or superficial  venous thrombosis in the lower extremities bilaterally. Chronic phlebitic processes involving the left small saphenous vein       ASSESSMENT:  · Acute hypoxemic respiratory failure  · COVID 19 pneumonia in an unvaccinated individual  · Septic shock  · Severe ARDS  · Bloody respiratory secretions-probably traumatic from suctioning  · Pulmonary emphysema  · Leukocytosis -worsening  · Hypertriglyceridemia   · Anemia- no active bleed   · Pancreatic nodule, 2.7 cm increase in size  · DM2 with hyperglycemia   · Elevated LFTS  · Tobacco abuse  · CAD and PAD with h/o STEMI     PLAN:  · Droplet Plus Airborne Precautions   · Mechanical ventilation as per my orders. The ventilator was adjusted by me at the bedside for unstable, life threatening respiratory failure.   · Increase RR 36 and PEEP 16 ---> ABG 2 hr  · IV Versed/Fentanyl for sedation, target RASS -52, with daily spontaneous awakening trial.  Fentanyl PRN pain, gtt as needed  · Valium 10 TID   · Nimbex for ventilator dyssynchrony   · Head of bed 30 degrees or higher at all times  · Prone 1/14/22-  · Decadron QD, D#14 6 mg, monitor glucose closely  · S/p Remdesevir D#5 and Tocilizumab received 1/9/22  · Vanc and Cefepime D#3  · Follow up Cx   · IV Levophed to keep MAP > 65 mmHg or SBP>90  · TFs trophic   · On ASA - Holding Plavix given and anemia and bloody secretions   · Follow LFTs  · BM regiment   · Monitor H&H and transfuse for hemoglobin less than 7  · Blood sugar control ISS,  lantus 25 with goal 150-180  · GI prophylaxis: Pepcid  · DVT prophylaxis: Heaprin drip given profound hypoxemia and instability for testing   · MRSA prophylaxis: Bactroban  · Very poor prognosis. Discussed with family at the bedside and deciding on code status           Total critical care time caring for this patient with life threatening, unstable organ failure, including direct patient contact, management of life support systems, review of data including imaging and labs, discussions with other team members and physicians at least 35 minutes so far today, excluding procedures.

## 2022-01-22 NOTE — PROGRESS NOTES
Dr Steph Hampton called and informed of pt current condition, v/s, ABG results, and Levo rate infusing. Orders received to notify RT to increase peep to 14 and if needed to increase to 16 in a hour or two. Valarie Quevedo notified of orders.

## 2022-01-22 NOTE — PROGRESS NOTES
Internal Medicine ICU Progress Note    covid pneumonia, hypoxia      Events of Last 24 hours:       Intubated    Proned- back to supine.   Worsening hypoxemia now on 100 % fio2    Hypotensive remains  on levophed   Wbc worsened as well       -progressive hypoxemia. He was maxed out on the ventilator setting. PEEP increased to 16. He was on FiO2 100%. He was proned overnight. He had not back. Presently on Levophed, fentanyl, Versed and Nimbex off propofol. Received unit of PRBC. Invasive Lines: PICC placed on 1/10/22       MV:  N/A    Recent Labs     22  0505 22  1135   PHART 7.318* 7.269*   OHX8GTF 57.2* 62.5*   PO2ART 52.1* 60.9*       MV Settings:  Vent Mode: AC/VC Rate Set: 36 bmp/Vt Ordered: 340 mL/ /FiO2 : 95 %    IV:   heparin (PORCINE) Infusion 620 Units/hr (22 1349)    cisatracurium (NIMBEX) infusion 3 mcg/kg/min (22 0950)    propofol      sodium chloride      sodium chloride 500 mL/hr at 22 1925    midazolam 8 mg/hr (22 0952)    dextrose      fentaNYL 200 mcg/hr (22 1240)    norepinephrine 8 mcg/min (22 1344)    sodium chloride         Vitals:  Temp  Av.3 °F (36.8 °C)  Min: 97.7 °F (36.5 °C)  Max: 99.1 °F (37.3 °C)  Pulse  Av.4  Min: 102  Max: 136  BP  Min: 88/63  Max: 127/72  SpO2  Av.3 %  Min: 80 %  Max: 94 %  FiO2   Av.8 %  Min: 90 %  Max: 100 %  Patient Vitals for the past 4 hrs:   BP Pulse Resp SpO2   22 1408 -- 103 (!) 36 94 %   22 1227 90/62 105 (!) 36 93 %       CVP: CVP (Mean): 64 mmHg      Intake/Output Summary (Last 24 hours) at 2022 1410  Last data filed at 2022 0950  Gross per 24 hour   Intake 2390.6 ml   Output 1680 ml   Net 710.6 ml       EXAM:  General:  Middle aged male, old appearing. Intubated and sedated. Prone  Eyes:  No sclera icterus. No conjunctiva injected. ENT - oral ETT and OG noted . Neck: Trachea midline. Normal thyroid. Resp-  jose + crackles. No wheezing. No rhonchi. CV: Regular rate. Regular rhythm. No mumur or rub. No edema. No JVD. Palpable pedal pulses. GI: Non-tender. Non-distended. No masses. No organmegaly. Normal bowel sounds. No hernia. Skin: developing edema to all ex t  Lymph: No cervical LAD. No supraclavicular LAD. M/S: No cyanosis. No joint deformity. No clubbing. Neuro: sedated       Medications:  Scheduled Meds:   insulin glargine  25 Units SubCUTAneous QAM    polyethylene glycol  17 g Oral Daily    cefepime  2,000 mg IntraVENous Q8H    diazePAM  10 mg Oral Q8H    dexamethasone  6 mg IntraVENous Daily    vancomycin  1,250 mg IntraVENous Q12H    senna  2 tablet Oral BID    And    docusate  100 mg Oral BID    Venelex   Topical BID    insulin lispro  0-18 Units SubCUTAneous Q4H    ipratropium-albuterol  1 ampule Inhalation Q4H    famotidine  20 mg Oral BID    atorvastatin  40 mg Oral Nightly    omega-3 acid ethyl esters  2 g Oral BID    [Held by provider] clopidogrel  75 mg Oral Daily    aspirin  81 mg Oral Daily    sodium chloride flush  5-40 mL IntraVENous 2 times per day    Vitamin D  2,000 Units Oral Daily       PRN Meds:  heparin (porcine), heparin (porcine), sodium chloride, sodium chloride, albuterol, glucose, dextrose, glucagon (rDNA), dextrose, midazolam, fentanNYL, melatonin, nitroGLYCERIN, sodium chloride flush, sodium chloride, ondansetron **OR** ondansetron, polyethylene glycol, acetaminophen **OR** acetaminophen, potassium chloride, magnesium sulfate    Results:  CBC:   Recent Labs     01/20/22  0500 01/20/22  1500 01/21/22  0602 01/21/22  0602 01/21/22  1403 01/21/22  2305 01/22/22  0405   WBC 24.6*  --  27.6*  --   --   --  28.9*   HGB 8.3*   < > 7.4*   < > 7.1* 6.7* 7.8*   HCT 25.4*   < > 23.2*   < > 22.7* 21.3* 23.6*   MCV 82.7  --  84.3  --   --   --  87.4     --  174  --   --   --  112*    < > = values in this interval not displayed.      BMP:   Recent Labs     01/20/22  0500 01/21/22  0505 01/22/22  0405    137 132*   K 4.6 4.5 5.2*   CL 99 103 100   CO2 34* 30 28   BUN 42* 31* 34*   CREATININE <0.5* <0.5* 0.6*     LIVER PROFILE:   Recent Labs     01/20/22  0500 01/21/22  0507 01/22/22  0405   AST 58* 66* 73*   ALT 64* 56* 63*   BILITOT 2.4* 2.1* 1.5*   ALKPHOS 93 102 116     PT/INR:   Recent Labs     01/20/22  1500   PROTIME 16.3*   INR 1.42*     Results for Dedra Pepper (MRN [id number]) as of 1/11/2022 06:37   Ref. Range 1/9/2022 16:30   CRP Latest Ref Range: 0.0 - 5.1 mg/L 55.2 (H)       Cultures:    Covid 19: detected  Influenza A and B: not detected  Blood culture:NGTD  Sputum pending    Films:    XR CHEST PORTABLE   Final Result   Extensive parenchymal infiltrates are seen throughout the lungs bilaterally,   mildly worsened at the right lung base, otherwise similar in appearance. XR CHEST PORTABLE   Final Result   Interval increase left-sided pulmonary opacities representing worsening   edema, atelectasis or pneumonia. Slightly improved aeration in the right   upper lung zone. XR CHEST PORTABLE   Final Result   Stable pattern of diffuse, multifocal airspace opacities. XR CHEST PORTABLE   Final Result   Unchanged multifocal bilateral atypical pneumonia. XR CHEST PORTABLE   Final Result   Unremarkable appearance of an endotracheal tube. Unchanged patchy interstitial lung markings which may reflect atypical   pneumonia or pulmonary edema. XR CHEST PORTABLE   Final Result   1. Endotracheal tube terminates 1.6 cm from the benton. Recommend 2 cm   retraction. 2.  Slightly increased diffuse bilateral airspace and interstitial opacities. The findings were sent to the [facility] Results Communication Team at 12:48 a.m.   on 01/18/2022 to be communicated to a licensed caregiver. XR CHEST PORTABLE   Final Result   Mild interval worsening with moderate infiltrates in the lungs. These are   most pronounced in the right lung.       5 cm indeterminate dense opacity in the midline-right upper abdomen. Correlate for oral contrast in bowel. VL Extremity Venous Bilateral   Final Result      XR CHEST PORTABLE   Final Result   Stable multifocal bilateral lung airspace disease consistent with previously   identified presence of COVID-19 viral pneumonia. Suspected moderate pulmonary interstitial edema. XR CHEST PORTABLE   Final Result   The support apparatus as described above. Continued increased interstitial opacity throughout both lungs, along with   more focal traits within the periphery the lungs, some combination of   interstitial edema and pneumonia. XR CHEST PORTABLE   Final Result   1. Endotracheal tube projects in appropriate position. 2. Increased bilateral nonspecific pulmonary opacities representing edema,   atelectasis and/or pneumonia. XR CHEST PORTABLE   Final Result   Right PICC projects in normal position. Stable interstitial and ground-glass opacities, most likely atypical   pneumonia. IR PICC WO SQ PORT/PUMP > 5 YEARS   Final Result   Successful placement of PICC line. CT CHEST PULMONARY EMBOLISM W CONTRAST   Final Result   1. No evidence of pulmonary embolism. 2.  Commonly reported imaging features of COVID-19 pneumonia are present. Other processes such as influenza pneumonia and organizing pneumonia, as can   be seen with drug toxicity and connective tissue disease, can cause a similar   imaging pattern. PneTyp   3. Mild compression deformity of T8 of uncertain chronicity, new since   05/29/2020. Similar mild moderate compression deformities at T10, T12.   4. Fatty liver. 5. Interval increase in size of now 2.7 cm fat attenuation lesion of the   pancreatic neck. Recommend further evaluation with nonemergent pancreas   protocol MRI. XR CHEST PORTABLE   Final Result   1.  Bilateral interstitial thickening either due to edema or atypical   interstitial pneumonia. XR CHEST PORTABLE    (Results Pending)          Assessment and Plan      COVID PNA  Acute Hypoxic Respiratory Failure    - imaging with bilateral pna pattern  - does not wear O2 at baseline  - Admit to Med Surg, droplet + precautions, tele  - supp O2, progressive hypoxemia, now on vent support  -Intubated 1/14 , proning as needed  - on  Decadron D#14, now at 6 mg ,  completed 5 days of  Remdesivir,   s/p Tocilizumab   - worsening wbc and hypoxia, now at 100 % fio2, maxed out on ventilator settings. 100% with PEEP of 16.  - started  cefepime and vancomycin D3  - bloody secretions in ETT. Can hold PLAVIX   - pulmonary consulted   - lovenox changed to heparin gtt today for possibility of PE     Hypotension - likely sepsis   - started on cefepime and vanc  - sp fluid boluses - on levophed now   - wean as able      Anemia - multifactorial   - monitor and transfuse PRBC less than 7     Abnormal lFT - likely from viral infection   Monitor, stable      CAD - stable. No CP. Hold Plavix for bloody secretions      PAD stable     DM type 2  Hyperglycemia with steroids, on ssi     HLD On Lipitor.     Started heparin gtt  Diet - On TF  Full code    Patient carries a very poor prognosis. Family deciding on CODE STATUS.  Discussed with wife.   Maryellen Gosselin, MD 2:10 PM 1/22/2022

## 2022-01-22 NOTE — PROGRESS NOTES
High dose Heparin GTT:  Anti-Xa at 1923 is 1.8 IU/mL. Hold the infusion for 1hr and reduce the rate to 920 units/hr. Recheck the Anti-Xa at 0300 1/22/22. Desired Anti-Xa range is 0.3-0.7IU/mL.   TAY Calabrese [facility] PharmD 1/21/2022 8:00 PM

## 2022-01-22 NOTE — PROGRESS NOTES
Care Rounds completed with Dr. Valeria Mccormack and the multidisciplinary team. Reviewed labs, meds, vital signs, assessment and plan of care reviewed. See dictated note and new orders for details. Plan is to continue current POC. Verbal orders from Dr. Valeria Mccormack to keep the patient in the prone position (SPO2 is 87-89% on 100% FiO2 and 16 PEEP). Dr. Valeria Mccormack discussed change the patients code status to DNR CCA they will discuss it and get back with the hospital after the discussion. ABG at 1130. Hold TF.   Electronically signed by Lorenzo Meza RN on 1/22/2022 at 11:05 AM

## 2022-01-22 NOTE — PROGRESS NOTES
with same Frequency and PRN reasons based on the score. If a patient is on this medication at home then do not decrease Frequency below that used at home. 0-3 - enter or revise RT bronchodilator order(s) to equivalent RT Bronchodilator order with Frequency of every 4 hours PRN for wheezing or increased work of breathing using Per Protocol order mode. 4-6 - enter or revise RT Bronchodilator order(s) to two equivalent RT bronchodilator orders with one order with BID Frequency and one order with Frequency of every 4 hours PRN wheezing or increased work of breathing using Per Protocol order mode. 7-10 - enter or revise RT Bronchodilator order(s) to two equivalent RT bronchodilator orders with one order with TID Frequency and one order with Frequency of every 4 hours PRN wheezing or increased work of breathing using Per Protocol order mode. 11-13 - enter or revise RT Bronchodilator order(s) to one equivalent RT bronchodilator order with QID Frequency and an Albuterol order with Frequency of every 4 hours PRN wheezing or increased work of breathing using Per Protocol order mode. Greater than 13 - enter or revise RT Bronchodilator order(s) to one equivalent RT bronchodilator order with every 4 hours Frequency and an Albuterol order with Frequency of every 2 hours PRN wheezing or increased work of breathing using Per Protocol order mode.          Electronically signed by Antonia Rubi RCP on 1/22/2022 at 1:29 AM

## 2022-01-22 NOTE — PLAN OF CARE
Problem: Infection - Central Venous Catheter-Associated Bloodstream Infection:  Goal: Will show no infection signs and symptoms  Description: Will show no infection signs and symptoms  Outcome: Ongoing     Problem: Airway Clearance - Ineffective  Goal: Achieve or maintain patent airway  Outcome: Ongoing     Problem: Gas Exchange - Impaired  Goal: Absence of hypoxia  Outcome: Ongoing  Goal: Promote optimal lung function  Outcome: Ongoing     Problem: Breathing Pattern - Ineffective  Goal: Ability to achieve and maintain a regular respiratory rate  Outcome: Ongoing     Problem:  Body Temperature -  Risk of, Imbalanced  Goal: Ability to maintain a body temperature within defined limits  Outcome: Ongoing  Goal: Will regain or maintain usual level of consciousness  Outcome: Ongoing  Goal: Complications related to the disease process, condition or treatment will be avoided or minimized  Outcome: Ongoing     Problem: Isolation Precautions - Risk of Spread of Infection  Goal: Prevent transmission of infection  Outcome: Ongoing     Problem: Nutrition Deficits  Goal: Optimize nutritional status  Outcome: Ongoing     Problem: Risk for Fluid Volume Deficit  Goal: Maintain normal heart rhythm  Outcome: Ongoing  Goal: Maintain absence of muscle cramping  Outcome: Ongoing  Goal: Maintain normal serum potassium, sodium, calcium, phosphorus, and pH  Outcome: Ongoing     Problem: Loneliness or Risk for Loneliness  Goal: Demonstrate positive use of time alone when socialization is not possible  Outcome: Ongoing     Problem: Fatigue  Goal: Verbalize increase energy and improved vitality  Outcome: Ongoing     Problem: Patient Education: Go to Patient Education Activity  Goal: Patient/Family Education  Outcome: Ongoing     Problem: Skin Integrity:  Goal: Absence of new skin breakdown  Description: Absence of new skin breakdown  Outcome: Ongoing     Problem: Falls - Risk of:  Goal: Will remain free from falls  Description: Will remain free from falls  Outcome: Ongoing  Goal: Absence of physical injury  Description: Absence of physical injury  Outcome: Ongoing     Problem: Non-Violent Restraints  Goal: Removal from restraints as soon as assessed to be safe  Outcome: Ongoing  Goal: No harm/injury to patient while restraints in use  Outcome: Ongoing  Goal: Patient's dignity will be maintained  Outcome: Ongoing     Problem: Nutrition  Goal: Optimal nutrition therapy  1/21/2022 2209 by Petar Alcantara RN  Outcome: Ongoing  1/21/2022 1108 by Jordan Adan RD, LD  Outcome: Met This Shift  Goal: Understanding of nutritional guidelines  1/21/2022 2209 by Petar Alcantara RN  Outcome: Ongoing  1/21/2022 1108 by Jordan Adan RD, LD  Outcome: Met This Shift   Pt resting in bed, prone position, tolerating vent settings, IV's infusing, TF infusing, yeh patent, B/L wrist restraints assessed, no s/s of distress.

## 2022-01-23 NOTE — PROGRESS NOTES
01/22/22 2236   Vent Information   Vent Type 840   Vent Mode AC/VC   Vt Ordered 340 mL   Rate Set 36 bmp   Peak Flow 60 L/min   Pressure Support 0 cmH20   FiO2  95 %   SpO2 91 %   SpO2/FiO2 ratio 95.79   Sensitivity 3   PEEP/CPAP 16   Humidification Source Heated wire   Humidification Temp 37   Humidification Temp Measured 36.8   Circuit Condensation Drained   Vent Patient Data   Peak Inspiratory Pressure 35 cmH2O   Mean Airway Pressure 23 cmH20   Rate Measured 36 br/min   Vt Exhaled 366 mL   Minute Volume 13.1 Liters   I:E Ratio 1:1.70   Plateau Pressure 34 [id number]   Cough/Sputum   Sputum How Obtained Endotracheal;Suctioned   Cough Productive   Sputum Amount Small   Sputum Color Creamy; Red   Tenacity Thick   Spontaneous Breathing Trial (SBT) RT Doc   Pulse 113   Breath Sounds   Right Upper Lobe Diminished   Right Middle Lobe Diminished   Right Lower Lobe Diminished   Left Upper Lobe Diminished   Left Lower Lobe Diminished   Additional Respiratory  Assessments   Resp (!) 36   Position Prone   Alarm Settings   High Pressure Alarm 50 cmH2O   Low Minute Volume Alarm 4 L/min   High Respiratory Rate 45 br/min   Patient Observation   Observations ETT SIZE 8.0, SECURED AT 24 LIP LINE. AMBU BAG AT HEAD OF BED. WATER GOOD. ETT (adult)   Placement Date/Time: 01/14/22 1503   Timeout: Patient;Procedure; Appropriate Equipment;Correct Position  Preoxygenation: Yes  Mask Ventilation: Ventilated by mask (1)  Technique: Rapid sequence  Type: Cuffed  Tube Size: 8 mm  Laryngoscope: GlideScope  . ..    Secured at 24 cm   Measured From [address] [place] [address],[address] By Cloth tape;Twill tape   Site Condition Dry

## 2022-01-23 NOTE — PROGRESS NOTES
RT Inhaler-Nebulizer Bronchodilator Protocol Note    There is a bronchodilator order in the chart from a provider indicating to follow the RT Bronchodilator Protocol and there is an Initiate RT Inhaler-Nebulizer Bronchodilator Protocol order as well (see protocol at bottom of note). CXR Findings:  XR CHEST PORTABLE    Result Date: 1/22/2022  Extensive parenchymal infiltrates are seen throughout the lungs bilaterally, mildly worsened at the right lung base, otherwise similar in appearance. XR CHEST PORTABLE    Result Date: 1/21/2022  Interval increase left-sided pulmonary opacities representing worsening edema, atelectasis or pneumonia. Slightly improved aeration in the right upper lung zone. The findings from the last RT Protocol Assessment were as follows:   History Pulmonary Disease: Chronic pulmonary disease  Respiratory Pattern: Severe use of accessory muscle or RR>30 bpm  Breath Sounds: Slightly diminished and/or crackles  Cough: Weak, non-productive  Indication for Bronchodilator Therapy: Decreased or absent breath sounds  Bronchodilator Assessment Score: 15    Aerosolized bronchodilator medication orders have been revised according to the RT Inhaler-Nebulizer Bronchodilator Protocol below. Respiratory Therapist to perform RT Therapy Protocol Assessment initially then follow the protocol. Repeat RT Therapy Protocol Assessment PRN for score 0-3 or on second treatment, BID, and PRN for scores above 3. No Indications - adjust the frequency to every 6 hours PRN wheezing or bronchospasm, if no treatments needed after 48 hours then discontinue using Per Protocol order mode. If indication present, adjust the RT bronchodilator orders based on the Bronchodilator Assessment Score as indicated below.   Use Inhaler orders unless patient has one or more of the following: on home nebulizer, not able to hold breath for 10 seconds, is not alert and oriented, cannot activate and use MDI correctly, or respiratory rate 25 breaths per minute or more, then use the equivalent nebulizer order(s) with same Frequency and PRN reasons based on the score. If a patient is on this medication at home then do not decrease Frequency below that used at home. 0-3 - enter or revise RT bronchodilator order(s) to equivalent RT Bronchodilator order with Frequency of every 4 hours PRN for wheezing or increased work of breathing using Per Protocol order mode. 4-6 - enter or revise RT Bronchodilator order(s) to two equivalent RT bronchodilator orders with one order with BID Frequency and one order with Frequency of every 4 hours PRN wheezing or increased work of breathing using Per Protocol order mode. 7-10 - enter or revise RT Bronchodilator order(s) to two equivalent RT bronchodilator orders with one order with TID Frequency and one order with Frequency of every 4 hours PRN wheezing or increased work of breathing using Per Protocol order mode. 11-13 - enter or revise RT Bronchodilator order(s) to one equivalent RT bronchodilator order with QID Frequency and an Albuterol order with Frequency of every 4 hours PRN wheezing or increased work of breathing using Per Protocol order mode. Greater than 13 - enter or revise RT Bronchodilator order(s) to one equivalent RT bronchodilator order with every 4 hours Frequency and an Albuterol order with Frequency of every 2 hours PRN wheezing or increased work of breathing using Per Protocol order mode.          Electronically signed by Kojo Blakely RCP on 1/22/2022 at 10:10 PM

## 2022-01-23 NOTE — PLAN OF CARE
Problem: Infection - Central Venous Catheter-Associated Bloodstream Infection:  Goal: Will show no infection signs and symptoms  Description: Will show no infection signs and symptoms  Outcome: Ongoing     Problem: Airway Clearance - Ineffective  Goal: Achieve or maintain patent airway  Outcome: Ongoing     Problem: Gas Exchange - Impaired  Goal: Absence of hypoxia  Outcome: Ongoing  Goal: Promote optimal lung function  Outcome: Ongoing     Problem: Breathing Pattern - Ineffective  Goal: Ability to achieve and maintain a regular respiratory rate  Outcome: Ongoing     Problem:  Body Temperature -  Risk of, Imbalanced  Goal: Ability to maintain a body temperature within defined limits  Outcome: Ongoing  Goal: Will regain or maintain usual level of consciousness  Outcome: Ongoing  Goal: Complications related to the disease process, condition or treatment will be avoided or minimized  Outcome: Ongoing     Problem: Isolation Precautions - Risk of Spread of Infection  Goal: Prevent transmission of infection  Outcome: Ongoing     Problem: Nutrition Deficits  Goal: Optimize nutritional status  Outcome: Ongoing     Problem: Risk for Fluid Volume Deficit  Goal: Maintain normal heart rhythm  Outcome: Ongoing  Goal: Maintain absence of muscle cramping  Outcome: Ongoing  Goal: Maintain normal serum potassium, sodium, calcium, phosphorus, and pH  Outcome: Ongoing     Problem: Loneliness or Risk for Loneliness  Goal: Demonstrate positive use of time alone when socialization is not possible  Outcome: Ongoing     Problem: Fatigue  Goal: Verbalize increase energy and improved vitality  Outcome: Ongoing     Problem: Patient Education: Go to Patient Education Activity  Goal: Patient/Family Education  Outcome: Ongoing     Problem: Skin Integrity:  Goal: Absence of new skin breakdown  Description: Absence of new skin breakdown  Outcome: Ongoing     Problem: Falls - Risk of:  Goal: Will remain free from falls  Description: Will remain free from falls  Outcome: Ongoing  Goal: Absence of physical injury  Description: Absence of physical injury  Outcome: Ongoing     Problem: Non-Violent Restraints  Goal: Removal from restraints as soon as assessed to be safe  Outcome: Ongoing  Goal: No harm/injury to patient while restraints in use  Outcome: Ongoing  Goal: Patient's dignity will be maintained  Outcome: Ongoing     Problem: Nutrition  Goal: Optimal nutrition therapy  Outcome: Ongoing  Goal: Understanding of nutritional guidelines  Outcome: Ongoing   Pt resting in bed, prone position, tolerating vent settings, IV's infusing, TF on hold, yeh patent, family at bedside and updated on pt condition.

## 2022-01-23 NOTE — PROGRESS NOTES
Pharmacy - RE:  High-dose Heparin drip  Current rate = 920 units/h  (9.2 ml/h)  Anti-Xa  = 1.16 IU/ml   Goal = 0.3 - 0.7  Hold drip x 1.5 hours (2750 - 7639), then restart at a reduced rate of 440 units/h (4.4 ml/h).   Obtain another anti-Xa 1/23 @ 0600

## 2022-01-23 NOTE — PROGRESS NOTES
01/23/22 0244   Vent Information   Vent Type 840   Vent Mode AC/VC   Vt Ordered 340 mL   Rate Set 36 bmp   Peak Flow 60 L/min   Pressure Support 0 cmH20   FiO2  95 %   SpO2 94 %   SpO2/FiO2 ratio 98.95   Sensitivity 3   PEEP/CPAP 16   Humidification Source Heated wire   Humidification Temp 37   Humidification Temp Measured 36.9   Circuit Condensation Drained   Vent Patient Data   Peak Inspiratory Pressure 35 cmH2O   Mean Airway Pressure 23 cmH20   Rate Measured 36 br/min   Vt Exhaled 364 mL   Minute Volume 13 Liters   I:E Ratio 1:1.70   Plateau Pressure 34 [id number]   Cough/Sputum   Sputum How Obtained Endotracheal;Suctioned   Cough Productive   Sputum Amount Small   Sputum Color Creamy;Dark red   Tenacity Thick   Spontaneous Breathing Trial (SBT) RT Doc   Pulse 112   Breath Sounds   Right Upper Lobe Diminished   Right Middle Lobe Diminished   Right Lower Lobe Diminished   Left Upper Lobe Diminished   Left Lower Lobe Diminished   Additional Respiratory  Assessments   Resp (!) 36   Position Prone   Alarm Settings   High Pressure Alarm 50 cmH2O   Low Minute Volume Alarm 4 L/min   High Respiratory Rate 45 br/min   Patient Observation   Observations ETT SIZE 8.0, SECURED AT 24 LIP LINE. AMBU BAG AT HEAD OF BED. WATER GOOD. ETT (adult)   Placement Date/Time: 01/14/22 1503   Timeout: Patient;Procedure; Appropriate Equipment;Correct Position  Preoxygenation: Yes  Mask Ventilation: Ventilated by mask (1)  Technique: Rapid sequence  Type: Cuffed  Tube Size: 8 mm  Laryngoscope: GlideScope  . ..    Secured at 24 cm   Measured From [address] [place] [address],[address] By Cloth tape;Twill tape   Site Condition Dry

## 2022-01-23 NOTE — PROGRESS NOTES
Internal Medicine ICU Progress Note    covid pneumonia, hypoxia      Events of Last 24 hours:       Intubated    Proned- back to supine.   Worsening hypoxemia now on 100 % fio2    Hypotensive remains  on levophed   Wbc worsened as well       -progressive hypoxemia. He was maxed out on the ventilator setting. PEEP increased to 16. He was on FiO2 100%. He was proned overnight. He had not back. Presently on Levophed, fentanyl, Versed and Nimbex off propofol. Received unit of PRBC.    -progressive hypoxemia. FiO2 95%. PEEP 16. Proned overnight. On Levophed. On fentanyl Versed and Nimbex. Off propofol. H and H has dropped.       Invasive Lines: PICC placed on 1/10/22       MV:  N/A    Recent Labs     22  1135 22  0525   PHART 7.269* 7.336*   HQJ3BAM 62.5* 54.8*   PO2ART 60.9* 71.8*       MV Settings:  Vent Mode: AC/VC Rate Set: 36 bmp/Vt Ordered: 340 mL/ /FiO2 : 95 %    IV:   heparin 25,000 units in dextrose 5% 250 mL infusion 380 Units/hr (22 0645)    cisatracurium (NIMBEX) infusion 3 mcg/kg/min (22 0610)    propofol      sodium chloride      sodium chloride 500 mL/hr at 22 1925    midazolam 7 mg/hr (22 0610)    dextrose      fentaNYL 200 mcg/hr (22 1035)    norepinephrine 8 mcg/min (22 0610)    sodium chloride         Vitals:  Temp  Av.7 °F (36.5 °C)  Min: 97.3 °F (36.3 °C)  Max: 98.3 °F (36.8 °C)  Pulse  Av.7  Min: 102  Max: 119  BP  Min: 81/57  Max: 110/59  SpO2  Av.6 %  Min: 90 %  Max: 95 %  FiO2   Av %  Min: 95 %  Max: 95 %  Patient Vitals for the past 4 hrs:   BP Temp Temp src Pulse Resp SpO2   22 1200 (!) 88/59 97.9 °F (36.6 °C) Axillary 106 (!) 36 93 %   22 1100 (!) 91/52 -- -- 105 (!) 36 93 %   22 1000 (!) 87/54 -- -- 108 (!) 36 93 %       CVP: CVP (Mean): 64 mmHg      Intake/Output Summary (Last 24 hours) at 2022 1337  Last data filed at 2022 0610  Gross per 24 hour Intake 1605.94 ml   Output 475 ml   Net 1130.94 ml       EXAM:  General:  Middle aged male, old appearing. Intubated and sedated. Prone  Eyes:  No sclera icterus. No conjunctiva injected. ENT - oral ETT and OG noted . Neck: Trachea midline. Normal thyroid. Resp-  jose + crackles. No wheezing. No rhonchi. CV: Regular rate. Regular rhythm. No mumur or rub. No edema. No JVD. Palpable pedal pulses. GI: Non-tender. Non-distended. No masses. No organmegaly. Normal bowel sounds. No hernia. Skin: developing edema to all ex t  Lymph: No cervical LAD. No supraclavicular LAD. M/S: No cyanosis. No joint deformity. No clubbing.    Neuro: sedated       Medications:  Scheduled Meds:   meropenem  1,000 mg IntraVENous Q8H    voriconazole  400 mg IntraVENous Q12H    [START ON 1/24/2022] voriconazole  300 mg IntraVENous Q12H    pantoprazole  40 mg IntraVENous BID    insulin glargine  25 Units SubCUTAneous QAM    polyethylene glycol  17 g Oral Daily    diazePAM  10 mg Oral Q8H    dexamethasone  6 mg IntraVENous Daily    vancomycin  1,250 mg IntraVENous Q12H    senna  2 tablet Oral BID    And    docusate  100 mg Oral BID    Venelex   Topical BID    insulin lispro  0-18 Units SubCUTAneous Q4H    ipratropium-albuterol  1 ampule Inhalation Q4H    atorvastatin  40 mg Oral Nightly    omega-3 acid ethyl esters  2 g Oral BID    [Held by provider] clopidogrel  75 mg Oral Daily    aspirin  81 mg Oral Daily    sodium chloride flush  5-40 mL IntraVENous 2 times per day    Vitamin D  2,000 Units Oral Daily       PRN Meds:  heparin (porcine), heparin (porcine), sodium chloride, sodium chloride, albuterol, glucose, dextrose, glucagon (rDNA), dextrose, midazolam, fentanNYL, melatonin, nitroGLYCERIN, sodium chloride flush, sodium chloride, ondansetron **OR** ondansetron, polyethylene glycol, acetaminophen **OR** acetaminophen, potassium chloride, magnesium sulfate    Results:  CBC:   Recent Labs     01/21/22  0602 01/21/22  1403 01/22/22  0405 01/22/22  0405 01/22/22  1352 01/22/22  2325 01/23/22  0620   WBC 27.6*  --  28.9*  --   --   --  42.5*   HGB 7.4*   < > 7.8*   < > 7.6* 7.8* 7.3*   HCT 23.2*   < > 23.6*   < > 23.5* 24.5* 22.8*   MCV 84.3  --  87.4  --   --   --  88.6     --  112*  --   --   --  111*    < > = values in this interval not displayed. BMP:   Recent Labs     01/21/22  0507 01/22/22  0405 01/23/22  0620    132* 132*   K 4.5 5.2* 5.4*    100 97*   CO2 30 28 33*   BUN 31* 34* 42*   CREATININE <0.5* 0.6* 0.6*     LIVER PROFILE:   Recent Labs     01/21/22  0507 01/22/22  0405 01/23/22  0620   AST 66* 73* 85*   ALT 56* 63* 67*   BILITOT 2.1* 1.5* 1.6*   ALKPHOS 102 116 156*     PT/INR:   Recent Labs     01/20/22  1500 01/23/22  1110   PROTIME 16.3* 12.4   INR 1.42* 1.09     Results for Jaime Kc (MRN [id number]) as of 1/11/2022 06:37   Ref. Range 1/9/2022 16:30   CRP Latest Ref Range: 0.0 - 5.1 mg/L 55.2 (H)       Cultures:    Covid 19: detected  Influenza A and B: not detected  Blood culture:NGTD  Sputum pending    Films:    XR CHEST PORTABLE   Final Result   Extensive parenchymal infiltrates are seen throughout the lungs bilaterally,   mildly worsened at the right lung base, otherwise similar in appearance. XR CHEST PORTABLE   Final Result   Interval increase left-sided pulmonary opacities representing worsening   edema, atelectasis or pneumonia. Slightly improved aeration in the right   upper lung zone. XR CHEST PORTABLE   Final Result   Stable pattern of diffuse, multifocal airspace opacities. XR CHEST PORTABLE   Final Result   Unchanged multifocal bilateral atypical pneumonia. XR CHEST PORTABLE   Final Result   Unremarkable appearance of an endotracheal tube. Unchanged patchy interstitial lung markings which may reflect atypical   pneumonia or pulmonary edema. XR CHEST PORTABLE   Final Result   1.   Endotracheal tube terminates 1.6 cm from the benton. Recommend 2 cm   retraction. 2.  Slightly increased diffuse bilateral airspace and interstitial opacities. The findings were sent to the [facility] Results Communication Team at 12:48 a.m.   on 01/18/2022 to be communicated to a licensed caregiver. XR CHEST PORTABLE   Final Result   Mild interval worsening with moderate infiltrates in the lungs. These are   most pronounced in the right lung. 5 cm indeterminate dense opacity in the midline-right upper abdomen. Correlate for oral contrast in bowel. VL Extremity Venous Bilateral   Final Result      XR CHEST PORTABLE   Final Result   Stable multifocal bilateral lung airspace disease consistent with previously   identified presence of COVID-19 viral pneumonia. Suspected moderate pulmonary interstitial edema. XR CHEST PORTABLE   Final Result   The support apparatus as described above. Continued increased interstitial opacity throughout both lungs, along with   more focal traits within the periphery the lungs, some combination of   interstitial edema and pneumonia. XR CHEST PORTABLE   Final Result   1. Endotracheal tube projects in appropriate position. 2. Increased bilateral nonspecific pulmonary opacities representing edema,   atelectasis and/or pneumonia. XR CHEST PORTABLE   Final Result   Right PICC projects in normal position. Stable interstitial and ground-glass opacities, most likely atypical   pneumonia. IR PICC WO SQ PORT/PUMP > 5 YEARS   Final Result   Successful placement of PICC line. CT CHEST PULMONARY EMBOLISM W CONTRAST   Final Result   1. No evidence of pulmonary embolism. 2.  Commonly reported imaging features of COVID-19 pneumonia are present. Other processes such as influenza pneumonia and organizing pneumonia, as can   be seen with drug toxicity and connective tissue disease, can cause a similar   imaging pattern. PneTyp   3.  Mild compression deformity of T8 of uncertain chronicity, new since   05/29/2020. Similar mild moderate compression deformities at T10, T12.   4. Fatty liver. 5. Interval increase in size of now 2.7 cm fat attenuation lesion of the   pancreatic neck. Recommend further evaluation with nonemergent pancreas   protocol MRI. XR CHEST PORTABLE   Final Result   1. Bilateral interstitial thickening either due to edema or atypical   interstitial pneumonia. XR CHEST PORTABLE    (Results Pending)   XR CHEST PORTABLE    (Results Pending)          Assessment and Plan      COVID PNA  Acute Hypoxic Respiratory Failure  Worsening leukocytosis  - imaging with bilateral pna pattern  - does not wear O2 at baseline  - Admit to Med Surg, droplet + precautions, tele  - supp O2, progressive hypoxemia, now on vent support  -Intubated 1/14 , proning as needed  - on  Decadron D#14, now at 6 mg ,  completed 5 days of  Remdesivir,   s/p Tocilizumab   - worsening wbc and hypoxia, now at 95 % fio2, maxed out on ventilator settings. PEEP of 16.  - started  cefepime and vancomycin D4. Stop cefepime. Start Merrem due to worsening leukocytosis. Add voriconazole for possible fungal infection. - bloody secretions in ETT. Can hold PLAVIX   - pulmonary consulted   - lovenox changed to heparin gtt today for possibility of PE     Hypotension - likely sepsis   -Was on cefepime and Vanco.  Cefepime stopped. [place] started. Voriconazole added. - sp fluid boluses - on levophed now   - wean as able      Anemia - multifactorial   - monitor and transfuse PRBC less than 7.  - I ordered one unit of PRBC     Abnormal lFT - likely from viral infection   Monitor, stable      CAD - stable. No CP. Hold Plavix for bloody secretions      PAD stable     DM type 2  Hyperglycemia with steroids, on ssi     HLD On Lipitor.     On  heparin gtt    Diet - On TF  Full code    Patient carries a very poor prognosis. Family deciding on CODE STATUS.  Discussed with wife.   Mayco Garcia MD 1:37 PM 1/23/2022

## 2022-01-23 NOTE — PROGRESS NOTES
01/23/22 1839   Vent Information   Vent Type 840   Vent Mode AC/VC   Vt Ordered 340 mL   Rate Set 36 bmp   Peak Flow 60 L/min   Pressure Support 0 cmH20   FiO2  95 %   SpO2 95 %   SpO2/FiO2 ratio 100   Sensitivity 3   PEEP/CPAP 16   Humidification Source Heated wire   Humidification Temp Measured 37   Circuit Condensation Drained   Vent Patient Data   Peak Inspiratory Pressure 34 cmH2O   Mean Airway Pressure 23 cmH20   Rate Measured 36 br/min   Vt Exhaled 366 mL   Minute Volume 13.1 Liters   I:E Ratio 1:1.70   Plateau Pressure 33 [id number]   Static Compliance 21 mL/cmH2O   Dynamic Compliance 20 mL/cmH2O   Cough/Sputum   Sputum How Obtained Endotracheal   Cough Productive   Sputum Amount Small   Sputum Color Dark red   Tenacity Thick   Spontaneous Breathing Trial (SBT) RT Doc   Pulse 100   Breath Sounds   Right Upper Lobe Diminished   Right Middle Lobe Diminished   Right Lower Lobe Diminished   Left Upper Lobe Diminished   Left Lower Lobe Diminished   Additional Respiratory  Assessments   Resp (!) 36   Alarm Settings   High Pressure Alarm 50 cmH2O   Low Minute Volume Alarm 4 L/min   Apnea (secs) 20 secs   High Respiratory Rate 45 br/min   Low Exhaled Vt  200 mL   Patient Observation   Observations 8ett 24 at lip

## 2022-01-23 NOTE — PROGRESS NOTES
Pharmacy - RE:  High-dose Heparin drip  Current rate = 380 units/h  (3.8 ml/h)  Anti-Xa drawn @ 1426 = 0.29 IU/ml (goal 0.3-0.7)     Increase rate to 500 units/h (5 ml/h).     Obtain another anti-Xa today @ [address], [place]

## 2022-01-23 NOTE — PROGRESS NOTES
Pulmonary & Critical Care Medicine ICU Progress Note    CC: COVID-19 pneumonia in an unvaccinated individual    Events of Last 24 hours:   Progressive hypoxemia, maxed on ventilator setting, PEEP increased to 14  Heparin drip   Proned overnight   Levophed 8  Fentanyl 200  Versed 8  Nimbex 3 mcg/kg/min  Propofol off  PEEP 16  FiO2 95%   No diarrhea       Invasive Lines: PICC placed 1/10/22    MV:  1/14/22-  Vent Mode: AC/VC Rate Set: 36 bmp/Vt Ordered: 340 mL/ /FiO2 : 95 %  Recent Labs     01/22/22  1135 01/23/22  0525   PHART 7.269* 7.336*   TEZ4IMP 62.5* 54.8*   PO2ART 60.9* 71.8*       IV:   heparin 25,000 units in dextrose 5% 250 mL infusion 380 Units/hr (01/23/22 0645)    cisatracurium (NIMBEX) infusion 3 mcg/kg/min (01/23/22 0610)    propofol      sodium chloride      sodium chloride 500 mL/hr at 01/20/22 1925    midazolam 7 mg/hr (01/23/22 0610)    dextrose      fentaNYL 200 mcg/hr (01/23/22 0610)    norepinephrine 8 mcg/min (01/23/22 0610)    sodium chloride         Vitals:  BP (!) 87/56   Pulse 109   Temp 97.3 °F (36.3 °C) (Axillary)   Resp (!) 36   Ht 5' 2\" (1.575 m)   Wt 154 lb 1.6 oz (69.9 kg)   SpO2 94%   BMI 28.19 kg/m²   on vent AC 36/340    Intake/Output Summary (Last 24 hours) at 1/23/2022 0718  Last data filed at 1/23/2022 0610  Gross per 24 hour   Intake 1881.11 ml   Output 1225 ml   Net 656.11 ml     EXAM: limited due to prone ventilation   General: ill appearing. Intubated sedated. Eyes: PERRL. No sclera icterus. + conjunctival injection. ENT: ET tube in place  Neck: Trachea midline  Resp: No accessory muscle use. Few crackles. No wheezing. Few rhonchi. No dullness on percussion. CV: Regular rate. Regular rhythm. 1+ LE edema. GI: Proned not able to assess   Skin: Warm and dry. No nodule on exposed extremities. No rash on exposed extremities. Lymph: No cervical LAD. No supraclavicular LAD. M/S: No cyanosis. No joint deformity. No clubbing. Neuro: Intubated sedated. Paralyzed. Psych: Noncommunicative unable to obtain    Scheduled Meds:   insulin glargine  25 Units SubCUTAneous QAM    polyethylene glycol  17 g Oral Daily    cefepime  2,000 mg IntraVENous Q8H    diazePAM  10 mg Oral Q8H    dexamethasone  6 mg IntraVENous Daily    vancomycin  1,250 mg IntraVENous Q12H    senna  2 tablet Oral BID    And    docusate  100 mg Oral BID    Venelex   Topical BID    insulin lispro  0-18 Units SubCUTAneous Q4H    ipratropium-albuterol  1 ampule Inhalation Q4H    famotidine  20 mg Oral BID    atorvastatin  40 mg Oral Nightly    omega-3 acid ethyl esters  2 g Oral BID    [Held by provider] clopidogrel  75 mg Oral Daily    aspirin  81 mg Oral Daily    sodium chloride flush  5-40 mL IntraVENous 2 times per day    Vitamin D  2,000 Units Oral Daily     PRN Meds:  heparin (porcine), heparin (porcine), sodium chloride, sodium chloride, albuterol, glucose, dextrose, glucagon (rDNA), dextrose, midazolam, fentanNYL, melatonin, nitroGLYCERIN, sodium chloride flush, sodium chloride, ondansetron **OR** ondansetron, polyethylene glycol, acetaminophen **OR** acetaminophen, potassium chloride, magnesium sulfate    Results:  CBC:   Recent Labs     01/21/22  0602 01/21/22  1403 01/22/22  0405 01/22/22  0405 01/22/22  1352 01/22/22  2325 01/23/22  0620   WBC 27.6*  --  28.9*  --   --   --  42.5*   HGB 7.4*   < > 7.8*   < > 7.6* 7.8* 7.3*   HCT 23.2*   < > 23.6*   < > 23.5* 24.5* 22.8*   MCV 84.3  --  87.4  --   --   --  88.6     --  112*  --   --   --  111*    < > = values in this interval not displayed.      BMP:   Recent Labs     01/21/22  0507 01/22/22  0405 01/23/22  0620    132* 132*   K 4.5 5.2* 5.4*    100 97*   CO2 30 28 33*   BUN 31* 34* 42*   CREATININE <0.5* 0.6* 0.6*     LIVER PROFILE:   Recent Labs     01/21/22  0507 01/22/22  0405 01/23/22  0620   AST 66* 73* 85*   ALT 56* 63* 67*   BILITOT 2.1* 1.5* 1.6*   ALKPHOS 102 116 156*       Micro  1/9 BC NGTD  1/9 COVID-19 detected  1/20 BC NGTD   1/20 UC NGTD   1/20 Resp gram negative and Mold      Imaging   Chest x-ray 1/22 imaging reviewed by me and showed   High ETT position  Satisfactory PICC line position   Multifocal fine ASD  -worsening on R    LE dopper 1/17   Within the limits of this exam, there is no evidence of deep or superficial  venous thrombosis in the lower extremities bilaterally. Chronic phlebitic processes involving the left small saphenous vein       ASSESSMENT:  · Acute hypoxemic respiratory failure  · COVID 19 pneumonia in an unvaccinated individual  · Septic shock  · Severe ARDS  · Bloody respiratory secretions-probably traumatic from suctioning  · Pulmonary emphysema  · Leukocytosis -worsening  · Hypertriglyceridemia   · Anemia- no active bleed   · Pancreatic nodule, 2.7 cm increase in size  · DM2 with hyperglycemia   · Elevated LFTS  · Tobacco abuse  · CAD and PAD with h/o STEMI     PLAN:  · Droplet Plus Airborne Precautions   · Mechanical ventilation as per my orders. The ventilator was adjusted by me at the bedside for unstable, life threatening respiratory failure.   · IV Versed/Fentanyl for sedation, target RASS -52, with daily spontaneous awakening trial.  Fentanyl PRN pain, gtt as needed  · CXR today when able   · Valium 10 TID   · Nimbex for ventilator dyssynchrony   · Head of bed 30 degrees or higher at all times  · Prone 1/14/22-  · Decadron QD, D#15--> 6 mg, monitor glucose closely  · S/p Remdesevir D#5 and Tocilizumab received 1/9/22  · Vanc and Cefepime D#4  · Change cefepime to meropenem  · Add voriconazole  · Check serum 1,3 beta d-glucan   · Serum and tracheal aspirate galactomannan  · Follow up Cx   · IV Levophed to keep MAP > 65 mmHg or SBP>90  · TFs -- to suction   · On ASA - Holding Plavix given and anemia and bloody secretions   · Follow LFTs  · PT/INR  · BM regiment   · Monitor H&H and transfuse for hemoglobin less than 7  · Blood sugar control ISS,  lantus 25 with goal 150-180  · GI prophylaxis: Pepcid--> protonix BID   · DVT prophylaxis: Heaprin drip given profound hypoxemia and instability for testing   · MRSA prophylaxis: Bactroban  · Very poor prognosis. Discussed with family at the bedside and deciding on code status           Total critical care time caring for this patient with life threatening, unstable organ failure, including direct patient contact, management of life support systems, review of data including imaging and labs, discussions with other team members and physicians at least 36 minutes so far today, excluding procedures.

## 2022-01-24 NOTE — PROGRESS NOTES
01/23/22 2222   Vent Information   Vent Type 840   Vent Mode AC/VC   Vt Ordered 340 mL   Rate Set 36 bmp   Peak Flow 60 L/min   Pressure Support 0 cmH20   FiO2  95 %   SpO2 94 %   SpO2/FiO2 ratio 98.95   Sensitivity 3   PEEP/CPAP 16   Humidification Source Heated wire   Humidification Temp 37   Humidification Temp Measured 37   Circuit Condensation Drained   Vent Patient Data   Peak Inspiratory Pressure 33 cmH2O   Mean Airway Pressure 23 cmH20   Rate Measured 36 br/min   Vt Exhaled 351 mL   Minute Volume 12.7 Liters   I:E Ratio 1:1.70   Plateau Pressure 30 NXW22   Cough/Sputum   Sputum How Obtained Endotracheal;Suctioned   Cough Productive   Sputum Amount Small   Sputum Color Creamy;Dark red   Tenacity Thick   Spontaneous Breathing Trial (SBT) RT Doc   Pulse 94   Breath Sounds   Right Upper Lobe Diminished   Right Middle Lobe Diminished   Right Lower Lobe Diminished   Left Upper Lobe Diminished   Left Lower Lobe Diminished   Additional Respiratory  Assessments   Resp (!) 36   Position Prone   Alarm Settings   High Pressure Alarm 50 cmH2O   Low Minute Volume Alarm 4 L/min   High Respiratory Rate 45 br/min   Patient Observation   Observations ETT SIZE 8.0, SECURED AT 24 LIP LINE. AMBU BAG AT HEAD OF BED. WATER GOOD. ETT (adult)   Placement Date/Time: 01/14/22 1503   Timeout: Patient;Procedure; Appropriate Equipment;Correct Position  Preoxygenation: Yes  Mask Ventilation: Ventilated by mask (1)  Technique: Rapid sequence  Type: Cuffed  Tube Size: 8 mm  Laryngoscope: GlideScope  . ..    Secured at 24 cm   Measured From [address] [place] [address],[address] By Cloth tape;Twill tape

## 2022-01-24 NOTE — PROGRESS NOTES
Pt returned to prone positioning x4 RN & 1 RT assist. Pillows placed under all april prominences.  Fio2 increased to 80% from 75% d/t o2 sat 88%

## 2022-01-24 NOTE — PROGRESS NOTES
Pharmacy - RE:  High-dose Heparin drip  Current rate = 500 units/h  (5 ml/h)  Anti-Xa drawn @ 1940 = 0.3 IU/ml     Continue rate of 500 units/h (5 ml/h).   Obtain another anti-Xa 1/24 @ 7569

## 2022-01-24 NOTE — PROGRESS NOTES
Pharmacy - RE:  High-dose Heparin drip  Current rate = 500 units/h  (5 ml/h)  Anti-Xa drawn 1/24 @ 0330  = 0.4 IU/ml   Goal = 0.3 - 0.7 IU/ml  Per protocol, continue same drip rate of 500 units/h (5 ml/h). May now switch to daily anti-Xa draws since patient has been therapeutic x2 at current rate. Next anti-Xa due 1/25 with AM lab draws.

## 2022-01-24 NOTE — PROGRESS NOTES
Pulmonary & Critical Care Medicine ICU Progress Note    CC: COVID-19 pneumonia in an unvaccinated individual    Events of Last 24 hours:   Heparin drip   Proned overnight   Levophed 6  Fentanyl 225  Versed 8  Nimbex 3 mcg/kg/min  Propofol off  PEEP 16  FiO2 85%         Invasive Lines: PICC placed 1/10/22    MV:  1/14/22-  Vent Mode: AC/VC Rate Set: 36 bmp/Vt Ordered: 340 mL/ /FiO2 : 95 %  Recent Labs     01/23/22  0525 01/24/22  0333   PHART 7.336* 7.305*   KKB1MKC 54.8* 59.7*   PO2ART 71.8* 62.6*       IV:   sodium chloride      heparin 25,000 units in dextrose 5% 250 mL infusion 500 Units/hr (01/23/22 1721)    cisatracurium (NIMBEX) infusion 3 mcg/kg/min (01/23/22 1821)    propofol      sodium chloride      sodium chloride 500 mL/hr at 01/20/22 1925    midazolam 8 mg/hr (01/24/22 0350)    dextrose      fentaNYL 200 mcg/hr (01/24/22 0210)    norepinephrine 8 mcg/min (01/23/22 1821)    sodium chloride         Vitals:  BP 93/62   Pulse 83   Temp 98.9 °F (37.2 °C) (Axillary)   Resp (!) 36   Ht 5' 2\" (1.575 m)   Wt 167 lb 4.8 oz (75.9 kg)   SpO2 97%   BMI 30.60 kg/m²   on vent AC 36/340    Intake/Output Summary (Last 24 hours) at 1/24/2022 3146  Last data filed at 1/24/2022 0600  Gross per 24 hour   Intake 2307.92 ml   Output 1095 ml   Net 1212.92 ml     EXAM: limited due to prone ventilation   General: ill appearing. Intubated sedated. Eyes: PERRL. No sclera icterus. + conjunctival injection. ENT: ET tube in place  Neck: Trachea midline  Resp: No accessory muscle use. Few crackles. No wheezing. Few rhonchi. No dullness on percussion. CV: Regular rate. Regular rhythm. 1+ LE edema. GI: Proned not able to assess   Skin: Warm and dry. No nodule on exposed extremities. No rash on exposed extremities. Lymph: No cervical LAD. No supraclavicular LAD. M/S: No cyanosis. No joint deformity. No clubbing. Neuro: Intubated sedated. Paralyzed.    Psych: Noncommunicative unable to obtain    Scheduled Meds:   meropenem  1,000 mg IntraVENous Q8H    voriconazole  300 mg IntraVENous Q12H    pantoprazole  40 mg IntraVENous BID    insulin glargine  25 Units SubCUTAneous QAM    polyethylene glycol  17 g Oral Daily    diazePAM  10 mg Oral Q8H    dexamethasone  6 mg IntraVENous Daily    vancomycin  1,250 mg IntraVENous Q12H    senna  2 tablet Oral BID    And    docusate  100 mg Oral BID    Venelex   Topical BID    insulin lispro  0-18 Units SubCUTAneous Q4H    ipratropium-albuterol  1 ampule Inhalation Q4H    atorvastatin  40 mg Oral Nightly    omega-3 acid ethyl esters  2 g Oral BID    [Held by provider] clopidogrel  75 mg Oral Daily    aspirin  81 mg Oral Daily    sodium chloride flush  5-40 mL IntraVENous 2 times per day    Vitamin D  2,000 Units Oral Daily     PRN Meds:  sodium chloride, heparin (porcine), heparin (porcine), sodium chloride, sodium chloride, albuterol, glucose, dextrose, glucagon (rDNA), dextrose, midazolam, fentanNYL, melatonin, nitroGLYCERIN, sodium chloride flush, sodium chloride, ondansetron **OR** ondansetron, polyethylene glycol, acetaminophen **OR** acetaminophen, potassium chloride, magnesium sulfate    Results:  CBC:   Recent Labs     01/22/22  0405 01/22/22  1352 01/23/22  0620 01/23/22  0620 01/23/22  1426 01/23/22  1940 01/24/22  0330   WBC 28.9*  --  42.5*  --   --   --  37.2*   HGB 7.8*   < > 7.3*   < > 6.8* 9.0* 8.3*   HCT 23.6*   < > 22.8*   < > 21.1* 27.1* 25.7*   MCV 87.4  --  88.6  --   --   --  87.6   *  --  111*  --   --   --  85*    < > = values in this interval not displayed.      BMP:   Recent Labs     01/22/22  0405 01/23/22  0620 01/24/22  0330   * 132* 132*   K 5.2* 5.4* 4.7    97* 96*   CO2 28 33* 30   BUN 34* 42* 42*   CREATININE 0.6* 0.6* 0.7*     LIVER PROFILE:   Recent Labs     01/22/22  0405 01/23/22  0620 01/24/22  0330   AST 73* 85* 74*   ALT 63* 67* 59*   BILITOT 1.5* 1.6* 2.0*   ALKPHOS 116 156* 149*       Micro  1/9 BC regiment   · Monitor H&H and transfuse for hemoglobin less than 7  · Blood sugar control ISS,  lantus 25 with goal 150-180  · GI prophylaxis: Protonix BID   · DVT prophylaxis: Heaprin drip given profound hypoxemia and instability for VTE testing   · MRSA prophylaxis: Bactroban  · Very poor prognosis. Discussed with family at the bedside and deciding on code status           Total critical care time caring for this patient with life threatening, unstable organ failure, including direct patient contact, management of life support systems, review of data including imaging and labs, discussions with other team members and physicians at least 33 minutes so far today, excluding procedures.

## 2022-01-24 NOTE — PROGRESS NOTES
AM assessment completed. AM labs reviewed. Pt remains intubated, sedated & prone positioning. See MAR for titration. Pt TF off d/t increased residuals. Navarro in place for strict I/o. AM meds crushed & given via OG.    Enrique Shirley RN, BSN

## 2022-01-24 NOTE — PROGRESS NOTES
Fluid (ml/day):  836 - 1064 ml; Method Used for Fluid Requirements:  1 ml/kcal      Nutrition Related Findings:  patient remains intubated; propofol is not on at this time; patient has been in prone position and paralyzed; TF is not infusing at this time; last documented BM was on 1/12/22; abdomen is not able to be assessed since patient is in prone position at this time; h/h, Na, Cl, and Ca are low; BUN, alk phos, and AST/ALT are elevated; patient has decadron, valium, high-dose SSI, merrem, protonix, glycolax, Senokot, colace, lovaza, vitamin D, VFEND in D5, nimbex, fentanyl, heparin in D5, versed in D5, and levo in D5 ordered at this time      Wounds:  Deep Tissue Injury,Pressure Injury (DTI on L lower chin)       Current Nutrition Therapies:    Current Tube Feeding (TF) Orders:  · Feeding Route: Orogastric  · Formula: Peptide Based  · Schedule: Continuous  · Additives/Modulars: Protein (one proteinex P2Go TWICE daily via feeding tube)  · Water Flushes: 30 ml water flushes every 4 hours for tube patency  · Current TF & Flush Orders Provides: Vital AF 1.2 with a low goal rate of 15 ml/hr x 20 hours = 300 ml TV, 360 kcals, 23 g protein, and 243 ml free water + 30 ml water flushes every 4 hours for tube patency + 52 g protein and 208 kcals from one proteinex P2Go TWICE daily (75 g protein and 568 kcals total)  · Goal TF & Flush Orders Provides: Vital AF 1.2 with a trophic rate of 15 ml/hr x 20 hours = 300 ml TV, 360 kcals, 23 g protein, and 243 ml free water + 30 ml water flushes every 4 hours for tube patency + 52 g protein and 208 kcals from one proteinex P2Go TWICE daily via feeding tube (75 g protein and 568 kcals total)      Anthropometric Measures:  · Height: 5' 2\" (157.5 cm)  · Current Body Weight: 167 lb 4.8 oz (75.9 kg) (obtained on 1/24/22; actual weight)   · Admission Body Weight: 146 lb 13.2 oz (66.6 kg) (obtained on 1/15/22; first actual weight obtained during this admission)    · Usual Body Weight: 146 lb 13.2 oz (66.6 kg) (obtained on 1/15/22; first actual weight obtained during this admission)     · Ideal Body Weight: 118 lbs; % Ideal Body Weight 141.8 %   · BMI: 30.6     · BMI Categories: Obese Class 1 (BMI 30.0-34. 9)       Nutrition Diagnosis:   · Inadequate oral intake related to inadequate protein-energy intake,impaired respiratory function,increase demand for energy/nutrients as evidenced by NPO or clear liquid status due to medical condition,intubation,lab values,poor intake prior to admission      Nutrition Interventions:   Food and/or Nutrient Delivery:  Continue NPO,Continue Current Tube Feeding  Nutrition Education/Counseling:  No recommendation at this time   Coordination of Nutrition Care:  Continue to monitor while inpatient,Interdisciplinary Rounds    Goals:  patient will tolerate Vital AF 1.2 with a trophic rate of 15 ml/hr x 20 hours without GI distress, without s/s of aspiration, and without additional lab/fluid disturbances       Nutrition Monitoring and Evaluation:   Behavioral-Environmental Outcomes:  None Identified   Food/Nutrient Intake Outcomes:  Enteral Nutrition Intake/Tolerance  Physical Signs/Symptoms Outcomes:  Biochemical Data,Constipation,GI Status,Fluid Status or Edema,Hemodynamic Status,Weight,Skin     Discharge Planning:     Too soon to determine     Electronically signed by Maryan Turner RD, LD on 1/24/22 at 3:58 PM EST    Contact: [phone]

## 2022-01-24 NOTE — PROGRESS NOTES
RT Inhaler-Nebulizer Bronchodilator Protocol Note    There is a bronchodilator order in the chart from a provider indicating to follow the RT Bronchodilator Protocol and there is an Initiate RT Inhaler-Nebulizer Bronchodilator Protocol order as well (see protocol at bottom of note). CXR Findings:  XR CHEST PORTABLE    Result Date: 1/24/2022  Improved aeration of the lungs. Bilateral airspace disease remains       The findings from the last RT Protocol Assessment were as follows:   History Pulmonary Disease: (P) Chronic pulmonary disease  Respiratory Pattern: (P) Severe use of accessory muscle or RR>30 bpm  Breath Sounds: (P) Slightly diminished and/or crackles  Cough: (P) Weak, non-productive  Indication for Bronchodilator Therapy: (P) Decreased or absent breath sounds  Bronchodilator Assessment Score: (P) 15    Aerosolized bronchodilator medication orders have been revised according to the RT Inhaler-Nebulizer Bronchodilator Protocol below. Respiratory Therapist to perform RT Therapy Protocol Assessment initially then follow the protocol. Repeat RT Therapy Protocol Assessment PRN for score 0-3 or on second treatment, BID, and PRN for scores above 3. No Indications - adjust the frequency to every 6 hours PRN wheezing or bronchospasm, if no treatments needed after 48 hours then discontinue using Per Protocol order mode. If indication present, adjust the RT bronchodilator orders based on the Bronchodilator Assessment Score as indicated below. Use Inhaler orders unless patient has one or more of the following: on home nebulizer, not able to hold breath for 10 seconds, is not alert and oriented, cannot activate and use MDI correctly, or respiratory rate 25 breaths per minute or more, then use the equivalent nebulizer order(s) with same Frequency and PRN reasons based on the score. If a patient is on this medication at home then do not decrease Frequency below that used at home.     0-3 - enter or revise RT bronchodilator order(s) to equivalent RT Bronchodilator order with Frequency of every 4 hours PRN for wheezing or increased work of breathing using Per Protocol order mode. 4-6 - enter or revise RT Bronchodilator order(s) to two equivalent RT bronchodilator orders with one order with BID Frequency and one order with Frequency of every 4 hours PRN wheezing or increased work of breathing using Per Protocol order mode. 7-10 - enter or revise RT Bronchodilator order(s) to two equivalent RT bronchodilator orders with one order with TID Frequency and one order with Frequency of every 4 hours PRN wheezing or increased work of breathing using Per Protocol order mode. 11-13 - enter or revise RT Bronchodilator order(s) to one equivalent RT bronchodilator order with QID Frequency and an Albuterol order with Frequency of every 4 hours PRN wheezing or increased work of breathing using Per Protocol order mode. Greater than 13 - enter or revise RT Bronchodilator order(s) to one equivalent RT bronchodilator order with every 4 hours Frequency and an Albuterol order with Frequency of every 2 hours PRN wheezing or increased work of breathing using Per Protocol order mode. RT to enter RT Home Evaluation for COPD & MDI Assessment order using Per Protocol order mode.     Electronically signed by Nova Davila RCP on 1/24/2022 at 3:19 PM

## 2022-01-24 NOTE — PROGRESS NOTES
Physical Exam Performed:    /78   Pulse 104   Temp 96.3 °F (35.7 °C) (Axillary)   Resp 23   Ht 5' 2\" (1.575 m)   Wt 167 lb 4.8 oz (75.9 kg)   SpO2 99%   BMI 30.60 kg/m²     General:  Middle aged male, old appearing. Intubated and sedated. Prone  Eyes:  No sclera icterus. No conjunctiva injected. ENT - oral ETT and OG noted . Neck: Trachea midline. Normal thyroid. Resp-  jose + crackles. No wheezing. No rhonchi. CV: Regular rate. Regular rhythm. No mumur or rub. No edema. No JVD. Palpable pedal pulses. GI: Non-tender. Non-distended. No masses. No organmegaly. Normal bowel sounds. No hernia. Skin: developing edema to all ex t  Lymph: No cervical LAD. No supraclavicular LAD. M/S: No cyanosis. No joint deformity. No clubbing. Neuro: sedated    Labs:   Recent Labs     01/22/22  0405 01/22/22  1352 01/23/22  0620 01/23/22  0620 01/23/22  1426 01/23/22  1940 01/24/22  0330   WBC 28.9*  --  42.5*  --   --   --  37.2*   HGB 7.8*   < > 7.3*   < > 6.8* 9.0* 8.3*   HCT 23.6*   < > 22.8*   < > 21.1* 27.1* 25.7*   *  --  111*  --   --   --  85*    < > = values in this interval not displayed. Recent Labs     01/22/22  0405 01/23/22  0620 01/24/22  0330   * 132* 132*   K 5.2* 5.4* 4.7    97* 96*   CO2 28 33* 30   BUN 34* 42* 42*   CREATININE 0.6* 0.6* 0.7*   CALCIUM 7.8* 7.9* 7.7*     Recent Labs     01/22/22  0405 01/23/22  0620 01/24/22  0330   AST 73* 85* 74*   ALT 63* 67* 59*   BILITOT 1.5* 1.6* 2.0*   ALKPHOS 116 156* 149*     Recent Labs     01/23/22  1110   INR 1.09     No results for input(s): CKTOTAL, TROPONINI in the last 72 hours. Urinalysis:      Lab Results   Component Value Date    NITRU Negative 01/20/2022    WBCUA 0-2 01/20/2022    BACTERIA 1+ 07/16/2018    RBCUA 11-20 01/20/2022    BLOODU MODERATE 01/20/2022    SPECGRAV 1.015 01/20/2022    GLUCOSEU Negative 01/20/2022       Radiology:  XR CHEST PORTABLE   Final Result   Improved aeration of the lungs. Bilateral airspace disease remains         XR CHEST PORTABLE   Final Result   Extensive parenchymal infiltrates are seen throughout the lungs bilaterally,   mildly worsened at the right lung base, otherwise similar in appearance. XR CHEST PORTABLE   Final Result   Interval increase left-sided pulmonary opacities representing worsening   edema, atelectasis or pneumonia. Slightly improved aeration in the right   upper lung zone. XR CHEST PORTABLE   Final Result   Stable pattern of diffuse, multifocal airspace opacities. XR CHEST PORTABLE   Final Result   Unchanged multifocal bilateral atypical pneumonia. XR CHEST PORTABLE   Final Result   Unremarkable appearance of an endotracheal tube. Unchanged patchy interstitial lung markings which may reflect atypical   pneumonia or pulmonary edema. XR CHEST PORTABLE   Final Result   1. Endotracheal tube terminates 1.6 cm from the benton. Recommend 2 cm   retraction. 2.  Slightly increased diffuse bilateral airspace and interstitial opacities. The findings were sent to the [facility] Results Communication Team at 12:48 a.m.   on 01/18/2022 to be communicated to a licensed caregiver. XR CHEST PORTABLE   Final Result   Mild interval worsening with moderate infiltrates in the lungs. These are   most pronounced in the right lung. 5 cm indeterminate dense opacity in the midline-right upper abdomen. Correlate for oral contrast in bowel. VL Extremity Venous Bilateral   Final Result      XR CHEST PORTABLE   Final Result   Stable multifocal bilateral lung airspace disease consistent with previously   identified presence of COVID-19 viral pneumonia. Suspected moderate pulmonary interstitial edema. XR CHEST PORTABLE   Final Result   The support apparatus as described above.       Continued increased interstitial opacity throughout both lungs, along with   more focal traits within the periphery the lungs, some combination of   interstitial edema and pneumonia. XR CHEST PORTABLE   Final Result   1. Endotracheal tube projects in appropriate position. 2. Increased bilateral nonspecific pulmonary opacities representing edema,   atelectasis and/or pneumonia. XR CHEST PORTABLE   Final Result   Right PICC projects in normal position. Stable interstitial and ground-glass opacities, most likely atypical   pneumonia. IR PICC WO SQ PORT/PUMP > 5 YEARS   Final Result   Successful placement of PICC line. CT CHEST PULMONARY EMBOLISM W CONTRAST   Final Result   1. No evidence of pulmonary embolism. 2.  Commonly reported imaging features of COVID-19 pneumonia are present. Other processes such as influenza pneumonia and organizing pneumonia, as can   be seen with drug toxicity and connective tissue disease, can cause a similar   imaging pattern. PneTyp   3. Mild compression deformity of T8 of uncertain chronicity, new since   05/29/2020. Similar mild moderate compression deformities at T10, T12.   4. Fatty liver. 5. Interval increase in size of now 2.7 cm fat attenuation lesion of the   pancreatic neck. Recommend further evaluation with nonemergent pancreas   protocol MRI. XR CHEST PORTABLE   Final Result   1. Bilateral interstitial thickening either due to edema or atypical   interstitial pneumonia.          XR CHEST PORTABLE    (Results Pending)           Assessment/Plan:    Active Hospital Problems    Diagnosis     Coronary artery disease [I25.10]      Priority: High    Septic shock ([facility] [address] [A41.9, R65.21]     Anemia [D64.9]     Leukocytosis [D72.829]     Hypertriglyceridemia [E78.1]     Acute hypoxemic respiratory failure (HCC) [J96.01]     ARDS (adult respiratory distress syndrome) (HCC) [J80]     Hyperglycemia [R73.9]     Pulmonary emphysema (HCC) [J43.9]     Pneumonia due to COVID-19 virus [U07.1, J12.82]     Acute hypoxemic respiratory failure due to COVID-19 ([facility] [address] [U07.1, J96.01]     Essential hypertension [I10]     DMII (diabetes mellitus, type 2) (HCC) [E11.9]     GERD (gastroesophageal reflux disease) [K21.9]     HLD (hyperlipidemia) [E78.5]     PAD (peripheral artery disease) ([facility]) [I73.9]               COVID PNA  Acute Hypoxic Respiratory Failure  Worsening leukocytosis  - imaging with bilateral pna pattern  - does not wear O2 at baseline  - Admit to Med Surg, droplet + precautions, tele  - supp O2, progressive hypoxemia, now on vent support  -Intubated 1/14 , proning as needed  - on  Decadron D#15, now at 6 mg ,  completed 5 days of  Remdesivir, s/p Tocilizumab   - worsening wbc and hypoxia, now at 95 % fio2, maxed out on ventilator settings. PEEP of 16.  - started  cefepime and vancomycin D4. Stop cefepime. Start Merrem due to worsening leukocytosis. Add voriconazole for possible fungal infection. - bloody secretions in ETT. Can hold PLAVIX   - pulmonary consulted   - lovenox changed to heparin gtt for possibility of PE  - hold heparin gtt     Hypotension - likely sepsis   -Was on cefepime and Vanco.  Cefepime stopped. [facility] started. Voriconazole added. - sp fluid boluses - on levophed now   - wean as able     Thrombocytopenia - plt dropped to 85, HIT panel sent     Anemia - multifactorial   - monitor and transfuse PRBC less than 7.  - ordered one unit of PRBC 1/23  - monitor hb     Abnormal lFT - likely from viral infection   Monitor, stable      CAD - stable. No CP.  Hold Plavix for bloody secretions        PAD stable     DM type 2  Hyperglycemia with steroids, on ssi     HLD On Lipitor.         DVT prophylaxis:  heparin gtt  Diet: Diet NPO  ADULT TUBE FEEDING; Orogastric; Peptide Based; Continuous; 15; No; 30; Q 4 hours; Protein; one proteinex P2Go TWICE daily via feeding tube  Code Status: Full Code    PT/OT Eval Status: not indicated    Dispo - cont care in ICU    Yissel Javier MD

## 2022-01-24 NOTE — PROGRESS NOTES
01/24/22 0340   Vent Information   Vent Type 840   Vent Mode AC/VC   Vt Ordered 340 mL   Rate Set 36 bmp   Peak Flow 60 L/min   Pressure Support 0 cmH20   FiO2  95 %   SpO2 97 %   SpO2/FiO2 ratio 102.11   Sensitivity 3   PEEP/CPAP 16   Humidification Source Heated wire   Humidification Temp Measured 37   Circuit Condensation Drained   Vent Patient Data   Peak Inspiratory Pressure 34 cmH2O   Mean Airway Pressure 23 cmH20   Rate Measured 36 br/min   Vt Exhaled 367 mL   Minute Volume 13.1 Liters   I:E Ratio 1:1.70   Cough/Sputum   Sputum How Obtained Endotracheal   Cough Productive   Sputum Amount Small   Sputum Color Dark red   Tenacity Thick   Spontaneous Breathing Trial (SBT) RT Doc   Pulse 89   Breath Sounds   Right Upper Lobe Diminished   Right Middle Lobe Diminished   Right Lower Lobe Diminished   Left Upper Lobe Diminished   Left Lower Lobe Diminished   Additional Respiratory  Assessments   Resp (!) 36   Alarm Settings   High Pressure Alarm 50 cmH2O   Low Minute Volume Alarm 4 L/min   Apnea (secs) 20 secs   High Respiratory Rate 45 br/min   Low Exhaled Vt  200 mL   Patient Observation   Observations 8ett 24 at lip

## 2022-01-24 NOTE — PROGRESS NOTES
Dr. Maribel Wu at bedside    -increase valium  -d/c vancomycin  -check HIT panel  -trophic TF if tolerates  -titrate down/off nimbex if able  -supine & CXR    Abimael Wesley RN, BSN

## 2022-01-25 NOTE — PROGRESS NOTES
This note also relates to the following rows which could not be included:  Vt Ordered - Cannot attach notes to unvalidated device data  Rate Set - Cannot attach notes to unvalidated device data  Peak Flow - Cannot attach notes to unvalidated device data  Pressure Support - Cannot attach notes to unvalidated device data  Sensitivity - Cannot attach notes to unvalidated device data  PEEP/CPAP - Cannot attach notes to unvalidated device data  Peak Inspiratory Pressure - Cannot attach notes to unvalidated device data  Mean Airway Pressure - Cannot attach notes to unvalidated device data  Rate Measured - Cannot attach notes to unvalidated device data  Vt Exhaled - Cannot attach notes to unvalidated device data  Minute Volume - Cannot attach notes to unvalidated device data  I:E Ratio - Cannot attach notes to unvalidated device data  Pulse - Cannot attach notes to unvalidated device data  High Pressure Alarm - Cannot attach notes to unvalidated device data  Low Minute Volume Alarm - Cannot attach notes to unvalidated device data  High Respiratory Rate - Cannot attach notes to unvalidated device data       01/25/22 0246   Vent Information   $Ventilation $Subsequent Day   Vent Type 840   Vent Mode AC/VC   FiO2  90 %   SpO2 93 %   SpO2/FiO2 ratio 103.33   Humidification Source Heated wire   Humidification Temp 37   Humidification Temp Measured 36.8   Circuit Condensation Drained   Vent Patient Data   Plateau Pressure 30 VYW15   Cough/Sputum   Sputum How Obtained Endotracheal;Suctioned   Cough Productive   Sputum Amount Small   Sputum Color Dark red   Tenacity Thick   Breath Sounds   Right Upper Lobe Diminished   Right Middle Lobe Diminished   Right Lower Lobe Diminished   Left Upper Lobe Diminished   Left Lower Lobe Diminished   Additional Respiratory  Assessments   Resp (!) 0   Position Prone   Alarm Settings   Apnea (secs) 20 secs   Low Exhaled Vt  200 mL   ETT (adult)   Placement Date/Time: 01/14/22 7069   Timeout: Patient;Procedure; Appropriate Equipment;Correct Position  Preoxygenation: Yes  Mask Ventilation: Ventilated by mask (1)  Technique: Rapid sequence  Type: Cuffed  Tube Size: 8 mm  Laryngoscope: GlideScope  . ..    Secured at 24 cm   Measured From [address]   Site Condition Dry

## 2022-01-25 NOTE — PROGRESS NOTES
RT Inhaler-Nebulizer Bronchodilator Protocol Note    There is a bronchodilator order in the chart from a provider indicating to follow the RT Bronchodilator Protocol and there is an Initiate RT Inhaler-Nebulizer Bronchodilator Protocol order as well (see protocol at bottom of note). CXR Findings:  XR CHEST PORTABLE    Result Date: 1/25/2022  Line and tubes as above. Stable bilateral interstitial opacities. XR CHEST PORTABLE    Result Date: 1/24/2022  Improved aeration of the lungs. Bilateral airspace disease remains       The findings from the last RT Protocol Assessment were as follows:   History Pulmonary Disease: (P) Chronic pulmonary disease  Respiratory Pattern: (P) Severe use of accessory muscle or RR>30 bpm  Breath Sounds: (P) Slightly diminished and/or crackles  Cough: (P) Weak, non-productive  Indication for Bronchodilator Therapy: (P) Decreased or absent breath sounds  Bronchodilator Assessment Score: (P) 15    Aerosolized bronchodilator medication orders have been revised according to the RT Inhaler-Nebulizer Bronchodilator Protocol below. Respiratory Therapist to perform RT Therapy Protocol Assessment initially then follow the protocol. Repeat RT Therapy Protocol Assessment PRN for score 0-3 or on second treatment, BID, and PRN for scores above 3. No Indications - adjust the frequency to every 6 hours PRN wheezing or bronchospasm, if no treatments needed after 48 hours then discontinue using Per Protocol order mode. If indication present, adjust the RT bronchodilator orders based on the Bronchodilator Assessment Score as indicated below. Use Inhaler orders unless patient has one or more of the following: on home nebulizer, not able to hold breath for 10 seconds, is not alert and oriented, cannot activate and use MDI correctly, or respiratory rate 25 breaths per minute or more, then use the equivalent nebulizer order(s) with same Frequency and PRN reasons based on the score.   If a patient is on this medication at home then do not decrease Frequency below that used at home. 0-3 - enter or revise RT bronchodilator order(s) to equivalent RT Bronchodilator order with Frequency of every 4 hours PRN for wheezing or increased work of breathing using Per Protocol order mode. 4-6 - enter or revise RT Bronchodilator order(s) to two equivalent RT bronchodilator orders with one order with BID Frequency and one order with Frequency of every 4 hours PRN wheezing or increased work of breathing using Per Protocol order mode. 7-10 - enter or revise RT Bronchodilator order(s) to two equivalent RT bronchodilator orders with one order with TID Frequency and one order with Frequency of every 4 hours PRN wheezing or increased work of breathing using Per Protocol order mode. 11-13 - enter or revise RT Bronchodilator order(s) to one equivalent RT bronchodilator order with QID Frequency and an Albuterol order with Frequency of every 4 hours PRN wheezing or increased work of breathing using Per Protocol order mode. Greater than 13 - enter or revise RT Bronchodilator order(s) to one equivalent RT bronchodilator order with every 4 hours Frequency and an Albuterol order with Frequency of every 2 hours PRN wheezing or increased work of breathing using Per Protocol order mode.          Electronically signed by Justin Woo RCP on 1/25/2022 at 3:12 PM

## 2022-01-25 NOTE — PROGRESS NOTES
01/25/22 1841   Vent Information   Vent Type 840   Vent Mode AC/VC   Vt Ordered 340 mL   Rate Set 36 bmp   Peak Flow 55 L/min   Pressure Support 0 cmH20   FiO2  100 %   SpO2 92 %   SpO2/FiO2 ratio 92   Sensitivity 3   PEEP/CPAP 14   Humidification Source Heated wire   Humidification Temp 37   Humidification Temp Measured 36.8   Circuit Condensation Drained   Vent Patient Data   Peak Inspiratory Pressure 27 cmH2O   Mean Airway Pressure 20 cmH20   Rate Measured 36 br/min   Vt Exhaled 350 mL   Minute Volume 12.8 Liters   I:E Ratio 1:1.50   Plateau Pressure 26 YHX29   Static Compliance 29 mL/cmH2O   Dynamic Compliance 23 mL/cmH2O   Cough/Sputum   Sputum How Obtained Endotracheal;Suctioned   Cough Productive   Sputum Amount Small   Sputum Color Dark red   Tenacity Thick   Spontaneous Breathing Trial (SBT) RT Doc   Pulse 89   Breath Sounds   Right Upper Lobe Diminished   Right Middle Lobe Diminished   Right Lower Lobe Diminished   Left Upper Lobe Diminished   Left Lower Lobe Diminished   Additional Respiratory  Assessments   Resp (!) 36   Position Prone   Alarm Settings   High Pressure Alarm 50 cmH2O   Low Minute Volume Alarm 4 L/min   Apnea (secs) 20 secs   High Respiratory Rate 45 br/min   Low Exhaled Vt  200 mL   ETT (adult)   Placement Date/Time: 01/14/22 1503   Timeout: Patient;Procedure; Appropriate Equipment;Correct Position  Preoxygenation: Yes  Mask Ventilation: Ventilated by mask (1)  Technique: Rapid sequence  Type: Cuffed  Tube Size: 8 mm  Laryngoscope: GlideScope  . ..    Secured at 24 cm   Measured From [address] [place] [address],[address]   Site Condition Dry

## 2022-01-25 NOTE — PROGRESS NOTES
Pulmonary & Critical Care Medicine ICU Progress Note    CC: COVID-19 pneumonia in an unvaccinated individual    Events of Last 24 hours:   Heparin drip   Proned overnight   Levophed 7  Fentanyl 200  Versed 6  Nimbex off   Propofol off  PEEP 12  FiO2 80%         Invasive Lines: PICC placed 1/10/22    MV:  1/14/22-  Vent Mode: AC/VC Rate Set: 36 bmp/Vt Ordered: 340 mL/ /FiO2 : 90 %  Recent Labs     01/23/22  0525 01/24/22  0333   PHART 7.336* 7.305*   SLC7QCY 54.8* 59.7*   PO2ART 71.8* 62.6*       IV:   sodium chloride      heparin 25,000 units in dextrose 5% 250 mL infusion 500 Units/hr (01/23/22 1721)    cisatracurium (NIMBEX) infusion Stopped (01/24/22 1749)    propofol      sodium chloride      sodium chloride 500 mL/hr at 01/20/22 1925    midazolam 8 mg/hr (01/24/22 1752)    dextrose      fentaNYL 200 mcg/hr (01/25/22 0441)    norepinephrine 8 mcg/min (01/24/22 1752)    sodium chloride         Vitals:  /65   Pulse 88   Temp 97.7 °F (36.5 °C) (Axillary)   Resp (!) 36   Ht 5' 2\" (1.575 m)   Wt 167 lb 4.8 oz (75.9 kg)   SpO2 (!) 88%   BMI 30.60 kg/m²   on Vent AC 36/340    Intake/Output Summary (Last 24 hours) at 1/25/2022 0701  Last data filed at 1/25/2022 0645  Gross per 24 hour   Intake 2144.97 ml   Output 1700 ml   Net 444.97 ml     EXAM: limited due to prone ventilation   General: ill appearing. Intubated sedated. Eyes: PERRL. No sclera icterus. + conjunctival injection. ENT: ET tube in place  Neck: Trachea midline  Resp: No accessory muscle use. Few crackles. No wheezing. Few rhonchi. No dullness on percussion. CV: Regular rate. Regular rhythm. 1+ LE edema. GI: Proned not able to assess   Skin: Warm and dry. No nodule on exposed extremities. No rash on exposed extremities  Lymph: No cervical LAD. No supraclavicular LAD. M/S: No cyanosis. No joint deformity. No clubbing. Neuro: Intubated sedated. No response to painful stimuli.    Psych: Noncommunicative unable to obtain      Scheduled Meds:   diazePAM  20 mg Oral Q8H    meropenem  1,000 mg IntraVENous Q8H    voriconazole  300 mg IntraVENous Q12H    pantoprazole  40 mg IntraVENous BID    insulin glargine  25 Units SubCUTAneous QAM    polyethylene glycol  17 g Oral Daily    dexamethasone  6 mg IntraVENous Daily    senna  2 tablet Oral BID    And    docusate  100 mg Oral BID    Venelex   Topical BID    insulin lispro  0-18 Units SubCUTAneous Q4H    ipratropium-albuterol  1 ampule Inhalation Q4H    [Held by provider] atorvastatin  40 mg Oral Nightly    omega-3 acid ethyl esters  2 g Oral BID    [Held by provider] clopidogrel  75 mg Oral Daily    aspirin  81 mg Oral Daily    sodium chloride flush  5-40 mL IntraVENous 2 times per day    Vitamin D  2,000 Units Oral Daily     PRN Meds:  sodium chloride, heparin (porcine), heparin (porcine), sodium chloride, sodium chloride, albuterol, glucose, dextrose, glucagon (rDNA), dextrose, midazolam, fentanNYL, melatonin, nitroGLYCERIN, sodium chloride flush, sodium chloride, ondansetron **OR** ondansetron, polyethylene glycol, acetaminophen **OR** acetaminophen, potassium chloride, magnesium sulfate    Results:  CBC:   Recent Labs     01/23/22  0620 01/23/22  1426 01/24/22  0330 01/25/22  0014 01/25/22  0619   WBC 42.5*  --  37.2*  --  33.8*   HGB 7.3*   < > 8.3* 8.1* 7.9*   HCT 22.8*   < > 25.7* 24.8* 24.5*   MCV 88.6  --  87.6  --  89.7   *  --  85*  --  69*    < > = values in this interval not displayed.      BMP:   Recent Labs     01/23/22  0620 01/24/22  0330   * 132*   K 5.4* 4.7   CL 97* 96*   CO2 33* 30   BUN 42* 42*   CREATININE 0.6* 0.7*     LIVER PROFILE:   Recent Labs     01/23/22  0620 01/24/22  0330   AST 85* 74*   ALT 67* 59*   BILITOT 1.6* 2.0*   ALKPHOS 156* 149*       Micro  1/9 BC NGTD  1/9 COVID-19 detected  1/20 BC NGTD   1/20 UC NGTD   1/20 Resp Pseudomonas fluorescens Aspergillus species        Imaging   Chest x-ray 1/25 imaging reviewed by me and showed   High ETT position  Satisfactory PICC line position   Bilateral airspace disease - same     LE dopper 1/17   Within the limits of this exam, there is no evidence of deep or superficial  venous thrombosis in the lower extremities bilaterally. Chronic phlebitic processes involving the left small saphenous vein       ASSESSMENT:  · Acute hypoxemic respiratory failure  · COVID 19 pneumonia in an unvaccinated individual  · Septic shock  · Severe ARDS  · Invasive aspergillosis   · Bloody respiratory secretions-probably traumatic from suctioning  · Pulmonary emphysema  · Leukocytosis -worsening  · Hypertriglyceridemia   · Thrombocytopenia   · Anemia- no active bleed   · Pancreatic nodule, 2.7 cm increase in size  · DM2 with hyperglycemia   · Elevated LFTS  · Tobacco abuse  · CAD and PAD with h/o STEMI       PLAN:  · Droplet Plus Airborne Precautions   · Mechanical ventilation as per my orders. The ventilator was adjusted by me at the bedside for unstable, life threatening respiratory failure. · Advance ETT 1 cm   · IV Versed/Fentanyl for sedation, target RASS -52, with daily spontaneous awakening trial.  Fentanyl PRN pain, gtt as needed  · Continue Valium 20 TID   · Nimbex for ventilator dyssynchrony - trial off today   · Head of bed 30 degrees or higher at all times  · Prone 1/14/22-  · Decadron QD, D#17--> 6 mg, monitor glucose closely  · S/p Remdesevir D#5 and Tocilizumab received 1/9/22  · Meropenem D#3 and Voriconazole D#3.  Completed 4 days of Cefepime and 5 days of Vanc  · Change Meropenem to Levaquin   · Follow up Cx   · IV Levophed to keep MAP > 65 mmHg or SBP>90  · TFs - trophic today   · On ASA - Holding Plavix given and anemia and bloody secretions   · Follow LFTs- hold lipitor   · Check HIT  · BM regiment   · Monitor H&H and transfuse for hemoglobin less than 7  · Blood sugar control ISS,  lantus 25 with goal 150-180  · GI prophylaxis: Protonix BID   · DVT prophylaxis: change Heparin  to Arixtra   · MRSA prophylaxis: Bactroban  · Very poor prognosis. Discussed with family at the bedside and deciding on code status           Total critical care time caring for this patient with life threatening, unstable organ failure, including direct patient contact, management of life support systems, review of data including imaging and labs, discussions with other team members and physicians at least 31 minutes so far today, excluding procedures.

## 2022-01-25 NOTE — PROGRESS NOTES
Spoke w/ Dr. Moise Mott in regards to increased o2 demands on ventailtor. pt 87-89% on fio2 100% peep +14.  MD states to restart paralytic   Sheron Lay RN, BSN

## 2022-01-25 NOTE — PROGRESS NOTES
01/24/22 2249   Vent Information   Vent Type 840   Vent Mode AC/VC   Vt Ordered 340 mL   Rate Set 36 bmp   Peak Flow 55 L/min   Pressure Support 0 cmH20   FiO2  85 %  (increased to 90)   SpO2 (!) 88 %   SpO2/FiO2 ratio 103.53   Sensitivity 3   PEEP/CPAP 16   Humidification Source Heated wire   Humidification Temp 37   Humidification Temp Measured 36.8   Circuit Condensation Drained   Vent Patient Data   Peak Inspiratory Pressure 27 cmH2O   Mean Airway Pressure 22 cmH20   Rate Measured 36 br/min   Vt Exhaled 320 mL   Minute Volume 14 Liters   I:E Ratio 1:1.50   Plateau Pressure 30 ZJO10   Cough/Sputum   Sputum How Obtained Endotracheal;Suctioned   Cough Productive   Sputum Amount Small   Sputum Color Dark red   Tenacity Mucous plugs   Spontaneous Breathing Trial (SBT) RT Doc   Pulse 95   Breath Sounds   Right Upper Lobe Diminished   Right Middle Lobe Diminished   Right Lower Lobe Diminished   Left Upper Lobe Diminished   Left Lower Lobe Diminished   Additional Respiratory  Assessments   Resp (!) 0   Position Prone   Alarm Settings   High Pressure Alarm 50 cmH2O   Low Minute Volume Alarm 4 L/min   Apnea (secs) 20 secs   High Respiratory Rate 45 br/min   Low Exhaled Vt  200 mL   ETT (adult)   Placement Date/Time: 01/14/22 1503   Timeout: Patient;Procedure; Appropriate Equipment;Correct Position  Preoxygenation: Yes  Mask Ventilation: Ventilated by mask (1)  Technique: Rapid sequence  Type: Cuffed  Tube Size: 8 mm  Laryngoscope: GlideScope  . ..    Secured at 24 cm   Measured From [address] [place] [address],[address] By [employer] tube oliveros   Site Condition Dry

## 2022-01-25 NOTE — PROGRESS NOTES
AM assessment completed. AM labs reviewed. VSS. Pt on levophed. Pt intubated, sedated & in prone positioning. Navarro in place- patent & secured. No new orders at present time.   Rosa Jimenez RN, BSN

## 2022-01-25 NOTE — PROGRESS NOTES
Pharmacy - RE:  High-dose Heparin drip  Current rate = 500 units/h  (5 ml/h)  Anti-Xa drawn 1/25 @0619  = 0.44 IU/ml   Goal = 0.3 - 0.7 IU/ml  Per protocol, continue same drip rate of 500 units/h (5 ml/h). Daily anti-Xa draws. Next anti-Xa due 1/25 with AM lab draws.     Booker Holland, RP

## 2022-01-25 NOTE — PROGRESS NOTES
01/24/22 1858   Vent Information   Vent Type 840   Vent Mode AC/VC   Vt Ordered 340 mL   Rate Set 36 bmp   Peak Flow 55 L/min   Pressure Support 0 cmH20   FiO2  85 %   SpO2 91 %   SpO2/FiO2 ratio 107.06   Sensitivity 3   PEEP/CPAP 16   Humidification Source Heated wire   Humidification Temp 37   Humidification Temp Measured 36.9   Circuit Condensation Drained   Vent Patient Data   Peak Inspiratory Pressure 28 cmH2O   Mean Airway Pressure 22 cmH20   Rate Measured 36 br/min   Vt Exhaled 377 mL   Minute Volume 13.3 Liters   I:E Ratio 1:1.50   Plateau Pressure 26 ALV84   Static Compliance 38 mL/cmH2O   Dynamic Compliance 31 mL/cmH2O   Cough/Sputum   Sputum How Obtained Endotracheal;Suctioned   Cough Productive   Sputum Amount Moderate   Sputum Color Red   Tenacity Mucous plugs   Spontaneous Breathing Trial (SBT) RT Doc   Pulse 101   Breath Sounds   Right Upper Lobe Diminished   Right Middle Lobe Diminished   Right Lower Lobe Diminished   Left Upper Lobe Diminished   Left Lower Lobe Diminished   Additional Respiratory  Assessments   Resp (!) 0   Position Prone   Alarm Settings   High Pressure Alarm 50 cmH2O   Low Minute Volume Alarm 4 L/min   Apnea (secs) 20 secs   High Respiratory Rate 45 br/min   Low Exhaled Vt  200 mL   ETT (adult)   Placement Date/Time: 01/14/22 1503   Timeout: Patient;Procedure; Appropriate Equipment;Correct Position  Preoxygenation: Yes  Mask Ventilation: Ventilated by mask (1)  Technique: Rapid sequence  Type: Cuffed  Tube Size: 8 mm  Laryngoscope: GlideScope  . ..    Secured at 24 cm   Measured From [address] [place] [address],[address] By [employer] tube oliveros   Site Condition Dry

## 2022-01-26 NOTE — PROGRESS NOTES
Pt intubated and sedated. Intubated with # 8 at the # 25 LL. Vent settings are AC 36 / 340 / 100% / 16. Breath sounds are diminished. Heart rhythm is ST on the bedside monitor with rates in the 120s-130s. Generalized edema noted. Right elbow very swollen. Area is soft with no hard nodules noted. Will make mention of this to MD with rounds this AM.     Scheduled meds given per orders. OG at 63cm. TF running at goal rate of 15ml/hr. Pt tolerating well. RUE PICC patent and working well. Versed infusing. Fetanyl infusing. Nimbex infusing. Levophed infusing. RASS -5. Pupils are round, equal, and reactive to light. CPOT 0. Navarro is patent and draining well. Urine is seven and clear.

## 2022-01-26 NOTE — PLAN OF CARE
Problem: Infection - Central Venous Catheter-Associated Bloodstream Infection:  Goal: Will show no infection signs and symptoms  Description: Will show no infection signs and symptoms  Outcome: Ongoing     Problem: Airway Clearance - Ineffective  Goal: Achieve or maintain patent airway  Outcome: Ongoing     Problem: Gas Exchange - Impaired  Goal: Absence of hypoxia  Outcome: Ongoing  Goal: Promote optimal lung function  Outcome: Ongoing     Problem: Breathing Pattern - Ineffective  Goal: Ability to achieve and maintain a regular respiratory rate  Outcome: Ongoing     Problem:  Body Temperature -  Risk of, Imbalanced  Goal: Ability to maintain a body temperature within defined limits  Outcome: Ongoing  Goal: Will regain or maintain usual level of consciousness  Outcome: Ongoing  Goal: Complications related to the disease process, condition or treatment will be avoided or minimized  Outcome: Ongoing     Problem: Isolation Precautions - Risk of Spread of Infection  Goal: Prevent transmission of infection  Outcome: Ongoing     Problem: Nutrition Deficits  Goal: Optimize nutritional status  Outcome: Ongoing     Problem: Risk for Fluid Volume Deficit  Goal: Maintain normal heart rhythm  Outcome: Ongoing  Goal: Maintain absence of muscle cramping  Outcome: Ongoing  Goal: Maintain normal serum potassium, sodium, calcium, phosphorus, and pH  Outcome: Ongoing     Problem: Loneliness or Risk for Loneliness  Goal: Demonstrate positive use of time alone when socialization is not possible  Outcome: Ongoing     Problem: Fatigue  Goal: Verbalize increase energy and improved vitality  Outcome: Ongoing     Problem: Patient Education: Go to Patient Education Activity  Goal: Patient/Family Education  Outcome: Ongoing     Problem: Skin Integrity:  Goal: Absence of new skin breakdown  Description: Absence of new skin breakdown  Outcome: Ongoing     Problem: Falls - Risk of:  Goal: Will remain free from falls  Description: Will remain free from falls  Outcome: Ongoing  Goal: Absence of physical injury  Description: Absence of physical injury  Outcome: Ongoing     Problem: Non-Violent Restraints  Goal: Removal from restraints as soon as assessed to be safe  Outcome: Ongoing  Goal: No harm/injury to patient while restraints in use  Outcome: Ongoing  Goal: Patient's dignity will be maintained  Outcome: Ongoing     Problem: Nutrition  Goal: Optimal nutrition therapy  Outcome: Ongoing  Goal: Understanding of nutritional guidelines  Outcome: Ongoing   Pt prone in bed, tolerating vent settings, IV's infusing, yeh patent, TF infusing, no s/s of distress.

## 2022-01-26 NOTE — PROGRESS NOTES
Hospitalist Progress Note      PCP: Bhavesh Edwards    Date of Admission: 1/9/2022    Admitted with Covid pneumonia and acute hypoxic respiratory failure    Subjective: cont to be intubated with high O2 requirement at 100%FiO2 and 14 PEEP  Proned   Continues to be tachycardic  On Levophed    Medications:  Reviewed    Infusion Medications    sodium chloride      cisatracurium (NIMBEX) infusion 1 mcg/kg/min (01/26/22 1066)    propofol      sodium chloride      sodium chloride 500 mL/hr at 01/20/22 1925    midazolam 8 mg/hr (01/26/22 0942)    dextrose      fentaNYL 200 mcg/hr (01/26/22 1120)    norepinephrine 8 mcg/min (01/24/22 1752)    sodium chloride       Scheduled Medications    levofloxacin  750 mg IntraVENous Q24H    fondaparinux  2.5 mg SubCUTAneous Daily    diazePAM  20 mg Oral Q8H    voriconazole  300 mg IntraVENous Q12H    pantoprazole  40 mg IntraVENous BID    insulin glargine  25 Units SubCUTAneous QAM    polyethylene glycol  17 g Oral Daily    dexamethasone  6 mg IntraVENous Daily    senna  2 tablet Oral BID    And    docusate  100 mg Oral BID    Venelex   Topical BID    insulin lispro  0-18 Units SubCUTAneous Q4H    ipratropium-albuterol  1 ampule Inhalation Q4H    [Held by provider] atorvastatin  40 mg Oral Nightly    omega-3 acid ethyl esters  2 g Oral BID    [Held by provider] clopidogrel  75 mg Oral Daily    aspirin  81 mg Oral Daily    sodium chloride flush  5-40 mL IntraVENous 2 times per day    Vitamin D  2,000 Units Oral Daily     PRN Meds: sodium chloride, sodium chloride, sodium chloride, albuterol, glucose, dextrose, glucagon (rDNA), dextrose, midazolam, fentanNYL, melatonin, nitroGLYCERIN, sodium chloride flush, sodium chloride, ondansetron **OR** ondansetron, polyethylene glycol, acetaminophen **OR** acetaminophen, potassium chloride, magnesium sulfate      Intake/Output Summary (Last 24 hours) at 1/26/2022 1139  Last data filed at 1/26/2022 1138  Gross per 24 hour   Intake 3814.38 ml   Output 1755 ml   Net 2059.38 ml       Physical Exam Performed:    /60   Pulse 127   Temp 99.4 °F (37.4 °C)   Resp 23   Ht 5' 2\" (1.575 m)   Wt 161 lb 9.6 oz (73.3 kg)   SpO2 (!) 89%   BMI 29.56 kg/m²       General:  Middle aged male, old appearing.  Intubated and sedated.  Prone  Eyes:  No sclera icterus. No conjunctiva injected. ENT - oral ETT and OG noted . Neck: Trachea midline. Normal thyroid. Resp-  jose + crackles. No wheezing. No rhonchi. CV: Regular rate. Regular rhythm. No mumur or rub. No edema. No JVD. Palpable pedal pulses. GI: Non-tender. Non-distended. No masses. No organmegaly. Normal bowel sounds. No hernia. Skin: developing edema to all ex t  Lymph: No cervical LAD. No supraclavicular LAD. M/S: No cyanosis. No joint deformity. No clubbing. Neuro: sedated    Labs:   Recent Labs     01/24/22  0330 01/25/22  0014 01/25/22  0619 01/25/22  0619 01/25/22  1445 01/25/22  2310 01/26/22  0600   WBC 37.2*  --  33.8*  --   --   --  31.7*   HGB 8.3*   < > 7.9*   < > 7.5* 7.7* 8.2*   HCT 25.7*   < > 24.5*   < > 23.5* 24.3* 26.4*   PLT 85*  --  69*  --   --   --  75*    < > = values in this interval not displayed. Recent Labs     01/24/22  0330 01/24/22  0330 01/25/22  0619 01/26/22  0600   *  --  134* 136   K 4.7   < > 5.2* 5.6*  5.6*   CL 96*  --  98* 99   CO2 30  --  31 33*   BUN 42*  --  42* 38*   CREATININE 0.7*  --  0.6* 0.6*   CALCIUM 7.7*  --  8.0* 8.3    < > = values in this interval not displayed. Recent Labs     01/24/22  0330 01/25/22  0619 01/26/22  0600   AST 74* 89* 93*   ALT 59* 62* 67*   BILITOT 2.0* 2.0* 2.2*   ALKPHOS 149* 149* 186*     No results for input(s): INR in the last 72 hours. No results for input(s): [employer] in the last 72 hours.     Urinalysis:      Lab Results   Component Value Date    NITRU Negative 01/20/2022    WBCUA 0-2 01/20/2022    BACTERIA 1+ 07/16/2018    RBCUA 11-20 01/20/2022    BLOODU MODERATE 01/20/2022    SPECGRAV 1.015 01/20/2022    GLUCOSEU Negative 01/20/2022       Radiology:  XR CHEST PORTABLE   Final Result   Line and tubes as above. Stable bilateral interstitial opacities. XR CHEST PORTABLE   Final Result   Improved aeration of the lungs. Bilateral airspace disease remains         XR CHEST PORTABLE   Final Result   Extensive parenchymal infiltrates are seen throughout the lungs bilaterally,   mildly worsened at the right lung base, otherwise similar in appearance. XR CHEST PORTABLE   Final Result   Interval increase left-sided pulmonary opacities representing worsening   edema, atelectasis or pneumonia. Slightly improved aeration in the right   upper lung zone. XR CHEST PORTABLE   Final Result   Stable pattern of diffuse, multifocal airspace opacities. XR CHEST PORTABLE   Final Result   Unchanged multifocal bilateral atypical pneumonia. XR CHEST PORTABLE   Final Result   Unremarkable appearance of an endotracheal tube. Unchanged patchy interstitial lung markings which may reflect atypical   pneumonia or pulmonary edema. XR CHEST PORTABLE   Final Result   1. Endotracheal tube terminates 1.6 cm from the benton. Recommend 2 cm   retraction. 2.  Slightly increased diffuse bilateral airspace and interstitial opacities. The findings were sent to the [facility] Results Communication Team at 12:48 a.m.   on 01/18/2022 to be communicated to a licensed caregiver. XR CHEST PORTABLE   Final Result   Mild interval worsening with moderate infiltrates in the lungs. These are   most pronounced in the right lung. 5 cm indeterminate dense opacity in the midline-right upper abdomen. Correlate for oral contrast in bowel.          VL Extremity Venous Bilateral   Final Result      XR CHEST PORTABLE   Final Result   Stable multifocal bilateral lung airspace disease consistent with previously   identified presence of COVID-19 viral pneumonia. Suspected moderate pulmonary interstitial edema. XR CHEST PORTABLE   Final Result   The support apparatus as described above. Continued increased interstitial opacity throughout both lungs, along with   more focal traits within the periphery the lungs, some combination of   interstitial edema and pneumonia. XR CHEST PORTABLE   Final Result   1. Endotracheal tube projects in appropriate position. 2. Increased bilateral nonspecific pulmonary opacities representing edema,   atelectasis and/or pneumonia. XR CHEST PORTABLE   Final Result   Right PICC projects in normal position. Stable interstitial and ground-glass opacities, most likely atypical   pneumonia. IR PICC WO SQ PORT/PUMP > 5 YEARS   Final Result   Successful placement of PICC line. CT CHEST PULMONARY EMBOLISM W CONTRAST   Final Result   1. No evidence of pulmonary embolism. 2.  Commonly reported imaging features of COVID-19 pneumonia are present. Other processes such as influenza pneumonia and organizing pneumonia, as can   be seen with drug toxicity and connective tissue disease, can cause a similar   imaging pattern. PneTyp   3. Mild compression deformity of T8 of uncertain chronicity, new since   05/29/2020. Similar mild moderate compression deformities at T10, T12.   4. Fatty liver. 5. Interval increase in size of now 2.7 cm fat attenuation lesion of the   pancreatic neck. Recommend further evaluation with nonemergent pancreas   protocol MRI. XR CHEST PORTABLE   Final Result   1. Bilateral interstitial thickening either due to edema or atypical   interstitial pneumonia.          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]     Septic shock ([facility] [A41.9, R65.21]     Anemia [D64.9]     Leukocytosis [D72.829]     Hypertriglyceridemia [E78.1]     Acute hypoxemic respiratory failure ([facility]) [J96.01]     ARDS (adult respiratory distress syndrome) ([facility]) [J80]     Hyperglycemia [R73.9]     Pulmonary emphysema ([facility]) [J43.9]     Pneumonia due to COVID-19 virus [U07.1, J12.82]     Acute hypoxemic respiratory failure due to COVID-19 ([facility]) [U07.1, J96.01]     Essential hypertension [I10]     DMII (diabetes mellitus, type 2) ([facility]) [E11.9]     GERD (gastroesophageal reflux disease) [K21.9]     HLD (hyperlipidemia) [E78.5]     PAD (peripheral artery disease) ([facility]) [I73.9]            COVID PNA  Acute Hypoxic Respiratory Failure  - imaging with bilateral pna pattern  - does not wear O2 at baseline  - Admit to Med Surg, droplet + precautions, tele  - supp O2, progressive hypoxemia, now on vent support  -Intubated 1/14 , proning as needed  - on  Decadron D#18, now at 6 mg ,  completed 5 days of  Remdesivir, s/p Tocilizumab   - worsening wbc and hypoxia, now at 95 % fio2, maxed out on ventilator settings. PEEP of 16.  - started  cefepime and vancomycin D4.  Now on Levaquin day #2 add voriconazole day #4 for possible fungal infection.    - bloody secretions in ETT. hold PLAVIX   - pulmonary consulted   - lovenox changed to heparin gtt for possibility of PE  - hold heparin gtt  Lasix 40 mg IV x1     Hypotension - likely sepsis   -Was on cefepime and Vanco.  Cefepime stopped.  Merrem started.  Voriconazole added. - sp fluid boluses - on levophed now   - wean as able   As persistent tachycardia     Thrombocytopenia - plt dropped to 85--> 75, HIT panel sent    Hyperkalemia. Kayexalate     Anemia - multifactorial   - monitor and transfuse PRBC less than 7.  - ordered one unit of PRBC 1/23  - monitor hb     Abnormal lFT - likely from viral infection   Monitor, stable      CAD - stable. No CP.  Hold Plavix for bloody secretions        PAD stable     DM type 2  Hyperglycemia with steroids, on ssi     HLD On Lipitor.           DVT prophylaxis:  fondaparinaux  Diet: Diet NPO  ADULT TUBE FEEDING; Orogastric; Peptide Based; Continuous; 15; No; 30; Q 4 hours; Protein; one proteinex P2Go TWICE daily via feeding tube  Code Status: Full Code    PT/OT Eval Status: not indicated    Dispo - poor prognosis, planning family discussion regarding plan of care    Izzy Cisneros MD

## 2022-01-26 NOTE — PROGRESS NOTES
Pulmonary & Critical Care Medicine ICU Progress Note    CC: COVID-19 pneumonia in an unvaccinated individual    Events of Last 24 hours:   Proned overnight   Levophed 8  Fentanyl 200  Versed 8  Nimbex 1  Propofol off  PEEP 16---> 18  FiO2 100%  RR 36---> 38  Plateau pressure 26  PaO2/FiO2 66        Invasive Lines: PICC placed 1/10/22    MV:  1/14/22-  Vent Mode: AC/VC Rate Set: 36 bmp/Vt Ordered: 340 mL/ /FiO2 : 100 %  Recent Labs     01/25/22  0650 01/26/22  0600   PHART 7.365 7.302*   LXB0AQO 55.6* 69.7*   PO2ART 64.6* 66.7*       IV:   sodium chloride      cisatracurium (NIMBEX) infusion 1 mcg/kg/min (01/26/22 0623)    propofol      sodium chloride      sodium chloride 500 mL/hr at 01/20/22 1925    midazolam 8 mg/hr (01/25/22 1924)    dextrose      fentaNYL 200 mcg/hr (01/26/22 0551)    norepinephrine 8 mcg/min (01/24/22 1752)    sodium chloride         Vitals:  /62   Pulse 132   Temp 98.5 °F (36.9 °C) (Rectal)   Resp (!) 32   Ht 5' 2\" (1.575 m)   Wt 161 lb 9.6 oz (73.3 kg)   SpO2 (!) 87%   BMI 29.56 kg/m²   on Vent AC 36/340    Intake/Output Summary (Last 24 hours) at 1/26/2022 0703  Last data filed at 1/26/2022 0437  Gross per 24 hour   Intake 2654.38 ml   Output 1575 ml   Net 1079.38 ml     EXAM: limited due to prone ventilation   General: ill appearing. Intubated sedated. Eyes: PERRL. No sclera icterus. + conjunctival injection. ENT: ET tube in place  Neck: Trachea midline  Resp: No accessory muscle use. Few crackles. No wheezing. Few rhonchi. No dullness on percussion. CV: Regular rate. Regular rhythm. 1+ LE edema. GI: Proned not able to assess   Skin: Warm and dry. No nodule on exposed extremities. No rash on exposed extremities  Lymph: No cervical LAD. No supraclavicular LAD. M/S: No cyanosis. No joint deformity. No clubbing. Neuro: Intubated sedated.  paralyzed  Psych: Noncommunicative unable to obtain      Scheduled Meds:   levofloxacin  750 mg IntraVENous Q24H    fondaparinux  2.5 mg SubCUTAneous Daily    diazePAM  20 mg Oral Q8H    voriconazole  300 mg IntraVENous Q12H    pantoprazole  40 mg IntraVENous BID    insulin glargine  25 Units SubCUTAneous QAM    polyethylene glycol  17 g Oral Daily    dexamethasone  6 mg IntraVENous Daily    senna  2 tablet Oral BID    And    docusate  100 mg Oral BID    Venelex   Topical BID    insulin lispro  0-18 Units SubCUTAneous Q4H    ipratropium-albuterol  1 ampule Inhalation Q4H    [Held by provider] atorvastatin  40 mg Oral Nightly    omega-3 acid ethyl esters  2 g Oral BID    [Held by provider] clopidogrel  75 mg Oral Daily    aspirin  81 mg Oral Daily    sodium chloride flush  5-40 mL IntraVENous 2 times per day    Vitamin D  2,000 Units Oral Daily     PRN Meds:  sodium chloride, sodium chloride, sodium chloride, albuterol, glucose, dextrose, glucagon (rDNA), dextrose, midazolam, fentanNYL, melatonin, nitroGLYCERIN, sodium chloride flush, sodium chloride, ondansetron **OR** ondansetron, polyethylene glycol, acetaminophen **OR** acetaminophen, potassium chloride, magnesium sulfate    Results:  CBC:   Recent Labs     01/24/22 0330 01/25/22  0014 01/25/22  0619 01/25/22  0619 01/25/22  1445 01/25/22  2310 01/26/22  0600   WBC 37.2*  --  33.8*  --   --   --  31.7*   HGB 8.3*   < > 7.9*   < > 7.5* 7.7* 8.2*   HCT 25.7*   < > 24.5*   < > 23.5* 24.3* 26.4*   MCV 87.6  --  89.7  --   --   --  93.6   PLT 85*  --  69*  --   --   --  75*    < > = values in this interval not displayed. BMP:   Recent Labs     01/24/22  0330 01/24/22  0330 01/25/22  0619 01/26/22  0600   *  --  134* 136   K 4.7   < > 5.2* 5.6*  5.6*   CL 96*  --  98* 99   CO2 30  --  31 33*   BUN 42*  --  42* 38*   CREATININE 0.7*  --  0.6* 0.6*    < > = values in this interval not displayed.      LIVER PROFILE:   Recent Labs     01/24/22  0330 01/25/22  0619 01/26/22  0600   AST 74* 89* 93*   ALT 59* 62* 67*   BILITOT 2.0* 2.0* 2.2*   ALKPHOS 149* 149* 186*       Micro  1/9 BC NGTD  1/9 COVID-19 detected  1/20 BC NGTD   1/20 UC NGTD   1/20 Resp Pseudomonas fluorescens Aspergillus species        Imaging   Chest x-ray 1/25 imaging reviewed by me and showed   High ETT position  Satisfactory PICC line position   Bilateral airspace disease - same     LE dopper 1/17   Within the limits of this exam, there is no evidence of deep or superficial  venous thrombosis in the lower extremities bilaterally. Chronic phlebitic processes involving the left small saphenous vein       ASSESSMENT:  · Acute hypoxemic respiratory failure  · COVID 19 pneumonia in an unvaccinated individual  · Septic shock  · Severe ARDS  · Invasive aspergillosis   · Pulmonary emphysema  · Hypertriglyceridemia with propofol   · Thrombocytopenia   · Anemia- no active bleed   · Pancreatic nodule, 2.7 cm increase in size  · DM2 with hyperglycemia   · Elevated LFTS  · Tobacco abuse  · CAD and PAD with h/o STEMI       PLAN:  · Droplet Plus Airborne Precautions   · Mechanical ventilation as per my orders. The ventilator was adjusted by me at the bedside for unstable, life threatening respiratory failure. · IV Versed/Fentanyl for sedation, target RASS -52, with daily spontaneous awakening trial.  Fentanyl PRN pain, gtt as needed  · Continue Valium 20 TID   · Nimbex restarted yesterday   · Head of bed 30 degrees or higher at all times  · Prone 1/14/22-  · Decadron QD, D#18--> 6 mg, monitor glucose closely  · S/p Remdesevir D#5 and Tocilizumab received 1/9/22  · Levaquin D#2 and Voriconazole D#4.  Completed 4 days of Cefepime, 3 days Meropenem and 5 days of Vanc  · Follow up Cx   · IV Levophed to keep MAP > 65 mmHg or SBP>90  · TFs - trophic today   · On ASA - Holding Plavix   · Follow LFTs- hold lipitor   · Follow HIT  · BM regiment   · Lasix 40 IV x 1   · Kayexalate rectally and follow K  · Monitor H&H and transfuse for hemoglobin less than 7  · Blood sugar control ISS,  lantus 25 with goal 150-180  · GI prophylaxis: Protonix BID   · DVT prophylaxis: change Heparin  to Arixtra pending HIT  · MRSA prophylaxis: Bactroban  · Very poor prognosis          Total critical care time caring for this patient with life threatening, unstable organ failure, including direct patient contact, management of life support systems, review of data including imaging and labs, discussions with other team members and physicians at least 31 minutes so far today, excluding procedures.

## 2022-01-26 NOTE — PROGRESS NOTES
Reassessment completed at this time. No major changes. Wife here at this time and updated on pt condition.

## 2022-01-26 NOTE — PROGRESS NOTES
HR now getting up to 150s and suctioned large amount of dark red blood out of ETT. Per Dr. Ksenia Abbott, go ahead and get CXR while pt is prone and give 50mcg of IVP fentanyl to see if it will help.

## 2022-01-26 NOTE — PROGRESS NOTES
HR up to the 140s, Sinus tach on the monitor. Currently on levophed at 6 mcg/min. Dr. Maribel Wu made aware that HR has trended up from 120-130s. At this time, no new orders.

## 2022-01-26 NOTE — PROGRESS NOTES
Hospitalist Progress Note      PCP: Tarik Francis    Date of Admission: 1/9/2022    Subjective: cont to be intubated with high O2 requirement at 100%FiO2 and 14 PEEP    Medications:  Reviewed    Infusion Medications    sodium chloride      cisatracurium (NIMBEX) infusion 1 mcg/kg/min (01/25/22 2049)    propofol      sodium chloride      sodium chloride 500 mL/hr at 01/20/22 1925    midazolam 8 mg/hr (01/25/22 1924)    dextrose      fentaNYL 200 mcg/hr (01/25/22 1927)    norepinephrine 8 mcg/min (01/24/22 1752)    sodium chloride       Scheduled Medications    levofloxacin  750 mg IntraVENous Q24H    fondaparinux  2.5 mg SubCUTAneous Daily    diazePAM  20 mg Oral Q8H    voriconazole  300 mg IntraVENous Q12H    pantoprazole  40 mg IntraVENous BID    insulin glargine  25 Units SubCUTAneous QAM    polyethylene glycol  17 g Oral Daily    dexamethasone  6 mg IntraVENous Daily    senna  2 tablet Oral BID    And    docusate  100 mg Oral BID    Venelex   Topical BID    insulin lispro  0-18 Units SubCUTAneous Q4H    ipratropium-albuterol  1 ampule Inhalation Q4H    [Held by provider] atorvastatin  40 mg Oral Nightly    omega-3 acid ethyl esters  2 g Oral BID    [Held by provider] clopidogrel  75 mg Oral Daily    aspirin  81 mg Oral Daily    sodium chloride flush  5-40 mL IntraVENous 2 times per day    Vitamin D  2,000 Units Oral Daily     PRN Meds: sodium chloride, sodium chloride, sodium chloride, albuterol, glucose, dextrose, glucagon (rDNA), dextrose, midazolam, fentanNYL, melatonin, nitroGLYCERIN, sodium chloride flush, sodium chloride, ondansetron **OR** ondansetron, polyethylene glycol, acetaminophen **OR** acetaminophen, potassium chloride, magnesium sulfate      Intake/Output Summary (Last 24 hours) at 1/25/2022 2106  Last data filed at 1/25/2022 2049  Gross per 24 hour   Intake 2172.87 ml   Output 2025 ml   Net 147.87 ml       Physical Exam Performed:    /79   Pulse 93 Temp 96 °F (35.6 °C) (Axillary)   Resp (!) 36   Ht 5' 2\" (1.575 m)   Wt 167 lb 4.8 oz (75.9 kg)   SpO2 90%   BMI 30.60 kg/m²       General:  Middle aged male, old appearing.  Intubated and sedated.  Prone  Eyes:  No sclera icterus. No conjunctiva injected. ENT - oral ETT and OG noted . Neck: Trachea midline. Normal thyroid. Resp-  jose + crackles. No wheezing. No rhonchi. CV: Regular rate. Regular rhythm. No mumur or rub. No edema. No JVD. Palpable pedal pulses. GI: Non-tender. Non-distended. No masses. No organmegaly. Normal bowel sounds. No hernia. Skin: developing edema to all ex t  Lymph: No cervical LAD. No supraclavicular LAD. M/S: No cyanosis. No joint deformity. No clubbing. Neuro: sedated    Labs:   Recent Labs     01/23/22  0620 01/23/22  1426 01/24/22  0330 01/24/22 0330 01/25/22  0014 01/25/22  0619 01/25/22  1445   WBC 42.5*  --  37.2*  --   --  33.8*  --    HGB 7.3*   < > 8.3*   < > 8.1* 7.9* 7.5*   HCT 22.8*   < > 25.7*   < > 24.8* 24.5* 23.5*   *  --  85*  --   --  69*  --     < > = values in this interval not displayed. Recent Labs     01/23/22 0620 01/24/22 0330 01/25/22 0619   * 132* 134*   K 5.4* 4.7 5.2*   CL 97* 96* 98*   CO2 33* 30 31   BUN 42* 42* 42*   CREATININE 0.6* 0.7* 0.6*   CALCIUM 7.9* 7.7* 8.0*     Recent Labs     01/23/22 0620 01/24/22  0330 01/25/22  0619   AST 85* 74* 89*   ALT 67* 59* 62*   BILITOT 1.6* 2.0* 2.0*   ALKPHOS 156* 149* 149*     Recent Labs     01/23/22  1110   INR 1.09     No results for input(s): CKTOTAL, TROPONINI in the last 72 hours. Urinalysis:      Lab Results   Component Value Date    NITRU Negative 01/20/2022    WBCUA 0-2 01/20/2022    BACTERIA 1+ 07/16/2018    RBCUA 11-20 01/20/2022    BLOODU MODERATE 01/20/2022    SPECGRAV 1.015 01/20/2022    GLUCOSEU Negative 01/20/2022       Radiology:  XR CHEST PORTABLE   Final Result   Line and tubes as above. Stable bilateral interstitial opacities.          XR CHEST PORTABLE   Final Result   Improved aeration of the lungs. Bilateral airspace disease remains         XR CHEST PORTABLE   Final Result   Extensive parenchymal infiltrates are seen throughout the lungs bilaterally,   mildly worsened at the right lung base, otherwise similar in appearance. XR CHEST PORTABLE   Final Result   Interval increase left-sided pulmonary opacities representing worsening   edema, atelectasis or pneumonia. Slightly improved aeration in the right   upper lung zone. XR CHEST PORTABLE   Final Result   Stable pattern of diffuse, multifocal airspace opacities. XR CHEST PORTABLE   Final Result   Unchanged multifocal bilateral atypical pneumonia. XR CHEST PORTABLE   Final Result   Unremarkable appearance of an endotracheal tube. Unchanged patchy interstitial lung markings which may reflect atypical   pneumonia or pulmonary edema. XR CHEST PORTABLE   Final Result   1. Endotracheal tube terminates 1.6 cm from the benton. Recommend 2 cm   retraction. 2.  Slightly increased diffuse bilateral airspace and interstitial opacities. The findings were sent to the [facility] Results Communication Team at 12:48 a.m.   on 01/18/2022 to be communicated to a licensed caregiver. XR CHEST PORTABLE   Final Result   Mild interval worsening with moderate infiltrates in the lungs. These are   most pronounced in the right lung. 5 cm indeterminate dense opacity in the midline-right upper abdomen. Correlate for oral contrast in bowel. VL Extremity Venous Bilateral   Final Result      XR CHEST PORTABLE   Final Result   Stable multifocal bilateral lung airspace disease consistent with previously   identified presence of COVID-19 viral pneumonia. Suspected moderate pulmonary interstitial edema. XR CHEST PORTABLE   Final Result   The support apparatus as described above.       Continued increased interstitial opacity throughout both lungs, along with   more focal traits within the periphery the lungs, some combination of   interstitial edema and pneumonia. XR CHEST PORTABLE   Final Result   1. Endotracheal tube projects in appropriate position. 2. Increased bilateral nonspecific pulmonary opacities representing edema,   atelectasis and/or pneumonia. XR CHEST PORTABLE   Final Result   Right PICC projects in normal position. Stable interstitial and ground-glass opacities, most likely atypical   pneumonia. IR PICC WO SQ PORT/PUMP > 5 YEARS   Final Result   Successful placement of PICC line. CT CHEST PULMONARY EMBOLISM W CONTRAST   Final Result   1. No evidence of pulmonary embolism. 2.  Commonly reported imaging features of COVID-19 pneumonia are present. Other processes such as influenza pneumonia and organizing pneumonia, as can   be seen with drug toxicity and connective tissue disease, can cause a similar   imaging pattern. PneTyp   3. Mild compression deformity of T8 of uncertain chronicity, new since   05/29/2020. Similar mild moderate compression deformities at T10, T12.   4. Fatty liver. 5. Interval increase in size of now 2.7 cm fat attenuation lesion of the   pancreatic neck. Recommend further evaluation with nonemergent pancreas   protocol MRI. XR CHEST PORTABLE   Final Result   1. Bilateral interstitial thickening either due to edema or atypical   interstitial pneumonia.          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.9]     Septic shock ([address] [A41.9, R65.21]     Anemia [D64.9]     Leukocytosis [D72.829]     Hypertriglyceridemia [E78.1]     Acute hypoxemic respiratory failure (HCC) [J96.01]     ARDS (adult respiratory distress syndrome) (HCC) [J80]     Hyperglycemia [R73.9]     Pulmonary emphysema (HCC) [J43.9]     Pneumonia due to COVID-19 virus [U07.1, J12.82]     Acute hypoxemic respiratory failure due to COVID-19 (HCC) [U07.1, J96.01]     Essential hypertension [I10]     DMII (diabetes mellitus, type 2) (HCC) [E11.9]     GERD (gastroesophageal reflux disease) [K21.9]     HLD (hyperlipidemia) [E78.5]     PAD (peripheral artery disease) ([facility]) [I73.9]            COVID PNA  Acute Hypoxic Respiratory Failure  Worsening leukocytosis  - imaging with bilateral pna pattern  - does not wear O2 at baseline  - Admit to Med Surg, droplet + precautions, tele  - supp O2, progressive hypoxemia, now on vent support  -Intubated 1/14 , proning as needed  - on  Decadron D#15, now at 6 mg ,  completed 5 days of  Remdesivir, s/p Tocilizumab   - worsening wbc and hypoxia, now at 95 % fio2, maxed out on ventilator settings. PEEP of 16.  - started  cefepime and vancomycin D4.  Stop cefepime.  Start Merrem due to worsening leukocytosis.  Add voriconazole for possible fungal infection.    - bloody secretions in ETT. hold PLAVIX   - pulmonary consulted   - lovenox changed to heparin gtt for possibility of PE  - hold heparin gtt     Hypotension - likely sepsis   -Was on cefepime and Vanco.  Cefepime stopped.  Merrem started.  Voriconazole added. - sp fluid boluses - on levophed now   - wean as able      Thrombocytopenia - plt dropped to 85, HIT panel sent     Anemia - multifactorial   - monitor and transfuse PRBC less than 7.  - ordered one unit of PRBC 1/23  - monitor hb     Abnormal lFT - likely from viral infection   Monitor, stable      CAD - stable. No CP.  Hold Plavix for bloody secretions        PAD stable     DM type 2  Hyperglycemia with steroids, on ssi     HLD On Lipitor.           DVT prophylaxis:  fondaparinaux  Diet: Diet NPO  ADULT TUBE FEEDING; Orogastric; Peptide Based; Continuous; 15; No; 30; Q 4 hours; Protein; one proteinex P2Go TWICE daily via feeding tube  Code Status: Full

## 2022-01-26 NOTE — PROGRESS NOTES
HR elevated to 186 and back down to 120s. Sp02 maintained at 91% on Fi02 100% and Peep 18. Repeat K level sent off at this time.

## 2022-01-26 NOTE — CARE COORDINATION
INTERDISCIPLINARY PLAN OF CARE CONFERENCE    Date/Time: 1/26/2022 12:38 PM  Completed by: Sindy Meehan RN, Case Management      Patient Name:  Fantasma Dennis  YOB: 1965  Admitting Diagnosis: Acute hypoxemic respiratory failure ([facility] [J96.01]  Acute hypoxemic respiratory failure due to COVID-19 [facility]) [U07.1, J96.01]     Admit Date/Time:  1/9/2022 11:42 AM    Chart reviewed. Interdisciplinary team contacted or reviewed plan related to patient progress and discharge plans. Disciplines included Case Management, Nursing, and Dietitian. Current Status: ICU- Vent/C19 Poor Prognosis  PT/OT recommendation for discharge plan of care: NA  Plan: Goals of care discussion planned per Dr. Starr Mckeon will follow and assist, await medical direction.

## 2022-01-27 NOTE — PROGRESS NOTES
Pulmonary & Critical Care Medicine ICU Progress Note    CC: COVID-19 pneumonia in an unvaccinated individual    Events of Last 24 hours:   Proned overnight   Levophed 5  Fentanyl 200  Versed 8  Nimbex 1    Invasive Lines: PICC placed 1/10/22    MV:  1/14/22-  Vent Mode: AC/VC Rate Set: 38 bmp/Vt Ordered: 340 mL/ /FiO2 : 100 %  Recent Labs     01/26/22  0600 01/27/22  0530   PHART 7.302* 7.366   DOP5GSB 69.7* 64.6*   PO2ART 66.7* 84.3       IV:   sodium chloride      cisatracurium (NIMBEX) infusion 1 mcg/kg/min (01/27/22 0229)    propofol      sodium chloride      sodium chloride 500 mL/hr at 01/20/22 1925    midazolam 8 mg/hr (01/26/22 2218)    dextrose      fentaNYL 200 mcg/hr (01/27/22 0318)    norepinephrine 5 mcg/min (01/27/22 0600)    sodium chloride         Vitals:  BP (!) 94/57   Pulse 98   Temp 98 °F (36.7 °C) (Axillary)   Resp 28   Ht 5' 2\" (1.575 m)   Wt 161 lb 6.4 oz (73.2 kg)   SpO2 94%   BMI 29.52 kg/m²   on Vent AC 36/340    Intake/Output Summary (Last 24 hours) at 1/27/2022 0656  Last data filed at 1/27/2022 0406  Gross per 24 hour   Intake 2553.72 ml   Output 2355 ml   Net 198.72 ml     EXAM: limited due to prone ventilation   General: ill appearing. Intubated sedated. Eyes: PERRL. No sclera icterus. + conjunctival injection. ENT: ET tube in place  Neck: Trachea midline  Resp: No accessory muscle use. Few crackles. No wheezing. Few rhonchi. CV: Regular rate. Regular rhythm. 1+ LE edema. GI: Proned not able to assess   Skin: Warm and dry. No nodule on exposed extremities. No rash on exposed extremities  Lymph: No cervical LAD. No supraclavicular LAD. M/S: No cyanosis. No joint deformity. No clubbing. Neuro: Intubated sedated.  paralyzed  Psych: Noncommunicative unable to obtain      Scheduled Meds:   metoclopramide  10 mg IntraVENous Q6H    levofloxacin  750 mg IntraVENous Q24H    fondaparinux  2.5 mg SubCUTAneous Daily    diazePAM  20 mg Oral Q8H    voriconazole  300 mg IntraVENous Q12H    pantoprazole  40 mg IntraVENous BID    insulin glargine  25 Units SubCUTAneous QAM    polyethylene glycol  17 g Oral Daily    dexamethasone  6 mg IntraVENous Daily    senna  2 tablet Oral BID    And    docusate  100 mg Oral BID    Venelex   Topical BID    insulin lispro  0-18 Units SubCUTAneous Q4H    ipratropium-albuterol  1 ampule Inhalation Q4H    [Held by provider] atorvastatin  40 mg Oral Nightly    omega-3 acid ethyl esters  2 g Oral BID    [Held by provider] clopidogrel  75 mg Oral Daily    aspirin  81 mg Oral Daily    sodium chloride flush  5-40 mL IntraVENous 2 times per day    Vitamin D  2,000 Units Oral Daily     PRN Meds:  sodium chloride, sodium chloride, sodium chloride, albuterol, glucose, dextrose, glucagon (rDNA), dextrose, midazolam, fentanNYL, melatonin, nitroGLYCERIN, sodium chloride flush, sodium chloride, ondansetron **OR** ondansetron, polyethylene glycol, acetaminophen **OR** acetaminophen, potassium chloride, magnesium sulfate    Results:  CBC:   Recent Labs     01/25/22  0619 01/25/22  1445 01/26/22  0600 01/26/22  0600 01/26/22  1515 01/26/22  2237 01/27/22  0525   WBC 33.8*  --  31.7*  --   --   --  26.6*   HGB 7.9*   < > 8.2*   < > 8.1* 7.6* 7.0*   HCT 24.5*   < > 26.4*   < > 25.9* 24.4* 22.3*   MCV 89.7  --  93.6  --   --   --  92.6   PLT 69*  --  75*  --   --   --  60*    < > = values in this interval not displayed. BMP:   Recent Labs     01/25/22  0619 01/25/22  0619 01/26/22  0600 01/26/22  0600 01/26/22  1710 01/27/22  0525   *  --  136  --   --  136   K 5.2*   < > 5.6*  5.6*   < > 5.0 5.1  5.1   CL 98*  --  99  --   --  97*   CO2 31  --  33*  --   --  35*   BUN 42*  --  38*  --   --  47*   CREATININE 0.6*  --  0.6*  --   --  0.8*    < > = values in this interval not displayed.      LIVER PROFILE:   Recent Labs     01/25/22  0619 01/26/22  0600 01/27/22  0525   AST 89* 93* 91*   ALT 62* 67* 69*   BILITOT 2.0* 2.2* 1.8*   ALKPHOS 149* 186* 168*       Micro  1/9 BC NGTD  1/9 COVID-19 detected  1/20 BC NGTD   1/20 UC NGTD   1/20 Resp Pseudomonas fluorescens and  Aspergillus species    Imaging   Chest x-ray 1/26 imaging reviewed by me and showed   High ETT position  Satisfactory PICC line position   Bilateral airspace disease - same     LE dopper 1/17   Within the limits of this exam, there is no evidence of deep or superficial  venous thrombosis in the lower extremities bilaterally. Chronic phlebitic processes involving the left small saphenous vein       ASSESSMENT:  · Acute hypoxemic respiratory failure  · COVID 19 pneumonia in an unvaccinated individual  · Septic shock  · Severe ARDS  · Invasive aspergillosis   · Pulmonary emphysema  · Hypertriglyceridemia with propofol   · Thrombocytopenia: negative HIT  · Anemia- no active bleed   · Pancreatic nodule, 2.7 cm increase in size  · DM2 with hyperglycemia   · Elevated LFTS  · Tobacco abuse  · CAD and PAD with h/o STEMI     PLAN:  · Droplet Plus Airborne Precautions   · Mechanical ventilation as per my orders. The ventilator was adjusted by me at the bedside for unstable, life threatening respiratory failure. · IV Versed/Fentanyl for sedation, target RASS -52, with daily spontaneous awakening trial.  Fentanyl PRN pain, gtt as needed  · Continue Valium 20 TID   · Nimbex restarted 1/26  · Head of bed 30 degrees or higher at all times  · Prone 1/14/22-  · Decadron QD, D#18--> 6 mg, monitor glucose closely  · S/p Remdesevir D#5 and Tocilizumab received 1/9/22  · Levaquin D#3 and Voriconazole D#5.  Completed 4 days of Cefepime, 3 days Meropenem and 5 days of Vanc  · Follow up Cx   · IV Levophed to keep MAP > 65 mmHg or SBP>90  · TFs - trophic    · On ASA - Holding Plavix   · Follow LFTs- hold lipitor   · BM regimen  · Monitor H&H and transfuse for hemoglobin less than 7  · Blood sugar control ISS,  lantus 25 with goal 150-180  · GI prophylaxis: Protonix BID  DVT prophylaxis: lovenox BID  · Very poor prognosis    · Total critical care time caring for this patient with life threatening, unstable organ failure, including direct patient contact, management of life support systems, review of data including imaging and labs, discussions with other team members and physicians at least 31 minutes so far today, excluding procedures.

## 2022-01-27 NOTE — FLOWSHEET NOTE
01/27/22 0820   Assessment   Charting Type Shift assessment   Neurological   Neuro (WDL) X  (pt sedated and intubated)   Level of Consciousness Unresponsive (3)   Orientation Level MARY   Cognition MARY   Language MARY   Size R Pupil (mm) 2   R Pupil Shape Round   R Pupil Reaction Sluggish   Size L Pupil (mm) 2   L Pupil Shape Round   L Pupil Reaction Sluggish   R Hand  MARY   L Hand  MARY   R Foot Dorsiflexion MARY   L Foot Dorsiflexion MARY   R Foot Plantar Flexion MARY   L Foot Plantar Flexion MARY   RUE Motor Response MARY   Sensation RUE MARY   LUE Motor Response MARY   Sensation LUE MARY   RLE Motor Response MARY   Sensation RLE MARY   LLE Motor Response MARY   Sensation LLE MARY   Gag MARY   Train of Four   Twitches (Train of Four) 4   Number of Amps (Train of Four) 4   HEENT   HEENT (WDL) X  (pt sedated and intubated)   Right Eye MARY   Left Eye MARY   Right Ear MARY   Left Ear MARY   Nose Intact   Throat Intact   Neck Symmetrical   Tongue Dry   Mucous Membrane Intact;Dry   Respiratory   Respiratory (WDL) X   Respiratory Pattern Regular   Respiratory Depth Normal   Chest Assessment Chest expansion symmetrical   Breath Sounds   Right Upper Lobe Diminished   Right Middle Lobe Diminished   Right Lower Lobe Diminished   Left Upper Lobe Diminished   Left Lower Lobe Diminished   Cough/Sputum   Cough None   Cough Description   Sputum Amount None   ETT (adult)   Placement Date/Time: 01/14/22 1503   Timeout: Patient;Procedure; Appropriate Equipment;Correct Position  Preoxygenation: Yes  Mask Ventilation: Ventilated by mask (1)  Technique: Rapid sequence  Type: Cuffed  Tube Size: 8 mm  Laryngoscope: GlideScope  . ..    Secured at 24 cm   Measured From [address] [place] [address],[address] By [employer] tube oliveros   Site Condition Dry   Cardiac   Cardiac FF [facility]) X   Cardiac Rhythm Sinus tachy   Cardiac Regularity Regular   Cardiac Monitor   Telemetry Monitor On Yes   Telemetry Audible Yes   Telemetry Alarms Set Yes   Telemetry Box Number ICU 2   Telemetry Monitor Alarm Parameters ICU   Gastrointestinal   Abdominal (WDL) X   Abdomen Inspection Other (Comment)  (prone/limited assessment of surface)   RUQ Bowel Sounds Hypoactive   LUQ Bowel Sounds Hypoactive   RLQ Bowel Sounds Hypoactive   LLQ Bowel Sounds Hypoactive   Peripheral Vascular   Peripheral Vascular (WDL) WDL   Edema None   Skin Color/Condition   Skin Color/Condition (WDL) WDL   Skin Integrity   Skin Integrity (WDL) X   Skin Integrity Bruising   Location scattered   Preventative Dressing Yes   Dressing Site   (joints, chest,shoulders,face)   Date Applied 01/26/22   Assessed this shift? Yes   Musculoskeletal   Musculoskeletal (WDL) X  (pt sedated with paralytic IV agent infusing)   Musculoskeletal Details   R Elbow Swelling   Genitourinary   Genitourinary (WDL) WDL   Flank Tenderness MARY   Suprapubic Tenderness MARY   Dysuria MARY   Anus/Rectum   Anus/Rectum (WDL) WDL   Wound 01/18/22 Jaw Left; Lower STDI on chin   Date First Assessed/Time First Assessed: 01/18/22 1237   Present on Hospital Admission: No  Primary Wound Type: Pressure Injury  Location: Jaw  Wound Location Orientation: Left; Lower  Wound Description (Comments): STDI on chin   Dressing Status Clean;Dry; Intact   Dressing/Treatment Pharmaceutical agent (see MAR)   Odor None   Urethral Catheter Non-latex 16 fr   Placement Date/Time: 01/14/22 (c) 1900   Catheter Type: Non-latex  Tube Size (fr): 16 fr  Catheter Balloon Size: 10 mL   Catheter Indications Need for fluid volume management of the critically ill patient in a critical care setting   Site Assessment Pink   Urine Color Yellow   Urine Appearance Clear   Output (mL) 300 mL   Psychosocial   Psychosocial (WDL) X  (pt sedated and intubated)   All ICU monitoring leads in place, remains in prone position - will discuss status with Dr Teresa Currie prior to supinating for PCXR this a.m. .    Family in waiting room, updated on stats and POC. State patient will remain full code.

## 2022-01-27 NOTE — PROGRESS NOTES
01/27/22 0239   Vent Information   Vent Type 840   Vent Mode AC/VC   Vt Ordered 340 mL   Rate Set 38 bmp   Peak Flow 70 L/min   Pressure Support 0 cmH20   FiO2  100 %   SpO2 94 %   SpO2/FiO2 ratio 94   Sensitivity 3   PEEP/CPAP 18   Humidification Source Heated wire   Humidification Temp 37   Humidification Temp Measured 37   Circuit Condensation Drained   Vent Patient Data   Peak Inspiratory Pressure 36 cmH2O   Mean Airway Pressure 25 cmH20   Rate Measured 38 br/min   Vt Exhaled 365 mL   Minute Volume 13.9 Liters   I:E Ratio 1:2.00   Plateau Pressure 32 KDE97   Cough/Sputum   Sputum How Obtained Endotracheal;Suctioned   Cough Productive   Sputum Amount Moderate   Sputum Color Dark red   Tenacity Thick   Spontaneous Breathing Trial (SBT) RT Doc   Pulse 114   Breath Sounds   Right Upper Lobe Diminished   Right Middle Lobe Diminished   Right Lower Lobe Diminished   Left Upper Lobe Diminished   Left Lower Lobe Diminished   Additional Respiratory  Assessments   Resp (!) 38   Position Prone   Alarm Settings   High Pressure Alarm 50 cmH2O   Low Minute Volume Alarm 4 L/min   Apnea (secs) 20 secs   High Respiratory Rate 45 br/min   Low Exhaled Vt  200 mL   ETT (adult)   Placement Date/Time: 01/14/22 1503   Timeout: Patient;Procedure; Appropriate Equipment;Correct Position  Preoxygenation: Yes  Mask Ventilation: Ventilated by mask (1)  Technique: Rapid sequence  Type: Cuffed  Tube Size: 8 mm  Laryngoscope: GlideScope  . ..    Secured at 24 cm   Measured From [address] [place] [address],[address] By [employer] tube oliveros   Site Condition Dry

## 2022-01-27 NOTE — PROGRESS NOTES
Family updated on status/POC, wife in to visit at bedside. No changes in current assessment. All ICU monitoring lines in place. All tubes secure. IV sites WNL.

## 2022-01-27 NOTE — PROGRESS NOTES
01/26/22 1906   Vent Information   Vent Type 840   Vent Mode AC/VC   Vt Ordered 340 mL   Rate Set 38 bmp   Peak Flow 70 L/min   Pressure Support 0 cmH20   FiO2  100 %   SpO2 92 %   SpO2/FiO2 ratio 92   Sensitivity 3   PEEP/CPAP 18   Humidification Source Heated wire   Humidification Temp 37   Humidification Temp Measured 37   Circuit Condensation Drained   Vent Patient Data   Peak Inspiratory Pressure 33 cmH2O   Mean Airway Pressure 24 cmH20   Rate Measured 38 br/min   Vt Exhaled 373 mL   Minute Volume 14 Liters   I:E Ratio 1:2.00   Plateau Pressure 30 [id number]   Cough/Sputum   Sputum How Obtained Endotracheal;Suctioned   Cough Productive   Sputum Amount Moderate   Sputum Color Dark red   Tenacity Thick   Spontaneous Breathing Trial (SBT) RT Doc   Pulse 122   Breath Sounds   Right Upper Lobe Diminished   Right Middle Lobe Diminished   Right Lower Lobe Diminished   Left Upper Lobe Diminished   Left Lower Lobe Diminished   Additional Respiratory  Assessments   Resp 22   Position Prone   Alarm Settings   High Pressure Alarm 50 cmH2O   Low Minute Volume Alarm 4 L/min   Apnea (secs) 20 secs   High Respiratory Rate 45 br/min   Low Exhaled Vt  200 mL   ETT (adult)   Placement Date/Time: 01/14/22 1503   Timeout: Patient;Procedure; Appropriate Equipment;Correct Position  Preoxygenation: Yes  Mask Ventilation: Ventilated by mask (1)  Technique: Rapid sequence  Type: Cuffed  Tube Size: 8 mm  Laryngoscope: GlideScope  . ..    Secured at 25 cm   Measured From [address] [place] [address],[address] By [employer] tube [email or web address]   Site Condition Dry

## 2022-01-27 NOTE — PROGRESS NOTES
With Renata RT-   Repositioned with head to L side with z pillow support to head, R shoulder/ RUE. Body positioned for comfort and alignment. Reverse trendelenberg bed position. Vent- Fi0-2 100% +16 PEEP. Sp02 88% with repositioning. ETT/OG tubes secure, placement checked. venelex ointment applied  to R ear and under R chin DTI. Navarro catheter in place, draining cl yellow urine to gravity bag. Levophed IV drip to 5 mcg/min for MAP > 65. See eMAR.

## 2022-01-27 NOTE — PROGRESS NOTES
01/26/22 2313   Vent Information   Vent Type 840   Vent Mode AC/VC   Vt Ordered 340 mL   Rate Set 38 bmp   Peak Flow 70 L/min   Pressure Support 0 cmH20   FiO2  100 %   SpO2 93 %   SpO2/FiO2 ratio 93   Sensitivity 3   PEEP/CPAP 18   Humidification Source Heated wire   Humidification Temp 37   Humidification Temp Measured 37   Circuit Condensation Drained   Vent Patient Data   Peak Inspiratory Pressure 34 cmH2O   Mean Airway Pressure 24 cmH20   Rate Measured 38 br/min   Vt Exhaled 365 mL   Minute Volume 13.9 Liters   I:E Ratio 1:2.00   Plateau Pressure 32 [id number]   Cough/Sputum   Sputum How Obtained Endotracheal;Suctioned   Cough Productive   Sputum Amount Moderate   Sputum Color Dark red   Tenacity Thick   Spontaneous Breathing Trial (SBT) RT Doc   Pulse 116   Breath Sounds   Right Upper Lobe Diminished   Right Middle Lobe Diminished   Right Lower Lobe Diminished   Left Upper Lobe Diminished   Left Lower Lobe Diminished   Additional Respiratory  Assessments   Resp 25   Position Prone   Alarm Settings   High Pressure Alarm 50 cmH2O   Low Minute Volume Alarm 4 L/min   Apnea (secs) 20 secs   High Respiratory Rate 45 br/min   Low Exhaled Vt  200 mL   ETT (adult)   Placement Date/Time: 01/14/22 1503   Timeout: Patient;Procedure; Appropriate Equipment;Correct Position  Preoxygenation: Yes  Mask Ventilation: Ventilated by mask (1)  Technique: Rapid sequence  Type: Cuffed  Tube Size: 8 mm  Laryngoscope: GlideScope  . ..    Secured at 24 cm   Measured From [address] [place] [address],[address] By [employer] tube [email or web address]   Site Condition Dry

## 2022-01-27 NOTE — PROGRESS NOTES
Hospitalist Progress Note      PCP: Yovana Jeter    Date of Admission: 1/9/2022    Admitted with Covid pneumonia and acute hypoxic respiratory failure    Subjective: cont to be intubated with high O2 requirement at 100%FiO2 and 14 PEEP  Proned   Continues to be tachycardic  On Levophed    Medications:  Reviewed    Infusion Medications    sodium chloride      cisatracurium (NIMBEX) infusion 1 mcg/kg/min (01/27/22 0229)    propofol      sodium chloride      sodium chloride 500 mL/hr at 01/20/22 1925    midazolam 8 mg/hr (01/26/22 2218)    dextrose      fentaNYL 200 mcg/hr (01/27/22 0318)    norepinephrine 6 mcg/min (01/27/22 0854)    sodium chloride       Scheduled Medications    metoclopramide  10 mg IntraVENous Q6H    levofloxacin  750 mg IntraVENous Q24H    fondaparinux  2.5 mg SubCUTAneous Daily    diazePAM  20 mg Oral Q8H    voriconazole  300 mg IntraVENous Q12H    pantoprazole  40 mg IntraVENous BID    insulin glargine  25 Units SubCUTAneous QAM    polyethylene glycol  17 g Oral Daily    dexamethasone  6 mg IntraVENous Daily    senna  2 tablet Oral BID    And    docusate  100 mg Oral BID    Venelex   Topical BID    insulin lispro  0-18 Units SubCUTAneous Q4H    ipratropium-albuterol  1 ampule Inhalation Q4H    [Held by provider] atorvastatin  40 mg Oral Nightly    omega-3 acid ethyl esters  2 g Oral BID    [Held by provider] clopidogrel  75 mg Oral Daily    aspirin  81 mg Oral Daily    sodium chloride flush  5-40 mL IntraVENous 2 times per day    Vitamin D  2,000 Units Oral Daily     PRN Meds: sodium chloride, sodium chloride, sodium chloride, albuterol, glucose, dextrose, glucagon (rDNA), dextrose, midazolam, fentanNYL, melatonin, nitroGLYCERIN, sodium chloride flush, sodium chloride, ondansetron **OR** ondansetron, polyethylene glycol, acetaminophen **OR** acetaminophen, potassium chloride, magnesium sulfate      Intake/Output Summary (Last 24 hours) at 1/27/2022 2801  Last data filed at 1/27/2022 0820  Gross per 24 hour   Intake 2553.72 ml   Output 2475 ml   Net 78.72 ml       Physical Exam Performed:    BP (!) 86/49   Pulse 108   Temp 97.2 °F (36.2 °C) (Axillary)   Resp (!) 38   Ht 5' 2\" (1.575 m)   Wt 161 lb 6.4 oz (73.2 kg)   SpO2 92%   BMI 29.52 kg/m²       General:  Middle aged male, old appearing.  Intubated and sedated.  Prone  Eyes:  No sclera icterus. No conjunctiva injected. ENT - oral ETT and OG noted . Neck: Trachea midline. Normal thyroid. Resp-  jose + crackles. No wheezing. No rhonchi. CV: Regular rate. Regular rhythm. No mumur or rub. No edema. No JVD. Palpable pedal pulses. GI: Non-tender. Non-distended. No masses. No organmegaly. Normal bowel sounds. No hernia. Skin: developing edema to all ex t  Lymph: No cervical LAD. No supraclavicular LAD. M/S: No cyanosis. No joint deformity. No clubbing. Neuro: sedated    Labs:   Recent Labs     01/25/22  0619 01/25/22  1445 01/26/22  0600 01/26/22  0600 01/26/22  1515 01/26/22  2237 01/27/22  0525   WBC 33.8*  --  31.7*  --   --   --  26.6*   HGB 7.9*   < > 8.2*   < > 8.1* 7.6* 7.0*   HCT 24.5*   < > 26.4*   < > 25.9* 24.4* 22.3*   PLT 69*  --  75*  --   --   --  60*    < > = values in this interval not displayed. Recent Labs     01/25/22  0619 01/25/22  0619 01/26/22  0600 01/26/22  0600 01/26/22  1710 01/27/22  0525   *  --  136  --   --  136   K 5.2*   < > 5.6*  5.6*   < > 5.0 5.1  5.1   CL 98*  --  99  --   --  97*   CO2 31  --  33*  --   --  35*   BUN 42*  --  38*  --   --  47*   CREATININE 0.6*  --  0.6*  --   --  0.8*   CALCIUM 8.0*  --  8.3  --   --  8.3    < > = values in this interval not displayed. Recent Labs     01/25/22  0619 01/26/22  0600 01/27/22  0525   AST 89* 93* 91*   ALT 62* 67* 69*   BILITOT 2.0* 2.2* 1.8*   ALKPHOS 149* 186* 168*     No results for input(s): INR in the last 72 hours.   No results for input(s): Juliet Liu in the last 72 hours.    Urinalysis:      Lab Results   Component Value Date    NITRU Negative 01/20/2022    WBCUA 0-2 01/20/2022    BACTERIA 1+ 07/16/2018    RBCUA 11-20 01/20/2022    BLOODU MODERATE 01/20/2022    SPECGRAV 1.015 01/20/2022    GLUCOSEU Negative 01/20/2022       Radiology:  XR CHEST PORTABLE   Final Result   Supportive tubing is in normal position. Stable, extensive interstitial opacities. XR CHEST PORTABLE   Final Result   Line and tubes as above. Stable bilateral interstitial opacities. XR CHEST PORTABLE   Final Result   Improved aeration of the lungs. Bilateral airspace disease remains         XR CHEST PORTABLE   Final Result   Extensive parenchymal infiltrates are seen throughout the lungs bilaterally,   mildly worsened at the right lung base, otherwise similar in appearance. XR CHEST PORTABLE   Final Result   Interval increase left-sided pulmonary opacities representing worsening   edema, atelectasis or pneumonia. Slightly improved aeration in the right   upper lung zone. XR CHEST PORTABLE   Final Result   Stable pattern of diffuse, multifocal airspace opacities. XR CHEST PORTABLE   Final Result   Unchanged multifocal bilateral atypical pneumonia. XR CHEST PORTABLE   Final Result   Unremarkable appearance of an endotracheal tube. Unchanged patchy interstitial lung markings which may reflect atypical   pneumonia or pulmonary edema. XR CHEST PORTABLE   Final Result   1. Endotracheal tube terminates 1.6 cm from the benton. Recommend 2 cm   retraction. 2.  Slightly increased diffuse bilateral airspace and interstitial opacities. The findings were sent to the [facility] Results Communication Team at 12:48 a.m.   on 01/18/2022 to be communicated to a licensed caregiver. XR CHEST PORTABLE   Final Result   Mild interval worsening with moderate infiltrates in the lungs. These are   most pronounced in the right lung.       5 cm indeterminate dense opacity in the midline-right upper abdomen. Correlate for oral contrast in bowel. VL Extremity Venous Bilateral   Final Result      XR CHEST PORTABLE   Final Result   Stable multifocal bilateral lung airspace disease consistent with previously   identified presence of COVID-19 viral pneumonia. Suspected moderate pulmonary interstitial edema. XR CHEST PORTABLE   Final Result   The support apparatus as described above. Continued increased interstitial opacity throughout both lungs, along with   more focal traits within the periphery the lungs, some combination of   interstitial edema and pneumonia. XR CHEST PORTABLE   Final Result   1. Endotracheal tube projects in appropriate position. 2. Increased bilateral nonspecific pulmonary opacities representing edema,   atelectasis and/or pneumonia. XR CHEST PORTABLE   Final Result   Right PICC projects in normal position. Stable interstitial and ground-glass opacities, most likely atypical   pneumonia. IR PICC WO SQ PORT/PUMP > 5 YEARS   Final Result   Successful placement of PICC line. CT CHEST PULMONARY EMBOLISM W CONTRAST   Final Result   1. No evidence of pulmonary embolism. 2.  Commonly reported imaging features of COVID-19 pneumonia are present. Other processes such as influenza pneumonia and organizing pneumonia, as can   be seen with drug toxicity and connective tissue disease, can cause a similar   imaging pattern. PneTyp   3. Mild compression deformity of T8 of uncertain chronicity, new since   05/29/2020. Similar mild moderate compression deformities at T10, T12.   4. Fatty liver. 5. Interval increase in size of now 2.7 cm fat attenuation lesion of the   pancreatic neck. Recommend further evaluation with nonemergent pancreas   protocol MRI. XR CHEST PORTABLE   Final Result   1.  Bilateral interstitial thickening either due to edema or atypical   interstitial pneumonia. XR CHEST PORTABLE    (Results Pending)   XR CHEST PORTABLE    (Results Pending)           Assessment/Plan:    Active Hospital Problems    Diagnosis     Coronary artery disease [I25.10]      Priority: High    Thrombocytopenia ([facility]) [D69.6]     Invasive aspergillosis ([facility] [address] [B44.9]     Septic shock ([facility] [address] [A41.9, R65.21]     Anemia [D64.9]     Leukocytosis [D72.829]     Hypertriglyceridemia [E78.1]     Acute hypoxemic respiratory failure ([facility]) [J96.01]     ARDS (adult respiratory distress syndrome) ([facility]) [J80]     Hyperglycemia [R73.9]     Pulmonary emphysema ([facility]) [J43.9]     Pneumonia due to COVID-19 virus [U07.1, J12.82]     Acute hypoxemic respiratory failure due to COVID-19 ([facility]) [U07.1, J96.01]     Essential hypertension [I10]     DMII (diabetes mellitus, type 2) ([facility]) [E11.9]     GERD (gastroesophageal reflux disease) [K21.9]     HLD (hyperlipidemia) [E78.5]     PAD (peripheral artery disease) ([facility]) [I73.9]            COVID PNA  Acute Hypoxic Respiratory Failure  - imaging with bilateral pna pattern  - does not wear O2 at baseline  - Admit to Med Surg, droplet + precautions, tele  - supp O2, progressive hypoxemia, now on vent support  -Intubated 1/14 , proning as needed  - on  Decadron D#19, now at 6 mg ,  completed 5 days of  Remdesivir, s/p Tocilizumab   - worsening wbc and hypoxia, now at 95 % fio2, maxed out on ventilator settings. PEEP of 16.  - started  cefepime and vancomycin D4.  Now on Levaquin day #3 add voriconazole day #5 for possible fungal infection.    - bloody secretions in ETT. hold PLAVIX   - pulmonary consulted   - lovenox changed to heparin gtt for possibility of PE  - hold heparin gtt  Lasix 40 mg IV prn      Hypotension - likely sepsis   -Was on cefepime and Vanco.  Cefepime stopped.  Merrem started.  Voriconazole added.   - sp fluid boluses - on levophed now   - wean as able   Has  persistent tachycardia     Leucocytosis- resolving     Thrombocytopenia - plt dropped to 85--> 75--> 60 , HIT Ab negative    Hyperkalemia. Kayexalate     Anemia - multifactorial   - monitor and transfuse PRBC less than 7.  - ordered one unit of PRBC 1/23  - monitor hb     Abnormal lFT - likely from viral infection   Monitor, stable      CAD - stable. No CP.  Hold Plavix for bloody secretions        PAD stable     DM type 2  Hyperglycemia with steroids, on ssi     HLD On Lipitor.           DVT prophylaxis: Lovenox  Diet: Diet NPO  ADULT TUBE FEEDING; Orogastric; Peptide Based; Continuous; 15; No; 30; Q 4 hours; Protein; one proteinex P2Go TWICE daily via feeding tube  Code Status: Full Code    PT/OT Eval Status: not indicated    Dispo - poor prognosis, planning family discussion regarding plan of care    Nandini Christian MD

## 2022-01-27 NOTE — PLAN OF CARE
Problem: Infection - Central Venous Catheter-Associated Bloodstream Infection:  Goal: Will show no infection signs and symptoms  Description: Will show no infection signs and symptoms  Outcome: Ongoing     Problem: Airway Clearance - Ineffective  Goal: Achieve or maintain patent airway  Outcome: Ongoing     Problem: Gas Exchange - Impaired  Goal: Absence of hypoxia  Outcome: Ongoing  Goal: Promote optimal lung function  Outcome: Ongoing     Problem: Breathing Pattern - Ineffective  Goal: Ability to achieve and maintain a regular respiratory rate  Outcome: Ongoing     Problem:  Body Temperature -  Risk of, Imbalanced  Goal: Ability to maintain a body temperature within defined limits  Outcome: Ongoing  Goal: Will regain or maintain usual level of consciousness  Outcome: Ongoing  Goal: Complications related to the disease process, condition or treatment will be avoided or minimized  Outcome: Ongoing     Problem: Isolation Precautions - Risk of Spread of Infection  Goal: Prevent transmission of infection  Outcome: Ongoing     Problem: Nutrition Deficits  Goal: Optimize nutritional status  Outcome: Ongoing     Problem: Risk for Fluid Volume Deficit  Goal: Maintain normal heart rhythm  Outcome: Ongoing  Goal: Maintain absence of muscle cramping  Outcome: Ongoing  Goal: Maintain normal serum potassium, sodium, calcium, phosphorus, and pH  Outcome: Ongoing     Problem: Loneliness or Risk for Loneliness  Goal: Demonstrate positive use of time alone when socialization is not possible  Outcome: Ongoing     Problem: Fatigue  Goal: Verbalize increase energy and improved vitality  Outcome: Ongoing     Problem: Patient Education: Go to Patient Education Activity  Goal: Patient/Family Education  Outcome: Ongoing     Problem: Skin Integrity:  Goal: Absence of new skin breakdown  Description: Absence of new skin breakdown  Outcome: Ongoing     Problem: Falls - Risk of:  Goal: Will remain free from falls  Description: Will remain free from falls  Outcome: Ongoing  Goal: Absence of physical injury  Description: Absence of physical injury  Outcome: Ongoing     Problem: Non-Violent Restraints  Goal: Removal from restraints as soon as assessed to be safe  Outcome: Ongoing  Goal: No harm/injury to patient while restraints in use  Outcome: Ongoing  Goal: Patient's dignity will be maintained  Outcome: Ongoing     Problem: Nutrition  Goal: Optimal nutrition therapy  Outcome: Ongoing  Goal: Understanding of nutritional guidelines  Outcome: Ongoing   Pt resting prone in bed, tolerating vent settings, pt suctioned by RT with bloody sputum, IV's infusing, yeh patent, TF patent, no s/s of distress.

## 2022-01-28 NOTE — CARE COORDINATION
INTERDISCIPLINARY PLAN OF CARE CONFERENCE    Date/Time: 1/28/2022 11:28 AM  Completed by: Camillia Goltz, RN, Case Management      Patient Name:  Andris Fleischer  YOB: 1965  Admitting Diagnosis: Acute hypoxemic respiratory failure ([facility] [address] [J96.01]  Acute hypoxemic respiratory failure due to COVID-19 ([facility] [address] [U07.1, J96.01]     Admit Date/Time:  1/9/2022 11:42 AM    Chart reviewed. Interdisciplinary team contacted or reviewed plan related to patient progress and discharge plans. Disciplines included Case Management, Nursing, and Dietitian. Current status: ICU LOC. Inpatient. C19+. very poor prognosis. However, Levophed has been able to decreased- currently 4   PT/OT recommendation for discharge plan of care: N/A at this time    Expected D/C Disposition:  TBD  Discharge Plan Comments: following for dcp pending progress. Pt with poor prognosis. Making some progress though. Pt remains on ventilator, Fio2 able to be decreased to 95%. Levophed titrated down to 4 at this time. Proning. Will follow.     Home O2 in place on admit: No  Pt informed of need to bring portable home O2 tank on day of discharge for nursing to connect prior to leaving:  Not Indicated  Verbalized agreement/Understanding:  Not Indicated

## 2022-01-28 NOTE — PROGRESS NOTES
Pulmonary & Critical Care Medicine ICU Progress Note    CC: COVID-19 pneumonia in an unvaccinated individual    Events of Last 24 hours:   Proned overnight   Levophed 4  Fentanyl 200  Versed 8  Nimbex 1    Invasive Lines: PICC placed 1/10/22    MV:  1/14/22-  Vent Mode: AC/VC Rate Set: 38 bmp/Vt Ordered: 340 mL/ /FiO2 : 95 %  Recent Labs     01/27/22  0530 01/28/22  0320   PHART 7.366 7.375   AWH7VAR 64.6* 57.6*   PO2ART 84.3 67.8*       IV:   sodium chloride      cisatracurium (NIMBEX) infusion 1 mcg/kg/min (01/27/22 0229)    propofol      sodium chloride      sodium chloride 500 mL/hr at 01/20/22 1925    midazolam 8 mg/hr (01/28/22 0200)    dextrose      fentaNYL 200 mcg/hr (01/28/22 0458)    norepinephrine 4 mcg/min (01/27/22 1550)    sodium chloride         Vitals:  /62   Pulse 110   Temp 97.4 °F (36.3 °C) (Axillary)   Resp (!) 35   Ht 5' 2\" (1.575 m)   Wt 161 lb 6.4 oz (73.2 kg)   SpO2 92%   BMI 29.52 kg/m²   on Vent AC 36/340    Intake/Output Summary (Last 24 hours) at 1/28/2022 5510  Last data filed at 1/28/2022 0500  Gross per 24 hour   Intake 1315 ml   Output 1500 ml   Net -185 ml     EXAM: limited due to prone ventilation   General: ill appearing. Intubated sedated. Eyes: PERRL. No sclera icterus. + conjunctival injection. ENT: ET tube in place  Neck: Trachea midline  Resp: No accessory muscle use. Few crackles. No wheezing. Few rhonchi. CV: Regular rate. Regular rhythm. 1+ LE edema. GI: Proned not able to assess   Skin: Warm and dry. No nodule on exposed extremities. No rash on exposed extremities  Lymph: No cervical LAD. No supraclavicular LAD. M/S: No cyanosis. No joint deformity. No clubbing. Neuro: Intubated sedated.  paralyzed  Psych: Noncommunicative unable to obtain      Scheduled Meds:   enoxaparin  30 mg SubCUTAneous BID    influenza virus vaccine  0.5 mL IntraMUSCular Prior to discharge    levofloxacin  750 mg IntraVENous Q24H    diazePAM  20 mg Oral Q8H    voriconazole  300 mg IntraVENous Q12H    pantoprazole  40 mg IntraVENous BID    insulin glargine  25 Units SubCUTAneous QAM    dexamethasone  6 mg IntraVENous Daily    Venelex   Topical BID    insulin lispro  0-18 Units SubCUTAneous Q4H    ipratropium-albuterol  1 ampule Inhalation Q4H    [Held by provider] atorvastatin  40 mg Oral Nightly    omega-3 acid ethyl esters  2 g Oral BID    [Held by provider] clopidogrel  75 mg Oral Daily    aspirin  81 mg Oral Daily    sodium chloride flush  5-40 mL IntraVENous 2 times per day    Vitamin D  2,000 Units Oral Daily     PRN Meds:  senna **AND** docusate, polyethylene glycol, sodium chloride, sodium chloride, sodium chloride, albuterol, glucose, dextrose, glucagon (rDNA), dextrose, midazolam, fentanNYL, melatonin, nitroGLYCERIN, sodium chloride flush, sodium chloride, ondansetron **OR** ondansetron, polyethylene glycol, acetaminophen **OR** acetaminophen, potassium chloride, magnesium sulfate    Results:  CBC:   Recent Labs     01/26/22  0600 01/26/22  1515 01/26/22  2237 01/27/22  0525 01/28/22  0330   WBC 31.7*  --   --  26.6* 31.9*   HGB 8.2*   < > 7.6* 7.0* 7.5*   HCT 26.4*   < > 24.4* 22.3* 24.0*   MCV 93.6  --   --  92.6 93.4   PLT 75*  --   --  60* 68*    < > = values in this interval not displayed. BMP:   Recent Labs     01/26/22  0600 01/26/22  1710 01/27/22  0525 01/28/22  0330     --  136 137   K 5.6*  5.6*   < > 5.1  5.1 5.5*  5.5*   CL 99  --  97* 97*   CO2 33*  --  35* 36*   BUN 38*  --  47* 50*   CREATININE 0.6*  --  0.8* 0.7*    < > = values in this interval not displayed.      LIVER PROFILE:   Recent Labs     01/26/22  0600 01/27/22  0525 01/28/22  0330   AST 93* 91* 87*   ALT 67* 69* 66*   BILITOT 2.2* 1.8* 1.8*   ALKPHOS 186* 168* 183*       Micro  1/9 BC NGTD  1/9 COVID-19 detected  1/20 BC NGTD   1/20 UC NGTD   1/20 Resp Pseudomonas fluorescens and  Aspergillus species    Imaging   Chest x-ray 1/26 imaging reviewed by me and showed   High ETT position  Satisfactory PICC line position   Bilateral airspace disease - same     LE dopper 1/17   Within the limits of this exam, there is no evidence of deep or superficial  venous thrombosis in the lower extremities bilaterally. Chronic phlebitic processes involving the left small saphenous vein       ASSESSMENT:  · Acute hypoxemic respiratory failure  · COVID 19 pneumonia in an unvaccinated individual  · Septic shock  · Severe ARDS  · Invasive aspergillosis   · Pulmonary emphysema  · Hypertriglyceridemia with propofol   · Thrombocytopenia: negative HIT  · Anemia- no active bleed   · Pancreatic nodule, 2.7 cm increase in size  · DM2 with hyperglycemia   · Elevated LFTS  · Tobacco abuse  · CAD and PAD with h/o STEMI     PLAN:  · Droplet Plus Airborne Precautions   · Mechanical ventilation as per my orders. The ventilator was adjusted by me at the bedside for unstable, life threatening respiratory failure. · IV Versed/Fentanyl for sedation, target RASS -5, with daily spontaneous awakening trial.  Fentanyl PRN pain, gtt as needed  · Continue Valium 20 TID   · Nimbex restarted 1/26  · Head of bed 30 degrees or higher at all times  · Prone 1/14/22-  · Decadron QD, D#19--> 4 mg, monitor glucose closely  · S/p Remdesevir D#5 and Tocilizumab received 1/9/22  · Levaquin D#4 and Voriconazole D#6.  Completed 4 days of Cefepime, 3 days Meropenem and 5 days of Vanc  · IV Levophed to keep MAP > 65 mmHg or SBP>90  · Start Lokelma 3  · TFs - trophic    · On ASA - Holding Plavix   · Follow LFTs- hold lipitor   · BM regimen  · Monitor H&H and transfuse for hemoglobin less than 7  · Blood sugar control ISS,  lantus 25 with goal 150-180  · GI prophylaxis: Protonix BID  DVT prophylaxis: lovenox BID  · Total critical care time caring for this patient with life threatening, unstable organ failure, including direct patient contact, management of life support systems, review of data including imaging and labs, discussions with other team members and physicians at least 31 minutes so far today, excluding procedures.

## 2022-01-28 NOTE — PROGRESS NOTES
Reassessment completed with no changes noted.  PRN given for bowel regimen program. No concerns noted

## 2022-01-28 NOTE — PROGRESS NOTES
Hospitalist Progress Note      PCP: Luz Maria Quezada    Date of Admission: 1/9/2022    Admitted with Covid pneumonia and acute hypoxic respiratory failure    Subjective: cont to be intubated with high O2 requirement at 100%FiO2 and 14 PEEP  Proned   Continues to be tachycardic  On Levophed    1/28  Proned overnight. On Levophed. 1/29  Worsening hypoxia. 95% FiO2 and 14 of PEEP  Off Levophed.     Medications:  Reviewed    Infusion Medications    sodium chloride      cisatracurium (NIMBEX) infusion 1 mcg/kg/min (01/27/22 0229)    propofol      sodium chloride      sodium chloride 500 mL/hr at 01/20/22 1925    midazolam 8 mg/hr (01/28/22 0200)    dextrose      fentaNYL 200 mcg/hr (01/28/22 0458)    norepinephrine 4 mcg/min (01/27/22 1550)    sodium chloride       Scheduled Medications    [START ON 1/29/2022] dexamethasone  4 mg IntraVENous Daily    enoxaparin  30 mg SubCUTAneous BID    influenza virus vaccine  0.5 mL IntraMUSCular Prior to discharge    levofloxacin  750 mg IntraVENous Q24H    diazePAM  20 mg Oral Q8H    voriconazole  300 mg IntraVENous Q12H    pantoprazole  40 mg IntraVENous BID    insulin glargine  25 Units SubCUTAneous QAM    Venelex   Topical BID    insulin lispro  0-18 Units SubCUTAneous Q4H    ipratropium-albuterol  1 ampule Inhalation Q4H    [Held by provider] atorvastatin  40 mg Oral Nightly    omega-3 acid ethyl esters  2 g Oral BID    [Held by provider] clopidogrel  75 mg Oral Daily    aspirin  81 mg Oral Daily    sodium chloride flush  5-40 mL IntraVENous 2 times per day    Vitamin D  2,000 Units Oral Daily     PRN Meds: senna **AND** docusate, polyethylene glycol, sodium chloride, sodium chloride, sodium chloride, albuterol, glucose, dextrose, glucagon (rDNA), dextrose, midazolam, fentanNYL, melatonin, nitroGLYCERIN, sodium chloride flush, sodium chloride, ondansetron **OR** ondansetron, polyethylene glycol, acetaminophen **OR** acetaminophen, potassium chloride, magnesium sulfate      Intake/Output Summary (Last 24 hours) at 1/28/2022 0846  Last data filed at 1/28/2022 0500  Gross per 24 hour   Intake 1315 ml   Output 1500 ml   Net -185 ml       Physical Exam Performed:    /62   Pulse 110   Temp 97.4 °F (36.3 °C) (Axillary)   Resp (!) 35   Ht 5' 2\" (1.575 m)   Wt 161 lb 6.4 oz (73.2 kg)   SpO2 92%   BMI 29.52 kg/m²       General:  Middle aged male, old appearing.  Intubated and sedated.  Prone  Eyes:  No sclera icterus. No conjunctiva injected. ENT - oral ETT and OG noted . Neck: Trachea midline. Normal thyroid. Resp-  jose + crackles. No wheezing. No rhonchi. CV: Regular rate. Regular rhythm. No mumur or rub. No edema. No JVD. Palpable pedal pulses. GI: Non-tender. Non-distended. No masses. No organmegaly. Normal bowel sounds. No hernia. Skin: developing edema to all ex t  Lymph: No cervical LAD. No supraclavicular LAD. M/S: No cyanosis. No joint deformity. No clubbing. Neuro: sedated    Labs:   Recent Labs     01/26/22  0600 01/26/22  1515 01/26/22  2237 01/27/22  0525 01/28/22  0330   WBC 31.7*  --   --  26.6* 31.9*   HGB 8.2*   < > 7.6* 7.0* 7.5*   HCT 26.4*   < > 24.4* 22.3* 24.0*   PLT 75*  --   --  60* 68*    < > = values in this interval not displayed. Recent Labs     01/26/22  0600 01/26/22  1710 01/27/22  0525 01/28/22  0330     --  136 137   K 5.6*  5.6*   < > 5.1  5.1 5.5*  5.5*   CL 99  --  97* 97*   CO2 33*  --  35* 36*   BUN 38*  --  47* 50*   CREATININE 0.6*  --  0.8* 0.7*   CALCIUM 8.3  --  8.3 8.2*    < > = values in this interval not displayed. Recent Labs     01/26/22  0600 01/27/22  0525 01/28/22  0330   AST 93* 91* 87*   ALT 67* 69* 66*   BILITOT 2.2* 1.8* 1.8*   ALKPHOS 186* 168* 183*     No results for input(s): INR in the last 72 hours. No results for input(s): Clement Huddleston in the last 72 hours.     Urinalysis:      Lab Results   Component Value Date    NITRU Negative 01/20/2022    WBCUA 0-2 01/20/2022    BACTERIA 1+ 07/16/2018    RBCUA 11-20 01/20/2022    BLOODU MODERATE 01/20/2022    SPECGRAV 1.015 01/20/2022    GLUCOSEU Negative 01/20/2022       Radiology:  XR CHEST PORTABLE   Final Result   Supportive tubing is in normal position. Stable, extensive interstitial opacities. XR CHEST PORTABLE   Final Result   Line and tubes as above. Stable bilateral interstitial opacities. XR CHEST PORTABLE   Final Result   Improved aeration of the lungs. Bilateral airspace disease remains         XR CHEST PORTABLE   Final Result   Extensive parenchymal infiltrates are seen throughout the lungs bilaterally,   mildly worsened at the right lung base, otherwise similar in appearance. XR CHEST PORTABLE   Final Result   Interval increase left-sided pulmonary opacities representing worsening   edema, atelectasis or pneumonia. Slightly improved aeration in the right   upper lung zone. XR CHEST PORTABLE   Final Result   Stable pattern of diffuse, multifocal airspace opacities. XR CHEST PORTABLE   Final Result   Unchanged multifocal bilateral atypical pneumonia. XR CHEST PORTABLE   Final Result   Unremarkable appearance of an endotracheal tube. Unchanged patchy interstitial lung markings which may reflect atypical   pneumonia or pulmonary edema. XR CHEST PORTABLE   Final Result   1. Endotracheal tube terminates 1.6 cm from the benton. Recommend 2 cm   retraction. 2.  Slightly increased diffuse bilateral airspace and interstitial opacities. The findings were sent to the [facility] Results Communication Team at 12:48 a.m.   on 01/18/2022 to be communicated to a licensed caregiver. XR CHEST PORTABLE   Final Result   Mild interval worsening with moderate infiltrates in the lungs. These are   most pronounced in the right lung. 5 cm indeterminate dense opacity in the midline-right upper abdomen. Correlate for oral contrast in bowel. VL Extremity Venous Bilateral   Final Result      XR CHEST PORTABLE   Final Result   Stable multifocal bilateral lung airspace disease consistent with previously   identified presence of COVID-19 viral pneumonia. Suspected moderate pulmonary interstitial edema. XR CHEST PORTABLE   Final Result   The support apparatus as described above. Continued increased interstitial opacity throughout both lungs, along with   more focal traits within the periphery the lungs, some combination of   interstitial edema and pneumonia. XR CHEST PORTABLE   Final Result   1. Endotracheal tube projects in appropriate position. 2. Increased bilateral nonspecific pulmonary opacities representing edema,   atelectasis and/or pneumonia. XR CHEST PORTABLE   Final Result   Right PICC projects in normal position. Stable interstitial and ground-glass opacities, most likely atypical   pneumonia. IR PICC WO SQ PORT/PUMP > 5 YEARS   Final Result   Successful placement of PICC line. CT CHEST PULMONARY EMBOLISM W CONTRAST   Final Result   1. No evidence of pulmonary embolism. 2.  Commonly reported imaging features of COVID-19 pneumonia are present. Other processes such as influenza pneumonia and organizing pneumonia, as can   be seen with drug toxicity and connective tissue disease, can cause a similar   imaging pattern. PneTyp   3. Mild compression deformity of T8 of uncertain chronicity, new since   05/29/2020. Similar mild moderate compression deformities at T10, T12.   4. Fatty liver. 5. Interval increase in size of now 2.7 cm fat attenuation lesion of the   pancreatic neck. Recommend further evaluation with nonemergent pancreas   protocol MRI. XR CHEST PORTABLE   Final Result   1. Bilateral interstitial thickening either due to edema or atypical   interstitial pneumonia.          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]     Septic shock ([facility]) [A41.9, R65.21]     Anemia [D64.9]     Leukocytosis [D72.829]     Hypertriglyceridemia [E78.1]     Acute hypoxemic respiratory failure ([facility]) [J96.01]     ARDS (adult respiratory distress syndrome) ([facility]) [J80]     Hyperglycemia [R73.9]     Pulmonary emphysema ([facility]) [J43.9]     Pneumonia due to COVID-19 virus [U07.1, J12.82]     Acute hypoxemic respiratory failure due to COVID-19 ([facility]) [U07.1, J96.01]     Essential hypertension [I10]     DMII (diabetes mellitus, type 2) ([facility]) [E11.9]     GERD (gastroesophageal reflux disease) [K21.9]     HLD (hyperlipidemia) [E78.5]     PAD (peripheral artery disease) ([facility]) [I73.9]            COVID PNA  Acute Hypoxic Respiratory Failure  - imaging with bilateral pna pattern  - does not wear O2 at baseline  - Admit to Med Surg, droplet + precautions, tele  - supp O2, progressive hypoxemia, now on vent support  -Intubated 1/14 , proning as needed  - on  Decadron D#21, now at 6 mg ,  completed 5 days of  Remdesivir, s/p Tocilizumab   - worsening wbc and hypoxia, now at 95 % fio2, maxed out on ventilator settings. PEEP of 16.  - started  cefepime and vancomycin D4.  Now on Levaquin day #5 add voriconazole day #7 for possible fungal infection.    - bloody secretions in ETT. hold PLAVIX   - pulmonary consulted   - lovenox changed to heparin gtt for possibility of PE  - hold heparin gtt  Lasix 40 mg IV daily prn   Worsening oxygenation     Hypotension - likely sepsis   -Was on cefepime and Vanco.  Cefepime stopped. Now on levaquin.  Voriconazole added.   - sp fluid boluses - on levophed now   - wean as able   Has  persistent tachycardia  Now off Levophed     Leucocytosis- persistent     Thrombocytopenia - plt dropped to 85--> 75--> 60-- 67, HIT Ab negative    Hyperkalemia. Lokelma     Anemia - multifactorial   - monitor and transfuse PRBC less than 7.  - ordered one unit of PRBC 1/23  - monitor hb     Abnormal lFT - likely from viral infection   Monitor, stable      CAD - stable. No CP.  Hold Plavix for bloody secretions        PAD stable     DM type 2  Hyperglycemia with steroids, on ssi     HLD On Lipitor.      DVT prophylaxis: Lovenox  Diet: Diet NPO  ADULT TUBE FEEDING; Orogastric; Peptide Based; Continuous; 15; No; 30; Q 4 hours; Protein; one proteinex P2Go TWICE daily via feeding tube  Code Status: Full Code    PT/OT Eval Status: not indicated      Ho Newton MD

## 2022-01-28 NOTE — PROGRESS NOTES
Shift assessment completed as documented on flowsheet. Pt sedated RASS -5 and mechanically ventilated. NSR without ectopy noted. Abd soft and nontender. Navarro patent to bsd. Tube feeding infusing per order. No concerns noted at this time.

## 2022-01-28 NOTE — PROGRESS NOTES
Comprehensive Nutrition Assessment    Type and Reason for Visit:  Reassess    Nutrition Recommendations/Plan:   1. Continue current TF order - ADULT TUBE FEEDING; Orogastric; Peptide-Based formula - Vital AF 1.2 with a trophic rate of 15 ml/hr x 20 hours + 30 ml water flushes every 4 hours for tube patency + one proteinex P2Go TWICE daily via feeding tube. 2. Monitor TF rate, intake, and tolerance + water flushes + administration of one proteinex P2Go TWICE daily. 3. Monitor vent status and plan of care. 4. Monitor nutrition-related labs, bowel function, and weight trends. 5. Since patient is off propofol and if patient remains in supine position, recommend TF rate advancement to Vital AF 1.2 with a goal rate of 60 ml/hr x 20 hours. Start with 20 ml/hr and increase by 20 ml every 4 hours, as tolerated by patient, until goal rate can be achieved and maintained. Water flushes, 30 ml every 4 hours for tube patency. Proteinex P2Go not needed when TF rate is advanced to 65 ml/hr x 20 hours. Nutrition Assessment:  patient remains unchanged from a nutritional standpoint since last RD assessment; patient remains at risk for further compromise d/t increased nutrition needs r/t COVID-19 virus, need for EN as sole source of nutrition, and altered nutrition-related labs; will continue trophic TF of Vital AF 1.2 with a goal rate of 15 ml/hr x 20 hours + 30 ml water flushes every 4 hours for tube patency + one proteinex P2Go TWICE daily via feeding tube    Malnutrition Assessment:  Malnutrition Status:   At risk for malnutrition  Context:  Acute Illness     Findings of the 6 clinical characteristics of malnutrition:  Energy Intake:  7 - 50% or less of estimated energy requirements for 5 or more days  Weight Loss:  Unable to assess (patient is + 17L fluid since admission)     Body Fat Loss:  Unable to assess (COVID-19 +)     Muscle Mass Loss:  Unable to assess (COVID-19 +)    Fluid Accumulation:  No significant fluid accumulation     Strength:  Not Performed    Estimated Daily Nutrient Needs:  Energy (kcal):  1460 - 1679 kcals based on 20-23 kcals/kg/CBW; Weight Used for Energy Requirements:  Current     Protein (g):  76 - 86 g protein based on 1.4-1.6 g/kg/IBW;  Weight Used for Protein Requirements:  Ideal        Fluid (ml/day):  1460 - 1679 ml; Method Used for Fluid Requirements:  1 ml/kcal      Nutrition Related Findings:  patient remains intubated; propofol is not on at this time; patient was proned until mid-morning today when he was turned to supine position; last documented BM was on 1/26/22; TF continues to infuse at 15 ml/hr x 20 hours; K, BUN, alk phos, and ALT/AST are elevated; Cl, Ca, and h/h are low      Wounds:  Deep Tissue Injury,Pressure Injury (DTI on L lower chin)       Current Nutrition Therapies:    Current Tube Feeding (TF) Orders:  · Feeding Route: Orogastric  · Formula: Peptide Based  · Schedule: Continuous  · Additives/Modulars:  (none)  · Water Flushes: 30 ml every 4 hours for tube patency  · Current TF & Flush Orders Provides: Vital AF 1.2 with a low goal rate of 15 ml/hr x 20 hours = 300 ml TV, 360 kcals, 23 g protein, and 243 ml free water + 30 ml water flushes every 4 hours for tube patency + 52 g protein and 208 kcals from one proteinex P2Go TWICE daily (75 g protein and 568 kcals total)  · Goal TF & Flush Orders Provides: Vital AF 1.2 with a goal rate of 60 ml/hr x 20 hours = 1200 ml TV, 1440 kcals, 90 g protein, and 973 ml free water + 30 ml water flushes every 4 hours for tube patency      Anthropometric Measures:  · Height: 5' 2\" (157.5 cm)  · Current Body Weight: 161 lb 6.4 oz (73.2 kg) (obtained on 1/27/22; actual weight)   · Admission Body Weight: 146 lb 13.2 oz (66.6 kg) (obtained on 1/15/22; actual weight)    · Usual Body Weight: 146 lb 13.2 oz (66.6 kg) (obtained on 1/15/22; actual weight)     · Ideal Body Weight: 118 lbs; % Ideal Body Weight 136.8 %   · BMI: 29.5    · BMI Categories: Overweight (BMI 25.0-29. 9)       Nutrition Diagnosis:   · Inadequate oral intake related to inadequate protein-energy intake,impaired respiratory function,increase demand for energy/nutrients as evidenced by NPO or clear liquid status due to medical condition,intubation,lab values,poor intake prior to admission      Nutrition Interventions:   Food and/or Nutrient Delivery:  Continue NPO,Continue Current Tube Feeding  Nutrition Education/Counseling:  No recommendation at this time   Coordination of Nutrition Care:  Continue to monitor while inpatient,Interdisciplinary Rounds    Goals:  patient will tolerate Vital AF 1.2 with a trophic rate of 15 ml/hr x 20 hours without GI distress, without s/s of aspiration, and without additional lab/fluid disturbances       Nutrition Monitoring and Evaluation:   Behavioral-Environmental Outcomes:  None Identified   Food/Nutrient Intake Outcomes:  Enteral Nutrition Intake/Tolerance  Physical Signs/Symptoms Outcomes:  Biochemical Data,Fluid Status or Edema,Hemodynamic Status,Weight,Skin     Discharge Planning:     Too soon to determine     Electronically signed by Harmeet Blake RD, LD on 1/28/22 at 11:31 AM EST    Contact: [phone]

## 2022-01-28 NOTE — PROGRESS NOTES
This note also relates to the following rows which could not be included:  Resp - Cannot attach notes to unvalidated device data       01/27/22 1910   Vent Information   Vent Type 840   Vent Mode AC/VC   Vt Ordered 340 mL   Rate Set 38 bmp   Peak Flow 70 L/min   Pressure Support 0 cmH20   FiO2  100 %   SpO2 92 %   SpO2/FiO2 ratio 92   Sensitivity 3   PEEP/CPAP 16   Humidification Source Heated wire   Humidification Temp 37   Humidification Temp Measured 37   Circuit Condensation Drained   Vent Patient Data   Peak Inspiratory Pressure 32 cmH2O   Mean Airway Pressure 22 cmH20   Rate Measured 39 br/min   Vt Exhaled 374 mL   Minute Volume 14.4 Liters   I:E Ratio 1:2.00   Plateau Pressure 30 PNT12   Static Compliance 27 mL/cmH2O   Dynamic Compliance 23 mL/cmH2O   Cough/Sputum   Sputum How Obtained Endotracheal;Suctioned   Cough Productive   Sputum Amount Moderate   Sputum Color Dark red   Tenacity Thick;Mucous plugs   Spontaneous Breathing Trial (SBT) RT Doc   Pulse 106   Breath Sounds   Right Upper Lobe Diminished   Right Middle Lobe Diminished   Right Lower Lobe Diminished   Left Upper Lobe Diminished   Left Lower Lobe Diminished   Additional Respiratory  Assessments   Position Prone   Alarm Settings   High Pressure Alarm 50 cmH2O   Low Minute Volume Alarm 4 L/min   Apnea (secs) 20 secs   High Respiratory Rate 45 br/min   Low Exhaled Vt  200 mL   ETT (adult)   Placement Date/Time: 01/14/22 1503   Timeout: Patient;Procedure; Appropriate Equipment;Correct Position  Preoxygenation: Yes  Mask Ventilation: Ventilated by mask (1)  Technique: Rapid sequence  Type: Cuffed  Tube Size: 8 mm  Laryngoscope: GlideScope  . ..    Secured at 24 cm   Measured From Lips   ET Placement Right   Secured By Commercial tube oliveros   Site Condition Dry

## 2022-01-28 NOTE — PROGRESS NOTES
01/28/22 0310   Vent Information   $Ventilation $Subsequent Day   Vent Type 840   Vent Mode AC/VC   Vt Ordered 340 mL   Rate Set 38 bmp   Peak Flow 70 L/min   Pressure Support 0 cmH20   FiO2  95 %   SpO2 90 %   SpO2/FiO2 ratio 94.74   Sensitivity 3   PEEP/CPAP 16   Humidification Source Heated wire   Humidification Temp 37   Humidification Temp Measured 37   Circuit Condensation Drained   Vent Patient Data   Peak Inspiratory Pressure 29 cmH2O   Mean Airway Pressure 22 cmH20   Rate Measured 38 br/min   Vt Exhaled 399 mL   Minute Volume 14.2 Liters   I:E Ratio 1:2.00   Plateau Pressure 26 IVL71   Cough/Sputum   Sputum How Obtained Endotracheal;Suctioned   Cough Productive   Sputum Amount Moderate   Sputum Color Dark red   Tenacity Mucous plugs; Thick   Spontaneous Breathing Trial (SBT) RT Doc   Pulse 99   Breath Sounds   Right Upper Lobe Diminished   Right Middle Lobe Diminished   Right Lower Lobe Diminished   Left Upper Lobe Diminished   Left Lower Lobe Diminished   Additional Respiratory  Assessments   Resp (!) 35   Position Prone   Alarm Settings   High Pressure Alarm 50 cmH2O   Low Minute Volume Alarm 4 L/min   Apnea (secs) 20 secs   High Respiratory Rate 45 br/min   Low Exhaled Vt  200 mL   ETT (adult)   Placement Date/Time: 01/14/22 1503   Timeout: Patient;Procedure; Appropriate Equipment;Correct Position  Preoxygenation: Yes  Mask Ventilation: Ventilated by mask (1)  Technique: Rapid sequence  Type: Cuffed  Tube Size: 8 mm  Laryngoscope: GlideScope  . ..    Secured at 24 cm   Measured From Lips   ET Placement Right   Secured By Commercial tube oliveros   Site Condition Dry

## 2022-01-29 NOTE — PROGRESS NOTES
Patient prone position. Turned head to left again. Oxygen saturations down to 78% at one point. Very difficult to recover. Suctioned and repositioned multiple times with no results. Placed head facing right again with marginal improvement to 87%. Respiratory called [facility]) and notified. Fio2 100%. Monitoring closely.

## 2022-01-29 NOTE — PROGRESS NOTES
Shift assessment completed as documented on flowsheet. Pt prone position, sedated RASS -5 and mechanically ventilated. NST without ectopy. Lungs diminished. Fio2 95% Peep 14. Trophic tubing feeding. Navarro patent to bsd.  No current concerns noted

## 2022-01-29 NOTE — PROGRESS NOTES
01/28/22 2310   Vent Information   Vent Type 840   Vent Mode AC/VC   Vt Ordered 340 mL   Rate Set 38 bmp   Peak Flow 70 L/min   Pressure Support 0 cmH20   FiO2  95 %   SpO2 (!) 88 %   SpO2/FiO2 ratio 92.63   Sensitivity 3   PEEP/CPAP 14   Vent Patient Data   Peak Inspiratory Pressure 27 cmH2O   Mean Airway Pressure 20 cmH20   Rate Measured 39 br/min   Vt Exhaled 395 mL   Minute Volume 14.5 Liters   I:E Ratio 1:2.00   Cough/Sputum   Sputum How Obtained Suctioned;Endotracheal   Sputum Amount Small   Sputum Color Dark red   Tenacity Thick   Spontaneous Breathing Trial (SBT) RT Doc   Pulse 113   Breath Sounds   Right Upper Lobe Diminished   Right Middle Lobe Diminished   Right Lower Lobe Diminished   Left Upper Lobe Diminished   Left Lower Lobe Diminished   Additional Respiratory  Assessments   Resp (!) 31   Alarm Settings   High Pressure Alarm 50 cmH2O   Low Minute Volume Alarm 4 L/min   High Respiratory Rate 45 br/min

## 2022-01-29 NOTE — PROGRESS NOTES
FiO2 decraesed to 95%         01/29/22 0728   Vent Information   Vt Ordered 340 mL   Rate Set 38 bmp   Peak Flow 70 L/min   Pressure Support 0 cmH20   FiO2  95 %   SpO2 90 %   SpO2/FiO2 ratio 94.74   Sensitivity 3   PEEP/CPAP 14   Humidification Source Heated wire   Humidification Temp Measured 37   Vent Patient Data   Peak Inspiratory Pressure 28 cmH2O   Mean Airway Pressure 20 cmH20   Rate Measured 38 br/min   Vt Exhaled 368 mL   Minute Volume 14 Liters   I:E Ratio 1:2.00   Plateau Pressure 28 SMT19   Static Compliance 29 mL/cmH2O   Cough/Sputum   Sputum How Obtained None   Spontaneous Breathing Trial (SBT) RT Doc   Pulse 99   Breath Sounds   Right Upper Lobe Diminished   Right Middle Lobe Diminished   Right Lower Lobe Diminished   Left Upper Lobe Diminished   Left Lower Lobe Diminished   Additional Respiratory  Assessments   Resp (!) 38   Position Semi-Lee's   Oral Care Completed? Yes   Oral Care Mouth suctioned   Subglottic Suction Done? Yes   Alarm Settings   High Pressure Alarm 50 cmH2O   Low Minute Volume Alarm 4 L/min   High Respiratory Rate 45 br/min   Patient Observation   Observations 8ett 24 at lip   ETT (adult)   Placement Date/Time: 01/14/22 1503   Timeout: Patient;Procedure; Appropriate Equipment;Correct Position  Preoxygenation: Yes  Mask Ventilation: Ventilated by mask (1)  Technique: Rapid sequence  Type: Cuffed  Tube Size: 8 mm  Laryngoscope: GlideScope  . ..    Secured at 24 cm   Measured From [address] [place] [address],[address] By Commercial tube oliveros   Site Condition Dry

## 2022-01-29 NOTE — PROGRESS NOTES
Pulmonary & Critical Care Medicine ICU Progress Note    CC: COVID-19 pneumonia in an unvaccinated individual    Events of Last 24 hours: worsening hypoxia   Levophed 0  Fentanyl 200  Versed 10  Nimbex 1    Invasive Lines: PICC placed 1/10/22    MV:  1/14/22-  Vent Mode: AC/VC Rate Set: 38 bmp/Vt Ordered: 340 mL/ /FiO2 : 100 %  Recent Labs     01/28/22  0320 01/29/22  0330   PHART 7.375 7.315*   PGL3TXN 57.6* 72.3*   PO2ART 67.8* 67.5*       IV:   sodium chloride      cisatracurium (NIMBEX) infusion 1.5 mcg/kg/min (01/28/22 2357)    propofol      sodium chloride      sodium chloride 500 mL/hr at 01/20/22 1925    midazolam 10 mg/hr (01/28/22 2353)    dextrose      fentaNYL 25 mcg/hr (01/29/22 0442)    norepinephrine Stopped (01/28/22 1811)    sodium chloride         Vitals:  BP 90/60   Pulse 104   Temp 97.4 °F (36.3 °C) (Rectal)   Resp (!) 38   Ht 5' 2\" (1.575 m)   Wt 152 lb 8.9 oz (69.2 kg)   SpO2 90%   BMI 27.90 kg/m²   on Vent AC 36/340    Intake/Output Summary (Last 24 hours) at 1/29/2022 0732  Last data filed at 1/29/2022 9023  Gross per 24 hour   Intake 2052. 35 ml   Output 1460 ml   Net 592.35 ml     EXAM: limited due to prone ventilation   General: ill appearing. Intubated sedated. Eyes: PERRL. No sclera icterus. + conjunctival injection. ENT: ET tube in place  Neck: Trachea midline  Resp: No accessory muscle use. Few crackles. No wheezing. Few rhonchi. CV: Regular rate. Regular rhythm. 1+ LE edema. GI: Proned not able to assess   Skin: Warm and dry. No nodule on exposed extremities. No rash on exposed extremities  Lymph: No cervical LAD. No supraclavicular LAD. M/S: No cyanosis. No joint deformity. No clubbing. Neuro: Intubated sedated.  paralyzed  Psych: Noncommunicative unable to obtain      Scheduled Meds:   dexamethasone  4 mg IntraVENous Daily    enoxaparin  30 mg SubCUTAneous BID    influenza virus vaccine  0.5 mL IntraMUSCular Prior to discharge    levofloxacin  750 mg IntraVENous Q24H    diazePAM  20 mg Oral Q8H    voriconazole  300 mg IntraVENous Q12H    pantoprazole  40 mg IntraVENous BID    insulin glargine  25 Units SubCUTAneous QAM    Venelex   Topical BID    insulin lispro  0-18 Units SubCUTAneous Q4H    ipratropium-albuterol  1 ampule Inhalation Q4H    [Held by provider] atorvastatin  40 mg Oral Nightly    omega-3 acid ethyl esters  2 g Oral BID    [Held by provider] clopidogrel  75 mg Oral Daily    aspirin  81 mg Oral Daily    sodium chloride flush  5-40 mL IntraVENous 2 times per day    Vitamin D  2,000 Units Oral Daily     PRN Meds:  senna **AND** docusate, polyethylene glycol, sodium chloride, sodium chloride, sodium chloride, albuterol, glucose, dextrose, glucagon (rDNA), dextrose, midazolam, fentanNYL, melatonin, nitroGLYCERIN, sodium chloride flush, sodium chloride, ondansetron **OR** ondansetron, polyethylene glycol, acetaminophen **OR** acetaminophen, potassium chloride, magnesium sulfate    Results:  CBC:   Recent Labs     01/27/22  0525 01/28/22 0330 01/29/22 0330   WBC 26.6* 31.9* 25.9*   HGB 7.0* 7.5* 7.0*   HCT 22.3* 24.0* 22.7*   MCV 92.6 93.4 94.9   PLT 60* 68* 67*     BMP:   Recent Labs     01/28/22  0330 01/28/22  1800 01/29/22  0330    137 138   K 5.5*  5.5* 5.2* 5.4*   CL 97* 96* 97*   CO2 36* 34* 36*   PHOS  --  4.1  --    BUN 50* 53* 53*   CREATININE 0.7* 0.6* 0.7*     LIVER PROFILE:   Recent Labs     01/27/22  0525 01/28/22  0330 01/29/22  0330   AST 91* 87* 93*   ALT 69* 66* 66*   BILITOT 1.8* 1.8* 2.0*   ALKPHOS 168* 183* 166*       Micro  1/9 BC NGTD  1/9 COVID-19 detected  1/20 BC NGTD   1/20 UC NGTD   1/20 Resp Pseudomonas fluorescens and  Aspergillus species    Imaging   Chest x-ray 1/28 imaging reviewed by me and showed   1. Redistributed bilateral pulmonary opacities as above with worsening   opacities on the right as above.    2. Lines and tubes as above       LE dopper 1/17   Within the limits of this exam, there is no evidence of deep or superficial  venous thrombosis in the lower extremities bilaterally. Chronic phlebitic processes involving the left small saphenous vein       ASSESSMENT:  · Acute hypoxemic respiratory failure  · COVID 19 pneumonia in an unvaccinated individual  · Septic shock  · Severe ARDS  · Invasive aspergillosis   · Pulmonary emphysema  · Hypertriglyceridemia with propofol   · Thrombocytopenia: negative HIT  · Anemia- no active bleed   · Pancreatic nodule, 2.7 cm increase in size  · DM2 with hyperglycemia   · Elevated LFTS  · Tobacco abuse  · CAD and PAD with h/o STEMI     PLAN:  · Droplet Plus Airborne Precautions   · Mechanical ventilation as per my orders. The ventilator was adjusted by me at the bedside for unstable, life threatening respiratory failure. · IV Versed/Fentanyl for sedation, target RASS -5, with daily spontaneous awakening trial.  Fentanyl PRN pain, gtt as needed  · Increase TV  · Continue Valium 20 TID   · Nimbex restarted 1/26  · Head of bed 30 degrees or higher at all times  · Prone 1/14/22-  · Decadron QD, D#20--> 4 mg, monitor glucose closely  · S/p Remdesevir D#5 and Tocilizumab received 1/9/22  · Levaquin D#5 and Voriconazole D#7.  Completed 4 days of Cefepime, 3 days Meropenem and 5 days of Vanc  · IV Levophed to keep MAP > 65 mmHg or SBP>90  · Lokelma X3 and 1L IVF  · TFs - trophic    · On ASA - Holding Plavix   · Follow LFTs- hold lipitor   · BM regimen  · Monitor H&H and transfuse for hemoglobin less than 7  · Blood sugar control ISS,  lantus 25 with goal 150-180  · GI prophylaxis: Protonix BID  DVT prophylaxis: lovenox BID  · Wife informed yesterday of poor prognosis  · Total critical care time caring for this patient with life threatening, unstable organ failure, including direct patient contact, management of life support systems, review of data including imaging and labs, discussions with other team members and physicians at least 31 minutes so far today, excluding procedures.

## 2022-01-29 NOTE — PROGRESS NOTES
01/28/22 2000   RT Protocol   History Pulmonary Disease 2   Respiratory pattern 8   Breath sounds 2   Cough 3   Indications for Bronchodilator Therapy Decreased or absent breath sounds   Bronchodilator Assessment Score 15   RT Inhaler-Nebulizer Bronchodilator Protocol Note    There is a bronchodilator order in the chart from a provider indicating to follow the RT Bronchodilator Protocol and there is an Initiate RT Inhaler-Nebulizer Bronchodilator Protocol order as well (see protocol at bottom of note). CXR Findings:  XR CHEST PORTABLE    Result Date: 1/28/2022  1. Redistributed bilateral pulmonary opacities as above with worsening opacities on the right as above. 2. Lines and tubes as above       The findings from the last RT Protocol Assessment were as follows:   History Pulmonary Disease: Chronic pulmonary disease  Respiratory Pattern: Severe use of accessory muscle or RR>30 bpm  Breath Sounds: Slightly diminished and/or crackles  Cough: Weak, non-productive  Indication for Bronchodilator Therapy: Decreased or absent breath sounds  Bronchodilator Assessment Score: 15    Aerosolized bronchodilator medication orders have been revised according to the RT Inhaler-Nebulizer Bronchodilator Protocol below. Respiratory Therapist to perform RT Therapy Protocol Assessment initially then follow the protocol. Repeat RT Therapy Protocol Assessment PRN for score 0-3 or on second treatment, BID, and PRN for scores above 3. No Indications - adjust the frequency to every 6 hours PRN wheezing or bronchospasm, if no treatments needed after 48 hours then discontinue using Per Protocol order mode. If indication present, adjust the RT bronchodilator orders based on the Bronchodilator Assessment Score as indicated below.   Use Inhaler orders unless patient has one or more of the following: on home nebulizer, not able to hold breath for 10 seconds, is not alert and oriented, cannot activate and use MDI correctly, or respiratory rate 25 breaths per minute or more, then use the equivalent nebulizer order(s) with same Frequency and PRN reasons based on the score. If a patient is on this medication at home then do not decrease Frequency below that used at home. 0-3 - enter or revise RT bronchodilator order(s) to equivalent RT Bronchodilator order with Frequency of every 4 hours PRN for wheezing or increased work of breathing using Per Protocol order mode. 4-6 - enter or revise RT Bronchodilator order(s) to two equivalent RT bronchodilator orders with one order with BID Frequency and one order with Frequency of every 4 hours PRN wheezing or increased work of breathing using Per Protocol order mode. 7-10 - enter or revise RT Bronchodilator order(s) to two equivalent RT bronchodilator orders with one order with TID Frequency and one order with Frequency of every 4 hours PRN wheezing or increased work of breathing using Per Protocol order mode. 11-13 - enter or revise RT Bronchodilator order(s) to one equivalent RT bronchodilator order with QID Frequency and an Albuterol order with Frequency of every 4 hours PRN wheezing or increased work of breathing using Per Protocol order mode. Greater than 13 - enter or revise RT Bronchodilator order(s) to one equivalent RT bronchodilator order with every 4 hours Frequency and an Albuterol order with Frequency of every 2 hours PRN wheezing or increased work of breathing using Per Protocol order mode.          Electronically signed by Sae Johnson RCP on 1/28/2022 at 8:44 PM

## 2022-01-29 NOTE — PROGRESS NOTES
RT Nebulizer Bronchodilator Protocol Note    There is a bronchodilator order in the chart from a provider indicating to follow the RT Bronchodilator Protocol and there is an Initiate RT Bronchodilator Protocol order as well (see protocol at bottom of note). CXR Findings:  XR CHEST PORTABLE    Result Date: 1/29/2022  Line and tubes as above. Mildly worsened bilateral interstitial opacities. Relative lucency in the right apex correlates with bullous disease. RECOMMENDATION: If there is concern for pneumothorax, a chest CT can be obtained as clinically indicated. XR CHEST PORTABLE    Result Date: 1/28/2022  1. Redistributed bilateral pulmonary opacities as above with worsening opacities on the right as above. 2. Lines and tubes as above       The findings from the last RT Protocol Assessment were as follows:  Smoking: (P) Chronic pulmonary disease  Respiratory Pattern: (P) Severe use of accessory muscle or RR>30 bpm  Breath Sounds: (P) Slightly diminished and/or crackles  Cough: (P) Weak, productive  Indication for Bronchodilator Therapy: (P) Decreased or absent breath sounds  Bronchodilator Assessment Score: (P) 14    Aerosolized bronchodilator medication orders have been revised according to the RT Nebulizer Bronchodilator Protocol below. Respiratory Therapist to perform RT Therapy Protocol Assessment initially then follow the protocol. Repeat RT Therapy Protocol Assessment PRN for score 0-3 or on second treatment, BID, and PRN for scores above 3. No Indications - adjust the frequency to every 6 hours PRN wheezing or bronchospasm, if no treatments needed after 48 hours then discontinue using Per Protocol order mode. If indication present, adjust the RT bronchodilator orders based on the Bronchodilator Assessment Score as indicated below. If a patient is on this medication at home then do not decrease Frequency below that used at home.     0-3 - enter or revise RT bronchodilator order(s) to equivalent RT Bronchodilator order with Frequency of every 4 hours PRN for wheezing or increased work of breathing using Per Protocol order mode. 4-6 - enter or revise RT Bronchodilator order(s) to two equivalent RT bronchodilator orders with one order with BID Frequency and one order with Frequency of every 4 hours PRN wheezing or increased work of breathing using Per Protocol order mode. 7-10 - enter or revise RT Bronchodilator order(s) to two equivalent RT bronchodilator orders with one order with TID Frequency and one order with Frequency of every 4 hours PRN wheezing or increased work of breathing using Per Protocol order mode. 11-13 - enter or revise RT Bronchodilator order(s) to one equivalent RT bronchodilator order with QID Frequency and an Albuterol order with Frequency of every 4 hours PRN wheezing or increased work of breathing using Per Protocol order mode. Greater than 13 - enter or revise RT Bronchodilator order(s) to one equivalent RT bronchodilator order with every 4 hours Frequency and an Albuterol order with Frequency of every 2 hours PRN wheezing or increased work of breathing using Per Protocol order mode. RT to enter RT Home Evaluation for COPD & MDI Assessment order using Per Protocol order mode.     Electronically signed by Chayo Myers RCP on 1/29/2022 at 12:51 PM

## 2022-01-29 NOTE — PROGRESS NOTES
01/28/22 1901   Vent Information   Vent Type 840   Vent Mode AC/VC   Vt Ordered 340 mL   Rate Set 38 bmp   Peak Flow 70 L/min   Pressure Support 0 cmH20   FiO2  95 %   SpO2 90 %   SpO2/FiO2 ratio 94.74   Sensitivity 3   PEEP/CPAP 14   Humidification Source Heated wire   Humidification Temp Measured 37   Circuit Condensation Drained   Vent Patient Data   Peak Inspiratory Pressure 28 cmH2O   Mean Airway Pressure 20 cmH20   Rate Measured 38 br/min   Vt Exhaled 347 mL   Minute Volume 13.2 Liters   I:E Ratio 1:2.00   Plateau Pressure 23 QNY60   Static Compliance 39 mL/cmH2O   Dynamic Compliance 25 mL/cmH2O   Cough/Sputum   Sputum How Obtained Endotracheal   Cough None   Sputum Amount None   Spontaneous Breathing Trial (SBT) RT Doc   Pulse 118   Breath Sounds   Right Upper Lobe Diminished   Right Middle Lobe Diminished   Right Lower Lobe Diminished   Left Upper Lobe Diminished   Left Lower Lobe Diminished   Additional Respiratory  Assessments   Resp (!) 38   Alarm Settings   High Pressure Alarm 50 cmH2O   Low Minute Volume Alarm 4 L/min   Apnea (secs) 20 secs   High Respiratory Rate 45 br/min   Low Exhaled Vt  200 mL   Patient Observation   Observations 8ett 24 at lip

## 2022-01-29 NOTE — PROGRESS NOTES
01/29/22 0311   Vent Information   Vent Type 840   Vent Mode AC/VC   Vt Ordered 340 mL   Rate Set 38 bmp   Peak Flow 70 L/min   Pressure Support 0 cmH20   FiO2  95 %   SpO2 92 %   SpO2/FiO2 ratio 96.84   Sensitivity 3   PEEP/CPAP 14   Humidification Source Heated wire   Humidification Temp Measured 37   Circuit Condensation Drained   Vent Patient Data   Peak Inspiratory Pressure 28 cmH2O   Mean Airway Pressure 20 cmH20   Rate Measured 38 br/min   Vt Exhaled 334 mL   Minute Volume 12.8 Liters   I:E Ratio 1:2.00   Cough/Sputum   Sputum How Obtained Endotracheal   Cough None   Sputum Amount None   Spontaneous Breathing Trial (SBT) RT Doc   Pulse 105   Breath Sounds   Right Upper Lobe Diminished   Right Middle Lobe Diminished   Right Lower Lobe Diminished   Left Upper Lobe Diminished   Left Lower Lobe Diminished   Additional Respiratory  Assessments   Resp (!) 38   Alarm Settings   High Pressure Alarm 50 cmH2O   Low Minute Volume Alarm 4 L/min   Apnea (secs) 20 secs   High Respiratory Rate 45 br/min   Low Exhaled Vt  200 mL   Patient Observation   Observations 8ett 24 at lip

## 2022-01-29 NOTE — PROGRESS NOTES
Reassessment completed as documented on flowsheet. No changes noted at this time. Increased Levophed due to hypotension.

## 2022-01-30 NOTE — PROGRESS NOTES
Pt placed in supine position from prone without any difficulties. Nimbex on hold for paralytic vacation.

## 2022-01-30 NOTE — PROGRESS NOTES
01/30/22 1800   RT Protocol   History Pulmonary Disease 2   Respiratory pattern 8   Breath sounds 2   Cough 1   Indications for Bronchodilator Therapy Decreased or absent breath sounds   Bronchodilator Assessment Score 13   RT Nebulizer Bronchodilator Protocol Note    There is a bronchodilator order in the chart from a provider indicating to follow the RT Bronchodilator Protocol and there is an Initiate RT Bronchodilator Protocol order as well (see protocol at bottom of note). CXR Findings:  XR CHEST PORTABLE    Result Date: 1/30/2022  Supportive tubing is stable. Interstitial and ground-glass opacity, consistent with pneumonia. There are shifting areas of ground-glass opacity in the lung bases, likely components of pulmonary edema. XR CHEST PORTABLE    Result Date: 1/29/2022  Line and tubes as above. Mildly worsened bilateral interstitial opacities. Relative lucency in the right apex correlates with bullous disease. RECOMMENDATION: If there is concern for pneumothorax, a chest CT can be obtained as clinically indicated. The findings from the last RT Protocol Assessment were as follows:  Smoking: (P) Chronic pulmonary disease  Respiratory Pattern: (P) Severe use of accessory muscle or RR>30 bpm  Breath Sounds: (P) Slightly diminished and/or crackles  Cough: (P) Strong, productive  Indication for Bronchodilator Therapy: (P) Decreased or absent breath sounds  Bronchodilator Assessment Score: (P) 13    Aerosolized bronchodilator medication orders have been revised according to the RT Nebulizer Bronchodilator Protocol below. Respiratory Therapist to perform RT Therapy Protocol Assessment initially then follow the protocol. Repeat RT Therapy Protocol Assessment PRN for score 0-3 or on second treatment, BID, and PRN for scores above 3.     No Indications - adjust the frequency to every 6 hours PRN wheezing or bronchospasm, if no treatments needed after 48 hours then discontinue using Per Protocol order mode. If indication present, adjust the RT bronchodilator orders based on the Bronchodilator Assessment Score as indicated below. If a patient is on this medication at home then do not decrease Frequency below that used at home. 0-3 - enter or revise RT bronchodilator order(s) to equivalent RT Bronchodilator order with Frequency of every 4 hours PRN for wheezing or increased work of breathing using Per Protocol order mode. 4-6 - enter or revise RT Bronchodilator order(s) to two equivalent RT bronchodilator orders with one order with BID Frequency and one order with Frequency of every 4 hours PRN wheezing or increased work of breathing using Per Protocol order mode. 7-10 - enter or revise RT Bronchodilator order(s) to two equivalent RT bronchodilator orders with one order with TID Frequency and one order with Frequency of every 4 hours PRN wheezing or increased work of breathing using Per Protocol order mode. 11-13 - enter or revise RT Bronchodilator order(s) to one equivalent RT bronchodilator order with QID Frequency and an Albuterol order with Frequency of every 4 hours PRN wheezing or increased work of breathing using Per Protocol order mode. Greater than 13 - enter or revise RT Bronchodilator order(s) to one equivalent RT bronchodilator order with every 4 hours Frequency and an Albuterol order with Frequency of every 2 hours PRN wheezing or increased work of breathing using Per Protocol order mode. RT to enter RT Home Evaluation for COPD & MDI Assessment order using Per Protocol order mode.     Electronically signed by Pavel Tadeo RCP on 1/30/2022 at 6:50 PM

## 2022-01-30 NOTE — PROGRESS NOTES
01/29/22 2100   RT Protocol   History Pulmonary Disease 2   Respiratory pattern 8   Breath sounds 2   Cough 2   Indications for Bronchodilator Therapy Decreased or absent breath sounds   Bronchodilator Assessment Score 14   RT Inhaler-Nebulizer Bronchodilator Protocol Note    There is a bronchodilator order in the chart from a provider indicating to follow the RT Bronchodilator Protocol and there is an Initiate RT Inhaler-Nebulizer Bronchodilator Protocol order as well (see protocol at bottom of note). CXR Findings:  XR CHEST PORTABLE    Result Date: 1/29/2022  Line and tubes as above. Mildly worsened bilateral interstitial opacities. Relative lucency in the right apex correlates with bullous disease. RECOMMENDATION: If there is concern for pneumothorax, a chest CT can be obtained as clinically indicated. XR CHEST PORTABLE    Result Date: 1/28/2022  1. Redistributed bilateral pulmonary opacities as above with worsening opacities on the right as above. 2. Lines and tubes as above       The findings from the last RT Protocol Assessment were as follows:   History Pulmonary Disease: Chronic pulmonary disease  Respiratory Pattern: Severe use of accessory muscle or RR>30 bpm  Breath Sounds: Slightly diminished and/or crackles  Cough: Weak, productive  Indication for Bronchodilator Therapy: Decreased or absent breath sounds  Bronchodilator Assessment Score: 14    Aerosolized bronchodilator medication orders have been revised according to the RT Inhaler-Nebulizer Bronchodilator Protocol below. Respiratory Therapist to perform RT Therapy Protocol Assessment initially then follow the protocol. Repeat RT Therapy Protocol Assessment PRN for score 0-3 or on second treatment, BID, and PRN for scores above 3. No Indications - adjust the frequency to every 6 hours PRN wheezing or bronchospasm, if no treatments needed after 48 hours then discontinue using Per Protocol order mode.      If indication present, adjust the RT bronchodilator orders based on the Bronchodilator Assessment Score as indicated below. Use Inhaler orders unless patient has one or more of the following: on home nebulizer, not able to hold breath for 10 seconds, is not alert and oriented, cannot activate and use MDI correctly, or respiratory rate 25 breaths per minute or more, then use the equivalent nebulizer order(s) with same Frequency and PRN reasons based on the score. If a patient is on this medication at home then do not decrease Frequency below that used at home. 0-3 - enter or revise RT bronchodilator order(s) to equivalent RT Bronchodilator order with Frequency of every 4 hours PRN for wheezing or increased work of breathing using Per Protocol order mode. 4-6 - enter or revise RT Bronchodilator order(s) to two equivalent RT bronchodilator orders with one order with BID Frequency and one order with Frequency of every 4 hours PRN wheezing or increased work of breathing using Per Protocol order mode. 7-10 - enter or revise RT Bronchodilator order(s) to two equivalent RT bronchodilator orders with one order with TID Frequency and one order with Frequency of every 4 hours PRN wheezing or increased work of breathing using Per Protocol order mode. 11-13 - enter or revise RT Bronchodilator order(s) to one equivalent RT bronchodilator order with QID Frequency and an Albuterol order with Frequency of every 4 hours PRN wheezing or increased work of breathing using Per Protocol order mode. Greater than 13 - enter or revise RT Bronchodilator order(s) to one equivalent RT bronchodilator order with every 4 hours Frequency and an Albuterol order with Frequency of every 2 hours PRN wheezing or increased work of breathing using Per Protocol order mode.        Electronically signed by Mariano Hansen RCP on 1/29/2022 at 9:14 PM

## 2022-01-30 NOTE — PROGRESS NOTES
Hospitalist Progress Note      PCP: Tarik Francis    Date of Admission: 1/9/2022    Admitted with Covid pneumonia and acute hypoxic respiratory failure    Subjective: cont to be intubated with high O2 requirement at 100%FiO2 and 14 PEEP  Proned   Continues to be tachycardic  On Levophed    1/28  Proned overnight. On Levophed. 1/29  Worsening hypoxia. 95% FiO2 and 14 of PEEP  Off Levophed.     1/30  Back on Levophed  Paralytic location today  Currently in supine position  1 unit of red cells transfused      Medications:  Reviewed    Infusion Medications    sodium chloride      sodium chloride      sodium chloride      cisatracurium (NIMBEX) infusion Stopped (01/30/22 0903)    propofol      sodium chloride      sodium chloride 500 mL/hr at 01/20/22 1925    midazolam 10 mg/hr (01/30/22 0830)    dextrose      fentaNYL 300 mcg/hr (01/30/22 1142)    norepinephrine 4 mcg/min (01/29/22 1307)    sodium chloride       Scheduled Medications    dexamethasone  4 mg IntraVENous Daily    enoxaparin  30 mg SubCUTAneous BID    influenza virus vaccine  0.5 mL IntraMUSCular Prior to discharge    levofloxacin  750 mg IntraVENous Q24H    diazePAM  20 mg Oral Q8H    voriconazole  300 mg IntraVENous Q12H    pantoprazole  40 mg IntraVENous BID    insulin glargine  25 Units SubCUTAneous QAM    Venelex   Topical BID    insulin lispro  0-18 Units SubCUTAneous Q4H    ipratropium-albuterol  1 ampule Inhalation Q4H    omega-3 acid ethyl esters  2 g Oral BID    [Held by provider] clopidogrel  75 mg Oral Daily    aspirin  81 mg Oral Daily    sodium chloride flush  5-40 mL IntraVENous 2 times per day    Vitamin D  2,000 Units Oral Daily     PRN Meds: sodium chloride, sodium chloride, senna **AND** docusate, polyethylene glycol, sodium chloride, sodium chloride, sodium chloride, albuterol, glucose, dextrose, glucagon (rDNA), dextrose, midazolam, fentanNYL, melatonin, nitroGLYCERIN, sodium chloride flush, sodium chloride, ondansetron **OR** ondansetron, polyethylene glycol, acetaminophen **OR** acetaminophen, potassium chloride, magnesium sulfate      Intake/Output Summary (Last 24 hours) at 1/30/2022 1227  Last data filed at 1/30/2022 0645  Gross per 24 hour   Intake 2843.46 ml   Output 1500 ml   Net 1343.46 ml       Physical Exam Performed:    BP 99/74   Pulse 98   Temp 96.5 °F (35.8 °C) (Axillary)   Resp (!) 38   Ht 5' 2\" (1.575 m)   Wt 152 lb 8.9 oz (69.2 kg)   SpO2 94%   BMI 27.90 kg/m²       General:  Middle aged male, old appearing.  Intubated and sedated.  Supine  Eyes:  No sclera icterus. No conjunctiva injected. ENT - oral ETT and OG noted . Neck: Trachea midline. Normal thyroid. Resp-  jose + crackles. No wheezing. No rhonchi. CV: Regular rate. Regular rhythm. No mumur or rub. No edema. No JVD. Palpable pedal pulses. GI: Non-tender. Non-distended. No masses. No organmegaly. Normal bowel sounds. No hernia. Skin: developing edema to all ex t  Lymph: No cervical LAD. No supraclavicular LAD. M/S: No cyanosis. No joint deformity. No clubbing. Neuro: sedated    Labs:   Recent Labs     01/28/22  0330 01/29/22  0330 01/30/22  0500   WBC 31.9* 25.9* 25.9*   HGB 7.5* 7.0* 6.8*   HCT 24.0* 22.7* 21.9*   PLT 68* 67* 73*     Recent Labs     01/28/22  1800 01/29/22  0330 01/30/22  0500    138 139   K 5.2* 5.4* 5.0   CL 96* 97* 99   CO2 34* 36* 34*   BUN 53* 53* 47*   CREATININE 0.6* 0.7* 0.6*   CALCIUM 8.3 8.3 8.2*   PHOS 4.1  --   --      Recent Labs     01/28/22  0330 01/29/22  0330 01/30/22  0500   AST 87* 93* 86*   ALT 66* 66* 59*   BILITOT 1.8* 2.0* 2.0*   ALKPHOS 183* 166* 175*     No results for input(s): INR in the last 72 hours. No results for input(s): Adrienne Castor in the last 72 hours.     Urinalysis:      Lab Results   Component Value Date    NITRU Negative 01/20/2022    WBCUA 0-2 01/20/2022    BACTERIA 1+ 07/16/2018    RBCUA 11-20 01/20/2022    BLOODU MODERATE 01/20/2022    SPECGRAV 1.015 01/20/2022    GLUCOSEU Negative 01/20/2022       Radiology:  XR CHEST PORTABLE   Final Result   Supportive tubing is stable. Interstitial and ground-glass opacity, consistent with pneumonia. There are   shifting areas of ground-glass opacity in the lung bases, likely components   of pulmonary edema. XR CHEST PORTABLE   Final Result   Line and tubes as above. Mildly worsened bilateral interstitial opacities. Relative lucency in the right apex correlates with bullous disease. RECOMMENDATION:   If there is concern for pneumothorax, a chest CT can be obtained as   clinically indicated. XR CHEST PORTABLE   Final Result   1. Redistributed bilateral pulmonary opacities as above with worsening   opacities on the right as above. 2. Lines and tubes as above         XR CHEST PORTABLE   Final Result   Supportive tubing is in normal position. Stable, extensive interstitial opacities. XR CHEST PORTABLE   Final Result   Line and tubes as above. Stable bilateral interstitial opacities. XR CHEST PORTABLE   Final Result   Improved aeration of the lungs. Bilateral airspace disease remains         XR CHEST PORTABLE   Final Result   Extensive parenchymal infiltrates are seen throughout the lungs bilaterally,   mildly worsened at the right lung base, otherwise similar in appearance. XR CHEST PORTABLE   Final Result   Interval increase left-sided pulmonary opacities representing worsening   edema, atelectasis or pneumonia. Slightly improved aeration in the right   upper lung zone. XR CHEST PORTABLE   Final Result   Stable pattern of diffuse, multifocal airspace opacities. XR CHEST PORTABLE   Final Result   Unchanged multifocal bilateral atypical pneumonia. XR CHEST PORTABLE   Final Result   Unremarkable appearance of an endotracheal tube.       Unchanged patchy interstitial lung markings which may reflect atypical   pneumonia or pulmonary edema. XR CHEST PORTABLE   Final Result   1. Endotracheal tube terminates 1.6 cm from the benton. Recommend 2 cm   retraction. 2.  Slightly increased diffuse bilateral airspace and interstitial opacities. The findings were sent to the [facility] Results Communication Team at 12:48 a.m.   on 01/18/2022 to be communicated to a licensed caregiver. XR CHEST PORTABLE   Final Result   Mild interval worsening with moderate infiltrates in the lungs. These are   most pronounced in the right lung. 5 cm indeterminate dense opacity in the midline-right upper abdomen. Correlate for oral contrast in bowel. VL Extremity Venous Bilateral   Final Result      XR CHEST PORTABLE   Final Result   Stable multifocal bilateral lung airspace disease consistent with previously   identified presence of COVID-19 viral pneumonia. Suspected moderate pulmonary interstitial edema. XR CHEST PORTABLE   Final Result   The support apparatus as described above. Continued increased interstitial opacity throughout both lungs, along with   more focal traits within the periphery the lungs, some combination of   interstitial edema and pneumonia. XR CHEST PORTABLE   Final Result   1. Endotracheal tube projects in appropriate position. 2. Increased bilateral nonspecific pulmonary opacities representing edema,   atelectasis and/or pneumonia. XR CHEST PORTABLE   Final Result   Right PICC projects in normal position. Stable interstitial and ground-glass opacities, most likely atypical   pneumonia. IR PICC WO SQ PORT/PUMP > 5 YEARS   Final Result   Successful placement of PICC line. CT CHEST PULMONARY EMBOLISM W CONTRAST   Final Result   1. No evidence of pulmonary embolism. 2.  Commonly reported imaging features of COVID-19 pneumonia are present.    Other processes such as influenza pneumonia and organizing pneumonia, as can   be seen with drug toxicity and connective tissue disease, can cause a similar   imaging pattern. PneTyp   3. Mild compression deformity of T8 of uncertain chronicity, new since   05/29/2020. Similar mild moderate compression deformities at T10, T12.   4. Fatty liver. 5. Interval increase in size of now 2.7 cm fat attenuation lesion of the   pancreatic neck. Recommend further evaluation with nonemergent pancreas   protocol MRI. XR CHEST PORTABLE   Final Result   1. Bilateral interstitial thickening either due to edema or atypical   interstitial pneumonia. 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]     Septic shock ([address] [A41.9, R65.21]     Anemia [D64.9]     Leukocytosis [D72.829]     Hypertriglyceridemia [E78.1]     Acute hypoxemic respiratory failure ([facility]) [J96.01]     ARDS (adult respiratory distress syndrome) ([facility]) [J80]     Hyperglycemia [R73.9]     Pulmonary emphysema ([facility]) [J43.9]     Pneumonia due to COVID-19 virus [U07.1, J12.82]     Acute hypoxemic respiratory failure due to COVID-19 (HCC) [U07.1, J96.01]     Essential hypertension [I10]     DMII (diabetes mellitus, type 2) ([facility]) [E11.9]     GERD (gastroesophageal reflux disease) [K21.9]     HLD (hyperlipidemia) [E78.5]     PAD (peripheral artery disease) ([facility]) [I73.9]            COVID PNA  Acute Hypoxic Respiratory Failure  - imaging with bilateral pna pattern  - does not wear O2 at baseline  - Admit to Med Surg, droplet + precautions, tele  - supp O2, progressive hypoxemia, now on vent support  -Intubated 1/14 , proning as needed  - on  Decadron D#22, now at 6 mg ,  completed 5 days of  Remdesivir, s/p Tocilizumab   - worsening wbc and hypoxia, now at 95 % fio2, maxed out on ventilator settings.  PEEP of 16.  - started  cefepime and vancomycin D4.  Now on Levaquin day #6 add voriconazole day #8 for possible fungal infection.    - bloody secretions in ETT. hold PLAVIX   - pulmonary consulted   - lovenox changed to heparin gtt for possibility of PE  - hold heparin gtt  Lasix 40 mg IV daily prn   Worsening oxygenation     Hypotension - likely sepsis   -Was on cefepime and Vanco.  Cefepime stopped. Now on levaquin.  Voriconazole added. - sp fluid boluses - on levophed now   - wean as able   Has  persistent tachycardia  Back on Levophed     Leucocytosis- persistent     Thrombocytopenia - plt dropped to 85--> 75--> 60-- 67--> 73 , HIT Ab negative    Hyperkalemia. Lokelma     Anemia - multifactorial   - monitor and transfuse PRBC less than 7.  - ordered one unit of PRBC 1/23  - monitor hb  1 unit of red cells transfused 1/30     Abnormal lFT - likely from viral infection   Monitor, stable      CAD - stable. No CP.  Hold Plavix for bloody secretions        PAD stable     DM type 2  Hyperglycemia with steroids, on ssi  lantus insulin      HLD On Lipitor.      DVT prophylaxis: Lovenox  Diet: Diet NPO  ADULT TUBE FEEDING; Orogastric; Peptide Based; Continuous; 15; No; 30; Q 4 hours; Protein; one proteinex P2Go TWICE daily via feeding tube  Code Status: Full Code    PT/OT Eval Status: not indicated      Faith Bhatia MD

## 2022-01-30 NOTE — PROGRESS NOTES
Pulmonary & Critical Care Medicine ICU Progress Note    CC: COVID-19 pneumonia in an unvaccinated individual    Events of Last 24 hours:    Levophed 4  Fentanyl 250  Versed 10  Nimbex     Invasive Lines: PICC placed 1/10/22    MV:  1/14/22-  Vent Mode: AC/VC Rate Set: 38 bmp/Vt Ordered: 360 mL/ /FiO2 : 95 %  Recent Labs     01/29/22  0330 01/30/22  0500   PHART 7.315* 7.374   QUQ5PPQ 72.3* 62.8*   PO2ART 67.5* 86.9       IV:   sodium chloride      sodium chloride      cisatracurium (NIMBEX) infusion 1.5 mcg/kg/min (01/28/22 2357)    propofol      sodium chloride      sodium chloride 500 mL/hr at 01/20/22 1925    midazolam 10 mg/hr (01/29/22 2207)    dextrose      fentaNYL 250 mcg/hr (01/30/22 0309)    norepinephrine 4 mcg/min (01/29/22 1307)    sodium chloride         Vitals:  /62   Pulse 94   Temp 97 °F (36.1 °C) (Axillary)   Resp (!) 38   Ht 5' 2\" (1.575 m)   Wt 152 lb 8.9 oz (69.2 kg)   SpO2 95%   BMI 27.90 kg/m²   on Vent     Intake/Output Summary (Last 24 hours) at 1/30/2022 0826  Last data filed at 1/30/2022 0645  Gross per 24 hour   Intake 2843.46 ml   Output 1500 ml   Net 1343.46 ml     EXAM: limited due to prone ventilation   General: ill appearing. Intubated sedated. Eyes: PERRL. No sclera icterus. + conjunctival injection. ENT: ET tube in place  Neck: Trachea midline  Resp: No accessory muscle use. Few crackles. No wheezing. Few rhonchi. CV: Regular rate. Regular rhythm. 1+ LE edema. GI: Proned not able to assess   Skin: Warm and dry. No nodule on exposed extremities. No rash on exposed extremities  M/S: No cyanosis. No joint deformity. No clubbing. Neuro: Intubated sedated.  paralyzed  Psych: Noncommunicative unable to obtain    Scheduled Meds:   dexamethasone  4 mg IntraVENous Daily    enoxaparin  30 mg SubCUTAneous BID    influenza virus vaccine  0.5 mL IntraMUSCular Prior to discharge    levofloxacin  750 mg IntraVENous Q24H    diazePAM  20 mg Oral Q8H    voriconazole  300 mg IntraVENous Q12H    pantoprazole  40 mg IntraVENous BID    insulin glargine  25 Units SubCUTAneous QAM    Venelex   Topical BID    insulin lispro  0-18 Units SubCUTAneous Q4H    ipratropium-albuterol  1 ampule Inhalation Q4H    omega-3 acid ethyl esters  2 g Oral BID    [Held by provider] clopidogrel  75 mg Oral Daily    aspirin  81 mg Oral Daily    sodium chloride flush  5-40 mL IntraVENous 2 times per day    Vitamin D  2,000 Units Oral Daily     PRN Meds:  sodium chloride, senna **AND** docusate, polyethylene glycol, sodium chloride, sodium chloride, sodium chloride, albuterol, glucose, dextrose, glucagon (rDNA), dextrose, midazolam, fentanNYL, melatonin, nitroGLYCERIN, sodium chloride flush, sodium chloride, ondansetron **OR** ondansetron, polyethylene glycol, acetaminophen **OR** acetaminophen, potassium chloride, magnesium sulfate    Results:  CBC:   Recent Labs     01/28/22  0330 01/29/22  0330 01/30/22  0500   WBC 31.9* 25.9* 25.9*   HGB 7.5* 7.0* 6.8*   HCT 24.0* 22.7* 21.9*   MCV 93.4 94.9 94.9   PLT 68* 67* 73*     BMP:   Recent Labs     01/28/22  1800 01/29/22  0330 01/30/22  0500    138 139   K 5.2* 5.4* 5.0   CL 96* 97* 99   CO2 34* 36* 34*   PHOS 4.1  --   --    BUN 53* 53* 47*   CREATININE 0.6* 0.7* 0.6*     LIVER PROFILE:   Recent Labs     01/28/22  0330 01/29/22  0330 01/30/22  0500   AST 87* 93* 86*   ALT 66* 66* 59*   BILITOT 1.8* 2.0* 2.0*   ALKPHOS 183* 166* 175*       Micro  1/9 BC NGTD  1/9 COVID-19 detected  1/20 BC NGTD   1/20 UC NGTD   1/20 Resp Pseudomonas fluorescens and  Aspergillus species    Imaging   Chest x-ray 1/29 imaging reviewed by me and showed   Tubes in place. Severe bilateral ASD, RUL lucency. LE dopper 1/17   Within the limits of this exam, there is no evidence of deep or superficial  venous thrombosis in the lower extremities bilaterally.  Chronic phlebitic processes involving the left small saphenous vein       ASSESSMENT:  · Acute hypoxemic respiratory failure  · COVID 19 pneumonia in an unvaccinated individual  · Septic shock  · Severe ARDS  · Invasive aspergillosis   · RUL lucency on CXR - appears to be bullous emphysema on recent chest CT  · Hypertriglyceridemia with propofol   · Thrombocytopenia: negative HIT  · Anemia- no active bleed   · Pancreatic nodule, 2.7 cm increase in size  · DM2 with hyperglycemia   · Elevated LFTS  · Tobacco abuse  · CAD and PAD with h/o STEMI     PLAN:  · Droplet Plus Airborne Precautions   · Mechanical ventilation as per my orders. The ventilator was adjusted by me at the bedside for unstable, life threatening respiratory failure. · IV Versed/Fentanyl for sedation, target RASS -5, with daily spontaneous awakening trial.  Fentanyl PRN pain, gtt as needed  · Continue Valium 20 TID   · Nimbex restarted 1/26 -  · Paralytic holiday today  · Head of bed 30 degrees or higher at all times  · Prone 1/14/22-  · Decadron QD, D#21--> 4 mg, monitor glucose closely  · S/p Remdesevir D#5 and Tocilizumab received 1/9/22  · Levaquin D#6 and Voriconazole D#8. Completed 4 days of Cefepime, 3 days Meropenem and 5 days of Vanc  · IV Levophed to keep MAP > 65 mmHg or SBP>90  · TFs - trophic    · On ASA -and holding Plavix   · Transfuse 1u PRBC   · BM regimen  · Monitor H&H and transfuse for hemoglobin less than 7  · Blood sugar control ISS,  lantus 25 with goal 150-180  · GI prophylaxis: Protonix BID  DVT prophylaxis: lovenox BID  · Wife informed yesterday of poor prognosis  · Total critical care time caring for this patient with life threatening, unstable organ failure, including direct patient contact, management of life support systems, review of data including imaging and labs, discussions with other team members and physicians at least 31 minutes so far today, excluding procedures.

## 2022-01-30 NOTE — PROGRESS NOTES
01/30/22 0253   Vent Information   Vent Type 840   Vent Mode AC/VC   Vt Ordered 360 mL   Rate Set 38 bmp   Peak Flow 70 L/min   Pressure Support 0 cmH20   FiO2  95 %   SpO2 96 %   SpO2/FiO2 ratio 101.05   Sensitivity 3   PEEP/CPAP 16   Vent Patient Data   Peak Inspiratory Pressure 31 cmH2O   Mean Airway Pressure 22 cmH20   Rate Measured 38 br/min   Vt Exhaled 429 mL   Minute Volume 14.9 Liters   I:E Ratio 1:1.80   Cough/Sputum   Sputum How Obtained Suctioned;Endotracheal   Sputum Amount Small   Sputum Color Dark red   Tenacity Thick   Spontaneous Breathing Trial (SBT) RT Doc   Pulse 95   Breath Sounds   Right Upper Lobe Diminished   Right Middle Lobe Diminished   Right Lower Lobe Diminished   Left Upper Lobe Diminished   Left Lower Lobe Diminished   Additional Respiratory  Assessments   Resp (!) 38   Position Prone   Alarm Settings   High Pressure Alarm 50 cmH2O   Low Minute Volume Alarm 4 L/min   High Respiratory Rate 45 br/min

## 2022-01-30 NOTE — PROGRESS NOTES
01/29/22 1859   Vent Information   Vent Type 840   Vent Mode AC/VC   Vt Ordered 360 mL   Rate Set 38 bmp   Peak Flow 70 L/min   Pressure Support 0 cmH20   FiO2  95 %   SpO2 90 %   SpO2/FiO2 ratio 94.74   Sensitivity 3   PEEP/CPAP 16   Humidification Source Heated wire   Humidification Temp Measured 37   Circuit Condensation Drained   Vent Patient Data   Peak Inspiratory Pressure 31 cmH2O   Mean Airway Pressure 22 cmH20   Rate Measured 38 br/min   Vt Exhaled 337 mL   Minute Volume 12.8 Liters   I:E Ratio 1:1.80   Plateau Pressure 23 PZN26   Static Compliance 44 mL/cmH2O   Dynamic Compliance 20 mL/cmH2O   Cough/Sputum   Sputum How Obtained Endotracheal   Cough None   Sputum Amount Small   Sputum Color Dark red   Tenacity Thick   Spontaneous Breathing Trial (SBT) RT Doc   Pulse 101   Breath Sounds   Right Upper Lobe Diminished   Right Middle Lobe Diminished   Right Lower Lobe Diminished   Left Upper Lobe Diminished   Left Lower Lobe Diminished   Additional Respiratory  Assessments   Resp (!) 38   Alarm Settings   High Pressure Alarm 50 cmH2O   Low Minute Volume Alarm 4 L/min   Apnea (secs) 20 secs   High Respiratory Rate 45 br/min   Low Exhaled Vt  200 mL   Patient Observation   Observations 8ett 24 at lip

## 2022-01-30 NOTE — PROGRESS NOTES
4 Eyes Skin Assessment     The patient is being assess for   Shift Handoff    I agree that 2 RN's have performed a thorough Head to Toe Skin Assessment on the patient. ALL assessment sites listed below have been assessed. Areas assessed for pressure by both nurses:   [x]   Head, Face, and Ears   [x]   Shoulders, Back, and Chest, Abdomen  [x]   Arms, Elbows, and Hands   [x]   Coccyx, Sacrum, and Ischium  [x]   Legs, Feet, and Heels        Skin Assessed Under all Medical Devices by both nurses:  ETT             DTI to chin, jose knees, and jose shins. Venalex applied   All Mepilex Borders were peeled back and area peeked at by both nurses:  Yes  Please list where Mepilex Borders are located:  coccyx, chest, jose shoulders, jose hips, jose knees, jose shins, jose feet, and nalini area           **SHARE this note so that the co-signing nurse is able to place an eSignature**    Co-signer eSignature: {Esignature:[id number]}    Does the Patient have Skin Breakdown related to pressure?   Yes [facility] WOUND CARE was Initiated documentation include the Nalini-wound, Wound Assessment, Measurements, Dressing Treatment, Drainage, and Color\",     (Insert Photo here***)         Clemente Prevention initiated:  Yes   Wound Care Orders initiated:  Yes      [id number]  nurse consulted for Pressure Injury (Stage 3,4, Unstageable, DTI, NWPT, Complex wounds)and New or Established Ostomies:  No      Primary Nurse eSignature: Electronically signed by Kirsty Gallagher RN on 1/30/22 at 2:34 PM EST

## 2022-01-30 NOTE — PROGRESS NOTES
01/29/22 2235   Vent Information   Vent Type 840   Vent Mode AC/VC   Vt Ordered 360 mL   Rate Set 38 bmp   Peak Flow 70 L/min   Pressure Support 0 cmH20   FiO2  95 %   SpO2 90 %   SpO2/FiO2 ratio 94.74   Sensitivity 3   PEEP/CPAP 16   Humidification Source Heated wire   Humidification Temp Measured 37   Circuit Condensation Drained   Vent Patient Data   Peak Inspiratory Pressure 32 cmH2O   Mean Airway Pressure 23 cmH20   Rate Measured 38 br/min   Vt Exhaled 383 mL   Minute Volume 14.6 Liters   I:E Ratio 1:1.80   Cough/Sputum   Sputum How Obtained Endotracheal   Cough None   Sputum Amount None   Spontaneous Breathing Trial (SBT) RT Doc   Pulse 97   Breath Sounds   Right Upper Lobe Diminished   Right Middle Lobe Diminished   Right Lower Lobe Diminished   Left Upper Lobe Diminished   Left Lower Lobe Diminished   Additional Respiratory  Assessments   Resp (!) 38   Alarm Settings   High Pressure Alarm 50 cmH2O   Low Minute Volume Alarm 4 L/min   Apnea (secs) 20 secs   High Respiratory Rate 45 br/min   Low Exhaled Vt  200 mL   Patient Observation   Observations 8ett 24 at lip

## 2022-01-30 NOTE — PROGRESS NOTES
Shift assessment completed as documented. Pt proned, sedated, paralized, and mechanically ventilated. NSR with ectopy, VSS Levophed @ 4mcg. Navarro patent to bsd. No concerns noted at this time.

## 2022-01-31 NOTE — PROGRESS NOTES
Hospitalist Progress Note      PCP: Pato Oseguera    Date of Admission: 1/9/2022    Admitted with Covid pneumonia and acute hypoxic respiratory failure    Subjective: cont to be intubated with high O2 requirement at 100%FiO2 and 14 PEEP  Proned   Continues to be tachycardic  On Levophed    1/28  Proned overnight. On Levophed. 1/29  Worsening hypoxia. 95% FiO2 and 14 of PEEP  Off Levophed. 1/30  Back on Levophed  Currently in supine position  1 unit of red cells transfused    1/31-off paralysis. Proned. On Levophed.       Medications:  Reviewed    Infusion Medications    sodium chloride      sodium chloride      sodium chloride      sodium chloride      sodium chloride 500 mL/hr at 01/20/22 1925    midazolam 10 mg/hr (01/31/22 0832)    dextrose      fentaNYL 300 mcg/hr (01/31/22 0635)    norepinephrine 3 mcg/min (01/31/22 0839)    sodium chloride       Scheduled Medications    senna  2 tablet Oral BID    And    docusate  100 mg Oral BID    dexamethasone  4 mg IntraVENous Daily    enoxaparin  30 mg SubCUTAneous BID    influenza virus vaccine  0.5 mL IntraMUSCular Prior to discharge    levofloxacin  750 mg IntraVENous Q24H    diazePAM  20 mg Oral Q8H    voriconazole  300 mg IntraVENous Q12H    pantoprazole  40 mg IntraVENous BID    insulin glargine  25 Units SubCUTAneous QAM    Venelex   Topical BID    insulin lispro  0-18 Units SubCUTAneous Q4H    ipratropium-albuterol  1 ampule Inhalation Q4H    omega-3 acid ethyl esters  2 g Oral BID    [Held by provider] clopidogrel  75 mg Oral Daily    aspirin  81 mg Oral Daily    sodium chloride flush  5-40 mL IntraVENous 2 times per day    Vitamin D  2,000 Units Oral Daily     PRN Meds: sodium chloride, sodium chloride, polyethylene glycol, sodium chloride, sodium chloride, sodium chloride, albuterol, glucose, dextrose, glucagon (rDNA), dextrose, midazolam, fentanNYL, melatonin, nitroGLYCERIN, sodium chloride flush, sodium chloride, ondansetron **OR** ondansetron, polyethylene glycol, acetaminophen **OR** acetaminophen, potassium chloride, magnesium sulfate      Intake/Output Summary (Last 24 hours) at 1/31/2022 1116  Last data filed at 1/31/2022 1846  Gross per 24 hour   Intake 2453.5 ml   Output 1500 ml   Net 953.5 ml       Physical Exam Performed:    /77   Pulse 109   Temp 97.2 °F (36.2 °C) (Axillary)   Resp (!) 38   Ht 5' 2\" (1.575 m)   Wt 152 lb 8.9 oz (69.2 kg)   SpO2 93%   BMI 27.90 kg/m²       General:  Middle aged male, old appearing.  Intubated and sedated. Prone/ill-appearing  Eyes:  No sclera icterus. No conjunctiva injected. ENT - oral ETT and OG noted . Neck: Trachea midline. Normal thyroid. Resp-  jose + crackles. No wheezing. No rhonchi. CV: Regular rate. Regular rhythm. No mumur or rub. No edema. No JVD. Palpable pedal pulses. GI: Non-tender. Non-distended. No masses. No organmegaly. Normal bowel sounds. No hernia. Skin: developing edema to all ex t  Lymph: No cervical LAD. No supraclavicular LAD. M/S: No cyanosis. No joint deformity. No clubbing. Neuro: sedated    Labs:   Recent Labs     01/29/22  0330 01/30/22  0500 01/31/22  0450   WBC 25.9* 25.9* 26.6*   HGB 7.0* 6.8* 9.1*   HCT 22.7* 21.9* 28.4*   PLT 67* 73* 70*     Recent Labs     01/28/22  1800 01/28/22  1800 01/29/22  0330 01/30/22  0500 01/31/22  0450      < > 138 139 138   K 5.2*  --  5.4* 5.0 4.5   CL 96*   < > 97* 99 98*   CO2 34*   < > 36* 34* 34*   BUN 53*   < > 53* 47* 41*   CREATININE 0.6*   < > 0.7* 0.6* 0.6*   CALCIUM 8.3   < > 8.3 8.2* 7.8*   PHOS 4.1  --   --   --   --     < > = values in this interval not displayed. Recent Labs     01/29/22  0330 01/30/22  0500 01/31/22  0450   AST 93* 86* 99*   ALT 66* 59* 62*   BILITOT 2.0* 2.0* 2.3*   ALKPHOS 166* 175* 186*     No results for input(s): INR in the last 72 hours. No results for input(s): Meldon Kramer in the last 72 hours.     Urinalysis:      Lab Results   Component Value Date    NITRU Negative 01/20/2022    WBCUA 0-2 01/20/2022    BACTERIA 1+ 07/16/2018    RBCUA 11-20 01/20/2022    BLOODU MODERATE 01/20/2022    SPECGRAV 1.015 01/20/2022    GLUCOSEU Negative 01/20/2022       Radiology:  XR CHEST PORTABLE   Final Result   Supportive tubing is stable. Interstitial and ground-glass opacity, consistent with pneumonia. There are   shifting areas of ground-glass opacity in the lung bases, likely components   of pulmonary edema. XR CHEST PORTABLE   Final Result   Line and tubes as above. Mildly worsened bilateral interstitial opacities. Relative lucency in the right apex correlates with bullous disease. RECOMMENDATION:   If there is concern for pneumothorax, a chest CT can be obtained as   clinically indicated. XR CHEST PORTABLE   Final Result   1. Redistributed bilateral pulmonary opacities as above with worsening   opacities on the right as above. 2. Lines and tubes as above         XR CHEST PORTABLE   Final Result   Supportive tubing is in normal position. Stable, extensive interstitial opacities. XR CHEST PORTABLE   Final Result   Line and tubes as above. Stable bilateral interstitial opacities. XR CHEST PORTABLE   Final Result   Improved aeration of the lungs. Bilateral airspace disease remains         XR CHEST PORTABLE   Final Result   Extensive parenchymal infiltrates are seen throughout the lungs bilaterally,   mildly worsened at the right lung base, otherwise similar in appearance. XR CHEST PORTABLE   Final Result   Interval increase left-sided pulmonary opacities representing worsening   edema, atelectasis or pneumonia. Slightly improved aeration in the right   upper lung zone. XR CHEST PORTABLE   Final Result   Stable pattern of diffuse, multifocal airspace opacities. XR CHEST PORTABLE   Final Result   Unchanged multifocal bilateral atypical pneumonia.          XR CHEST PORTABLE Final Result   Unremarkable appearance of an endotracheal tube. Unchanged patchy interstitial lung markings which may reflect atypical   pneumonia or pulmonary edema. XR CHEST PORTABLE   Final Result   1. Endotracheal tube terminates 1.6 cm from the benton. Recommend 2 cm   retraction. 2.  Slightly increased diffuse bilateral airspace and interstitial opacities. The findings were sent to the [facility] Results Communication Team at 12:48 a.m.   on 01/18/2022 to be communicated to a licensed caregiver. XR CHEST PORTABLE   Final Result   Mild interval worsening with moderate infiltrates in the lungs. These are   most pronounced in the right lung. 5 cm indeterminate dense opacity in the midline-right upper abdomen. Correlate for oral contrast in bowel. VL Extremity Venous Bilateral   Final Result      XR CHEST PORTABLE   Final Result   Stable multifocal bilateral lung airspace disease consistent with previously   identified presence of COVID-19 viral pneumonia. Suspected moderate pulmonary interstitial edema. XR CHEST PORTABLE   Final Result   The support apparatus as described above. Continued increased interstitial opacity throughout both lungs, along with   more focal traits within the periphery the lungs, some combination of   interstitial edema and pneumonia. XR CHEST PORTABLE   Final Result   1. Endotracheal tube projects in appropriate position. 2. Increased bilateral nonspecific pulmonary opacities representing edema,   atelectasis and/or pneumonia. XR CHEST PORTABLE   Final Result   Right PICC projects in normal position. Stable interstitial and ground-glass opacities, most likely atypical   pneumonia. IR PICC WO SQ PORT/PUMP > 5 YEARS   Final Result   Successful placement of PICC line. CT CHEST PULMONARY EMBOLISM W CONTRAST   Final Result   1. No evidence of pulmonary embolism.    2.  Commonly reported imaging #7 add voriconazole day #9 for possible fungal infection.    - bloody secretions in ETT. held PLAVIX   - pulmonary consulted   - lovenox changed to heparin gtt for possibility of PE  - hold heparin gtt  Lasix 40 mg IV daily prn        Hypotension - likely sepsis   -Was on cefepime and Vanco.  Cefepime stopped. Now on levaquin.  Voriconazole added. - sp fluid boluses - on levophed now   - wean as able   Has  persistent tachycardia  Back on Levophed     Leucocytosis- persistent     Thrombocytopenia - plt dropped to 85--> 75--> 60-- 67--> 73 , HIT Ab negative    Hyperkalemia. Lokelma     Anemia - multifactorial   - monitor and transfuse PRBC less than 7.  - ordered one unit of PRBC 1/23  - monitor hb  1 unit of red cells transfused 1/30     Abnormal lFT - likely from viral infection   Monitor, stable      CAD - stable. No CP.  Hold Plavix for bloody secretions        PAD stable     DM type 2  Hyperglycemia with steroids, on ssi  lantus insulin      HLD On Lipitor.      DVT prophylaxis: Lovenox  Diet: Diet NPO  ADULT TUBE FEEDING; Orogastric; Peptide Based; Continuous; 15; No; 30; Q 4 hours; Protein; one proteinex P2Go TWICE daily via feeding tube  Code Status: Full Code    PT/OT Eval Status: not indicated      Joel Ochoa MD 1/31/2022 11:19 AM

## 2022-01-31 NOTE — PROGRESS NOTES
01/31/22 0714   Vent Information   Equipment Changed Humidification   Vent Type 840   Vent Mode AC/VC   Vt Ordered 360 mL   Rate Set 38 bmp   Peak Flow 70 L/min   Pressure Support 0 cmH20   FiO2  85 %   SpO2 (!) 89 %   SpO2/FiO2 ratio 104.71   Sensitivity 3   PEEP/CPAP 16   Humidification Source Heated wire   Humidification Temp 37   Circuit Condensation Drained   Vent Patient Data   Peak Inspiratory Pressure 34 cmH2O   Mean Airway Pressure 23 cmH20   Rate Measured 38 br/min   Vt Exhaled 403 mL   Minute Volume 14.3 Liters   I:E Ratio 1:1.80   Plateau Pressure 32 SOL10   Static Compliance 25 mL/cmH2O   Cough/Sputum   Sputum How Obtained Endotracheal   Sputum Amount Small   Sputum Color Dark red   Tenacity Thick   Spontaneous Breathing Trial (SBT) RT Doc   Pulse 97   Breath Sounds   Right Upper Lobe Diminished   Right Middle Lobe Diminished   Right Lower Lobe Diminished   Left Upper Lobe Diminished   Left Lower Lobe Diminished   Additional Respiratory  Assessments   Resp (!) 38   Position Prone   Oral Care Completed? Yes   Oral Care Mouth suctioned   Subglottic Suction Done? Yes   Cuff Pressure (cm H2O) 27 cm H2O   Alarm Settings   High Pressure Alarm 50 cmH2O   Low Minute Volume Alarm 4 L/min   Apnea (secs) 20 secs   High Respiratory Rate 45 br/min   Resp Rate Low Alarm 14   Low Exhaled Vt  200 mL   Patient Observation   Observations 8ett 24 at lip   ETT (adult)   Placement Date/Time: 01/14/22 1503   Timeout: Patient;Procedure; Appropriate Equipment;Correct Position  Preoxygenation: Yes  Mask Ventilation: Ventilated by mask (1)  Technique: Rapid sequence  Type: Cuffed  Tube Size: 8 mm  Laryngoscope: GlideScope  . ..    Secured at 24 cm   Measured From Lips

## 2022-01-31 NOTE — PROGRESS NOTES
01/31/22 1855   Vent Information   Vent Mode AC/VC   Vt Ordered 330 mL   Rate Set 38 bmp   Peak Flow 70 L/min   FiO2  95 %   SpO2 92 %   Sensitivity 3   PEEP/CPAP 16   Vent Patient Data   Peak Inspiratory Pressure 29 cmH2O   Mean Airway Pressure 21 cmH20   Rate Measured 38 br/min   Vt Exhaled 396 mL   Minute Volume 13.7 Liters   I:E Ratio 1:2.00   Plateau Pressure 28 [id number]   Static Compliance 33 mL/cmH2O   Dynamic Compliance 30 mL/cmH2O

## 2022-01-31 NOTE — PROGRESS NOTES
Reassessment completed as documented, no changes noted at this time. Lopez aguillon turned off. Ramon patent to bsd. Darius wrist restraints in place.

## 2022-01-31 NOTE — PROGRESS NOTES
01/30/22 1906   Vent Information   Vent Type 840   Vent Mode AC/VC   Vt Ordered 360 mL   Rate Set 38 bmp   Peak Flow 70 L/min   Pressure Support 0 cmH20   FiO2  85 %   SpO2 91 %   SpO2/FiO2 ratio 107.06   Sensitivity 3   PEEP/CPAP 16   Humidification Source Heated wire   Humidification Temp Measured 37   Circuit Condensation Drained   Vent Patient Data   Peak Inspiratory Pressure 32 cmH2O   Mean Airway Pressure 23 cmH20   Rate Measured 38 br/min   Vt Exhaled 380 mL   Minute Volume 14.4 Liters   I:E Ratio 1:1.80   Plateau Pressure 30 VVF79   Static Compliance 27 mL/cmH2O   Dynamic Compliance 25 mL/cmH2O   Cough/Sputum   Sputum How Obtained Endotracheal   Cough None   Sputum Amount None   Spontaneous Breathing Trial (SBT) RT Doc   Pulse 75   Breath Sounds   Right Upper Lobe Diminished   Right Middle Lobe Diminished   Right Lower Lobe Diminished   Left Upper Lobe Diminished   Left Lower Lobe Diminished   Additional Respiratory  Assessments   Resp (!) 38   Alarm Settings   High Pressure Alarm 50 cmH2O   Low Minute Volume Alarm 4 L/min   Apnea (secs) 20 secs   High Respiratory Rate 45 br/min   Low Exhaled Vt  200 mL   Patient Observation   Observations 8ett 24 at lip

## 2022-01-31 NOTE — PROGRESS NOTES
Pulmonary & Critical Care Medicine ICU Progress Note    CC: COVID-19 pneumonia in an unvaccinated individual    Events of Last 24 hours:    Paralyzed yesterday, off currently   Proned  Levophed  1 unit PRBCs    Invasive Lines: PICC placed 1/10/22    MV:  1/14/22-  Vent Mode: AC/VC Rate Set: 38 bmp/Vt Ordered: 360 mL/ /FiO2 : 85 %  Recent Labs     01/30/22  0500 01/31/22  0450   PHART 7.374 7.342*   NBU3UBF 62.8* 66.4*   PO2ART 86.9 61.9*       IV:   sodium chloride      sodium chloride      sodium chloride      cisatracurium (NIMBEX) infusion Stopped (01/30/22 0903)    propofol      sodium chloride      sodium chloride 500 mL/hr at 01/20/22 1925    midazolam 10 mg/hr (01/30/22 1950)    dextrose      fentaNYL 300 mcg/hr (01/31/22 0316)    norepinephrine 4 mcg/min (01/29/22 1307)    sodium chloride         Vitals:  BP 96/63   Pulse 88   Temp 97.1 °F (36.2 °C) (Axillary)   Resp (!) 38   Ht 5' 2\" (1.575 m)   Wt 152 lb 8.9 oz (69.2 kg)   SpO2 (!) 87%   BMI 27.90 kg/m²   on Vent     Intake/Output Summary (Last 24 hours) at 1/31/2022 2573  Last data filed at 1/30/2022 1500  Gross per 24 hour   Intake 2259.75 ml   Output 1600 ml   Net 659.75 ml     General: intubated, ill appearing, prone    ENT: Pharynx with ETT. Resp: No crackles. No wheezing. CV: S1, S2. No edema  GI: prone  Skin: Warm and dry. Neuro: PERRL. Sedated, not following commands.  Patellar reflexes are symmetric     Scheduled Meds:   dexamethasone  4 mg IntraVENous Daily    enoxaparin  30 mg SubCUTAneous BID    influenza virus vaccine  0.5 mL IntraMUSCular Prior to discharge    levofloxacin  750 mg IntraVENous Q24H    diazePAM  20 mg Oral Q8H    voriconazole  300 mg IntraVENous Q12H    pantoprazole  40 mg IntraVENous BID    insulin glargine  25 Units SubCUTAneous QAM    Venelex   Topical BID    insulin lispro  0-18 Units SubCUTAneous Q4H    ipratropium-albuterol  1 ampule Inhalation Q4H    omega-3 acid ethyl esters  2 g Oral BID    [Held by provider] clopidogrel  75 mg Oral Daily    aspirin  81 mg Oral Daily    sodium chloride flush  5-40 mL IntraVENous 2 times per day    Vitamin D  2,000 Units Oral Daily     PRN Meds:  sodium chloride, sodium chloride, senna **AND** docusate, polyethylene glycol, sodium chloride, sodium chloride, sodium chloride, albuterol, glucose, dextrose, glucagon (rDNA), dextrose, midazolam, fentanNYL, melatonin, nitroGLYCERIN, sodium chloride flush, sodium chloride, ondansetron **OR** ondansetron, polyethylene glycol, acetaminophen **OR** acetaminophen, potassium chloride, magnesium sulfate    Results:  CBC:   Recent Labs     01/29/22  0330 01/30/22  0500 01/31/22  0450   WBC 25.9* 25.9* 26.6*   HGB 7.0* 6.8* 9.1*   HCT 22.7* 21.9* 28.4*   MCV 94.9 94.9 93.9   PLT 67* 73* 70*     BMP:   Recent Labs     01/28/22  1800 01/28/22  1800 01/29/22  0330 01/30/22  0500 01/31/22  0450      < > 138 139 138   K 5.2*  --  5.4* 5.0 4.5   CL 96*   < > 97* 99 98*   CO2 34*   < > 36* 34* 34*   PHOS 4.1  --   --   --   --    BUN 53*   < > 53* 47* 41*   CREATININE 0.6*   < > 0.7* 0.6* 0.6*    < > = values in this interval not displayed. LIVER PROFILE:   Recent Labs     01/29/22  0330 01/30/22  0500 01/31/22  0450   AST 93* 86* 99*   ALT 66* 59* 62*   BILITOT 2.0* 2.0* 2.3*   ALKPHOS 166* 175* 186*       Micro  1/9 BC NGTD  1/9 COVID-19 detected  1/20 BC NGTD   1/20 UC NGTD   1/20 Resp Pseudomonas fluorescens and  Aspergillus species    Imaging   CXR 1/30/2022 bilateral airspace disease    LE dopper 1/17/2022    no evidence of deep or superficial  venous thrombosis in the lower extremities bilaterally.  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 The ventilator was adjusted by me at the bedside for unstable, life threatening respiratory failure. · IV Versed/Fentanyl for sedation, target RASS -5, with daily spontaneous awakening trial.  Fentanyl PRN pain, gtt as needed  · Valium 20 TID   · Nimbex restarted 1/26/22-1/30/22  · Prone 1/14/22-  · Decadron QD, D#22 --> 4 mg   · Completed Remdesevir; s/p 1 dose Tocilizumab 1/9/22  · Levaquin D#7/10 and Voriconazole D#9. Completed 4 days of Cefepime, 3 days Meropenem and 5 days of Vanc  · IV Levophed to keep MAP > 65 mmHg or SBP>90  · TFs - trophic    · On ASA; holding Plavix   · S/P 1 U PRBC   · Clarify nutrition with dietician   · Blood sugar control ISS,  lantus 25 with goal 150-180  · Prophylaxis: Protonix BID, lovenox BID    Total critical care time caring for this patient with life threatening, unstable organ failure, including direct patient contact, management of life support systems, review of data including imaging and labs, discussions with other team members and physicians is 32 minutes so far today, excluding procedures.

## 2022-01-31 NOTE — PROGRESS NOTES
01/30/22 2303   Vent Information   Vent Type 840   Vent Mode AC/VC   Vt Ordered 360 mL   Rate Set 38 bmp   Peak Flow 70 L/min   Pressure Support 0 cmH20   FiO2  85 %   SpO2 91 %   SpO2/FiO2 ratio 107.06   Sensitivity 3   PEEP/CPAP 16   Humidification Source Heated wire   Humidification Temp Measured 37   Circuit Condensation Drained   Vent Patient Data   Peak Inspiratory Pressure 34 cmH2O   Mean Airway Pressure 23 cmH20   Rate Measured 38 br/min   Vt Exhaled 387 mL   Minute Volume 14.6 Liters   I:E Ratio 1:1.80   Cough/Sputum   Sputum How Obtained Endotracheal   Cough None   Sputum Amount Small   Sputum Color Dark red   Tenacity Thick   Spontaneous Breathing Trial (SBT) RT Doc   Pulse 84   Breath Sounds   Right Upper Lobe Diminished   Right Middle Lobe Diminished   Right Lower Lobe Diminished   Left Upper Lobe Diminished   Left Lower Lobe Diminished   Additional Respiratory  Assessments   Resp (!) 38   Alarm Settings   High Pressure Alarm 50 cmH2O   Low Minute Volume Alarm 4 L/min   Apnea (secs) 20 secs   High Respiratory Rate 45 br/min   Low Exhaled Vt  200 mL   Patient Observation   Observations 8ett 24 at lip

## 2022-01-31 NOTE — PLAN OF CARE
Nutrition Problem #1: Inadequate oral intake  Intervention: Food and/or Nutrient Delivery: Continue NPO,Modify Tube Feeding,Start Parenteral Nutrition  Nutritional Goals: patient will tolerate Two Eleazar HN at trophic rate of 15 ml/hr x 20 hours without GI distress, without s/s of aspiration, and without additional lab/fluid disturbances + patient will tolerate PN regimen, if ordered, to supplement EN regimen

## 2022-01-31 NOTE — PROGRESS NOTES
01/30/22 2100   RT Protocol   History Pulmonary Disease 2   Respiratory pattern 8   Breath sounds 2   Cough 1   Indications for Bronchodilator Therapy Decreased or absent breath sounds   Bronchodilator Assessment Score 13   RT Inhaler-Nebulizer Bronchodilator Protocol Note    There is a bronchodilator order in the chart from a provider indicating to follow the RT Bronchodilator Protocol and there is an Initiate RT Inhaler-Nebulizer Bronchodilator Protocol order as well (see protocol at bottom of note). CXR Findings:  XR CHEST PORTABLE    Result Date: 1/30/2022  Supportive tubing is stable. Interstitial and ground-glass opacity, consistent with pneumonia. There are shifting areas of ground-glass opacity in the lung bases, likely components of pulmonary edema. XR CHEST PORTABLE    Result Date: 1/29/2022  Line and tubes as above. Mildly worsened bilateral interstitial opacities. Relative lucency in the right apex correlates with bullous disease. RECOMMENDATION: If there is concern for pneumothorax, a chest CT can be obtained as clinically indicated. The findings from the last RT Protocol Assessment were as follows:   History Pulmonary Disease: Chronic pulmonary disease  Respiratory Pattern: Severe use of accessory muscle or RR>30 bpm  Breath Sounds: Slightly diminished and/or crackles  Cough: Strong, productive  Indication for Bronchodilator Therapy: Decreased or absent breath sounds  Bronchodilator Assessment Score: 13    Aerosolized bronchodilator medication orders have been revised according to the RT Inhaler-Nebulizer Bronchodilator Protocol below. Respiratory Therapist to perform RT Therapy Protocol Assessment initially then follow the protocol. Repeat RT Therapy Protocol Assessment PRN for score 0-3 or on second treatment, BID, and PRN for scores above 3.     No Indications - adjust the frequency to every 6 hours PRN wheezing or bronchospasm, if no treatments needed after 48 hours then discontinue using Per Protocol order mode. If indication present, adjust the RT bronchodilator orders based on the Bronchodilator Assessment Score as indicated below. Use Inhaler orders unless patient has one or more of the following: on home nebulizer, not able to hold breath for 10 seconds, is not alert and oriented, cannot activate and use MDI correctly, or respiratory rate 25 breaths per minute or more, then use the equivalent nebulizer order(s) with same Frequency and PRN reasons based on the score. If a patient is on this medication at home then do not decrease Frequency below that used at home. 0-3 - enter or revise RT bronchodilator order(s) to equivalent RT Bronchodilator order with Frequency of every 4 hours PRN for wheezing or increased work of breathing using Per Protocol order mode. 4-6 - enter or revise RT Bronchodilator order(s) to two equivalent RT bronchodilator orders with one order with BID Frequency and one order with Frequency of every 4 hours PRN wheezing or increased work of breathing using Per Protocol order mode. 7-10 - enter or revise RT Bronchodilator order(s) to two equivalent RT bronchodilator orders with one order with TID Frequency and one order with Frequency of every 4 hours PRN wheezing or increased work of breathing using Per Protocol order mode. 11-13 - enter or revise RT Bronchodilator order(s) to one equivalent RT bronchodilator order with QID Frequency and an Albuterol order with Frequency of every 4 hours PRN wheezing or increased work of breathing using Per Protocol order mode. Greater than 13 - enter or revise RT Bronchodilator order(s) to one equivalent RT bronchodilator order with every 4 hours Frequency and an Albuterol order with Frequency of every 2 hours PRN wheezing or increased work of breathing using Per Protocol order mode.          Electronically signed by Lane Paredes RCP on 1/30/2022 at 9:25 PM

## 2022-01-31 NOTE — PROGRESS NOTES
01/31/22 0257   Vent Information   Vent Type 840   Vent Mode AC/VC   Vt Ordered 360 mL   Rate Set 38 bmp   Peak Flow 70 L/min   Pressure Support 0 cmH20   FiO2  85 %   SpO2 (!) 87 %   SpO2/FiO2 ratio 102.35   Sensitivity 3   PEEP/CPAP 16   Humidification Source Heated wire   Humidification Temp Measured 37   Circuit Condensation Drained   Vent Patient Data   Peak Inspiratory Pressure 31 cmH2O   Mean Airway Pressure 22 cmH20   Rate Measured 38 br/min   Vt Exhaled 247 mL   Minute Volume 14.3 Liters   I:E Ratio 1:1.80   Cough/Sputum   Sputum How Obtained Endotracheal   Cough Productive   Sputum Amount Small   Sputum Color Dark red   Tenacity Thick   Spontaneous Breathing Trial (SBT) RT Doc   Pulse 90   Breath Sounds   Right Upper Lobe Diminished   Right Middle Lobe Diminished   Right Lower Lobe Diminished   Left Upper Lobe Diminished   Left Lower Lobe Diminished   Additional Respiratory  Assessments   Resp (!) 37   Alarm Settings   High Pressure Alarm 50 cmH2O   Low Minute Volume Alarm 4 L/min   Apnea (secs) 20 secs   High Respiratory Rate 45 br/min   Low Exhaled Vt  200 mL   Patient Observation   Observations 8ett 24 at lip

## 2022-01-31 NOTE — PROGRESS NOTES
Comprehensive Nutrition Assessment    Type and Reason for Visit:  Reassess    Nutrition Recommendations/Plan:   1. Modified TF order - ADULT TUBE FEEDING; Orogastric; 2.0 Calorie formula - Two Eleazar HN with a trophic rate of 15 ml/hr x 20 hours + water flushes of 30 ml every 3 hours for tube patency. 2. Recommend supplemental PN since patient has been receiving trophic TF for a significant period of time - Clinimix 5/20 E with a goal rate of 45 ml/hr x 24 hours. Start with 20 ml/hr and increase to goal rate on second day of PN infusion or when second bag of PN mixture is hung. Also, recommend 250 ml of 20% lipids one weekly with PN infusion. 3. Monitor TF formula switch + rate, intake, and tolerance and water flushes. 4. Monitor whether PN is started + rate, intake, and tolerance with lipids once weekly. 5. Monitor vent status and plan of care. 6. Please obtain an updated weight for this patient - last weight was obtained on 1/28/22.   7. Monitor nutrition-related labs, bowel function (last documented BM was on 1/27/22), and weight trends. Nutrition Assessment:  patient remains unchanged from a nutritional standpoint since last RD assessment; patient remains at risk for further compromise d/t need for prone position + trophic TF regimen, altered nutrition-related labs, and increased nutrition needs r/t COVID-19 virus; will modify TF order to Two Eleazar HN with a trophic rate of 15 ml/hr x 20 hours + 30 ml water flushes evry 3 hours for tube patency and recommend supplemental PN be started    Malnutrition Assessment:  Malnutrition Status:   At risk for malnutrition  Context:  Acute Illness     Findings of the 6 clinical characteristics of malnutrition:  Energy Intake:  7 - 50% or less of estimated energy requirements for 5 or more days  Weight Loss:  No significant weight loss     Body Fat Loss:  Unable to assess (COVID-19 +)     Muscle Mass Loss:  Unable to assess (COVID-19 +)    Fluid Accumulation:  No significant fluid accumulation     Strength:  Not Performed    Estimated Daily Nutrient Needs:  Energy (kcal):  1460 - 1679 kcals based on 20-23 kcals/kg/CBW; Weight Used for Energy Requirements:  Current     Protein (g):  76 - 86 g protein based on 1.4-1.6 g/kg/IBW;  Weight Used for Protein Requirements:  Ideal        Fluid (ml/day):  1460 - 1679 ml; Method Used for Fluid Requirements:  1 ml/kcal      Nutrition Related Findings:  patient remains intubated; propofol is not on at this time; patient was proned until yesterday; patient responds to pain; last documented BM was on 1/27/22; Ca, Cl, and h/h are low; BUN, alk phos, and AST/ALT are elevated; patient has 25 units Lantus daily and high-dose SSI ordered at this time for bloos glucose control; patient has Senokot and colace ordered at this time for bowel regimen      Wounds:  Deep Tissue Injury,Pressure Injury (DTI on L lower chin)       Current Nutrition Therapies:    Current Tube Feeding (TF) Orders:  · Feeding Route: Orogastric  · Formula: 2.0 Calorie  · Schedule: Continuous  · Additives/Modulars:  (none)  · Water Flushes: 30 ml water flushes every 3 hours for tube patency  · Current TF & Flush Orders Provides: Vital AF 1.2 with a low goal rate of 15 ml/hr x 20 hours = 300 ml TV, 360 kcals, 23 g protein, and 243 ml free water + 30 ml water flushes every 4 hours for tube patency + 52 g protein and 208 kcals from one proteinex P2Go TWICE daily (75 g protein and 568 kcals total)  · Goal TF & Flush Orders Provides: Two Eleazar HN with a trophic rate of 15 ml/hr x 20 hours = 300 ml TV, 600 kcals, 25 g protein, and 210 ml free water + 30 ml water flushes every 3 hours for tube patency      Anthropometric Measures:  · Height: 5' 2\" (157.5 cm)  · Current Body Weight: 152 lb 8.9 oz (69.2 kg) (obtained on 1/28/22; actual weight)   · Admission Body Weight: 146 lb 13.2 oz (66.6 kg) (obtained on 1/15/22; actual weight)    · Usual Body Weight: 146 lb 13.2 oz (66.6 kg) (obtained on 1/15/22; actual weight)     · Ideal Body Weight: 118 lbs; % Ideal Body Weight 129.3 %   · BMI: 27.9    · BMI Categories: Overweight (BMI 25.0-29. 9)       Nutrition Diagnosis:   · Inadequate oral intake related to inadequate protein-energy intake,impaired respiratory function,increase demand for energy/nutrients as evidenced by NPO or clear liquid status due to medical condition,intubation,lab values,poor intake prior to admission      Nutrition Interventions:   Food and/or Nutrient Delivery:  Continue NPO,Modify Tube Feeding,Start Parenteral Nutrition  Nutrition Education/Counseling:  No recommendation at this time   Coordination of Nutrition Care:  Continue to monitor while inpatient,Interdisciplinary Rounds    Goals:  patient will tolerate Two Eleazar HN at trophic rate of 15 ml/hr x 20 hours without GI distress, without s/s of aspiration, and without additional lab/fluid disturbances + patient will tolerate PN regimen, if ordered, to supplement EN regimen       Nutrition Monitoring and Evaluation:   Behavioral-Environmental Outcomes:  None Identified   Food/Nutrient Intake Outcomes:  Enteral Nutrition Intake/Tolerance,Parenteral Nutrition Intake/Tolerance  Physical Signs/Symptoms Outcomes:  Biochemical Data,GI Status,Hemodynamic Status,Weight,Skin     Discharge Planning:     Too soon to determine     Electronically signed by Mara Smith RD, LD on 1/31/22 at 1:15 PM EST    Contact: [phone]

## 2022-02-01 NOTE — FLOWSHEET NOTE
called to support pt's family. Struggling with decision regarding ongoing life support for pt.  listened and sought to help family process. Family welcomed support and prayer. Still working through shock of current circumstances and needing more time to process. Added challenge for family that they have not been able to work during pt's hospitalization. Spiritual Care will continue to follow, as well as support pt's care team.     02/01/22 1631   Encounter Summary   Services provided to: Family  (pt's wife & 2 sons)   Referral/Consult From: Nurse   Support System Spouse; Children   Continue Visiting Yes  (2/1 - Spir spt for famly in ICU waitng, decis-making, prayer)   Complexity of Encounter High   Length of Encounter 1 hour   Spiritual Assessment Completed Yes   Routine   Type Initial   Assessment Approachable; Anxious; Tearful   Intervention Explored feelings, thoughts, concerns;Prayer;Discussed relationship with God;Sustaining presence/ Ministry of presence   Outcome Expressed gratitude;Engaged in conversation; Shared life review;Receptive;Venting emotion

## 2022-02-01 NOTE — PROGRESS NOTES
Spoke to pt family about pt condition. Dr. Shellie Jackson aware that pt O2 sats have dropped to high 70's, low 80's . Dr. Shellie Jackson attempted to speak with family regarding code status, but was unable to finish the conversation with pt's wife. Unable to provide any other support to pt at this time. Charge RN, RT, and other staff aware of situation.

## 2022-02-01 NOTE — PROGRESS NOTES
02/01/22 0308   Vent Information   Vent Type 840   Vent Mode AC/VC   Vt Ordered 330 mL   Rate Set 38 bmp   Peak Flow 70 L/min   Pressure Support 0 cmH20   FiO2  100 %   SpO2 (!) 89 %   SpO2/FiO2 ratio 89   Sensitivity 3   PEEP/CPAP 16   Humidification Source Heated wire   Humidification Temp 37   Humidification Temp Measured 36.8   Circuit Condensation Drained   Vent Patient Data   Peak Inspiratory Pressure 29 cmH2O   Mean Airway Pressure 22 cmH20   Rate Measured 41 br/min   Vt Exhaled 364 mL   Minute Volume 14.6 Liters   I:E Ratio 1:2.00   Plateau Pressure 28 UFI41   Cough/Sputum   Sputum How Obtained Endotracheal;Suctioned   Cough Productive   Sputum Amount Small   Sputum Color Dark red   Tenacity Thick   Spontaneous Breathing Trial (SBT) RT Doc   Pulse 114   Breath Sounds   Right Upper Lobe Diminished   Right Middle Lobe Diminished   Right Lower Lobe Diminished   Left Upper Lobe Diminished   Left Lower Lobe Diminished   Additional Respiratory  Assessments   Resp (!) 33   Position Prone   Alarm Settings   High Pressure Alarm 50 cmH2O   Low Minute Volume Alarm 4 L/min   High Respiratory Rate 45 br/min   Patient Observation   Observations ETT SIZE 8.0, SECURED AT 24 LIP LINE. AMBU BAG AT HEAD OF BED. WATER GOOD. ETT (adult)   Placement Date/Time: 01/14/22 1503   Timeout: Patient;Procedure; Appropriate Equipment;Correct Position  Preoxygenation: Yes  Mask Ventilation: Ventilated by mask (1)  Technique: Rapid sequence  Type: Cuffed  Tube Size: 8 mm  Laryngoscope: GlideScope  . ..    Secured at 24 cm   Measured From [address] [place] [address],[address] By Cloth tape;Twill tape   Site Condition Dry

## 2022-02-01 NOTE — PROGRESS NOTES
RT Nebulizer Bronchodilator Protocol Note    There is a bronchodilator order in the chart from a provider indicating to follow the RT Bronchodilator Protocol and there is an Initiate RT Bronchodilator Protocol order as well (see protocol at bottom of note). CXR Findings:  XR CHEST PORTABLE    Result Date: 1/30/2022  Supportive tubing is stable. Interstitial and ground-glass opacity, consistent with pneumonia. There are shifting areas of ground-glass opacity in the lung bases, likely components of pulmonary edema. The findings from the last RT Protocol Assessment were as follows:  Smoking: Chronic pulmonary disease  Respiratory Pattern: Severe use of accessory muscle or RR>30 bpm  Breath Sounds: Slightly diminished and/or crackles  Cough: Strong, productive  Indication for Bronchodilator Therapy: Decreased or absent breath sounds  Bronchodilator Assessment Score: 13    Aerosolized bronchodilator medication orders have been revised according to the RT Nebulizer Bronchodilator Protocol below. Respiratory Therapist to perform RT Therapy Protocol Assessment initially then follow the protocol. Repeat RT Therapy Protocol Assessment PRN for score 0-3 or on second treatment, BID, and PRN for scores above 3. No Indications - adjust the frequency to every 6 hours PRN wheezing or bronchospasm, if no treatments needed after 48 hours then discontinue using Per Protocol order mode. If indication present, adjust the RT bronchodilator orders based on the Bronchodilator Assessment Score as indicated below. If a patient is on this medication at home then do not decrease Frequency below that used at home. 0-3 - enter or revise RT bronchodilator order(s) to equivalent RT Bronchodilator order with Frequency of every 4 hours PRN for wheezing or increased work of breathing using Per Protocol order mode.        4-6 - enter or revise RT Bronchodilator order(s) to two equivalent RT bronchodilator orders with one order with BID Frequency and one order with Frequency of every 4 hours PRN wheezing or increased work of breathing using Per Protocol order mode. 7-10 - enter or revise RT Bronchodilator order(s) to two equivalent RT bronchodilator orders with one order with TID Frequency and one order with Frequency of every 4 hours PRN wheezing or increased work of breathing using Per Protocol order mode. 11-13 - enter or revise RT Bronchodilator order(s) to one equivalent RT bronchodilator order with QID Frequency and an Albuterol order with Frequency of every 4 hours PRN wheezing or increased work of breathing using Per Protocol order mode. Greater than 13 - enter or revise RT Bronchodilator order(s) to one equivalent RT bronchodilator order with every 4 hours Frequency and an Albuterol order with Frequency of every 2 hours PRN wheezing or increased work of breathing using Per Protocol order mode. RT to enter RT Home Evaluation for COPD & MDI Assessment order using Per Protocol order mode.     Electronically signed by Jose Luis Lopez RCP on 2/1/2022 at 7:37 AM

## 2022-02-01 NOTE — PLAN OF CARE
0500: Patient desats into the 80's. Prn versed given. 515: patient unable to recover above 83% Sat on the monitor  Bagged patient for aprox 10 mins able to obtain 90% while bagging. Reconnected to the vent and patient immediately dropped to low 80's oxygen saturation. Notified Hospitalitis, discussed paralyzing the patient to achieve satisfactory oxygenation. 0530: Hospitalitis at bedside. Administered 60mg rocuronium and 100mg ketamine.    0545: 500ML Bolus started   0600: O2 sats 83% on monitor

## 2022-02-01 NOTE — PROGRESS NOTES
Pulmonary & Critical Care Medicine ICU Progress Note    CC: COVID-19 pneumonia in an unvaccinated individual    Events of Last 24 hours: Worsening oxygenation    Invasive Lines: PICC placed 1/10/22    MV:  1/14/22-  Vent Mode: AC/VC Rate Set: 38 bmp/Vt Ordered: 360 mL/ /FiO2 : 100 %  Recent Labs     01/31/22  0450 02/01/22  0445   PHART 7.342* 7.339*   LYS8EHZ 66.4* 58.6*   PO2ART 61.9* 61.0*       IV:   PN-Adult Premix 5/20 - Standard Electrolytes - Central Line 20 mL/hr at 01/31/22 2057    dextrose      sodium chloride 500 mL/hr at 01/20/22 1925    midazolam 10 mg/hr (02/01/22 0557)    fentaNYL 300 mcg/hr (02/01/22 0536)    norepinephrine 2 mcg/min (01/31/22 1213)    sodium chloride         Vitals:  /62   Pulse 108   Temp 97.9 °F (36.6 °C) (Rectal)   Resp (!) 38   Ht 5' 2\" (1.575 m)   Wt 161 lb 6 oz (73.2 kg)   SpO2 (!) 87%   BMI 29.52 kg/m²   on Vent     Intake/Output Summary (Last 24 hours) at 2/1/2022 0834  Last data filed at 2/1/2022 0708  Gross per 24 hour   Intake 3481.74 ml   Output 1100 ml   Net 2381.74 ml     General: intubated, ill appearing, prone    ENT: Pharynx with ETT. Resp: No crackles. No wheezing. CV: S1, S2. No edema  GI: prone  Skin: Warm and dry. Neuro: PERRL. Sedated, not following commands.      Scheduled Meds:   senna  2 tablet Oral BID    And    docusate  100 mg Oral BID    fat emulsion  250 mL IntraVENous Once per day on Mon Thu    dexamethasone  4 mg IntraVENous Daily    enoxaparin  30 mg SubCUTAneous BID    influenza virus vaccine  0.5 mL IntraMUSCular Prior to discharge    levofloxacin  750 mg IntraVENous Q24H    diazePAM  20 mg Oral Q8H    voriconazole  300 mg IntraVENous Q12H    pantoprazole  40 mg IntraVENous BID    insulin glargine  25 Units SubCUTAneous QAM    Venelex   Topical BID    insulin lispro  0-18 Units SubCUTAneous Q4H    ipratropium-albuterol  1 ampule Inhalation Q4H    omega-3 acid ethyl esters  2 g Oral BID    [Held by provider] clopidogrel  75 mg Oral Daily    aspirin  81 mg Oral Daily    sodium chloride flush  5-40 mL IntraVENous 2 times per day    Vitamin D  2,000 Units Oral Daily     PRN Meds:  dextrose bolus (hypoglycemia) **OR** dextrose bolus (hypoglycemia), dextrose, sodium chloride, albuterol, glucose, glucagon (rDNA), midazolam, fentanNYL, melatonin, nitroGLYCERIN, sodium chloride flush, sodium chloride, ondansetron **OR** ondansetron, polyethylene glycol, acetaminophen **OR** acetaminophen, potassium chloride, magnesium sulfate    Results:  CBC:   Recent Labs     01/30/22  0500 01/31/22  0450 02/01/22  0445   WBC 25.9* 26.6* 23.6*   HGB 6.8* 9.1* 8.1*   HCT 21.9* 28.4* 25.0*   MCV 94.9 93.9 95.9   PLT 73* 70* 73*     BMP:   Recent Labs     01/30/22  0500 01/31/22  0450 02/01/22  0445    138 138   K 5.0 4.5 4.8   CL 99 98* 97*   CO2 34* 34* 35*   BUN 47* 41* 47*   CREATININE 0.6* 0.6* 0.6*     LIVER PROFILE:   Recent Labs     01/30/22  0500 01/31/22  0450 02/01/22  0445   AST 86* 99* 98*   ALT 59* 62* 64*   BILITOT 2.0* 2.3* 2.7*   ALKPHOS 175* 186* 181*       Micro  1/9 BC NGTD  1/9 COVID-19 detected  1/20 BC NGTD   1/20 UC NGTD   1/20 Resp Pseudomonas fluorescens and  Aspergillus species    Imaging   CXR 2/1/2022 Worsening multifocal bilateral atypical pneumonia. LE dopper 1/17/2022    no evidence of deep or superficial  venous thrombosis in the lower extremities bilaterally.  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 The ventilator was adjusted by me at the bedside for unstable, life threatening respiratory failure. · IV Versed/Fentanyl for sedation, target RASS -5, with daily spontaneous awakening trial.  Fentanyl PRN pain, gtt as needed  · Valium 20 TID   · Nimbex 1/26/22-1/30/22  · Prone 1/14/22-  · Decadron QD, D#22 @ 4 mg   · Completed Remdesevir; s/p 1 dose Tocilizumab 1/9/22  · Levaquin D#8/10 and Voriconazole D#9. Completed 4 days of Cefepime, 3 days Meropenem and 5 days of Vanc  · IV Levophed to keep MAP > 65 mmHg or SBP>90  · TFs - trophic    · On ASA; holding Plavix   · S/P 2 U PRBC   · May need to consider TPN if unable to tolerate enteral feeding  · Blood sugar control ISS,  lantus 25 with goal 150-180  · Prophylaxis: Protonix BID, lovenox BID  · Poor prognosis  · Spouse aware of decline  Total critical care time caring for this patient with life threatening, unstable organ failure, including direct patient contact, management of life support systems, review of data including imaging and labs, discussions with other team members and physicians is 32 minutes so far today, excluding procedures.

## 2022-02-01 NOTE — PROGRESS NOTES
AM assessment completed. See flowsheet. Sedated on vent. Fentanyl infusing at 300 mcg/hr, Versed at 10 mcg/hr. Lungs sounds diminished throughout. )2 sats holding at 86-87%. ST on bedside monitor. Bowel sounds active. No  edema noted. Urinary catheter in place draining clear yellow urine. Bilateral soft wrist restraints in place for safety. Labs reviewed. Cont to monitor.

## 2022-02-01 NOTE — PROGRESS NOTES
Notified by RN that pt sats are low and BP is low. He is already on maximal vent settings. They have been having to ambubag him to get him back into 80's. Pt recently taken off Nimbex per RN. RN asked me to come give some paralytic to see if he might help. Gave him ketamine 100 mg and rocuronium 60 mg.  D/w RN ~ 15 min after dose. Did not appear to have had significant benefit to pt and I suspect might not benefit from restarting Nimbex. Will d/w Dr. Chrystal Plummer.

## 2022-02-01 NOTE — PROGRESS NOTES
Pharmacy-TPN Day 2  Increase rate to 45ml/hr, MVI/TR MWF  Recent Labs     01/30/22  0500 01/30/22  0500 01/31/22  0450 01/31/22  0450 02/01/22 0445     --  138  --  138   K 5.0  --  4.5  --  4.8   CL 99  --  98*  --  97*   CO2 34*  --  34*  --  35*   BUN 47*  --  41*  --  47*   GLUCOSE 128*  --  128*  --  155*   CALCIUM 8.2*  --  7.8*  --  8.1*   PROT 4.4*  --  4.5*  --  4.2*   LABALBU 2.4*  --  2.3*  --  2.0*   TRIG 160*  --  153*  --   --    WBC 25.9*  --  26.6*  --  23.6*   PHART 7.374   < > 7.342*   < > 7.339*    < > = values in this interval not displayed.        Allison Paredes [facility] 2/1/2022 11:57 AM

## 2022-02-01 NOTE — PROGRESS NOTES
score.  If a patient is on this medication at home then do not decrease Frequency below that used at home. 0-3 - enter or revise RT bronchodilator order(s) to equivalent RT Bronchodilator order with Frequency of every 4 hours PRN for wheezing or increased work of breathing using Per Protocol order mode. 4-6 - enter or revise RT Bronchodilator order(s) to two equivalent RT bronchodilator orders with one order with BID Frequency and one order with Frequency of every 4 hours PRN wheezing or increased work of breathing using Per Protocol order mode. 7-10 - enter or revise RT Bronchodilator order(s) to two equivalent RT bronchodilator orders with one order with TID Frequency and one order with Frequency of every 4 hours PRN wheezing or increased work of breathing using Per Protocol order mode. 11-13 - enter or revise RT Bronchodilator order(s) to one equivalent RT bronchodilator order with QID Frequency and an Albuterol order with Frequency of every 4 hours PRN wheezing or increased work of breathing using Per Protocol order mode. Greater than 13 - enter or revise RT Bronchodilator order(s) to one equivalent RT bronchodilator order with every 4 hours Frequency and an Albuterol order with Frequency of every 2 hours PRN wheezing or increased work of breathing using Per Protocol order mode.          Electronically signed by Sang Olivera RCP on 1/31/2022 at 9:22 PM

## 2022-02-01 NOTE — CONSULTS
Pharmacy to dose TPN. Recent Labs     01/29/22  0330 01/29/22  0330 01/30/22  0500 01/30/22  0500 01/31/22  0450     --  139  --  138   K 5.4*  --  5.0  --  4.5   CL 97*  --  99  --  98*   CO2 36*  --  34*  --  34*   BUN 53*  --  47*  --  41*   GLUCOSE 112*  --  128*  --  128*   CALCIUM 8.3  --  8.2*  --  7.8*   PROT 4.4*  --  4.4*  --  4.5*   LABALBU 2.6*  --  2.4*  --  2.3*   TRIG 170*  --  160*  --  153*   WBC 25.9*  --  25.9*  --  26.6*   PHART 7.315*   < > 7.374   < > 7.342*    < > = values in this interval not displayed. Clinimix-E + Multivitamin + trace elements, initial bag at 20 ml/hr. Fat Emulsion 20% 250 ml on Monday-Thursday. Second bag to run at goal rate of 45 ml/hr.     TAY Cullen [facility] 1/31/2022 10:21 PM

## 2022-02-01 NOTE — PROGRESS NOTES
Shift assessment completed as documented on flowsheet. vSS Pt sedated RASS -4  Prone position mechanically ventilated. Lungs diminished. Navarro patent. NSR without ectopy. Darius wrist restraints.

## 2022-02-01 NOTE — CARE COORDINATION
INTERDISCIPLINARY PLAN OF CARE CONFERENCE    Date/Time: 2/1/2022 11:49 AM  Completed by: Soila Oliveira RN, Case Management      Patient Name:  Jose Odonnell  YOB: 1965  Admitting Diagnosis: Acute hypoxemic respiratory failure ([facility] [address] [J96.01]  Acute hypoxemic respiratory failure due to COVID-19 ([facility] [address] [U07.1, J96.01]     Admit Date/Time:  1/9/2022 11:42 AM    Chart reviewed. Interdisciplinary team contacted or reviewed plan related to patient progress and discharge plans. Disciplines included Case Management, Nursing, and Dietitian. Current Status: IP 01/09/2022  PT/OT recommendation for discharge plan of care:     Expected D/C Disposition:  TBD  +C19- very poor prognosis.    ICU/VENT- pressors, IV- ABTX

## 2022-02-01 NOTE — PROGRESS NOTES
Hospitalist Progress Note      PCP: Yovana Jeter    Date of Admission: 1/9/2022    Admitted with Covid pneumonia and acute hypoxic respiratory failure    Subjective: cont to be intubated with high O2 requirement at 100%FiO2 and 14 PEEP  Proned   Continues to be tachycardic  On Levophed    1/28  Proned overnight. On Levophed. 1/29  Worsening hypoxia. 95% FiO2 and 14 of PEEP  Off Levophed. 1/30  Back on Levophed  Currently in supine position  1 unit of red cells transfused    1/31-off paralysis. Proned. On Levophed. 2/1- seen on prone position. On Levophed. On FiO2 is 100% and PEEP 16.       Medications:  Reviewed    Infusion Medications    PN-Adult Premix 5/20 - Standard Electrolytes - Central Line      PN-Adult Premix 5/20 - Standard Electrolytes - Central Line 20 mL/hr at 01/31/22 2057    dextrose      sodium chloride 500 mL/hr at 01/20/22 1925    midazolam 10 mg/hr (02/01/22 0557)    fentaNYL 300 mcg/hr (02/01/22 1245)    norepinephrine 2 mcg/min (01/31/22 1213)    sodium chloride       Scheduled Medications    senna  2 tablet Oral BID    And    docusate  100 mg Oral BID    fat emulsion  250 mL IntraVENous Once per day on Mon Thu    dexamethasone  4 mg IntraVENous Daily    enoxaparin  30 mg SubCUTAneous BID    influenza virus vaccine  0.5 mL IntraMUSCular Prior to discharge    levofloxacin  750 mg IntraVENous Q24H    diazePAM  20 mg Oral Q8H    voriconazole  300 mg IntraVENous Q12H    pantoprazole  40 mg IntraVENous BID    insulin glargine  25 Units SubCUTAneous QAM    Venelex   Topical BID    insulin lispro  0-18 Units SubCUTAneous Q4H    ipratropium-albuterol  1 ampule Inhalation Q4H    omega-3 acid ethyl esters  2 g Oral BID    [Held by provider] clopidogrel  75 mg Oral Daily    aspirin  81 mg Oral Daily    sodium chloride flush  5-40 mL IntraVENous 2 times per day    Vitamin D  2,000 Units Oral Daily     PRN Meds: dextrose bolus (hypoglycemia) **OR** dextrose bolus (hypoglycemia), dextrose, sodium chloride, albuterol, glucose, glucagon (rDNA), midazolam, fentanNYL, melatonin, nitroGLYCERIN, sodium chloride flush, sodium chloride, ondansetron **OR** ondansetron, polyethylene glycol, acetaminophen **OR** acetaminophen, potassium chloride, magnesium sulfate      Intake/Output Summary (Last 24 hours) at 2/1/2022 1353  Last data filed at 2/1/2022 0708  Gross per 24 hour   Intake 3481.74 ml   Output 1100 ml   Net 2381.74 ml       Physical Exam Performed:    BP (!) 102/57   Pulse 96   Temp 97.8 °F (36.6 °C) (Axillary)   Resp (!) 38   Ht 5' 2\" (1.575 m)   Wt 161 lb 6 oz (73.2 kg)   SpO2 (!) 84%   BMI 29.52 kg/m²       General:  Middle aged male, old appearing.  Intubated and sedated. Prone/ill-appearing. Seen in prone position. Eyes:  No sclera icterus. No conjunctiva injected. ENT - oral ETT and OG noted . Neck: Trachea midline. Normal thyroid. Resp-  jose + crackles. No wheezing. No rhonchi. CV: Regular rate. Regular rhythm. No mumur or rub. No edema. No JVD. Palpable pedal pulses. GI: Non-tender. Non-distended. No masses. No organmegaly. Normal bowel sounds. No hernia. Skin: developing edema to all ex t  Lymph: No cervical LAD. No supraclavicular LAD. M/S: No cyanosis. No joint deformity. No clubbing. Neuro: sedated    Labs:   Recent Labs     01/30/22  0500 01/31/22  0450 02/01/22  0445   WBC 25.9* 26.6* 23.6*   HGB 6.8* 9.1* 8.1*   HCT 21.9* 28.4* 25.0*   PLT 73* 70* 73*     Recent Labs     01/30/22  0500 01/31/22  0450 02/01/22  0445    138 138   K 5.0 4.5 4.8   CL 99 98* 97*   CO2 34* 34* 35*   BUN 47* 41* 47*   CREATININE 0.6* 0.6* 0.6*   CALCIUM 8.2* 7.8* 8.1*     Recent Labs     01/30/22  0500 01/31/22  0450 02/01/22  0445   AST 86* 99* 98*   ALT 59* 62* 64*   BILITOT 2.0* 2.3* 2.7*   ALKPHOS 175* 186* 181*     No results for input(s): INR in the last 72 hours. No results for input(s): Mara Evgeny in the last 72 hours.     Urinalysis: Lab Results   Component Value Date    NITRU Negative 01/20/2022    WBCUA 0-2 01/20/2022    BACTERIA 1+ 07/16/2018    RBCUA 11-20 01/20/2022    BLOODU MODERATE 01/20/2022    SPECGRAV 1.015 01/20/2022    GLUCOSEU Negative 01/20/2022     Cultures    1,3)-Beta-D-Glucan (Fungitell) Interpretation Negative Positive Abnormal             Radiology:  XR CHEST PORTABLE   Final Result   Worsening multifocal bilateral atypical pneumonia. XR CHEST PORTABLE   Final Result   Supportive tubing is stable. Interstitial and ground-glass opacity, consistent with pneumonia. There are   shifting areas of ground-glass opacity in the lung bases, likely components   of pulmonary edema. XR CHEST PORTABLE   Final Result   Line and tubes as above. Mildly worsened bilateral interstitial opacities. Relative lucency in the right apex correlates with bullous disease. RECOMMENDATION:   If there is concern for pneumothorax, a chest CT can be obtained as   clinically indicated. XR CHEST PORTABLE   Final Result   1. Redistributed bilateral pulmonary opacities as above with worsening   opacities on the right as above. 2. Lines and tubes as above         XR CHEST PORTABLE   Final Result   Supportive tubing is in normal position. Stable, extensive interstitial opacities. XR CHEST PORTABLE   Final Result   Line and tubes as above. Stable bilateral interstitial opacities. XR CHEST PORTABLE   Final Result   Improved aeration of the lungs. Bilateral airspace disease remains         XR CHEST PORTABLE   Final Result   Extensive parenchymal infiltrates are seen throughout the lungs bilaterally,   mildly worsened at the right lung base, otherwise similar in appearance. XR CHEST PORTABLE   Final Result   Interval increase left-sided pulmonary opacities representing worsening   edema, atelectasis or pneumonia. Slightly improved aeration in the right   upper lung zone.          XR CHEST PORTABLE   Final Result   Stable pattern of diffuse, multifocal airspace opacities. XR CHEST PORTABLE   Final Result   Unchanged multifocal bilateral atypical pneumonia. XR CHEST PORTABLE   Final Result   Unremarkable appearance of an endotracheal tube. Unchanged patchy interstitial lung markings which may reflect atypical   pneumonia or pulmonary edema. XR CHEST PORTABLE   Final Result   1. Endotracheal tube terminates 1.6 cm from the betnon. Recommend 2 cm   retraction. 2.  Slightly increased diffuse bilateral airspace and interstitial opacities. The findings were sent to the [facility] Results Communication Team at 12:48 a.m.   on 01/18/2022 to be communicated to a licensed caregiver. XR CHEST PORTABLE   Final Result   Mild interval worsening with moderate infiltrates in the lungs. These are   most pronounced in the right lung. 5 cm indeterminate dense opacity in the midline-right upper abdomen. Correlate for oral contrast in bowel. VL Extremity Venous Bilateral   Final Result      XR CHEST PORTABLE   Final Result   Stable multifocal bilateral lung airspace disease consistent with previously   identified presence of COVID-19 viral pneumonia. Suspected moderate pulmonary interstitial edema. XR CHEST PORTABLE   Final Result   The support apparatus as described above. Continued increased interstitial opacity throughout both lungs, along with   more focal traits within the periphery the lungs, some combination of   interstitial edema and pneumonia. XR CHEST PORTABLE   Final Result   1. Endotracheal tube projects in appropriate position. 2. Increased bilateral nonspecific pulmonary opacities representing edema,   atelectasis and/or pneumonia. XR CHEST PORTABLE   Final Result   Right PICC projects in normal position. Stable interstitial and ground-glass opacities, most likely atypical   pneumonia.          IR PICC WO SQ PORT/PUMP > 5 YEARS   Final Result   Successful placement of PICC line. CT CHEST PULMONARY EMBOLISM W CONTRAST   Final Result   1. No evidence of pulmonary embolism. 2.  Commonly reported imaging features of COVID-19 pneumonia are present. Other processes such as influenza pneumonia and organizing pneumonia, as can   be seen with drug toxicity and connective tissue disease, can cause a similar   imaging pattern. PneTyp   3. Mild compression deformity of T8 of uncertain chronicity, new since   05/29/2020. Similar mild moderate compression deformities at T10, T12.   4. Fatty liver. 5. Interval increase in size of now 2.7 cm fat attenuation lesion of the   pancreatic neck. Recommend further evaluation with nonemergent pancreas   protocol MRI. XR CHEST PORTABLE   Final Result   1. Bilateral interstitial thickening either due to edema or atypical   interstitial pneumonia. Assessment/Plan:    Principal Problem:    Acute hypoxemic respiratory failure due to COVID-19 [facility])  Active Problems:    Coronary artery disease    PAD (peripheral artery disease) (HCC)    HLD (hyperlipidemia)    GERD (gastroesophageal reflux disease)    DMII (diabetes mellitus, type 2) (HCC)    Essential hypertension    Pneumonia due to COVID-19 virus    Acute hypoxemic respiratory failure (HCC)    ARDS (adult respiratory distress syndrome) ([facility])    Hyperglycemia    Pulmonary emphysema (HCC)    Leukocytosis    Hypertriglyceridemia    Anemia    Septic shock (HCC)    Invasive aspergillosis (HCC)    Thrombocytopenia (HCC)  Resolved Problems:    * No resolved hospital problems. *         COVID PNA  Acute Hypoxic Respiratory Michelle Rowland is worse. He had to be bagged last night. He is only 85% on maximum vent support. Invasive aspergillosis.   - imaging with bilateral pna pattern  - does not wear O2 at baseline  - Admit to Med Surg, droplet + precautions, tele  - supp O2, progressive hypoxemia, now on vent support  -Intubated 1/14 , proning as needed  - on  Decadron D#23, now at 4 mg , completed 5 days of  Remdesivir, s/p Tocilizumab   - worsening wbc and hypoxia, now at 95 % fio2, maxed out on ventilator settings. PEEP of 16.  -Received cefepime and vancomycin D4.  Now on Levaquin day #8/10 add voriconazole day #10 for possible fungal infection.    - bloody secretions in ETT. held PLAVIX   - pulmonary consulted   - lovenox changed to heparin gtt for possibility of PE  - hold heparin gtt  Lasix 40 mg IV daily prn        Hypotension - likely sepsis   -Was on cefepime and Vanco.  Cefepime stopped. Now on levaquin.  Voriconazole added. - sp fluid boluses - on levophed now   - wean as able   Has persistent tachycardia  On Levophed. Try to wean off.     Leucocytosis- persistent     Thrombocytopenia - monitor platelets. HIT negative    Hyperkalemia. Lokelma     Anemia - multifactorial   - monitor and transfuse PRBC less than 7.  - ordered one unit of PRBC 1/23  - monitor hb  1 unit of red cells transfused 1/30     Abnormal lFT - likely from viral infection   Monitor, stable      CAD - stable. No CP.  Hold Plavix for bloody secretions        PAD stable     DM type 2  Hyperglycemia with steroids, on ssi  lantus insulin      HLD On Lipitor.      DVT prophylaxis: Lovenox  Diet: Diet NPO  ADULT TUBE FEEDING; Orogastric; 2.0 Calorie; Continuous; 15; No; 30; Q 3 hours  PN-Adult Premix 5/20 - Standard Electrolytes - Central Line  PN-Adult Premix 5/20 - Standard Electrolytes - Central Line  Code Status: Full Code    PT/OT Eval Status: not indicated      Nick Gregg MD 2/1/2022 1:53 PM

## 2022-02-02 NOTE — DISCHARGE SUMMARY
Death Summary        Date of death 2022. Cause of death: COVID-19. Please see progress note from same day. 79-year-old white male admitted to hospital on 1/10/21. Patient is at the time of admission. Tested positive COVID-19. Placed on BiPAP. Patient eventually intubated. In spite of aggressive treatment in the ICU patient did not do well. He  on 2022. Wife was at the bedside.         Erik Acevedo MD 2022 2:47 PM

## 2022-02-02 NOTE — PROGRESS NOTES
Witnessed wife [facility] and sons agree to change pt code status to DNR CCA Colby Vasques RN

## 2022-02-02 NOTE — PROGRESS NOTES
Hospitalist Progress Note      PCP: Carol Ojeda    Date of Admission: 1/9/2022    Admitted with Covid pneumonia and acute hypoxic respiratory failure    Subjective: cont to be intubated with high O2 requirement at 100%FiO2 and 14 PEEP  Proned   Continues to be tachycardic  On Levophed    1/28  Proned overnight. On Levophed. 1/29  Worsening hypoxia. 95% FiO2 and 14 of PEEP  Off Levophed. 1/30  Back on Levophed  Currently in supine position  1 unit of red cells transfused    1/31-off paralysis. Proned. On Levophed. 2/1- seen on prone position. On Levophed. On FiO2 is 100% and PEEP 16.    2/2-oxygenation is worse. . Family changed CODE STATUS to DNR CCA.       Medications:  Reviewed    Infusion Medications    PN-Adult Premix 5/20 - Standard Electrolytes - Central Line 45 mL/hr at 02/02/22 5482    dextrose      sodium chloride 500 mL/hr at 01/20/22 1925    midazolam 10 mg/hr (02/02/22 0437)    fentaNYL 300 mcg/hr (02/02/22 0727)    norepinephrine 9 mcg/min (02/02/22 0441)    sodium chloride       Scheduled Medications    senna  2 tablet Oral BID    And    docusate  100 mg Oral BID    fat emulsion  250 mL IntraVENous Once per day on Mon Thu    dexamethasone  4 mg IntraVENous Daily    enoxaparin  30 mg SubCUTAneous BID    influenza virus vaccine  0.5 mL IntraMUSCular Prior to discharge    levofloxacin  750 mg IntraVENous Q24H    diazePAM  20 mg Oral Q8H    voriconazole  300 mg IntraVENous Q12H    pantoprazole  40 mg IntraVENous BID    insulin glargine  25 Units SubCUTAneous QAM    Venelex   Topical BID    insulin lispro  0-18 Units SubCUTAneous Q4H    ipratropium-albuterol  1 ampule Inhalation Q4H    omega-3 acid ethyl esters  2 g Oral BID    [Held by provider] clopidogrel  75 mg Oral Daily    aspirin  81 mg Oral Daily    sodium chloride flush  5-40 mL IntraVENous 2 times per day    Vitamin D  2,000 Units Oral Daily     PRN Meds: dextrose bolus (hypoglycemia) **OR** dextrose bolus (hypoglycemia), dextrose, sodium chloride, albuterol, glucose, glucagon (rDNA), midazolam, fentanNYL, melatonin, nitroGLYCERIN, sodium chloride flush, sodium chloride, ondansetron **OR** ondansetron, polyethylene glycol, acetaminophen **OR** acetaminophen, potassium chloride, magnesium sulfate      Intake/Output Summary (Last 24 hours) at 2/2/2022 1113  Last data filed at 2/2/2022 3240  Gross per 24 hour   Intake 1675.15 ml   Output 500 ml   Net 1175.15 ml       Physical Exam Performed:    BP (!) 77/49   Pulse 123   Temp 98.8 °F (37.1 °C) (Axillary)   Resp (!) 36   Ht 5' 2\" (1.575 m)   Wt 161 lb 6 oz (73.2 kg)   SpO2 (!) 70%   BMI 29.52 kg/m²       General:  Middle aged male, old appearing.  Intubated and sedated. Prone/ill-appearing. Seen in prone position  Eyes:  No sclera icterus. No conjunctiva injected. ENT - oral ETT and OG noted . Neck: Trachea midline. Normal thyroid. Resp-  jose + crackles. No wheezing. No rhonchi. CV: Regular rate. Regular rhythm. No mumur or rub. No edema. No JVD. Palpable pedal pulses. GI: Non-tender. Non-distended. No masses. No organmegaly. Normal bowel sounds. No hernia. Skin: developing edema to all ex t  Lymph: No cervical LAD. No supraclavicular LAD. M/S: No cyanosis. No joint deformity. No clubbing. Neuro: sedated    Labs:   Recent Labs     01/31/22  0450 02/01/22  0445 02/02/22  0530   WBC 26.6* 23.6* 31.4*   HGB 9.1* 8.1* 7.1*   HCT 28.4* 25.0* 23.3*   PLT 70* 73* 67*     Recent Labs     01/31/22  0450 02/01/22  0445 02/02/22  0530    138 137   K 4.5 4.8 5.3*   CL 98* 97* 99   CO2 34* 35* 31   BUN 41* 47* 55*   CREATININE 0.6* 0.6* 0.6*   CALCIUM 7.8* 8.1* 7.9*     Recent Labs     01/31/22  0450 02/01/22  0445 02/02/22  0530   AST 99* 98* 94*   ALT 62* 64* 75*   BILITOT 2.3* 2.7* 3.1*   ALKPHOS 186* 181* 212*     No results for input(s): INR in the last 72 hours. No results for input(s): Lois Ends in the last 72 hours.     Urinalysis: Lab Results   Component Value Date    NITRU Negative 01/20/2022    WBCUA 0-2 01/20/2022    BACTERIA 1+ 07/16/2018    RBCUA 11-20 01/20/2022    BLOODU MODERATE 01/20/2022    SPECGRAV 1.015 01/20/2022    GLUCOSEU Negative 01/20/2022     Cultures    1,3)-Beta-D-Glucan (Fungitell) Interpretation Negative Positive Abnormal             Radiology:  XR CHEST PORTABLE   Final Result   Worsening multifocal bilateral atypical pneumonia. XR CHEST PORTABLE   Final Result   Supportive tubing is stable. Interstitial and ground-glass opacity, consistent with pneumonia. There are   shifting areas of ground-glass opacity in the lung bases, likely components   of pulmonary edema. XR CHEST PORTABLE   Final Result   Line and tubes as above. Mildly worsened bilateral interstitial opacities. Relative lucency in the right apex correlates with bullous disease. RECOMMENDATION:   If there is concern for pneumothorax, a chest CT can be obtained as   clinically indicated. XR CHEST PORTABLE   Final Result   1. Redistributed bilateral pulmonary opacities as above with worsening   opacities on the right as above. 2. Lines and tubes as above         XR CHEST PORTABLE   Final Result   Supportive tubing is in normal position. Stable, extensive interstitial opacities. XR CHEST PORTABLE   Final Result   Line and tubes as above. Stable bilateral interstitial opacities. XR CHEST PORTABLE   Final Result   Improved aeration of the lungs. Bilateral airspace disease remains         XR CHEST PORTABLE   Final Result   Extensive parenchymal infiltrates are seen throughout the lungs bilaterally,   mildly worsened at the right lung base, otherwise similar in appearance. XR CHEST PORTABLE   Final Result   Interval increase left-sided pulmonary opacities representing worsening   edema, atelectasis or pneumonia. Slightly improved aeration in the right   upper lung zone.          XR CHEST PORTABLE   Final Result   Stable pattern of diffuse, multifocal airspace opacities. XR CHEST PORTABLE   Final Result   Unchanged multifocal bilateral atypical pneumonia. XR CHEST PORTABLE   Final Result   Unremarkable appearance of an endotracheal tube. Unchanged patchy interstitial lung markings which may reflect atypical   pneumonia or pulmonary edema. XR CHEST PORTABLE   Final Result   1. Endotracheal tube terminates 1.6 cm from the benton. Recommend 2 cm   retraction. 2.  Slightly increased diffuse bilateral airspace and interstitial opacities. The findings were sent to the [facility] Results Communication Team at 12:48 a.m.   on 01/18/2022 to be communicated to a licensed caregiver. XR CHEST PORTABLE   Final Result   Mild interval worsening with moderate infiltrates in the lungs. These are   most pronounced in the right lung. 5 cm indeterminate dense opacity in the midline-right upper abdomen. Correlate for oral contrast in bowel. VL Extremity Venous Bilateral   Final Result      XR CHEST PORTABLE   Final Result   Stable multifocal bilateral lung airspace disease consistent with previously   identified presence of COVID-19 viral pneumonia. Suspected moderate pulmonary interstitial edema. XR CHEST PORTABLE   Final Result   The support apparatus as described above. Continued increased interstitial opacity throughout both lungs, along with   more focal traits within the periphery the lungs, some combination of   interstitial edema and pneumonia. XR CHEST PORTABLE   Final Result   1. Endotracheal tube projects in appropriate position. 2. Increased bilateral nonspecific pulmonary opacities representing edema,   atelectasis and/or pneumonia. XR CHEST PORTABLE   Final Result   Right PICC projects in normal position. Stable interstitial and ground-glass opacities, most likely atypical   pneumonia.          IR PICC WO SQ PORT/PUMP > 5 YEARS   Final Result   Successful placement of PICC line. CT CHEST PULMONARY EMBOLISM W CONTRAST   Final Result   1. No evidence of pulmonary embolism. 2.  Commonly reported imaging features of COVID-19 pneumonia are present. Other processes such as influenza pneumonia and organizing pneumonia, as can   be seen with drug toxicity and connective tissue disease, can cause a similar   imaging pattern. PneTyp   3. Mild compression deformity of T8 of uncertain chronicity, new since   05/29/2020. Similar mild moderate compression deformities at T10, T12.   4. Fatty liver. 5. Interval increase in size of now 2.7 cm fat attenuation lesion of the   pancreatic neck. Recommend further evaluation with nonemergent pancreas   protocol MRI. XR CHEST PORTABLE   Final Result   1. Bilateral interstitial thickening either due to edema or atypical   interstitial pneumonia. Assessment/Plan:    Principal Problem:    Acute hypoxemic respiratory failure due to COVID-19 [facility])  Active Problems:    Coronary artery disease    PAD (peripheral artery disease) (HCC)    HLD (hyperlipidemia)    GERD (gastroesophageal reflux disease)    DMII (diabetes mellitus, type 2) (HCC)    Essential hypertension    Pneumonia due to COVID-19 virus    Acute hypoxemic respiratory failure (HCC)    ARDS (adult respiratory distress syndrome) ([facility])    Hyperglycemia    Pulmonary emphysema (HCC)    Leukocytosis    Hypertriglyceridemia    Anemia    Septic shock (HCC)    Invasive aspergillosis (HCC)    Thrombocytopenia (HCC)  Resolved Problems:    * No resolved hospital problems. *         COVID PNA  Acute Hypoxic Respiratory [place] is worse. He had to be bagged last night. He is only 70% on maximum vent support. Invasive aspergillosis.   - imaging with bilateral pna pattern  - does not wear O2 at baseline  - Admit to Med Surg, droplet + precautions, tele  - supp O2, progressive hypoxemia, now on vent support  -Intubated  , proning as needed  - on  Decadron D#23, now at 4 mg , completed 5 days of  Remdesivir, s/p Tocilizumab   - worsening wbc and hypoxia, now at 95 % fio2, maxed out on ventilator settings. PEEP of 16.  -Received cefepime and vancomycin D4.  Now on Levaquin day #9/10 add voriconazole day #10 for possible fungal infection.    - bloody secretions in ETT. held PLAVIX   - pulmonary consulted   - lovenox changed to heparin gtt for possibility of PE  - hold heparin gtt  Lasix 40 mg IV daily prn   - he is worse. Code status changed to DNR-CCA       Hypotension - likely sepsis   -Was on cefepime and Vanco.  Cefepime stopped. Now on levaquin.  Voriconazole added. - sp fluid boluses - on levophed now   - wean as able   Has persistent tachycardia  On Levophed. Try to wean off.     Leucocytosis- persistent     Thrombocytopenia - monitor platelets. HIT negative    Hyperkalemia. Lokelma     Anemia - multifactorial   - monitor and transfuse PRBC less than 7.  - ordered one unit of PRBC   - monitor hb  1 unit of red cells transfused      Abnormal lFT - likely from viral infection   Monitor, stable      CAD - stable. No CP. Hold Plavix for bloody secretions        PAD stable     DM type 2  Hyperglycemia with steroids, on ssi  lantus insulin      HLD On Lipitor.      DVT prophylaxis: Lovenox  Diet: Diet NPO  ADULT TUBE FEEDING; Orogastric; 2.0 Calorie; Continuous; 15; No; 30; Q 3 hours  PN-Adult Premix 5/20 - Standard Electrolytes - Central Line  Code Status: DNR-CCA    PT/OT Eval Status: not indicated    Very poor prognosis. I expect patient will likely  soon. I talked to his spouse in detail.       Marisa Wang MD 2022 11:13 AM

## 2022-02-02 NOTE — PROGRESS NOTES
02/01/22 1913   Vent Information   Vent Type 840   Vent Mode AC/VC   Vt Ordered 380 mL   Rate Set 38 bmp   Peak Flow 50 L/min   Pressure Support 0 cmH20   FiO2  100 %   SpO2 (!) 77 %   SpO2/FiO2 ratio 77   Sensitivity 3   PEEP/CPAP 16   Humidification Source Heated wire   Humidification Temp 37   Humidification Temp Measured 36.8   Circuit Condensation Drained   Vent Patient Data   Peak Inspiratory Pressure 33 cmH2O   Mean Airway Pressure 25 cmH20   Rate Measured 38 br/min   Vt Exhaled 414 mL   Minute Volume 15.4 Liters   I:E Ratio 1.10:1   Plateau Pressure 31 [id number]   Static Compliance 33 mL/cmH2O   Dynamic Compliance 30 mL/cmH2O   Cough/Sputum   Sputum How Obtained Endotracheal;Suctioned   Sputum Amount Small   Sputum Color Dark red   Tenacity Thick   Spontaneous Breathing Trial (SBT) RT Doc   Pulse 108   Breath Sounds   Right Upper Lobe Diminished   Right Middle Lobe Diminished   Right Lower Lobe Diminished   Left Upper Lobe Diminished   Left Lower Lobe Diminished   Additional Respiratory  Assessments   Resp (!) 38   Position Prone   Oral Care Mouth suctioned   Alarm Settings   High Pressure Alarm 50 cmH2O   Low Minute Volume Alarm 4 L/min   High Respiratory Rate 45 br/min   Patient Observation   Observations ETT SIZE 8.0, SECURED AT 24 LIP LINE. AMBU BAG AT HEAD OF BED. WATER GOOD. ETT (adult)   Placement Date/Time: 01/14/22 1503   Timeout: Patient;Procedure; Appropriate Equipment;Correct Position  Preoxygenation: Yes  Mask Ventilation: Ventilated by mask (1)  Technique: Rapid sequence  Type: Cuffed  Tube Size: 8 mm  Laryngoscope: GlideScope  . ..    Secured at 24 cm   Measured From Lips   ET Placement Right   Secured By "[employer]" tape   Site Condition Dry

## 2022-02-02 NOTE — PROGRESS NOTES
02/02/22 0322   Vent Information   Vent Type 840   Vent Mode AC/VC   Vt Ordered 385 mL   Rate Set 36 bmp   Peak Flow 60 L/min   Pressure Support 0 cmH20   FiO2  100 %   SpO2 (!) 70 %   SpO2/FiO2 ratio 70   Sensitivity 3   PEEP/CPAP 16   Vent Patient Data   Peak Inspiratory Pressure 35 cmH2O   Mean Airway Pressure 24 cmH20   Rate Measured 37 br/min   Vt Exhaled 516 mL   Minute Volume 14.6 Liters   I:E Ratio 1:1.40   Cough/Sputum   Sputum How Obtained Suctioned;Endotracheal   Sputum Amount None   Spontaneous Breathing Trial (SBT) RT Doc   Pulse 119   Alarm Settings   High Pressure Alarm 50 cmH2O   Low Minute Volume Alarm 4 L/min   High Respiratory Rate 45 br/min

## 2022-02-02 NOTE — PROGRESS NOTES
Pulmonary & Critical Care Medicine ICU Progress Note    CC: COVID-19 pneumonia in an unvaccinated individual    Events of Last 24 hours: Worsening oxygenation    Invasive Lines: PICC placed 1/10/22    MV:  1/14/22-  Vent Mode: AC/VC Rate Set: 36 bmp/Vt Ordered: 385 mL/ /FiO2 : 100 %  Recent Labs     02/01/22  0445 02/02/22  0000   PHART 7.339* 7.283*   HKL3GFX 58.6* 75.9*   PO2ART 61.0* 37.2*       IV:   PN-Adult Premix 5/20 - Standard Electrolytes - Central Line 45 mL/hr at 02/02/22 7654    dextrose      sodium chloride 500 mL/hr at 01/20/22 1925    midazolam 10 mg/hr (02/02/22 0437)    fentaNYL 300 mcg/hr (02/02/22 0727)    norepinephrine 9 mcg/min (02/02/22 0441)    sodium chloride         Vitals:  BP (!) 92/57   Pulse 122   Temp 97.2 °F (36.2 °C) (Rectal)   Resp (!) 37   Ht 5' 2\" (1.575 m)   Wt 161 lb 6 oz (73.2 kg)   SpO2 (!) 69%   BMI 29.52 kg/m²   on Vent     Intake/Output Summary (Last 24 hours) at 2/2/2022 0735  Last data filed at 2/2/2022 8925  Gross per 24 hour   Intake 1675.15 ml   Output 500 ml   Net 1175.15 ml     General: intubated, ill appearing, prone    ENT: Pharynx with ETT. Resp: No crackles. No wheezing. CV: S1, S2. No edema  GI: prone  Skin: Warm and dry. Neuro: PERRL. Sedated, not following commands.      Scheduled Meds:   senna  2 tablet Oral BID    And    docusate  100 mg Oral BID    fat emulsion  250 mL IntraVENous Once per day on Mon Thu    dexamethasone  4 mg IntraVENous Daily    enoxaparin  30 mg SubCUTAneous BID    influenza virus vaccine  0.5 mL IntraMUSCular Prior to discharge    levofloxacin  750 mg IntraVENous Q24H    diazePAM  20 mg Oral Q8H    voriconazole  300 mg IntraVENous Q12H    pantoprazole  40 mg IntraVENous BID    insulin glargine  25 Units SubCUTAneous QAM    Venelex   Topical BID    insulin lispro  0-18 Units SubCUTAneous Q4H    ipratropium-albuterol  1 ampule Inhalation Q4H    omega-3 acid ethyl esters  2 g Oral BID    [Held by provider] clopidogrel  75 mg Oral Daily    aspirin  81 mg Oral Daily    sodium chloride flush  5-40 mL IntraVENous 2 times per day    Vitamin D  2,000 Units Oral Daily     PRN Meds:  dextrose bolus (hypoglycemia) **OR** dextrose bolus (hypoglycemia), dextrose, sodium chloride, albuterol, glucose, glucagon (rDNA), midazolam, fentanNYL, melatonin, nitroGLYCERIN, sodium chloride flush, sodium chloride, ondansetron **OR** ondansetron, polyethylene glycol, acetaminophen **OR** acetaminophen, potassium chloride, magnesium sulfate    Results:  CBC:   Recent Labs     01/31/22 0450 02/01/22 0445 02/02/22  0530   WBC 26.6* 23.6* 31.4*   HGB 9.1* 8.1* 7.1*   HCT 28.4* 25.0* 23.3*   MCV 93.9 95.9 97.2   PLT 70* 73* 67*     BMP:   Recent Labs     01/31/22 0450 02/01/22 0445 02/02/22  0530    138 137   K 4.5 4.8 5.3*   CL 98* 97* 99   CO2 34* 35* 31   BUN 41* 47* 55*   CREATININE 0.6* 0.6* 0.6*     LIVER PROFILE:   Recent Labs     01/31/22 0450 02/01/22 0445 02/02/22  0530   AST 99* 98* 94*   ALT 62* 64* 75*   BILITOT 2.3* 2.7* 3.1*   ALKPHOS 186* 181* 212*       Micro  1/9 BC NGTD  1/9 COVID-19 detected  1/20 BC NGTD   1/20 UC NGTD   1/20 Resp Pseudomonas fluorescens and  Aspergillus species    Imaging   CXR 2/1/2022 Worsening multifocal bilateral atypical pneumonia. LE dopper 1/17/2022    no evidence of deep or superficial  venous thrombosis in the lower extremities bilaterally.  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 The ventilator was adjusted by me at the bedside for unstable, life threatening respiratory failure. · IV Versed/Fentanyl for sedation, target RASS -5, with daily spontaneous awakening trial.  Fentanyl PRN pain, gtt as needed  · Valium 20 TID   · Nimbex 1/26/22-1/30/22  · Prone 1/14/22-  · Decadron QD, D#23@ 4 mg   · Completed Remdesevir; s/p 1 dose Tocilizumab 1/9/22  · Levaquin D#9/10 and Voriconazole D#10. Completed 4 days of Cefepime, 3 days Meropenem and 5 days of Vanc  · IV Levophed to keep MAP > 65 mmHg or SBP>90  · TFs - trophic    · On ASA; holding Plavix   · S/P 2 U PRBC   · May need to consider TPN if unable to tolerate enteral feeding  · Blood sugar control ISS,  lantus 25 with goal 150-180  · Prophylaxis: Protonix BID, lovenox BID  · Nyeda Mckeon, spouse aware of decline but was not willing to engage and discussions about prognosis or CODE STATUS. Today the pt was made [facility]. Total critical care time caring for this patient with life threatening, unstable organ failure, including direct patient contact, management of life support systems, review of data including imaging and labs, discussions with other team members and physicians is 32 minutes so far today, excluding procedures.

## 2022-02-02 NOTE — PROGRESS NOTES
02/01/22 1913   Treatment   Treatment Type Aerosol generator   $Treatment Type $Inhaled Therapy/Meds   Medications Albuterol/Ipratropium   Oxygen Therapy/Pulse Ox   O2 Therapy Oxygen humidified   O2 Device Ventilator   FiO2  100 %   Resp (!) 38   SpO2 (!) 77 %   Humidification Source Heated wire   Humidification Temp 37   Humidification Temp Measured 36.8   Circuit Condensation Drained   Cough/Sputum   Sputum How Obtained Endotracheal;Suctioned   Sputum Amount Small   Sputum Color Dark red   Tenacity Thick   Patient Observation   Observations ETT SIZE 8.0, SECURED AT 24 LIP LINE. AMBU BAG AT HEAD OF BED. WATER GOOD.

## 2022-02-02 NOTE — PROGRESS NOTES
Spoke with family at length regarding code status again. Crash cart and Dickie Hudson machine were placed outside room and pt's wife, Oleksandr Garcia, was instructed on what would happen if pt was to code. Maria De Jesus spoke with her sons and they came to all agree to change code status to [facility]. Bobby ALEGRIA witness. Dr. Venu Root updated.

## 2022-02-02 NOTE — PROGRESS NOTES
Patient with oxygen saturations in the 70's all day. Prone On AC/% and PEEP 16. 380 volume and rate of 36. O2sats now dropping into the 60's at times. Difficult to get accurate pulse ox. sending blood gas now. Stacks breaths from time to time. Prn's given, on 300 fentanyl and 10 versed. Levo 8. Dr. Andrea Ochoa notified via perfect serve.

## 2022-02-02 NOTE — PROGRESS NOTES
AM assessment completed. See flowsheet. Sedated on vent. Fentanyl infusing at 300 mcg/hr, Versed at 10 mcg/hr. Lungs sounds diminished throughout. O2 sats holding at 66-67%. ST on bedside monitor. Bowel sounds active. No  edema noted. Urinary catheter in place draining clear yellow urine. Bilateral soft wrist restraints in place for safety. Labs reviewed. Cont to monitor.

## 2022-02-02 NOTE — PROGRESS NOTES
Wife notified by telephone of decline in patient condition. Verbalized understanding of decreased oxygen saturations and confirms she wants patient to remain a full code at this time. Dr. Flanagan Nail updated. Patient bagged per [facility] and able to get oxygen saturations up to 92%. Decrease saturations to 65-75% once placed back on ventilator. This was attempted multiple times. Current oxygen saturation 75%. Heart rate 117 and hemodynamically stable on 9 mcg levo.

## 2022-02-02 NOTE — PROGRESS NOTES
02/01/22 2341   Vent Information   Vent Type 840   Vent Mode AC/VC   Vt Ordered 385 mL   Rate Set 36 bmp   Peak Flow 60 L/min   Pressure Support 0 cmH20   FiO2  100 %   SpO2 (!) 78 %   SpO2/FiO2 ratio 78   Sensitivity 3   PEEP/CPAP 16   Humidification Source Heated wire   Humidification Temp 37   Humidification Temp Measured 36.9   Circuit Condensation Drained   Vent Patient Data   Peak Inspiratory Pressure 33 cmH2O   Mean Airway Pressure 23 cmH20   Rate Measured 37 br/min   Vt Exhaled 445 mL   Minute Volume 14.9 Liters   I:E Ratio 1:1.40   Plateau Pressure 30 [id number]   Cough/Sputum   Sputum How Obtained Endotracheal;Suctioned   Cough None   Spontaneous Breathing Trial (SBT) RT Doc   Pulse 111   Breath Sounds   Right Upper Lobe Diminished   Right Middle Lobe Diminished   Right Lower Lobe Diminished   Left Upper Lobe Diminished   Left Lower Lobe Diminished   Additional Respiratory  Assessments   Resp (!) 37   Position Prone   Alarm Settings   High Pressure Alarm 50 cmH2O   Low Minute Volume Alarm 4 L/min   High Respiratory Rate 45 br/min   Patient Observation   Observations ETT SIZE 8.0, SECURED AT 24 LIP LINE. AMBU BAG AT HEAD OF BED. WATER GOOD. ETT (adult)   Placement Date/Time: 01/14/22 1503   Timeout: Patient;Procedure; Appropriate Equipment;Correct Position  Preoxygenation: Yes  Mask Ventilation: Ventilated by mask (1)  Technique: Rapid sequence  Type: Cuffed  Tube Size: 8 mm  Laryngoscope: GlideScope  . ..    Secured at 24 cm   Measured From [address] [place] [address],[address] By [employer] tape;[employer] tape   Site Condition Dry

## 2022-02-02 NOTE — PROGRESS NOTES
Pt's O2 undetected and HR had fallen from 120 to 80 to 30 to asystole. Pt's wife at bedside. No heart sounds were auscultated by 2 RNs. Caty Corey and CLIFF Bran. TOD [id number].

## 2022-02-02 NOTE — FLOWSHEET NOTE
Called for bedside presence with family. Pt passed. Spouse and two adult sons present, answered questions about burial/ home, family was undecided at time of our visit. Expressed sadness [place] was my whole world,\" spouse said. Validated feelings, assisted with questions about selecting  home. Family mostly quiet during our visit, I let them know about  on-call, availability. Eze Lily  [phone]       [phone]   Encounter Summary   Services provided to: Family   Referral/Consult From: Nurse   Support System Spouse; Children   Continue Visiting   (2/2: pt passed, presence, answered questions, tears)   Complexity of Encounter High   Length of Encounter 30 minutes   Spiritual Assessment Completed Yes   Grief and Life Adjustment   Type Death   Assessment Tearful;Grieving   Intervention Nurtured hope; Active listening;Discussed belief system/Orthodox practices/khai;Discussed illness/injury and it's impact; Discussed death   Outcome Grieving

## 2022-02-07 NOTE — TELEPHONE ENCOUNTER
Can we change to something cheaper? [Alert] : alert [No Acute Distress] : no acute distress [Normal Sclera/Conjunctiva] : normal sclera/conjunctiva [EOMI] : extra ocular movement intact [No LAD] : no lymphadenopathy [Thyroid Not Enlarged] : the thyroid was not enlarged [No Thyroid Nodules] : no palpable thyroid nodules [No Respiratory Distress] : no respiratory distress [Clear to Auscultation] : lungs were clear to auscultation bilaterally [Normal S1, S2] : normal S1 and S2 [Regular Rhythm] : with a regular rhythm [No Edema] : no peripheral edema [Normal Bowel Sounds] : normal bowel sounds [Not Tender] : non-tender [Not Distended] : not distended [Soft] : abdomen soft [Normal Anterior Cervical Nodes] : no anterior cervical lymphadenopathy [No Clubbing, Cyanosis] : no clubbing  or cyanosis of the fingernails [No Rash] : no rash [Right foot was examined, including] : right foot ~C was examined, including visual inspection with sensory and pulse exams [Left foot was examined, including] : left foot ~C was examined, including visual inspection with sensory and pulse exams [Normal] : normal [2+] : 2+ in the dorsalis pedis [Normal Reflexes] : deep tendon reflexes were 2+ and symmetric [Normal Affect] : the affect was normal [Normal Mood] : the mood was normal [Acanthosis Nigricans] : no acanthosis nigricans [Diminished Throughout Both Feet] : normal tactile sensation with monofilament testing throughout both feet

## 2023-08-03 NOTE — PLAN OF CARE
Problem: Infection - Central Venous Catheter-Associated Bloodstream Infection:  Goal: Will show no infection signs and symptoms  Description: Will show no infection signs and symptoms  Outcome: Ongoing     Problem: Airway Clearance - Ineffective  Goal: Achieve or maintain patent airway  Outcome: Ongoing     Problem: Gas Exchange - Impaired  Goal: Absence of hypoxia  Outcome: Ongoing  Goal: Promote optimal lung function  Outcome: Ongoing     Problem: Breathing Pattern - Ineffective  Goal: Ability to achieve and maintain a regular respiratory rate  Outcome: Ongoing     Problem:  Body Temperature -  Risk of, Imbalanced  Goal: Ability to maintain a body temperature within defined limits  Outcome: Ongoing  Goal: Will regain or maintain usual level of consciousness  Outcome: Ongoing  Goal: Complications related to the disease process, condition or treatment will be avoided or minimized  Outcome: Ongoing     Problem: Isolation Precautions - Risk of Spread of Infection  Goal: Prevent transmission of infection  Outcome: Ongoing     Problem: Nutrition Deficits  Goal: Optimize nutritional status  Outcome: Ongoing     Problem: Risk for Fluid Volume Deficit  Goal: Maintain normal heart rhythm  Outcome: Ongoing  Goal: Maintain absence of muscle cramping  Outcome: Ongoing  Goal: Maintain normal serum potassium, sodium, calcium, phosphorus, and pH  Outcome: Ongoing     Problem: Loneliness or Risk for Loneliness  Goal: Demonstrate positive use of time alone when socialization is not possible  Outcome: Ongoing     Problem: Fatigue  Goal: Verbalize increase energy and improved vitality  Outcome: Ongoing     Problem: Patient Education: Go to Patient Education Activity  Goal: Patient/Family Education  Outcome: Ongoing     Problem: Skin Integrity:  Goal: Absence of new skin breakdown  Description: Absence of new skin breakdown  Outcome: Ongoing     Problem: Falls - Risk of:  Goal: Will remain free from falls  Description: Will remain free from falls  Outcome: Ongoing  Goal: Absence of physical injury  Description: Absence of physical injury  Outcome: Ongoing     Problem: Non-Violent Restraints  Goal: Removal from restraints as soon as assessed to be safe  Outcome: Ongoing  Goal: No harm/injury to patient while restraints in use  Outcome: Ongoing  Goal: Patient's dignity will be maintained  Outcome: Ongoing   Pt prone in bed with eyes closed, tolerating vent settings, IV's infusing, Navarro patent, B/L wrist restraints assessed, no s/s of distress. Routine medical exam